# Patient Record
Sex: MALE | Race: WHITE | Employment: FULL TIME | URBAN - METROPOLITAN AREA
[De-identification: names, ages, dates, MRNs, and addresses within clinical notes are randomized per-mention and may not be internally consistent; named-entity substitution may affect disease eponyms.]

---

## 2020-10-15 ENCOUNTER — APPOINTMENT (OUTPATIENT)
Dept: GENERAL RADIOLOGY | Age: 45
DRG: 629 | End: 2020-10-15

## 2020-10-15 ENCOUNTER — HOSPITAL ENCOUNTER (INPATIENT)
Age: 45
LOS: 7 days | Discharge: HOME HEALTH | DRG: 629 | End: 2020-10-22
Attending: EMERGENCY MEDICINE | Admitting: INTERNAL MEDICINE

## 2020-10-15 PROBLEM — L08.9 DIABETIC FOOT INFECTION (HCC): Status: ACTIVE | Noted: 2020-10-15

## 2020-10-15 PROBLEM — E08.40 DIABETIC NEUROPATHY ASSOCIATED WITH DIABETES MELLITUS DUE TO UNDERLYING CONDITION (HCC): Status: ACTIVE | Noted: 2020-10-15

## 2020-10-15 PROBLEM — E11.610 CHARCOT FOOT DUE TO DIABETES MELLITUS (HCC): Status: ACTIVE | Noted: 2020-10-15

## 2020-10-15 PROBLEM — I10 ESSENTIAL HYPERTENSION: Status: ACTIVE | Noted: 2020-10-15

## 2020-10-15 PROBLEM — E11.628 DIABETIC FOOT INFECTION (HCC): Status: ACTIVE | Noted: 2020-10-15

## 2020-10-15 LAB
ALBUMIN SERPL-MCNC: 3.5 G/DL (ref 3.5–5.2)
ALP BLD-CCNC: 105 U/L (ref 40–129)
ALT SERPL-CCNC: 21 U/L (ref 0–40)
ANION GAP SERPL CALCULATED.3IONS-SCNC: 13 MMOL/L (ref 7–16)
AST SERPL-CCNC: 25 U/L (ref 0–39)
BASOPHILS ABSOLUTE: 0.07 E9/L (ref 0–0.2)
BASOPHILS RELATIVE PERCENT: 0.5 % (ref 0–2)
BILIRUB SERPL-MCNC: 0.9 MG/DL (ref 0–1.2)
BUN BLDV-MCNC: 10 MG/DL (ref 6–20)
CALCIUM SERPL-MCNC: 9.3 MG/DL (ref 8.6–10.2)
CHLORIDE BLD-SCNC: 101 MMOL/L (ref 98–107)
CO2: 24 MMOL/L (ref 22–29)
CREAT SERPL-MCNC: 1.1 MG/DL (ref 0.7–1.2)
EOSINOPHILS ABSOLUTE: 0.15 E9/L (ref 0.05–0.5)
EOSINOPHILS RELATIVE PERCENT: 1 % (ref 0–6)
GFR AFRICAN AMERICAN: >60
GFR NON-AFRICAN AMERICAN: >60 ML/MIN/1.73
GLUCOSE BLD-MCNC: 116 MG/DL (ref 74–99)
HCT VFR BLD CALC: 39.7 % (ref 37–54)
HEMOGLOBIN: 12 G/DL (ref 12.5–16.5)
IMMATURE GRANULOCYTES #: 0.28 E9/L
IMMATURE GRANULOCYTES %: 1.9 % (ref 0–5)
LACTIC ACID, SEPSIS: 2.8 MMOL/L (ref 0.5–1.9)
LACTIC ACID: 1.1 MMOL/L (ref 0.5–2.2)
LYMPHOCYTES ABSOLUTE: 0.9 E9/L (ref 1.5–4)
LYMPHOCYTES RELATIVE PERCENT: 6 % (ref 20–42)
MCH RBC QN AUTO: 24.9 PG (ref 26–35)
MCHC RBC AUTO-ENTMCNC: 30.2 % (ref 32–34.5)
MCV RBC AUTO: 82.4 FL (ref 80–99.9)
MONOCYTES ABSOLUTE: 0.46 E9/L (ref 0.1–0.95)
MONOCYTES RELATIVE PERCENT: 3.1 % (ref 2–12)
NEUTROPHILS ABSOLUTE: 13.05 E9/L (ref 1.8–7.3)
NEUTROPHILS RELATIVE PERCENT: 87.5 % (ref 43–80)
PDW BLD-RTO: 14 FL (ref 11.5–15)
PLATELET # BLD: 561 E9/L (ref 130–450)
PMV BLD AUTO: 9.4 FL (ref 7–12)
POTASSIUM SERPL-SCNC: 4.2 MMOL/L (ref 3.5–5)
RBC # BLD: 4.82 E12/L (ref 3.8–5.8)
SODIUM BLD-SCNC: 138 MMOL/L (ref 132–146)
TOTAL PROTEIN: 9.3 G/DL (ref 6.4–8.3)
WBC # BLD: 14.9 E9/L (ref 4.5–11.5)

## 2020-10-15 PROCEDURE — 80053 COMPREHEN METABOLIC PANEL: CPT

## 2020-10-15 PROCEDURE — 85025 COMPLETE CBC W/AUTO DIFF WBC: CPT

## 2020-10-15 PROCEDURE — 1200000000 HC SEMI PRIVATE

## 2020-10-15 PROCEDURE — 87040 BLOOD CULTURE FOR BACTERIA: CPT

## 2020-10-15 PROCEDURE — 87075 CULTR BACTERIA EXCEPT BLOOD: CPT

## 2020-10-15 PROCEDURE — 83605 ASSAY OF LACTIC ACID: CPT

## 2020-10-15 PROCEDURE — 99222 1ST HOSP IP/OBS MODERATE 55: CPT | Performed by: INTERNAL MEDICINE

## 2020-10-15 PROCEDURE — 87070 CULTURE OTHR SPECIMN AEROBIC: CPT

## 2020-10-15 PROCEDURE — 99284 EMERGENCY DEPT VISIT MOD MDM: CPT

## 2020-10-15 PROCEDURE — 36415 COLL VENOUS BLD VENIPUNCTURE: CPT

## 2020-10-15 PROCEDURE — 2580000003 HC RX 258: Performed by: NURSE PRACTITIONER

## 2020-10-15 PROCEDURE — 73630 X-RAY EXAM OF FOOT: CPT

## 2020-10-15 RX ORDER — 0.9 % SODIUM CHLORIDE 0.9 %
1000 INTRAVENOUS SOLUTION INTRAVENOUS ONCE
Status: COMPLETED | OUTPATIENT
Start: 2020-10-15 | End: 2020-10-15

## 2020-10-15 RX ADMIN — SODIUM CHLORIDE 1000 ML: 9 INJECTION, SOLUTION INTRAVENOUS at 21:46

## 2020-10-15 SDOH — HEALTH STABILITY: MENTAL HEALTH: HOW OFTEN DO YOU HAVE A DRINK CONTAINING ALCOHOL?: NEVER

## 2020-10-16 ENCOUNTER — ANESTHESIA EVENT (OUTPATIENT)
Dept: OPERATING ROOM | Age: 45
DRG: 629 | End: 2020-10-16

## 2020-10-16 LAB
ALBUMIN SERPL-MCNC: 2.8 G/DL (ref 3.5–5.2)
ALP BLD-CCNC: 92 U/L (ref 40–129)
ALT SERPL-CCNC: 20 U/L (ref 0–40)
ANION GAP SERPL CALCULATED.3IONS-SCNC: 7 MMOL/L (ref 7–16)
ANTISTREPTOLYSIN-O: 1243 IU/ML (ref 0–200)
AST SERPL-CCNC: 29 U/L (ref 0–39)
BASOPHILS ABSOLUTE: 0.08 E9/L (ref 0–0.2)
BASOPHILS RELATIVE PERCENT: 0.6 % (ref 0–2)
BILIRUB SERPL-MCNC: 0.8 MG/DL (ref 0–1.2)
BUN BLDV-MCNC: 11 MG/DL (ref 6–20)
C-REACTIVE PROTEIN: 10.8 MG/DL (ref 0–0.4)
CALCIUM SERPL-MCNC: 8.3 MG/DL (ref 8.6–10.2)
CHLORIDE BLD-SCNC: 104 MMOL/L (ref 98–107)
CO2: 24 MMOL/L (ref 22–29)
CREAT SERPL-MCNC: 1.1 MG/DL (ref 0.7–1.2)
EKG ATRIAL RATE: 81 BPM
EKG P AXIS: -9 DEGREES
EKG P-R INTERVAL: 134 MS
EKG Q-T INTERVAL: 414 MS
EKG QRS DURATION: 96 MS
EKG QTC CALCULATION (BAZETT): 480 MS
EKG R AXIS: 35 DEGREES
EKG T AXIS: 1 DEGREES
EKG VENTRICULAR RATE: 81 BPM
EOSINOPHILS ABSOLUTE: 0.22 E9/L (ref 0.05–0.5)
EOSINOPHILS RELATIVE PERCENT: 1.6 % (ref 0–6)
GFR AFRICAN AMERICAN: >60
GFR NON-AFRICAN AMERICAN: >60 ML/MIN/1.73
GLUCOSE BLD-MCNC: 134 MG/DL (ref 74–99)
HBA1C MFR BLD: 7.3 % (ref 4–5.6)
HCT VFR BLD CALC: 34.8 % (ref 37–54)
HEMOGLOBIN: 10.6 G/DL (ref 12.5–16.5)
IMMATURE GRANULOCYTES #: 0.28 E9/L
IMMATURE GRANULOCYTES %: 2.1 % (ref 0–5)
LYMPHOCYTES ABSOLUTE: 1.75 E9/L (ref 1.5–4)
LYMPHOCYTES RELATIVE PERCENT: 13 % (ref 20–42)
MCH RBC QN AUTO: 24.9 PG (ref 26–35)
MCHC RBC AUTO-ENTMCNC: 30.5 % (ref 32–34.5)
MCV RBC AUTO: 81.7 FL (ref 80–99.9)
METER GLUCOSE: 107 MG/DL (ref 74–99)
METER GLUCOSE: 126 MG/DL (ref 74–99)
METER GLUCOSE: 142 MG/DL (ref 74–99)
METER GLUCOSE: 156 MG/DL (ref 74–99)
METER GLUCOSE: 94 MG/DL (ref 74–99)
MONOCYTES ABSOLUTE: 0.75 E9/L (ref 0.1–0.95)
MONOCYTES RELATIVE PERCENT: 5.6 % (ref 2–12)
NEUTROPHILS ABSOLUTE: 10.33 E9/L (ref 1.8–7.3)
NEUTROPHILS RELATIVE PERCENT: 77.1 % (ref 43–80)
PDW BLD-RTO: 14.1 FL (ref 11.5–15)
PLATELET # BLD: 479 E9/L (ref 130–450)
PMV BLD AUTO: 9.7 FL (ref 7–12)
POTASSIUM SERPL-SCNC: 4.1 MMOL/L (ref 3.5–5)
RBC # BLD: 4.26 E12/L (ref 3.8–5.8)
SEDIMENTATION RATE, ERYTHROCYTE: 78 MM/HR (ref 0–15)
SODIUM BLD-SCNC: 135 MMOL/L (ref 132–146)
TOTAL PROTEIN: 8.1 G/DL (ref 6.4–8.3)
WBC # BLD: 13.4 E9/L (ref 4.5–11.5)

## 2020-10-16 PROCEDURE — 1200000000 HC SEMI PRIVATE

## 2020-10-16 PROCEDURE — 99233 SBSQ HOSP IP/OBS HIGH 50: CPT | Performed by: INTERNAL MEDICINE

## 2020-10-16 PROCEDURE — 85025 COMPLETE CBC W/AUTO DIFF WBC: CPT

## 2020-10-16 PROCEDURE — 2580000003 HC RX 258: Performed by: INTERNAL MEDICINE

## 2020-10-16 PROCEDURE — 6370000000 HC RX 637 (ALT 250 FOR IP): Performed by: INTERNAL MEDICINE

## 2020-10-16 PROCEDURE — 6360000002 HC RX W HCPCS: Performed by: INTERNAL MEDICINE

## 2020-10-16 PROCEDURE — 94660 CPAP INITIATION&MGMT: CPT

## 2020-10-16 PROCEDURE — 36415 COLL VENOUS BLD VENIPUNCTURE: CPT

## 2020-10-16 PROCEDURE — 99254 IP/OBS CNSLTJ NEW/EST MOD 60: CPT | Performed by: SURGERY

## 2020-10-16 PROCEDURE — 82962 GLUCOSE BLOOD TEST: CPT

## 2020-10-16 PROCEDURE — 83036 HEMOGLOBIN GLYCOSYLATED A1C: CPT

## 2020-10-16 PROCEDURE — 2500000003 HC RX 250 WO HCPCS: Performed by: INTERNAL MEDICINE

## 2020-10-16 PROCEDURE — 93005 ELECTROCARDIOGRAM TRACING: CPT | Performed by: INTERNAL MEDICINE

## 2020-10-16 PROCEDURE — 2580000003 HC RX 258

## 2020-10-16 PROCEDURE — 80053 COMPREHEN METABOLIC PANEL: CPT

## 2020-10-16 PROCEDURE — 86060 ANTISTREPTOLYSIN O TITER: CPT

## 2020-10-16 PROCEDURE — 85651 RBC SED RATE NONAUTOMATED: CPT

## 2020-10-16 PROCEDURE — 86140 C-REACTIVE PROTEIN: CPT

## 2020-10-16 RX ORDER — ATORVASTATIN CALCIUM 10 MG/1
5 TABLET, FILM COATED ORAL NIGHTLY
COMMUNITY

## 2020-10-16 RX ORDER — DEXTROSE MONOHYDRATE 50 MG/ML
100 INJECTION, SOLUTION INTRAVENOUS PRN
Status: DISCONTINUED | OUTPATIENT
Start: 2020-10-16 | End: 2020-10-22 | Stop reason: HOSPADM

## 2020-10-16 RX ORDER — SODIUM CHLORIDE 0.9 % (FLUSH) 0.9 %
SYRINGE (ML) INJECTION
Status: COMPLETED
Start: 2020-10-16 | End: 2020-10-16

## 2020-10-16 RX ORDER — LABETALOL HYDROCHLORIDE 5 MG/ML
10 INJECTION, SOLUTION INTRAVENOUS EVERY 4 HOURS PRN
Status: DISCONTINUED | OUTPATIENT
Start: 2020-10-16 | End: 2020-10-22 | Stop reason: HOSPADM

## 2020-10-16 RX ORDER — CLINDAMYCIN PHOSPHATE 600 MG/50ML
600 INJECTION INTRAVENOUS ONCE
Status: COMPLETED | OUTPATIENT
Start: 2020-10-16 | End: 2020-10-16

## 2020-10-16 RX ORDER — SODIUM CHLORIDE 9 MG/ML
25 INJECTION, SOLUTION INTRAVENOUS EVERY 8 HOURS
Status: DISCONTINUED | OUTPATIENT
Start: 2020-10-16 | End: 2020-10-22 | Stop reason: HOSPADM

## 2020-10-16 RX ORDER — DEXTROSE MONOHYDRATE 25 G/50ML
12.5 INJECTION, SOLUTION INTRAVENOUS PRN
Status: DISCONTINUED | OUTPATIENT
Start: 2020-10-16 | End: 2020-10-22 | Stop reason: HOSPADM

## 2020-10-16 RX ORDER — INSULIN GLARGINE 100 [IU]/ML
15 INJECTION, SOLUTION SUBCUTANEOUS NIGHTLY
COMMUNITY

## 2020-10-16 RX ORDER — NICOTINE POLACRILEX 4 MG
15 LOZENGE BUCCAL PRN
Status: DISCONTINUED | OUTPATIENT
Start: 2020-10-16 | End: 2020-10-22 | Stop reason: HOSPADM

## 2020-10-16 RX ADMIN — SODIUM CHLORIDE, PRESERVATIVE FREE 10 ML: 5 INJECTION INTRAVENOUS at 12:24

## 2020-10-16 RX ADMIN — INSULIN LISPRO 2 UNITS: 100 INJECTION, SOLUTION INTRAVENOUS; SUBCUTANEOUS at 16:29

## 2020-10-16 RX ADMIN — SODIUM CHLORIDE 25 ML: 9 INJECTION, SOLUTION INTRAVENOUS at 15:16

## 2020-10-16 RX ADMIN — SODIUM CHLORIDE 650 MG: 9 INJECTION, SOLUTION INTRAVENOUS at 11:40

## 2020-10-16 RX ADMIN — CLINDAMYCIN IN 5 PERCENT DEXTROSE 600 MG: 12 INJECTION, SOLUTION INTRAVENOUS at 01:07

## 2020-10-16 RX ADMIN — PIPERACILLIN SODIUM AND TAZOBACTAM SODIUM 3.38 G: 3; .375 INJECTION, POWDER, LYOPHILIZED, FOR SOLUTION INTRAVENOUS at 12:24

## 2020-10-16 RX ADMIN — INSULIN LISPRO 1 UNITS: 100 INJECTION, SOLUTION INTRAVENOUS; SUBCUTANEOUS at 22:10

## 2020-10-16 RX ADMIN — PIPERACILLIN SODIUM AND TAZOBACTAM SODIUM 3.38 G: 3; .375 INJECTION, POWDER, LYOPHILIZED, FOR SOLUTION INTRAVENOUS at 20:31

## 2020-10-16 ASSESSMENT — PAIN SCALES - GENERAL
PAINLEVEL_OUTOF10: 0

## 2020-10-16 NOTE — PROGRESS NOTES
[facility] Progress Note    Admitting Date and Time: 10/15/2020  7:57 PM  Admit Dx: Diabetic foot infection ([facility] [address] [E11.628, L08.9]  Diabetic foot infection ([facility] [U11.427, L08.9]    Subjective:  Patient is being followed for Diabetic foot infection ([facility] [C54.604, L08.9]  Diabetic foot infection ([facility] [E11.628, L08.9]   Pt feels     ROS: denies fever, chills, cp, sob, n/v, HA unless stated above.       insulin lispro  0-12 Units Subcutaneous TID WC    insulin lispro  0-6 Units Subcutaneous Nightly    [START ON 10/17/2020] influenza virus vaccine  0.5 mL Intramuscular Prior to discharge    [START ON 10/17/2020] enoxaparin  40 mg Subcutaneous Daily     glucose, 15 g, PRN  dextrose, 12.5 g, PRN  glucagon (rDNA), 1 mg, PRN  dextrose, 100 mL/hr, PRN  labetalol, 10 mg, Q4H PRN         Objective:    /74   Pulse 80   Temp 98.5 °F (36.9 °C) (Oral)   Resp 18   Ht 6' 3\" (1.905 m)   Wt (!) 303 lb (137.4 kg)   SpO2 97%   BMI 37.87 kg/m²     General Appearance: alert and oriented to person, place and time and in no acute distress  Skin: warm and dry  Head: normocephalic and atraumatic  Eyes: pupils equal, round, and reactive to light, extraocular eye movements intact, conjunctivae normal  Neck: neck supple and non tender without mass   Pulmonary/Chest: clear to auscultation bilaterally- no wheezes, rales or rhonchi, normal air movement, no respiratory distress  Cardiovascular: normal rate, normal S1 and S2 and no carotid bruits  Abdomen: soft, non-tender, non-distended, normal bowel sounds, no masses or organomegaly  Extremities: no cyanosis, no clubbing and left foot in dressing had open wound  Neurologic: no cranial nerve deficit and speech normal        Recent Labs     10/15/20  2027 10/16/20  0507    135   K 4.2 4.1    104   CO2 24 24   BUN 10 11   CREATININE 1.1 1.1   GLUCOSE 116* 134*   CALCIUM 9.3 8.3*       Recent Labs     10/15/20  2027 10/16/20  0507   WBC 14.9* 13.4* RBC 4.82 4.26   HGB 12.0* 10.6*   HCT 39.7 34.8*   MCV 82.4 81.7   MCH 24.9* 24.9*   MCHC 30.2* 30.5*   RDW 14.0 14.1   * 479*   MPV 9.4 9.7         Assessment:    Principal Problem:    Diabetic foot infection (HCC)  Active Problems:    Charcot foot due to diabetes mellitus ([facility] [address]    Diabetic neuropathy associated with diabetes mellitus due to underlying condition [facility])    Essential hypertension  Resolved Problems:    * No resolved hospital problems. *      Plan:  1. Open wound, patient has areas today, see, will still clindamycin wound cultures were obtained as well as blood cultures, consult, podiatry and vascular surgery. Patient might need amputation versus debridement,   2. Diabetes type 2, patient is currently on Lantus 15 units and metformin, holding oral hypoglycemic agents, place the patient on sliding scale insulin, consider resuming Lantus as the patient is not n.p.o. anymore. 3.  History of hyperlipidemia, continue atorvastatin. 4.  Elevated blood pressure readings, patient has no history of hypertension, presented with a blood pressure of 170/89, trended down slightly to 120/74 this morning, going to call patient multiple times from low-dose ACE inhibitor      NOTE: This report was transcribed using voice recognition software. Every effort was made to ensure accuracy; however, inadvertent computerized transcription errors may be present.   Electronically signed by Ainsley Willis MD on 10/16/2020 at 9:06 AM

## 2020-10-16 NOTE — ANESTHESIA PRE PROCEDURE
Department of Anesthesiology  Preprocedure Note       Name:  Muna Griggs   Age:  54 y.o.  :  1975                                          MRN:  [id number]         Date:  10/16/2020      Surgeon: Traci Russell):  Kwesi Damico DPM    Procedure: Procedure(s):  LEFT FOOT  INCISION AND DRAINAGE, BONE DEBRIDEMENT AND BIOPSY    Medications prior to admission:   Prior to Admission medications    Medication Sig Start Date End Date Taking?  Authorizing Provider   insulin glargine (LANTUS) 100 UNIT/ML injection vial Inject 15 Units into the skin nightly   Yes Historical Provider, MD   atorvastatin (LIPITOR) 10 MG tablet Take 5 mg by mouth daily   Yes Historical Provider, MD   metFORMIN (GLUCOPHAGE) 500 MG tablet Take 500 mg by mouth daily (with breakfast)   Yes Historical Provider, MD       Current medications:    Current Facility-Administered Medications   Medication Dose Route Frequency Provider Last Rate Last Dose    glucose (GLUTOSE) 40 % oral gel 15 g  15 g Oral PRN Josee Holloway MD        dextrose 50 % IV solution  12.5 g Intravenous PRN Josee Holloway MD        glucagon (rDNA) injection 1 mg  1 mg Intramuscular PRN Josee Holloway MD        dextrose 5 % solution  100 mL/hr Intravenous PRN Josee Holloway MD        insulin lispro (HUMALOG) injection vial 0-12 Units  0-12 Units Subcutaneous TID WC Josee Holloway MD        insulin lispro (HUMALOG) injection vial 0-6 Units  0-6 Units Subcutaneous Nightly MD Talita Morris [START ON 10/17/2020] influenza quadrivalent split vaccine (FLUZONE;FLUARIX;FLULAVAL;AFLURIA) injection 0.5 mL  0.5 mL Intramuscular Prior to discharge MD Talita Reed [START ON 10/17/2020] enoxaparin (LOVENOX) injection 40 mg  40 mg Subcutaneous Daily Josee Holloway MD        labetalol (NORMODYNE;TRANDATE) injection 10 mg  10 mg Intravenous Q4H PRN Josee Holloway MD        DAPTOmycin (CUBICIN) 650 mg in sodium chloride 0.9 % 50 mL IVPB  6 mg/kg (Adjusted) Intravenous Q24H Orlena Beth, DO   Stopped at 10/16/20 1210    piperacillin-tazobactam (ZOSYN) 3.375 g in dextrose 5 % 50 mL IVPB extended infusion (mini-bag)  3.375 g Intravenous Q8H Orlena Beth, DO 12.5 mL/hr at 10/16/20 1224 3.375 g at 10/16/20 1224    0.9 % sodium chloride infusion  25 mL Intravenous Q8H Orlena [place], DO           Allergies: Allergies   Allergen Reactions    Vancomycin Other (See Comments)     Red man syndrome       Problem List:    Patient Active Problem List   Diagnosis Code    Diabetic foot infection ([facility] [address] E11.628, L08.9    Charcot foot due to diabetes mellitus ([facility] [address] E11.610    Diabetic neuropathy associated with diabetes mellitus due to underlying condition ([facility] E08.40    Essential hypertension I10       Past Medical History:        Diagnosis Date    Charcot foot due to diabetes mellitus ([facility]     Diabetes mellitus ([facility]     Hypertension        Past Surgical History:        Procedure Laterality Date    ANKLE FUSION  02/2020       Social History:    Social History     Tobacco Use    Smoking status: Never Smoker    Smokeless tobacco: Never Used   Substance Use Topics    Alcohol use: Never     Frequency: Never                                Counseling given: Not Answered      Vital Signs (Current):   Vitals:    10/15/20 2300 10/16/20 0015 10/16/20 0514 10/16/20 0753   BP: (!) 131/56 (!) 143/80  120/74   Pulse: 87 98  80   Resp: 17 18  18   Temp: 36.7 °C (98 °F) 36.9 °C (98.5 °F)  36.9 °C (98.5 °F)   TempSrc: Oral Oral  Oral   SpO2: 98% 98%  97%   Weight:   (!) 303 lb (137.4 kg)    Height:                                                  BP Readings from Last 3 Encounters:   10/16/20 120/74       NPO Status:  verbalized agreement of NPO after midnight. BMI:   Wt Readings from Last 3 Encounters:   10/16/20 (!) 303 lb (137.4 kg)     Body mass index is 37.87 kg/m².     CBC:   Lab Results Component Value Date    WBC 13.4 10/16/2020    RBC 4.26 10/16/2020    HGB 10.6 10/16/2020    HCT 34.8 10/16/2020    MCV 81.7 10/16/2020    RDW 14.1 10/16/2020     10/16/2020       CMP:   Lab Results   Component Value Date     10/16/2020    K 4.1 10/16/2020     10/16/2020    CO2 24 10/16/2020    BUN 11 10/16/2020    CREATININE 1.1 10/16/2020    GFRAA >60 10/16/2020    LABGLOM >60 10/16/2020    GLUCOSE 134 10/16/2020    PROT 8.1 10/16/2020    CALCIUM 8.3 10/16/2020    BILITOT 0.8 10/16/2020    ALKPHOS 92 10/16/2020    AST 29 10/16/2020    ALT 20 10/16/2020       POC Tests: No results for input(s): POCGLU, POCNA, POCK, POCCL, POCBUN, POCHEMO, POCHCT in the last 72 hours.     Coags: No results found for: PROTIME, INR, APTT    HCG (If Applicable): No results found for: PREGTESTUR, PREGSERUM, HCG, HCGQUANT     ABGs: No results found for: PHART, PO2ART, MZX4WAQ, FLC1BVR, BEART, H4JAKJOR     Type & Screen (If Applicable):  No results found for: LABABO, LABRH    Drug/Infectious Status (If Applicable):  No results found for: HIV, HEPCAB    COVID-19 Screening (If Applicable): No results found for: COVID19      Anesthesia Evaluation  Patient summary reviewed and Nursing notes reviewed no history of anesthetic complications:   Airway: Mallampati: II  TM distance: >3 FB   Neck ROM: full  Mouth opening: > = 3 FB Dental: normal exam         Pulmonary: breath sounds clear to auscultation                             Cardiovascular:  Exercise tolerance: poor (<4 METS),   (+) hypertension:, hyperlipidemia      ECG reviewed  Rhythm: regular  Rate: normal           Beta Blocker:  Not on Beta Blocker         Neuro/Psych:   (+) neuromuscular disease (diebetic neuropathy ):,             GI/Hepatic/Renal:   (+) GERD: well controlled,           Endo/Other:    (+) DiabetesType II DM, using insulin, blood dyscrasia: anticoagulation therapy and anemia:., .          Pt had no PAT visit        ROS comment: obese Abdominal: Vascular:   + PVD, aortic or cerebral (peripheral vasc disease), .                            10/16/2020  7:33 AM - [place], Mhy Incoming Ekg Results From [facility]     Component  Value  Ref Range & Units  Status  Collected  Lab    Ventricular Rate  81  BPM  Incomplete  10/16/2020 7:22 AM  [facility]    Atrial Rate  81  BPM  Incomplete  10/16/2020 7:22 AM  [facility]    P-R Interval  134  ms  Incomplete  10/16/2020 7:22 AM  [facility]    QRS Duration  96  ms  Incomplete  10/16/2020 7:22 AM  [facility]    Q-T Interval  414  ms  Incomplete  10/16/2020 7:22 AM  [facility]    QTc Calculation (Bazett)  480  ms  Incomplete  10/16/2020 7:22 AM  [facility]    P Axis  -9  degrees  Incomplete  10/16/2020 7:22 AM  [facility]    R Axis  35  degrees  Incomplete  10/16/2020 7:22 AM  [facility]    T Axis  1  degrees  Incomplete  10/16/2020 7:22 AM  [facility]    Testing Performed By     Lab - Abbreviation  Name  Director  Address  Valid Date Range    360-[facility]  [facility] MUSE  Unknown  Unknown  04/18/16 0721-Present    Narrative & Impression     Normal sinus rhythm  Prolonged QT  Abnormal ECG  No previous ECGs available          Anesthesia Plan      MAC     ASA 3       Induction: intravenous. MIPS: Prophylactic antiemetics administered. Anesthetic plan and risks discussed with patient. Plan discussed with CRNA and attending. Gill Gallegos RN   10/16/2020    DOS STAFF ADDENDUM:    Pt seen and examined, physical exam updated, chart reviewed including anesthesia, drug and allergy history. H&P reviewed. No interval changes to history or physical examination (unless noted above). NPO status confirmed. Anesthetic plan, risks, benefits, alternatives discussed with patient. Patient verbalized an understanding and agrees to proceed.      Madelyn Krabbe, MD  Staff Anesthesiologist  6:23 AM

## 2020-10-16 NOTE — ED PROVIDER NOTES
ED Attending  CC: Amber       Department of Emergency Medicine   ED  Provider Note  Admit Date/RoomTime: 10/15/2020  7:57 PM  ED Room: \[facility]\""/[place] [address]  MRN: [id number]  Chief Complaint:   Wound Check (left foot wound since Saturday, hx DM)       History of Present Illness   Source of history provided by:  patient. History/Exam Limitations: none. Lucia Casanova is a 54 y.o. male who has a past medical history of:   Past Medical History:   Diagnosis Date    Charcot foot due to diabetes mellitus ([place] [address]     Diabetes mellitus ([facility] [address]     Hypertension     presents to the ED by private vehicle for left foot wound, beginning 5 days ago and are gradually worsening since that time. The complaint has been constant, severe in severity. Patient states he is a type II diabetic and is on insulin. He states that he has a podiatrist, endocrinologist, and infectious disease doctor in [place]. He states he has an appointment with all of his specialties on Monday. He states walking on it makes it worse and nothing makes it better. He denies headache, fever, chills, chest pain, shortness of breath, cough, abdominal pain, nausea, vomiting, diarrhea, constipation, dysuria, or trauma. ROS   Pertinent positives and negatives are stated within HPI, all other systems reviewed and are negative. Past Surgical History:   Procedure Laterality Date    ANKLE FUSION  02/2020   Social History:  reports that he has never smoked. He has never used smokeless tobacco. He reports that he does not drink alcohol or use drugs. Family History: family history is not on file.    Allergies: Vancomycin    Physical Exam Section   Oxygen Saturation Interpretation: Normal.   ED Triage Vitals [10/15/20 1753]   BP Temp Temp Source Pulse Resp SpO2 Height Weight   (!) 170/89 97.6 °F (36.4 °C) Temporal 96 18 99 % 6' 3\" (1.905 m) 280 lb (127 kg)       Physical Exam  · Constitutional/General: Alert and oriented x3, well appearing, non toxic.  · HEENT:  NC/NT. PERRLA,  Airway patent. · Neck: Supple, full ROM, non tender to palpation in the midline, no stridor, no crepitus, no meningeal signs  · Respiratory: Lungs clear to auscultation bilaterally, no wheezes, rales, or rhonchi. Not in respiratory distress  · CV:  Regular rate. Regular rhythm. No murmurs, gallops, or rubs. 2+ distal pulses  · Chest: No chest wall tenderness  · GI:  Abdomen Soft, Non tender, Non distended. +BS. No rebound, guarding, or rigidity. No pulsatile masses. · Musculoskeletal: Moves all extremities x 4. Warm and well perfused, no clubbing, cyanosis, or edema. Capillary refill <3 seconds  · Integument: skin warm and dry. No rashes. See image below of left foot wound. Wound culture went down to the bone. · Lymphatic: no lymphadenopathy noted  · Neurologic: GCS 15, no focal deficits, symmetric strength 5/5 in the upper and lower extremities bilaterally  · Psychiatric: Normal Affect            **Informed Consent**    The patient has given verbal consent to have photos taken of left foot and inserted into their ED Provider Note as part of their permanent medical record for purposes of documentation, treatment management and/or medical review. All Images taken on 10/15/20 of patient name: Katheryn Crowder were transmitted and stored on secured [employer] located within [facility] by a registered [employer] Mobile Application Device.          Lab / Imaging Results   (All laboratory and radiology results have been personally reviewed by myself)  Labs:  Results for orders placed or performed during the hospital encounter of 10/15/20   CBC Auto Differential   Result Value Ref Range    WBC 14.9 (H) 4.5 - 11.5 E9/L    RBC 4.82 3.80 - 5.80 E12/L    Hemoglobin 12.0 (L) 12.5 - 16.5 g/dL    Hematocrit 39.7 37.0 - 54.0 %    MCV 82.4 80.0 - 99.9 fL    MCH 24.9 (L) 26.0 - 35.0 pg    MCHC 30.2 (L) 32.0 - 34.5 %    RDW 14.0 11.5 - 15.0 fL    Platelets 585 (H) 955 - 450 E9/L this time until the patient is evaluated by podiatry. Procedures:   none    MDM: Patient presented with diabetic foot wound to the bottom of his left foot. Patient's primary care, podiatry, endocrinology, and infectious disease specialists are all in [place]. Patient was offered transfer, but declined. His diagnostics were reviewed and discussed with him and internal medicine. Patient requires admission for further treatment evaluation. He was stable in the emergency department and appeared otherwise well and nontoxic. Counseling:   I have spoken with the patient and discussed todays results, in addition to providing specific details for the plan of care and counseling regarding the diagnosis and prognosis and are agreeable with the plan. Assessment      1. Diabetic foot infection ([address]      This patient's ED course included: a personal history and physicial examination  This patient has remained hemodynamically stable during their ED course. Plan   Admit to med/surg floor. Patient condition is stable. New Medications     New Prescriptions    No medications on file     Electronically signed by OPAL Ha CNP   DD: 10/15/20  **This report was transcribed using voice recognition software. Every effort was made to ensure accuracy; however, inadvertent computerized transcription errors may be present.   END OF PROVIDER NOTE       OPAL Liz CNP  10/15/20 5906

## 2020-10-16 NOTE — CONSULTS
Podiatry Consult H&P  10/16/2020   Mily Reilly       SUBJECTIVE: This is a 54 y.o. male seen bedside for worsening L foot wound. Patient states the wound appeared Saturday and has been getting progressively worse since then. Patient previously saw a podiatrist in [place] but has recently moved and does not have a provider in this area. Patient states he has had wounds in the past but they have all healed very well without issue. Patient denies any nausea, vomiting, fever or chills. Patient denies any other pedal complaints. Past Medical History:   Diagnosis Date    Charcot foot due to diabetes mellitus ([facility] [address]     Diabetes mellitus ([facility] [facility]     Hypertension         Past Surgical History:   Procedure Laterality Date    ANKLE FUSION  02/2020         History reviewed. No pertinent family history. Social History     Tobacco Use    Smoking status: Never Smoker    Smokeless tobacco: Never Used   Substance Use Topics    Alcohol use: Never     Frequency: Never        Prior to Admission medications    Medication Sig Start Date End Date Taking? Authorizing Provider   insulin glargine (LANTUS) 100 UNIT/ML injection vial Inject 15 Units into the skin nightly   Yes Historical Provider, MD   atorvastatin (LIPITOR) 10 MG tablet Take 5 mg by mouth daily   Yes Historical Provider, MD   metFORMIN (GLUCOPHAGE) 500 MG tablet Take 500 mg by mouth daily (with breakfast)   Yes Historical Provider, MD        Vancomycin         OBJECTIVE:        Vitals:    10/16/20 0753   BP: 120/74   Pulse: 80   Resp: 18   Temp: 98.5 °F (36.9 °C)   SpO2: 97%              EXAM:        Pt is AAOx3    Vascular Exam:  DP and PT pulses are +1 on the right . DP and PT pulses are +1 on the left. CFT is <3 seconds bilateral lower extremity.   Edema is Present L foot/ankle    Neuro Exam:  Light touch sensation is Absent  bilaterally Protective sensation is absent 0/10 using Meeker-Lexx monofilament bilaterally    Dermatologic Exam: L foot wound pictured below. Plantar wound probes down to bone and communicates with small dorsal wound. Wound is malodorous with purulent drainage, periwound erythema and edema. MSK: Muscle strength 5/5 Bilateral  ROM is full without pain or crepitation bilateral.  No Pain on palpation Left foot/feet. Charcot deformity of LLE with rocker bottom foot type.     Current Facility-Administered Medications   Medication Dose Route Frequency Provider Last Rate Last Dose    glucose (GLUTOSE) 40 % oral gel 15 g  15 g Oral PRN Soraya Hitchcock MD        dextrose 50 % IV solution  12.5 g Intravenous PRN Soraya Hitchcock MD        glucagon (rDNA) injection 1 mg  1 mg Intramuscular PRN Soraya Hitchcock MD        dextrose 5 % solution  100 mL/hr Intravenous PRN Soraya Hitchcock MD        insulin lispro (HUMALOG) injection vial 0-12 Units  0-12 Units Subcutaneous TID WC Soraya Hitchcock MD        insulin lispro (HUMALOG) injection vial 0-6 Units  0-6 Units Subcutaneous Nightly MD Jann Hester [START ON 10/17/2020] influenza quadrivalent split vaccine (FLUZONE;FLUARIX;FLULAVAL;AFLURIA) injection 0.5 mL  0.5 mL Intramuscular Prior to discharge MD Jann Serrano [START ON 10/17/2020] enoxaparin (LOVENOX) injection 40 mg  40 mg Subcutaneous Daily Soraya Hitchcock MD        labetalol (NORMODYNE;TRANDATE) injection 10 mg  10 mg Intravenous Q4H PRN Soraya Hitchcock MD            Lab Results   Component Value Date    WBC 13.4 (H) 10/16/2020    HCT 34.8 (L) 10/16/2020    HGB 10.6 (L) 10/16/2020     (H) 10/16/2020     10/16/2020    K 4.1 10/16/2020     10/16/2020    CO2 24 10/16/2020    BUN 11 10/16/2020    CREATININE 1.1 10/16/2020    GLUCOSE 134 (H) 10/16/2020    CRP 10.8 (H) 10/16/2020         Radiographs:    ASSESSMENT:  Principal Problem:  Full thickness would of L foot to the level of bone, infected  Osteomyelitis, L foot  Charcot deformity of LLE  Diabetes with neuropathy PLAN:  -Evaluation and Management  -Charts and labs reviewed  -ID following, appreciate input  -Vascular following, from their standpoint we are clear to proceed with debridement for source control.  -L foot x-rays show osseous erosion and subcutaneous air. Based on clinical presentations we think the subcutaneous air is trapped free as the wound is wide and gaping, not indicative of a gas gangrene requiring emergent surgical intervention.   -That being said, would is still clinically infected requiring surgical intervention. Discussed this with patient and he is agreeable to debridement. All details of procedure were explained including any and all risks and complications.   -Patient scheduled for R foot incision and drainage with bone debridement and biopsy tomorrow 7:30am.  -Plan for possible application of wound vac 1-2 days are debridement. -NPO midnight  -Obtain surgical consent  -Patient discussed with Dr. Rosario Castillo.        Victorina Hampton   10/16/2020   9:31 AM

## 2020-10-16 NOTE — CONSULTS
[address] [place] [address] [facility]    Consultation Note     Admit Date: 10/15/2020  7:57 PM    Reason for Consult:          -- diabetic foot infection +charcot joint          Attending Physician:  Giuilano Saenzr, MD     Chief Complaint: Left diabetic foot infection    HISTORY OF PRESENT ILLNESS:     The patient is a 54 y.o.  male not previous known to the Infectious Diseases service. The patient has been a type II diabetic for approximately 10 years. He has a history of Charcot arthropathy and has had numerous procedures on his right foot. Recently developed an ulceration on his left foot. He had been managed by his podiatrist in [place] and he was using a walking boot until several days ago when he removed the boot and noticed severe maceration of the entire plantar aspect of his foot. Showed his wife and she urged him come to the emergency room. He tried local wound care with equipment that he had at home from previous infections with no improvement. He did notice significant drainage and discomfort of the left foot. In the emergency room wound culture tips pushed through necrotic tissue to bone. He denies any fever chills nausea or vomiting. He has been previously treated with vancomycin for approximately 6 weeks but at the end of therapy developed \"red man\" syndrome. 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 He was previously treated with daptomycin.     Past Medical History:          Diagnosis Date    Charcot foot due to [facility] mellitus ([facility]     Diabetes mellitus ([facility]     Hypertension      Past Surgical History:          Procedure Laterality Date    ANKLE FUSION  02/2020     Current Medications:     Scheduled Meds:   insulin lispro  0-12 Units Subcutaneous TID     insulin lispro  0-6 Units Subcutaneous Nightly    [START ON 10/17/2020] influenza virus vaccine  0.5 mL Intramuscular Prior to discharge    [START ON 10/17/2020] enoxaparin  40 mg Subcutaneous Daily     Continuous Infusions:   dextrose       PRN Meds:glucose, dextrose, glucagon (rDNA), dextrose, labetalol    Allergies:  Vancomycin    Social History:     Social History     Socioeconomic History    Marital status:      Spouse name: None    Number of children: None    Years of education: None    Highest education level: None   Occupational History    None   Social Needs    Financial resource strain: None    Food insecurity     Worry: None     Inability: None    Transportation needs     Medical: None     Non-medical: None   Tobacco Use    Smoking status: Never Smoker    Smokeless tobacco: Never Used   Substance and Sexual Activity    Alcohol use: Never     Frequency: Never    Drug use: Never    Sexual activity: Not Currently   Lifestyle    Physical activity     Days per week: None     Minutes per session: None    Stress: None   Relationships    Social connections     Talks on phone: None     Gets together: None     Attends Gnosticist service: None     Active member of club or organization: None     Attends meetings of clubs or organizations: None     Relationship status: None    Intimate partner violence     Fear of current or ex partner: None     Emotionally abused: None     Physically abused: None     Forced sexual activity: None   Other Topics Concern    None   Social History Narrative    None         Family History:     History reviewed. No pertinent family history. . Otherwise non-pertinent to the chief complaint. REVIEW OF SYSTEMS:    CONSTITUTIONAL:  No chills, fevers or night sweats. No loss of weight. EYES:  No double vision or drainage from eyes, ears or throat. HEENT:  No neck stiffness. No dysphagia. No drainage from eyes, ears or throat  RESPIRATORY:  No cough, productive sputum or hemoptysis. CARDIOVASCULAR:  No chest pain, palpitations, orthopnea or dyspnea on exertion.   GASTROINTESTINAL:  No nausea, vomiting, diarrhea or constipation or hematochezia GENITOURINARY:  No frequency burning dysuria or hematuria. INTEGUMENT/BREAST:  No rash or breast masses. HEMATOLOGIC/LYMPHATIC:  No lymphadenopathy or blood dyscrasics. ALLERGIC/IMMUNOLOGIC:  No anaphylaxis. ENDOCRINE:  No polyuria or polydipsia or temperature intolerance. MUSCULOSKELETAL: Charcot arthropathy  NEUROLOGICAL:  No focal motor sensory deficit. BEHAVIOR/PSYCH:  No psychosis. PHYSICAL EXAM:      Vitals:    /74   Pulse 80   Temp 98.5 °F (36.9 °C) (Oral)   Resp 18   Ht 6' 3\" (1.905 m)   Wt (!) 303 lb (137.4 kg)   SpO2 97%   BMI 37.87 kg/m²   Constitutional: The patient is awake, alert, and oriented. Skin: Warm and dry. No rashes were noted. No jaundice. HEENT: Eyes show round, and reactive pupils. Moist mucous membranes, no ulcerations, no thrush. Neck: Supple to movements. No lymphadenopathy. Chest: No use of accessory muscles to breathe. Symmetrical expansion. Auscultation reveals no wheezing, crackles, or rhonchi. Cardiovascular: Regular rate and rhythm. No murmurs appreciated. Abdomen: Positive bowel sounds to auscultation. Benign to palpation. No masses felt. No hepatosplenomegaly.   Extremities: Dressing left foot clean and dry-see photo  Neurological: No focal neurologic deficits                            CBC+dif:  Recent Labs     10/15/20  2027 10/16/20  0507   WBC 14.9* 13.4*   HGB 12.0* 10.6*   HCT 39.7 34.8*   MCV 82.4 81.7   * 479*   NEUTROABS 13.05* 10.33*     Lab Results   Component Value Date    CRP 10.8 (H) 10/16/2020     No results found for: CRP  Lab Results   Component Value Date    SEDRATE 78 (H) 10/16/2020     Lab Results   Component Value Date    ALT 20 10/16/2020    AST 29 10/16/2020    ALKPHOS 92 10/16/2020    BILITOT 0.8 10/16/2020     Lab Results   Component Value Date     10/16/2020    K 4.1 10/16/2020     10/16/2020    CO2 24 10/16/2020    BUN 11 10/16/2020    CREATININE 1.1 10/16/2020    GFRAA >60 10/16/2020    LABGLOM >60 10/16/2020    GLUCOSE 134 10/16/2020    PROT 8.1 10/16/2020    LABALBU 2.8 10/16/2020    CALCIUM 8.3 10/16/2020    BILITOT 0.8 10/16/2020    ALKPHOS 92 10/16/2020    AST 29 10/16/2020    ALT 20 10/16/2020       No results found for: PROTIME, INR    No results found for: TSH    No results found for: NITRITE, COLORU, PHUR, LABCAST, WBCUA, RBCUA, MUCUS, TRICHOMONAS, YEAST, BACTERIA, CLARITYU, SPECGRAV, LEUKOCYTESUR, UROBILINOGEN, BILIRUBINUR, BLOODU, GLUCOSEU, AMORPHOUS    No results found for: AIZ7QIB, BEART, N7RYJKGL, PHART, THGBART, MBV8NST, PO2ART, NTV8TMQ     Radiology:  XR FOOT LEFT (MIN 3 VIEWS)   Final Result   1. Mid foot plantar surface ulceration with subjacent subcutaneous air and   evidence of osseous erosion concerning for osseous infection. 2.  Osseous fusion of the osseous components of the midfoot. Microbiology:  Pending  No results for input(s): BC in the last 72 hours. No results for input(s): ORG in the last 72 hours. No results for input(s): Alba Jimmy in the last 72 hours. No results for input(s): STREPNEUMAGU in the last 72 hours. No results for input(s): LP1UAG in the last 72 hours. Recent Labs     10/16/20  0507   ASO 1243*     No results for input(s): CULTRESP in the last 72 hours. Problem list:    Principal Problem:    Diabetic foot infection ([facility]  Active Problems:    Charcot foot due to diabetes mellitus ([facility]    Diabetic neuropathy associated with diabetes mellitus due to underlying condition [facility])    Essential hypertension  Resolved Problems:    * No resolved hospital problems. *      Assessment:    · Severe diabetic foot infection left foot with osteomyelitis  · Charcot arthropathy  · Type 2 diabetes mellitus    Plan:      · Zosyn 3.375 g IV every 8 hours  · Daptomycin 6 mg/kg daily  · Check blood and wound cultures  · Baseline ESR, CRP noted. · Monitor labs  · Will follow with you    Thank you for having us see this patient in consultation.  I will be discussing this case with the treating physicians.       Electronically signed by Moses Mitchell DO on 10/16/2020 at 10:32 AM

## 2020-10-16 NOTE — PROGRESS NOTES
Perfect serve sent to Dr. Anjana Pugh regarding consult. Awaiting response.     Electronically signed by Tara Lowe RN on 10/16/2020 at 6:03 AM

## 2020-10-16 NOTE — CONSULTS
Vascular Surgery Consultation Note    Reason for Consult:  Diabetic foot, ? Amputation, rule out necrotizing fasciitis    HPI :    This is a 54 y.o. male who is admitted to the hospital for treatment of left foot wound and pain that he states began Saturday. He states it wasn't getting better so he came to the hospital. He has had multiple prior wounds and surgeries to his foot. States his sugars are usually normal. Admits to neuropathy. He has a boot for left foot but he has not been wearing it. Vascular surgery is consulted for evaluation and treatment of left foot wound. Pt has had pain for the past 6 days. They have 10/10 pain associated with this problem.       ROS : Negative if blank [], Positive if [x]  General Vascular   [] Fevers [] Claudication (Blocks)   [] Chills [] Rest Pain   [] Weight Loss [x] Tissue Loss   [] Chest Pain [] Clotting Disorder    [] SOB at rest [] Leg Swelling   [] SOB with exertion [] DVT/PE      [] Nausea    [] Vomitting [] Stroke/TIA   [] Abdominal Pain [] Focal weakness   [] Melena [] Slurred Speech   [] Hematochezia [] Vision Changes   [] Hematuria    [] Dysuria [] Hx of Central Catheters   [] Wears Glasses/Contacts  [] Dialysis and If so date initiated   [] Blindness     [x] Right Hand Dominant   [] Difficulty swallowing        Past Medical History:   Diagnosis Date    Charcot foot due to diabetes mellitus ([facility]     Diabetes mellitus ([facility]     Hypertension         Past Surgical History:   Procedure Laterality Date    ANKLE FUSION  02/2020     Current Medications:    dextrose        glucose, dextrose, glucagon (rDNA), dextrose, labetalol    insulin lispro  0-12 Units Subcutaneous TID WC    insulin lispro  0-6 Units Subcutaneous Nightly    [START ON 10/17/2020] influenza virus vaccine  0.5 mL Intramuscular Prior to discharge    [START ON 10/17/2020] enoxaparin  40 mg Subcutaneous Daily        Allergies:  Vancomycin    Social History     Socioeconomic History    Marital status:      Spouse name: Not on file    Number of children: Not on file    Years of education: Not on file    Highest education level: Not on file   Occupational History    Not on file   Social Needs    Financial resource strain: Not on file    Food insecurity     Worry: Not on file     Inability: Not on file    Transportation needs     Medical: Not on file     Non-medical: Not on file   Tobacco Use    Smoking status: Never Smoker    Smokeless tobacco: Never Used   Substance and Sexual Activity    Alcohol use: Never     Frequency: Never    Drug use: Never    Sexual activity: Not Currently   Lifestyle    Physical activity     Days per week: Not on file     Minutes per session: Not on file    Stress: Not on file   Relationships    Social connections     Talks on phone: Not on file     Gets together: Not on file     Attends Episcopalian service: Not on file     Active member of club or organization: Not on file     Attends meetings of clubs or organizations: Not on file     Relationship status: Not on file    Intimate partner violence     Fear of current or ex partner: Not on file     Emotionally abused: Not on file     Physically abused: Not on file     Forced sexual activity: Not on file   Other Topics Concern    Not on file   Social History Narrative    Not on file        History reviewed. No pertinent family history.     PHYSICAL EXAM:    BP (!) 143/80   Pulse 98   Temp 98.5 °F (36.9 °C) (Oral)   Resp 18   Ht 6' 3\" (1.905 m)   Wt (!) 303 lb (137.4 kg)   SpO2 98%   BMI 37.87 kg/m²   CONSTITUTIONAL:  awake, alert, cooperative, no apparent distress, and appears stated age  Normal  LUNGS:  no increased work of breathing, good air exchange  CARDIOVASCULAR:  regular rate and rhythm no murmur noted  ABDOMEN:  soft, non-distended, non-tender, Aorta is not palpable  SKIN:  Breakdown of skin left foot with erythema, induration  EXTREMITIES:   R UE Swelling absent Incisions absent       5/5 Strength  L UE Swelling absent Incisions absent       5/5 Strength  R LE Edema present  Incisions present   Varicose veins absent    Wounds absent, normalcaprefill   5/5 Strength, neuropathy is present  L LE Edema present  Incisions absent    Varicose veins absent    Wounds present, normalcaprefill   5/5 Strength, neuropathy is present  R brachial 2 L brachial 2   R radial 1 L radial 1   R femoral 2 L femoral 2   R popliteal -             L popliteal -   R posterior tibial 1 L posterior tibial multiphasic   R dorsalis pedis 1 L dorsalis pedis multiphasic     LABS:    Lab Results   Component Value Date    WBC 13.4 (H) 10/16/2020    HGB 10.6 (L) 10/16/2020    HCT 34.8 (L) 10/16/2020     (H) 10/16/2020    K 4.1 10/16/2020    BUN 11 10/16/2020    CREATININE 1.1 10/16/2020       RADIOLOGY:    Assesment/Plan  Diabetic foot ulcer    - will need debridement vs amputation, podiatry on board   - continue IV abx as per primary  - NPO, IVFs  - Will discuss with Dr. Cami Swenson     This patient was seen and examined. Agree with above. He has no calf pain or tenderness on physical examination. He has a multiphasic DP and PT signal bilaterally. I discussed the options. Podiatry has been consulted. I would recommend aggressive debridement for source control of the infection. We will order ankle-brachial indices. However I do not think these need to be done or completed prior to source control of the infection and podiatry can proceed with aggressive debridement and source control of the left lower extremity infection. I did talk with the patient about ongoing worsening of his foot including a left lower extremity amputation sepsis and/or death he understands and would like to try to do everything to save his lower extremity. He does have a history of bilateral Charcot joints. With a prior ankle fusion of his right lower extremity. Unfortunately this is been going on since Saturday. He was unaware secondary to neuropathy of his lower extremity. We will await to see how he progresses after podiatry evaluation and treatment.     Pertinent radiology and labs were reviewed    Preston Bush MD

## 2020-10-16 NOTE — PROGRESS NOTES
[facility] Quality Flow/Interdisciplinary Rounds Progress Note        Quality Flow Rounds held on October 16, 2020    Disciplines Attending:  Bedside Nurse, ,  and Nursing Unit Leadership    Alondra Van was admitted on 10/15/2020  7:57 PM    Anticipated Discharge Date:  Expected Discharge Date: 10/19/20    Disposition:    Eamon Score:  Eamon Scale Score: 22    Readmission Risk              Risk of Unplanned Readmission:        9           Discussed patient goal for the day, patient clinical progression, and barriers to discharge.   The following Goal(s) of the Day/Commitment(s) have been identified:  Diagnostics - Report Results and Labs - Report Results, New ID consult      Gurmeet Bass  October 16, 2020

## 2020-10-16 NOTE — PROGRESS NOTES
Perfect serve sent to Dr. Prakash Rowe regarding consult. Dr. Prakash Rowe called unit, stated he will send residents to see him this morning.      Electronically signed by Lisbeth Bajwa RN on 10/16/2020 at 6:06 AM

## 2020-10-16 NOTE — CARE COORDINATION
Met with patient about diagnosis and discharge plan of care. Pt lives with spouse in 2nd floor apt. Pt has [employer] and cane at home, no other DME or home care services. Pt has NO PCP or health insurance at this time. Pt has a diabetic infected wound on his left foot. Pt on IV antibiotics and plans are for OR in the am with possible wound vac. Pt WILL need some assistance for home vs possible ZINA. Waiting for HELP to see pt. Tentative referral given to Leila from [facility].  Will need to follow-MJO

## 2020-10-16 NOTE — PROGRESS NOTES
Per Dr. Isma Corbin, called [place] [phone], [place] @ 2042.  advised will have [address] return call. Pt indicated he has extensive wound care history for diabetic foot wound. Per Dr. Isma Corbin, will request for last year. Completed Release of Information form seeking records from 10/01/2019 to 10/15/2020. Jez Guillen called from [facility] @ 2046. Stated Medical Records available until at least 2100. Called # provided: [phone] @ 2047. To fax signed release to: [phone]. Daniel David advised available until 2200. Pt advised, upon request of signature on Release form, that it is not [facility], but [facility] [address] [place] [address] this facility @ 2106    Manager advised to fax completed release to: [phone]. Faxed signed release @ 2125    Received records via fax @ [phone]. Gave to Dr. Isma Corbin.

## 2020-10-16 NOTE — PLAN OF CARE
Problem: Pain:  Goal: Pain level will decrease  Description: Pain level will decrease  10/16/2020 1159 by Bebeto Price RN  Outcome: Met This Shift  10/16/2020 0127 by Brunilda Romano RN  Outcome: Met This Shift  Goal: Control of acute pain  Description: Control of acute pain  10/16/2020 1159 by Bebeto Price RN  Outcome: Met This Shift  10/16/2020 0127 by Brunilda Romano RN  Outcome: Met This Shift  Goal: Control of chronic pain  Description: Control of chronic pain  10/16/2020 1159 by Bebeto Price RN  Outcome: Met This Shift  10/16/2020 0127 by Brunilda Romano RN  Outcome: Met This Shift     Problem: Skin Integrity:  Goal: Will show no infection signs and symptoms  Description: Will show no infection signs and symptoms  10/16/2020 0127 by Brunilda Romano RN  Outcome: Ongoing  Goal: Absence of new skin breakdown  Description: Absence of new skin breakdown  10/16/2020 0127 by Brunilda Romano RN  Outcome: Ongoing

## 2020-10-16 NOTE — H&P
has never used smokeless tobacco.  ETOH:   reports no history of alcohol use. Family History:   History reviewed. No pertinent family history. Or deferred/otherwise considered non contributory to current admission  PHYSICAL EXAM:    VS: BP (!) 170/89   Pulse 96   Temp 97.6 °F (36.4 °C) (Temporal)   Resp 18   Ht 6' 3\" (1.905 m)   Wt 280 lb (127 kg)   SpO2 99%   BMI 35.00 kg/m²     General Appearance:     no acute distress. Psych:  HEENT:    A.O. As per HPI details  NC/AT, PERRL, no pallor no icterus, lips/ext mucous membrane grossly N    Neck:   Supple, trachea midline, no obvious JVD   Resp:     Dec bs, No wheezes, No rhonchi   Chest wall:    No tenderness or deformity   Heart:    Regular rate and rhythm, S1 and S2 normal, no rub or gallop. Abdomen:     Soft, non-tender, bowel sounds active    Inc habitus   Genitalia & Rectal:    Deferred.    Extremities x4:   Extremities normal, atraumatic, no cyanosis, no clubbing   Musculoskeletal:      NO active synovitis or swollen b/l wrists, 2-5 MCPs examined   Skin:   Skin color- ulceratons see pics     Lymph nodes:   Cervical nodes grossly normal   Neurologic:  .Grossly symmetric   LEs with symmetric grossly absent to  light touch sensation                                                                                LABS:  CBC:   Lab Results   Component Value Date    WBC 14.9 10/15/2020    RBC 4.82 10/15/2020    HGB 12.0 10/15/2020    HCT 39.7 10/15/2020     10/15/2020    MCV 82.4 10/15/2020     BMP:    Lab Results   Component Value Date     10/15/2020    K 4.2 10/15/2020     10/15/2020    CO2 24 10/15/2020    BUN 10 10/15/2020    CREATININE 1.1 10/15/2020    GLUCOSE 116 10/15/2020    CALCIUM 9.3 10/15/2020     Hepatic Function Panel:    Lab Results   Component Value Date    ALKPHOS 105 10/15/2020    AST 25 10/15/2020    ALT 21 10/15/2020    PROT 9.3 10/15/2020    LABALBU 3.5 10/15/2020    BILITOT 0.9 10/15/2020     Magnesium:  No results found for: MG    PT/INR:  No results found for: PROTIME, INR  U/A: No results found for: NITRITE, LEUKOCYTESUR, PHUR, WBCUA, RBCUA, BACTERIA, SPECGRAV, BLOODU, GLUCOSEU  ABG:  No results found for: PHART, KBT4LAR, PO2ART, N0JQBAFT, DSK4ARE, BEART  TSH:  No results found for: TSH  Cardiac Enzymes: No results found for: CKTOTAL, CKMB, CKMBINDEX, TROPONINI    Radiology: Xr Foot Left (min 3 Views)    Result Date: 10/15/2020  EXAMINATION: THREE XRAY VIEWS OF THE LEFT FOOT 10/15/2020 9:29 pm COMPARISON: None. HISTORY: ORDERING SYSTEM PROVIDED HISTORY: Plantar diabetic infection TECHNOLOGIST PROVIDED HISTORY: Reason for exam:->Plantar diabetic infection FINDINGS: Soft tissue edema throughout the left foot. Subcutaneous air along the mid plantar surface. There is suggestion of osseous erosion of the midfoot. Near complete osseous fusion of the midfoot bones as well as the anterior talus and calcaneus. Large anterior and posterior calcaneal enthesophytes. Tibiotalar joint degenerative spurring present. 1.  Mid foot plantar surface ulceration with subjacent subcutaneous air and evidence of osseous erosion concerning for osseous infection. 2.  Osseous fusion of the osseous components of the midfoot. EKG: pending     Assessment & Plan   ACTIVE hospital problems being addressed/reassessed for this admission:  Principal Problem:    Diabetic foot infection ([address]  Active Problems:    Charcot foot due to diabetes mellitus ([facility]    Diabetic neuropathy associated with diabetes mellitus due to underlying condition [facility])    Essential hypertension  Resolved Problems:    * No resolved hospital problems.  *    Code status/DVT prophylaxis and PLAN --see orders   Note extensive time spent coordinating care between ER docs, ER and floor nurses, and transitioning care over to day providers  Plan of care/ clinical impressions/communication specifics detailed below:  54 y.o. male    Patient's primary care, podiatry, endocrinology, and infectious disease specialists are all in [place]. Patient was offered transfer, but declined. Post-op Follow up Removal External Fixator Right Lower Extremity / Ostectomy Right Fibula / Arthrodesis Right Ankle and Right Subtalar Joint , sx 2/3/20   recommended he continue with his offloading. We did briefly discuss surgical intervention to the left lower extremity. At this point time he would like to hold off. I educated him on the signs of Charcot arthropathy      L foot chronically wearing walking boot-- per above-- he should be \"offloading\"    Last seen podiartry/ortho - June /July -- moved here June this year and hasn't seen [place] doctors since. He denies trauma, no cuts/ or wounds,  Till ONE week ago-- BS claims \"good recently\"? No fevers, no covid symptoms but worsening ulceration/eschar heel of foot- supposed to be unloading-- but states he's been on his feet a lot recently. Then 5 days ago, insideous onset swelling and then open sores  +probe to bone      Morbid obesity  +BC, uses cpap at home  +neuropathy L >R foot +PAD  Morbid obesity  +BC, uses cpap at home  dm2- on lantus QD-change to SS    Osteomyelitis foot +charcot  Xray foot:  \"Mid foot plantar surface ulceration with subjacent subcutaneous air and   evidence of osseous erosion concerning for osseous infection.    Osseous fusion of the osseous components of the midfoot\"    clinda one dose overnight-vanco allergy noted  Consult ID   Consult podiatry-[facility]/ [facility]-- diabetic foot infection +charcot joint  Vascular consutl -ulceraton to bbonediabetic foot, ?amputation?  rule out necrotizing fascitis rapid spread      lovenox propylaxis  HTN- meds        Kathi Uriarte MD   Night Officer, overnight admitting doctor at [facility] call day time doctor   for questions after 7:30am    Covering for [address] Service  If Qs please call [phone]  Electronically signed by Connor Manzano

## 2020-10-16 NOTE — ED NOTES
Left foot wound measures approx 5 cm x 7 cm x 2.2 cm, red tissue. Serosanguinous/tan, foul smelling drainage. Wound irrigated with NS, covered with ABD and wrapped with mitchell. Pt tolerated well.       Nelly Jain RN  10/15/20 2122

## 2020-10-16 NOTE — HOME CARE
[facility] referral received for homecare for wound vac and possible IV ATB. Demos verified with patient. Reviewed self pay nursing visit of $99.00/visit and IV ATB coverage of   ORDERED THERAPY AT TIME OF QUOTE: DAPTOMCYIN 650MG Q24H + ZOSYN 3.375GM IV Q8H  COVERAGE: SELF PAY (40% SELF PAY DISCOUNT)  TOTAL PT RESPONSIBILITY: $469.32/DAY. He is not able to afford cost. Would like to fill out a financial application. Faxed form to [phone]. Will have have financial dept review once received. Will follow for ID final plans.    Osvaldo Ruvalcaba LPN

## 2020-10-17 ENCOUNTER — ANESTHESIA (OUTPATIENT)
Dept: OPERATING ROOM | Age: 45
DRG: 629 | End: 2020-10-17

## 2020-10-17 VITALS — OXYGEN SATURATION: 100 % | DIASTOLIC BLOOD PRESSURE: 64 MMHG | SYSTOLIC BLOOD PRESSURE: 110 MMHG

## 2020-10-17 LAB
ANION GAP SERPL CALCULATED.3IONS-SCNC: 10 MMOL/L (ref 7–16)
BASOPHILS ABSOLUTE: 0.08 E9/L (ref 0–0.2)
BASOPHILS RELATIVE PERCENT: 1.1 % (ref 0–2)
BUN BLDV-MCNC: 13 MG/DL (ref 6–20)
CALCIUM SERPL-MCNC: 8.7 MG/DL (ref 8.6–10.2)
CHLORIDE BLD-SCNC: 105 MMOL/L (ref 98–107)
CO2: 24 MMOL/L (ref 22–29)
CREAT SERPL-MCNC: 1 MG/DL (ref 0.7–1.2)
EOSINOPHILS ABSOLUTE: 0.19 E9/L (ref 0.05–0.5)
EOSINOPHILS RELATIVE PERCENT: 2.7 % (ref 0–6)
GFR AFRICAN AMERICAN: >60
GFR NON-AFRICAN AMERICAN: >60 ML/MIN/1.73
GLUCOSE BLD-MCNC: 130 MG/DL (ref 74–99)
HCT VFR BLD CALC: 37.6 % (ref 37–54)
HEMOGLOBIN: 11.1 G/DL (ref 12.5–16.5)
IMMATURE GRANULOCYTES #: 0.31 E9/L
IMMATURE GRANULOCYTES %: 4.4 % (ref 0–5)
LYMPHOCYTES ABSOLUTE: 1.31 E9/L (ref 1.5–4)
LYMPHOCYTES RELATIVE PERCENT: 18.5 % (ref 20–42)
MCH RBC QN AUTO: 24.6 PG (ref 26–35)
MCHC RBC AUTO-ENTMCNC: 29.5 % (ref 32–34.5)
MCV RBC AUTO: 83.4 FL (ref 80–99.9)
METER GLUCOSE: 119 MG/DL (ref 74–99)
METER GLUCOSE: 130 MG/DL (ref 74–99)
METER GLUCOSE: 133 MG/DL (ref 74–99)
METER GLUCOSE: 147 MG/DL (ref 74–99)
METER GLUCOSE: 96 MG/DL (ref 74–99)
MONOCYTES ABSOLUTE: 0.47 E9/L (ref 0.1–0.95)
MONOCYTES RELATIVE PERCENT: 6.6 % (ref 2–12)
NEUTROPHILS ABSOLUTE: 4.73 E9/L (ref 1.8–7.3)
NEUTROPHILS RELATIVE PERCENT: 66.7 % (ref 43–80)
PDW BLD-RTO: 14.3 FL (ref 11.5–15)
PLATELET # BLD: 545 E9/L (ref 130–450)
PMV BLD AUTO: 9.3 FL (ref 7–12)
POTASSIUM SERPL-SCNC: 4.6 MMOL/L (ref 3.5–5)
RBC # BLD: 4.51 E12/L (ref 3.8–5.8)
SODIUM BLD-SCNC: 139 MMOL/L (ref 132–146)
WBC # BLD: 7.1 E9/L (ref 4.5–11.5)

## 2020-10-17 PROCEDURE — 36415 COLL VENOUS BLD VENIPUNCTURE: CPT

## 2020-10-17 PROCEDURE — 0QBM0ZZ EXCISION OF LEFT TARSAL, OPEN APPROACH: ICD-10-PCS | Performed by: PODIATRIST

## 2020-10-17 PROCEDURE — 85025 COMPLETE CBC W/AUTO DIFF WBC: CPT

## 2020-10-17 PROCEDURE — 87077 CULTURE AEROBIC IDENTIFY: CPT

## 2020-10-17 PROCEDURE — 2580000003 HC RX 258: Performed by: NURSE ANESTHETIST, CERTIFIED REGISTERED

## 2020-10-17 PROCEDURE — 3700000001 HC ADD 15 MINUTES (ANESTHESIA): Performed by: PODIATRIST

## 2020-10-17 PROCEDURE — 2500000003 HC RX 250 WO HCPCS: Performed by: PODIATRIST

## 2020-10-17 PROCEDURE — 6370000000 HC RX 637 (ALT 250 FOR IP): Performed by: PODIATRIST

## 2020-10-17 PROCEDURE — 94660 CPAP INITIATION&MGMT: CPT

## 2020-10-17 PROCEDURE — 87176 TISSUE HOMOGENIZATION CULTR: CPT

## 2020-10-17 PROCEDURE — 87102 FUNGUS ISOLATION CULTURE: CPT

## 2020-10-17 PROCEDURE — 87015 SPECIMEN INFECT AGNT CONCNTJ: CPT

## 2020-10-17 PROCEDURE — 87070 CULTURE OTHR SPECIMN AEROBIC: CPT

## 2020-10-17 PROCEDURE — 6360000002 HC RX W HCPCS: Performed by: PODIATRIST

## 2020-10-17 PROCEDURE — 3700000000 HC ANESTHESIA ATTENDED CARE: Performed by: PODIATRIST

## 2020-10-17 PROCEDURE — 3600000002 HC SURGERY LEVEL 2 BASE: Performed by: PODIATRIST

## 2020-10-17 PROCEDURE — 88307 TISSUE EXAM BY PATHOLOGIST: CPT

## 2020-10-17 PROCEDURE — 99233 SBSQ HOSP IP/OBS HIGH 50: CPT | Performed by: INTERNAL MEDICINE

## 2020-10-17 PROCEDURE — 1200000000 HC SEMI PRIVATE

## 2020-10-17 PROCEDURE — 7100000011 HC PHASE II RECOVERY - ADDTL 15 MIN: Performed by: PODIATRIST

## 2020-10-17 PROCEDURE — 2580000003 HC RX 258: Performed by: INTERNAL MEDICINE

## 2020-10-17 PROCEDURE — 82962 GLUCOSE BLOOD TEST: CPT

## 2020-10-17 PROCEDURE — 87153 DNA/RNA SEQUENCING: CPT

## 2020-10-17 PROCEDURE — 87186 SC STD MICRODIL/AGAR DIL: CPT

## 2020-10-17 PROCEDURE — 87116 MYCOBACTERIA CULTURE: CPT

## 2020-10-17 PROCEDURE — 2580000003 HC RX 258: Performed by: PODIATRIST

## 2020-10-17 PROCEDURE — 87185 SC STD ENZYME DETCJ PER NZM: CPT

## 2020-10-17 PROCEDURE — 87205 SMEAR GRAM STAIN: CPT

## 2020-10-17 PROCEDURE — 6360000002 HC RX W HCPCS: Performed by: INTERNAL MEDICINE

## 2020-10-17 PROCEDURE — 7100000010 HC PHASE II RECOVERY - FIRST 15 MIN: Performed by: PODIATRIST

## 2020-10-17 PROCEDURE — 2500000003 HC RX 250 WO HCPCS: Performed by: NURSE ANESTHETIST, CERTIFIED REGISTERED

## 2020-10-17 PROCEDURE — 87206 SMEAR FLUORESCENT/ACID STAI: CPT

## 2020-10-17 PROCEDURE — 80048 BASIC METABOLIC PNL TOTAL CA: CPT

## 2020-10-17 PROCEDURE — 3600000012 HC SURGERY LEVEL 2 ADDTL 15MIN: Performed by: PODIATRIST

## 2020-10-17 PROCEDURE — 87075 CULTR BACTERIA EXCEPT BLOOD: CPT

## 2020-10-17 PROCEDURE — 88304 TISSUE EXAM BY PATHOLOGIST: CPT

## 2020-10-17 PROCEDURE — 6360000002 HC RX W HCPCS: Performed by: NURSE ANESTHETIST, CERTIFIED REGISTERED

## 2020-10-17 PROCEDURE — 88311 DECALCIFY TISSUE: CPT

## 2020-10-17 PROCEDURE — 2709999900 HC NON-CHARGEABLE SUPPLY: Performed by: PODIATRIST

## 2020-10-17 RX ORDER — SODIUM CHLORIDE, SODIUM LACTATE, POTASSIUM CHLORIDE, CALCIUM CHLORIDE 600; 310; 30; 20 MG/100ML; MG/100ML; MG/100ML; MG/100ML
INJECTION, SOLUTION INTRAVENOUS CONTINUOUS PRN
Status: DISCONTINUED | OUTPATIENT
Start: 2020-10-17 | End: 2020-10-17 | Stop reason: SDUPTHER

## 2020-10-17 RX ORDER — LIDOCAINE HYDROCHLORIDE 20 MG/ML
INJECTION, SOLUTION EPIDURAL; INFILTRATION; INTRACAUDAL; PERINEURAL PRN
Status: DISCONTINUED | OUTPATIENT
Start: 2020-10-17 | End: 2020-10-17 | Stop reason: SDUPTHER

## 2020-10-17 RX ORDER — PROPOFOL 10 MG/ML
INJECTION, EMULSION INTRAVENOUS PRN
Status: DISCONTINUED | OUTPATIENT
Start: 2020-10-17 | End: 2020-10-17 | Stop reason: SDUPTHER

## 2020-10-17 RX ORDER — HYDROCODONE BITARTRATE AND ACETAMINOPHEN 5; 325 MG/1; MG/1
1 TABLET ORAL PRN
Status: DISCONTINUED | OUTPATIENT
Start: 2020-10-17 | End: 2020-10-17 | Stop reason: HOSPADM

## 2020-10-17 RX ORDER — PROPOFOL 10 MG/ML
INJECTION, EMULSION INTRAVENOUS CONTINUOUS PRN
Status: DISCONTINUED | OUTPATIENT
Start: 2020-10-17 | End: 2020-10-17 | Stop reason: SDUPTHER

## 2020-10-17 RX ORDER — SODIUM CHLORIDE 0.9 % (FLUSH) 0.9 %
10 SYRINGE (ML) INJECTION PRN
Status: DISCONTINUED | OUTPATIENT
Start: 2020-10-17 | End: 2020-10-22 | Stop reason: HOSPADM

## 2020-10-17 RX ORDER — FENTANYL CITRATE 50 UG/ML
INJECTION, SOLUTION INTRAMUSCULAR; INTRAVENOUS PRN
Status: DISCONTINUED | OUTPATIENT
Start: 2020-10-17 | End: 2020-10-17 | Stop reason: SDUPTHER

## 2020-10-17 RX ORDER — SODIUM CHLORIDE 0.9 % (FLUSH) 0.9 %
10 SYRINGE (ML) INJECTION 2 TIMES DAILY
Status: DISCONTINUED | OUTPATIENT
Start: 2020-10-17 | End: 2020-10-22 | Stop reason: HOSPADM

## 2020-10-17 RX ORDER — MIDAZOLAM HYDROCHLORIDE 1 MG/ML
INJECTION INTRAMUSCULAR; INTRAVENOUS PRN
Status: DISCONTINUED | OUTPATIENT
Start: 2020-10-17 | End: 2020-10-17 | Stop reason: SDUPTHER

## 2020-10-17 RX ORDER — BUPIVACAINE HYDROCHLORIDE 5 MG/ML
INJECTION, SOLUTION EPIDURAL; INTRACAUDAL PRN
Status: DISCONTINUED | OUTPATIENT
Start: 2020-10-17 | End: 2020-10-17 | Stop reason: HOSPADM

## 2020-10-17 RX ORDER — HYDROCODONE BITARTRATE AND ACETAMINOPHEN 5; 325 MG/1; MG/1
2 TABLET ORAL PRN
Status: DISCONTINUED | OUTPATIENT
Start: 2020-10-17 | End: 2020-10-17 | Stop reason: HOSPADM

## 2020-10-17 RX ADMIN — PROPOFOL 150 MCG/KG/MIN: 10 INJECTION, EMULSION INTRAVENOUS at 07:40

## 2020-10-17 RX ADMIN — SODIUM CHLORIDE 25 ML: 9 INJECTION, SOLUTION INTRAVENOUS at 00:01

## 2020-10-17 RX ADMIN — SODIUM CHLORIDE 25 ML: 9 INJECTION, SOLUTION INTRAVENOUS at 23:12

## 2020-10-17 RX ADMIN — SODIUM CHLORIDE, PRESERVATIVE FREE 10 ML: 5 INJECTION INTRAVENOUS at 20:31

## 2020-10-17 RX ADMIN — MIDAZOLAM 1 MG: 1 INJECTION INTRAMUSCULAR; INTRAVENOUS at 07:40

## 2020-10-17 RX ADMIN — ENOXAPARIN SODIUM 40 MG: 40 INJECTION SUBCUTANEOUS at 13:30

## 2020-10-17 RX ADMIN — SODIUM CHLORIDE, PRESERVATIVE FREE 10 ML: 5 INJECTION INTRAVENOUS at 13:38

## 2020-10-17 RX ADMIN — SODIUM CHLORIDE 650 MG: 9 INJECTION, SOLUTION INTRAVENOUS at 11:31

## 2020-10-17 RX ADMIN — INSULIN LISPRO 2 UNITS: 100 INJECTION, SOLUTION INTRAVENOUS; SUBCUTANEOUS at 16:18

## 2020-10-17 RX ADMIN — PROPOFOL 50 MG: 10 INJECTION, EMULSION INTRAVENOUS at 07:40

## 2020-10-17 RX ADMIN — PIPERACILLIN SODIUM AND TAZOBACTAM SODIUM 3.38 G: 3; .375 INJECTION, POWDER, LYOPHILIZED, FOR SOLUTION INTRAVENOUS at 20:30

## 2020-10-17 RX ADMIN — FENTANYL CITRATE 50 MCG: 50 INJECTION, SOLUTION INTRAMUSCULAR; INTRAVENOUS at 07:40

## 2020-10-17 RX ADMIN — PIPERACILLIN SODIUM AND TAZOBACTAM SODIUM 3.38 G: 3; .375 INJECTION, POWDER, LYOPHILIZED, FOR SOLUTION INTRAVENOUS at 04:30

## 2020-10-17 RX ADMIN — SODIUM CHLORIDE, POTASSIUM CHLORIDE, SODIUM LACTATE AND CALCIUM CHLORIDE: 600; 310; 30; 20 INJECTION, SOLUTION INTRAVENOUS at 07:19

## 2020-10-17 RX ADMIN — LIDOCAINE HYDROCHLORIDE 100 MG: 20 INJECTION, SOLUTION EPIDURAL; INFILTRATION; INTRACAUDAL; PERINEURAL at 07:40

## 2020-10-17 RX ADMIN — PIPERACILLIN SODIUM AND TAZOBACTAM SODIUM 3.38 G: 3; .375 INJECTION, POWDER, LYOPHILIZED, FOR SOLUTION INTRAVENOUS at 13:30

## 2020-10-17 RX ADMIN — SODIUM CHLORIDE 25 ML: 9 INJECTION, SOLUTION INTRAVENOUS at 16:17

## 2020-10-17 ASSESSMENT — PULMONARY FUNCTION TESTS
PIF_VALUE: 1
PIF_VALUE: 0
PIF_VALUE: 0
PIF_VALUE: 1
PIF_VALUE: 0
PIF_VALUE: 1

## 2020-10-17 ASSESSMENT — PAIN SCALES - GENERAL
PAINLEVEL_OUTOF10: 0

## 2020-10-17 ASSESSMENT — PAIN DESCRIPTION - ORIENTATION
ORIENTATION: LEFT

## 2020-10-17 ASSESSMENT — PAIN DESCRIPTION - LOCATION
LOCATION: FOOT

## 2020-10-17 ASSESSMENT — PAIN DESCRIPTION - PAIN TYPE
TYPE: SURGICAL PAIN
TYPE: SURGICAL PAIN

## 2020-10-17 NOTE — PROGRESS NOTES
Date: 10/16/2020    Time: 9:55 PM    Patient Placed On BIPAP/CPAP/ Non-Invasive Ventilation? No    If no must comment. Facial area red/color change? No           If YES are Blister/Lesion present? No   If yes must notify nursing staff  BIPAP/CPAP skin barrier? No    Skin barrier type:n/a       Comments: Patient refusing hospital CPAP at this time.         Veronica Carpio

## 2020-10-17 NOTE — PROGRESS NOTES
0114 admitted to stage2 pacu left foot dressing dry and intact.    0840 report called to [address] [place] [address]

## 2020-10-17 NOTE — PLAN OF CARE
Problem: Pain:  Goal: Pain level will decrease  Description: Pain level will decrease  Outcome: Met This Shift  Goal: Control of acute pain  Description: Control of acute pain  Outcome: Met This Shift  Goal: Control of chronic pain  Description: Control of chronic pain  Outcome: Met This Shift     Problem: Skin Integrity:  Goal: Absence of new skin breakdown  Description: Absence of new skin breakdown  Outcome: Met This Shift     Problem: Falls - Risk of:  Goal: Will remain free from falls  Description: Will remain free from falls  Outcome: Met This Shift  Goal: Absence of physical injury  Description: Absence of physical injury  Outcome: Met This Shift     Problem: Increased nutrient needs (NI-5.1)  Goal: Food and/or Nutrient Delivery  Description: Individualized approach for food/nutrient provision.   10/17/2020 1114 by Brittany La, MS, RD, LD  Outcome: Met This Shift

## 2020-10-17 NOTE — PROGRESS NOTES
Comprehensive Nutrition Assessment    Type and Reason for Visit:  Initial, Positive Nutrition Screen    Nutrition Recommendations/Plan: Continue current diet, Start Ensure HP BID, Jakob BID    Nutrition Assessment:  Pt is at nutritional risk d/t adm w/ DM foot ulcer/abscess w/ OM. Pt w/ increased nutrient needs for surgical healing s/p L foot I&D w/ bone debridement. Will add ONS and monitor.     Malnutrition Assessment:  Malnutrition Status:  No malnutrition    Context:  Chronic Illness     Findings of the 6 clinical characteristics of malnutrition:  Energy Intake:  No significant decrease in energy intake  Weight Loss:  Unable to assess     Body Fat Loss:  No significant body fat loss     Muscle Mass Loss:  No significant muscle mass loss    Fluid Accumulation:  No significant fluid accumulation     Strength:  Not Performed    Estimated Daily Nutrient Needs:  Energy (kcal):  9994-4489; Weight Used for Energy Requirements:  Current     Protein (g):  160-175; Weight Used for Protein Requirements:  Ideal(1.8-2)        Fluid (ml/day):  ; Weight Used for Fluid Requirements:  Current      Nutrition Related Findings:  pt alert, active BS, soft abd, +2 non-pitting edema, fluids WDL      Wounds:  Diabetic Ulcer, Surgical Wound       Current Nutrition Therapies:    DIET GENERAL; Carb Control: 4 carbs/meal (approximate 1800 kcals/day)    Anthropometric Measures:  · Height: 6' 3\" (190.5 cm)  · Current Body Weight: 303 lb (137.4 kg)   · Usual Body Weight: (UTO)     · Ideal Body Weight: 196 lbs; % Ideal Body Weight 154.6 %   · BMI: 37.9   · BMI Categories: Obese Class 2 (BMI 35.0 -39.9)       Nutrition Diagnosis:   · Increased nutrient needs related to increase demand for energy/nutrients as evidenced by wounds    Nutrition Interventions:   Food and/or Nutrient Delivery:  Continue Current Diet, Start Oral Nutrition Supplement(Jakob BID, Ensure HP BID)  Nutrition Education/Counseling:  Education not indicated Coordination of Nutrition Care:  Continued Inpatient Monitoring    Goals:  Pt to consume >75% meals/ONS       Nutrition Monitoring and Evaluation:   Food/Nutrient Intake Outcomes:  Food and Nutrient Intake, Supplement Intake  Physical Signs/Symptoms Outcomes:  Biochemical Data, Fluid Status or Edema, Nutrition Focused Physical Findings, Skin, Weight     Discharge Planning:    Continue Oral Nutrition Supplement, Continue current diet     Electronically signed by El Michel MS, RD, LD on 10/17/20 at 11:15 AM EDT    Contact: [phone]

## 2020-10-17 NOTE — PROGRESS NOTES
[facility] Progress Note    Admitting Date and Time: 10/15/2020  7:57 PM  Admit Dx: Di[facility] foot [facility] [E11.628, L08.9]  Diabetic foot infection ([facility] [W31.714, L08.9]    Subjective:  Patient is being followed for Diabetic foot infection ([facility] [Q78.376, L08.9]  Diabetic foot infection ([facility] [V31.700, L08.9]   Pt feels fine, s/p I&D, debridement and bone biopsy    ROS: denies fever, chills, cp, sob, n/v, HA unless stated above.       insulin lispro  0-12 Units Subcutaneous TID WC    insulin lispro  0-6 Units Subcutaneous Nightly    influenza virus vaccine  0.5 mL Intramuscular Prior to discharge    enoxaparin  40 mg Subcutaneous Daily    daptomycin (CUBICIN) IVPB  6 mg/kg (Adjusted) Intravenous Q24H    piperacillin-tazobactam  3.375 g Intravenous Q8H     glucose, 15 g, PRN  dextrose, 12.5 g, PRN  glucagon (rDNA), 1 mg, PRN  dextrose, 100 mL/hr, PRN  labetalol, 10 mg, Q4H PRN         Objective:    /62   Pulse 61   Temp 97.5 °F (36.4 °C) (Oral)   Resp 18   Ht 6' 3\" (1.905 m)   Wt (!) 303 lb (137.4 kg)   SpO2 97%   BMI 37.87 kg/m²     General Appearance: alert and oriented to person, place and time and in no acute distress  Skin: warm and dry  Head: normocephalic and atraumatic  Eyes: pupils equal, round, and reactive to light, extraocular eye movements intact, conjunctivae normal  Neck: neck supple and non tender without mass   Pulmonary/Chest: clear to auscultation bilaterally- no wheezes, rales or rhonchi, normal air movement, no respiratory distress  Cardiovascular: normal rate, normal S1 and S2 and no carotid bruits  Abdomen: soft, non-tender, non-distended, normal bowel sounds, no masses or organomegaly  Extremities: no cyanosis, no clubbing and left foot in dressing had open wound  Neurologic: no cranial nerve deficit and speech normal        Recent Labs     10/15/20  2027 10/16/20  0507    135   K 4.2 4.1    104   CO2 24 24   BUN 10 11   CREATININE 1.1 1.1   GLUCOSE 116* 134*   CALCIUM 9.3 8.3*       Recent Labs     10/15/20  2027 10/16/20  0507   WBC 14.9* 13.4*   RBC 4.82 4.26   HGB 12.0* 10.6*   HCT 39.7 34.8*   MCV 82.4 81.7   MCH 24.9* 24.9*   MCHC 30.2* 30.5*   RDW 14.0 14.1   * 479*   MPV 9.4 9.7         Assessment:    Principal Problem:    Diabetic foot infection (HCC)  Active Problems:    Charcot foot due to diabetes mellitus ([facility]    Diabetic neuropathy associated with diabetes mellitus due to underlying condition [facility])    Essential hypertension  Resolved Problems:    * No resolved hospital problems. *      Plan:  1. Open wound, diabetic foot infection of left foot with osteomyelitis, wound cultures were obtained as well as blood cultures, consulted podiatry and vascular surgery. Patient status post debridement with bone biopsy, appreciate ID input, antibiotics were switched to Zosyn and daptomycin  2. Diabetes type 2, patient is currently on Lantus 15 units and metformin, holding oral hypoglycemic agents, place the patient on sliding scale insulin, . 3.  History of hyperlipidemia, continue atorvastatin. 4.  Elevated blood pressure readings, patient has no history of hypertension, presented with a blood pressure of 170/89, trended down, with improved readings, no need to start antihypertensive medication      NOTE: This report was transcribed using voice recognition software. Every effort was made to ensure accuracy; however, inadvertent computerized transcription errors may be present.   Electronically signed by Niecy Albert MD on 10/17/2020 at 9:33 AM

## 2020-10-17 NOTE — OP NOTE
Operative Note      Patient: Jessica Lawton  YOB: 1975  MRN: [id number]    Date of Procedure: 10/17/2020    Pre-Op Diagnosis: Diabetic Left foot diabetic ulcerations, L foot abscess, osteomyelitis    Post-Op Diagnosis: Same       Procedure(s):  LEFT FOOT  INCISION AND DRAINAGE, BONE DEBRIDEMENT AND BIOPSY    Surgeon(s):  Deondre Oliva DPM    Assistant:   Resident: Fuentes Cruz    Anesthesia: Monitor Anesthesia Care    Estimated Blood Loss (mL): Minimal    Complications: None    Specimens:   ID Type Source Tests Collected by Time Destination   1 : left foot tissue Tissue Tissue CULTURE, ANAEROBIC, CULTURE, FUNGUS, GRAM STAIN, CULTURE, SURGICAL, CULTURE WITH SMEAR, ACID FAST BACILLIUS Deondre Oliva, [place] 10/17/2020 0759    2 : left foot wound Tissue Tissue CULTURE, ANAEROBIC, CULTURE, FUNGUS, GRAM STAIN, CULTURE, SURGICAL, CULTURE WITH SMEAR, [facility] 10/17/[facility] 0800    3 : left foot bone culture Bone Bone CULTURE, ANAEROBIC, CULTURE, FUNGUS, GRAM STAIN, CULTURE, SURGICAL, CULTURE WITH SMEAR, ACID FAST MODESTO Oliva, [place] 10/17/2020 0801    A : left foot tissue Tissue Tissue SURGICAL PATHOLOGY QUINTON Fuentes 10/17/2020 0756    B : left foot bone Bone Bone SURGICAL PATHOLOGY Deondre Oliva [place] 10/17/2020 0758        Implants:  * No implants in log *      Drains: * No LDAs found *    Findings: All necrotic tissue was sharply debrided leaving healthy bleeding tissue    Detailed Description of Procedure:   Details and indications of procedure were explained to patient beforehand. Explained possible risks and complications including bleeding, residual/recurrent infection and loss of limb. Patient states he understands and wishes to proceed with surgical intervention. No guarantees or promises were made. Procedure done on cart. Patient brought into OR and put under sedation. No tourniquet was indicated for this case.  L foot prepped and draped in sterile manner. 10cc of 0.5% marcaine plain was injected in a v style block peripherally to the wound. Attention was first drawn to the plantar wound where all necrotic tissue was sharply debrided with rongeurs. Soft tissue samples were sent for culture and pathology. We then directed our attention to the underlying bone which was similarly debrided with rongeurs and bone rasp to remove all necrotic tissue. Bone was sent for culture and biopsy taken from the cuboid. Similar debridement was performed on the dorsal wound. Leaving healthy bleeding tissue. Both wounds were explored to assess for tracking or residual fluid pockets and there were none. Wounds were then irrigated with 3L sterile saline using pulse lavage. L foot was cleansed with peroxide and saline and then dressed with iodoform packing in the wounds, 4x4s, abds, kerlix and ACE. Patient was brought out of sedation and transferred to PACU. Patient stable in PACU.     Electronically signed by Sai Goodman on 10/17/2020 at 8:23 AM

## 2020-10-17 NOTE — PROGRESS NOTES
[address] [place] [address] [facility]  Progress Note  CC: doing ok today- no new issues  Face to face encounter   SUBJECTIVE:  Patient is tolerating medications. No reported adverse drug reactions. ROS: No nausea, vomiting, diarrhea. No fevers. Resting in bed. Feeling ok today . Nurse at bedside. No issues overnight. Medications:  Scheduled Meds:   insulin lispro  0-12 Units Subcutaneous TID WC    insulin lispro  0-6 Units Subcutaneous Nightly    influenza virus vaccine  0.5 mL Intramuscular Prior to discharge    enoxaparin  40 mg Subcutaneous Daily    daptomycin (CUBICIN) IVPB  6 mg/kg (Adjusted) Intravenous Q24H    piperacillin-tazobactam  3.375 g Intravenous Q8H     Continuous Infusions:   dextrose      sodium chloride 25 mL (10/17/20 0001)     PRN Meds:glucose, dextrose, glucagon (rDNA), dextrose, labetalol  OBJECTIVE:  Patient Vitals for the past 24 hrs:   BP Temp Temp src Pulse Resp SpO2 Height   10/17/20 1106 -- -- -- -- -- -- 6' 3\" (1.905 m)   10/17/20 0915 116/62 97.5 °F (36.4 °C) Oral 61 18 97 % --   10/17/20 0847 119/75 -- -- 63 18 98 % --   10/17/20 0835 117/79 -- -- 64 20 98 % --   10/17/20 0824 109/69 96.8 °F (36 °C) Temporal 72 20 99 % --   10/16/20 2015 (!) 105/36 97.9 °F (36.6 °C) Oral 76 16 96 % --     Constitutional: The patient is awake, alert, and oriented. Skin: Warm and dry. No rashes were noted. Head: Eyes show round, and reactive pupils. No jaundice. Mouth: Moist mucous membranes, no ulcerations, no thrush. Neck: Supple to movements. No lymphadenopathy. Chest: No use of accessory muscles to breathe. Symmetrical expansion. Auscultation reveals no wheezing, crackles, or rhonchi. Cardiovascular: S1 and S2 are rhythmic and regular. No murmurs appreciated. Abdomen: Positive bowel sounds to auscultation. large  Extremities: No clubbing, no cyanosis, lymphedema changes to lower extremities  Left foot with dressing in place-   Skin dry and flaking.

## 2020-10-18 LAB
GRAM STAIN ORDERABLE: NORMAL
METER GLUCOSE: 114 MG/DL (ref 74–99)
METER GLUCOSE: 138 MG/DL (ref 74–99)
METER GLUCOSE: 138 MG/DL (ref 74–99)
METER GLUCOSE: 158 MG/DL (ref 74–99)
TOTAL CK: 45 U/L (ref 20–200)
WOUND/ABSCESS: NORMAL

## 2020-10-18 PROCEDURE — 2580000003 HC RX 258: Performed by: PODIATRIST

## 2020-10-18 PROCEDURE — 94660 CPAP INITIATION&MGMT: CPT

## 2020-10-18 PROCEDURE — 82552 ASSAY OF CPK IN BLOOD: CPT

## 2020-10-18 PROCEDURE — 99232 SBSQ HOSP IP/OBS MODERATE 35: CPT | Performed by: INTERNAL MEDICINE

## 2020-10-18 PROCEDURE — 1200000000 HC SEMI PRIVATE

## 2020-10-18 PROCEDURE — 82550 ASSAY OF CK (CPK): CPT

## 2020-10-18 PROCEDURE — 6360000002 HC RX W HCPCS: Performed by: PODIATRIST

## 2020-10-18 PROCEDURE — 36415 COLL VENOUS BLD VENIPUNCTURE: CPT

## 2020-10-18 PROCEDURE — 82962 GLUCOSE BLOOD TEST: CPT

## 2020-10-18 PROCEDURE — 2580000003 HC RX 258: Performed by: INTERNAL MEDICINE

## 2020-10-18 RX ADMIN — PIPERACILLIN SODIUM AND TAZOBACTAM SODIUM 3.38 G: 3; .375 INJECTION, POWDER, LYOPHILIZED, FOR SOLUTION INTRAVENOUS at 04:48

## 2020-10-18 RX ADMIN — SODIUM CHLORIDE, PRESERVATIVE FREE 10 ML: 5 INJECTION INTRAVENOUS at 12:58

## 2020-10-18 RX ADMIN — PIPERACILLIN SODIUM AND TAZOBACTAM SODIUM 3.38 G: 3; .375 INJECTION, POWDER, LYOPHILIZED, FOR SOLUTION INTRAVENOUS at 20:43

## 2020-10-18 RX ADMIN — SODIUM CHLORIDE, PRESERVATIVE FREE 10 ML: 5 INJECTION INTRAVENOUS at 11:26

## 2020-10-18 RX ADMIN — ENOXAPARIN SODIUM 40 MG: 40 INJECTION SUBCUTANEOUS at 08:35

## 2020-10-18 RX ADMIN — SODIUM CHLORIDE 25 ML: 9 INJECTION, SOLUTION INTRAVENOUS at 15:42

## 2020-10-18 RX ADMIN — SODIUM CHLORIDE, PRESERVATIVE FREE 10 ML: 5 INJECTION INTRAVENOUS at 08:35

## 2020-10-18 RX ADMIN — PIPERACILLIN SODIUM AND TAZOBACTAM SODIUM 3.38 G: 3; .375 INJECTION, POWDER, LYOPHILIZED, FOR SOLUTION INTRAVENOUS at 12:58

## 2020-10-18 RX ADMIN — SODIUM CHLORIDE 650 MG: 9 INJECTION, SOLUTION INTRAVENOUS at 11:26

## 2020-10-18 RX ADMIN — SODIUM CHLORIDE 25 ML: 9 INJECTION, SOLUTION INTRAVENOUS at 23:21

## 2020-10-18 RX ADMIN — SODIUM CHLORIDE 25 ML: 9 INJECTION, SOLUTION INTRAVENOUS at 08:36

## 2020-10-18 RX ADMIN — SODIUM CHLORIDE, PRESERVATIVE FREE 10 ML: 5 INJECTION INTRAVENOUS at 20:55

## 2020-10-18 ASSESSMENT — PAIN SCALES - GENERAL
PAINLEVEL_OUTOF10: 0
PAINLEVEL_OUTOF10: 0

## 2020-10-18 NOTE — PROGRESS NOTES
[address] [place] [address] [facility]  Progress Note  CC: diabetic foot infection  Face to face encounter   SUBJECTIVE:  Patient is tolerating medications. No reported adverse drug reactions. ROS: No nausea, vomiting, diarrhea. No fevers. No pain    Medications:  Scheduled Meds:   sodium chloride flush  10 mL Intravenous BID    insulin lispro  0-12 Units Subcutaneous TID WC    insulin lispro  0-6 Units Subcutaneous Nightly    influenza virus vaccine  0.5 mL Intramuscular Prior to discharge    enoxaparin  40 mg Subcutaneous Daily    daptomycin (CUBICIN) IVPB  6 mg/kg (Adjusted) Intravenous Q24H    piperacillin-tazobactam  3.375 g Intravenous Q8H     Continuous Infusions:   dextrose      sodium chloride 25 mL (10/18/20 0836)     PRN Meds:sodium chloride flush, glucose, dextrose, glucagon (rDNA), dextrose, labetalol  OBJECTIVE:  Patient Vitals for the past 24 hrs:   BP Temp Temp src Pulse Resp SpO2   10/18/20 0827 (!) 140/81 98.1 °F (36.7 °C) Oral 75 16 98 %   10/18/20 0040 114/69 97.8 °F (36.6 °C) Oral 88 16 --   10/17/20 2028 123/65 97.9 °F (36.6 °C) Oral 80 18 97 %   10/17/20 1600 122/71 98.1 °F (36.7 °C) Oral 70 16 --     Constitutional: The patient is awake, alert, and oriented. Sitting up in chair with legs elevated  Skin: Warm and dry. No rashes were noted. Head: Eyes show round, and reactive pupils. No jaundice. Mouth: Moist mucous membranes, no ulcerations, no thrush. Neck: Supple to movements. No lymphadenopathy. Chest: No use of accessory muscles to breathe. Symmetrical expansion. Auscultation reveals no wheezing, crackles, or rhonchi. Cardiovascular: S1 and S2 are rhythmic and regular. No murmurs appreciated. Abdomen: Positive bowel sounds to auscultation.  large  Extremities: No clubbing, no cyanosis, lymphedema changes to lower extremities  Left foot with dressing in place-     Laboratory and Tests Review:  Lab Results   Component Value Date    WBC 7.1 10/17/2020    WBC 13. 4 (H) 10/16/2020    WBC 14.9 (H) 10/15/2020    HGB 11.1 (L) 10/17/2020    HCT 37.6 10/17/2020    MCV 83.4 10/17/2020     (H) 10/17/2020     Lab Results   Component Value Date    NEUTROABS 4.73 10/17/2020    NEUTROABS 10.33 (H) 10/16/2020    NEUTROABS 13.05 (H) 10/15/2020     Lab Results   Component Value Date    CRP 10.8 (H) 10/16/2020     Lab Results   Component Value Date    SEDRATE 78 (H) 10/16/2020     Lab Results   Component Value Date    ALT 20 10/16/2020    AST 29 10/16/2020    ALKPHOS 92 10/16/2020    BILITOT 0.8 10/16/2020     Lab Results   Component Value Date     10/17/2020    K 4.6 10/17/2020     10/17/2020    CO2 24 10/17/2020    BUN 13 10/17/2020    CREATININE 1.0 10/17/2020    GFRAA >60 10/17/2020    LABGLOM >60 10/17/2020    GLUCOSE 130 10/17/2020    PROT 8.1 10/16/2020    LABALBU 2.8 10/16/2020    CALCIUM 8.7 10/17/2020    BILITOT 0.8 10/16/2020    ALKPHOS 92 10/16/2020    AST 29 10/16/2020    ALT 20 10/16/2020     Radiology:      Microbiology:   10/15/2020- wound cx- mixed constance/ gram negative rods, anaerobic growth present  10/15/2020- blood cx- no growth to date   Surgical cx- pending     ASSESSMENT:  · Left DFU with OM  · Leukocytosis -resolved  · Charcot   · S/p I&D of left foot- with bone debridment and biopsy/ resection       PLAN:  · Continue daptomycin IV  · Continue zosyn for now   · F/u cultures  · Monitor labs- crp- 10.8    Sed rate- 78  · ASO 1243  Check CK total - never drawn. D/w nursing  · Continue wound care- podiatry following     April Opal Newsome  12:44 PM  10/18/2020     Mixed cultures from wound not yet finalized    Pt seen and examined. I discussed and co-formulated the plan with advanced practice nurse. Labs, cultures, and radiographs reviewed. Face to Face encounter occurred. Changes made as necessary by me.      Darrell Guerra MD  Infectious Disease

## 2020-10-18 NOTE — PROGRESS NOTES
Podiatry Progress Note  10/18/2020   Imelda Hamptno       SUBJECTIVE: This is a 54 y.o. male seen bedside for Diabetic L foot ulcerations, L foot abscess, osteomyelitis: Status post L foot incision and drainage with bone debridement and biopsy. Procedure done 10/17/20 by Dr. Nelson Mclain. No acute events overnight. Patient is resting comfortably in bed with no complaints, pain is well controlled. Denies any constitutional symptoms or other pedal complaints. OBJECTIVE:      Pt is AAOx3    Vitals:    10/18/20 0827   BP: (!) 140/81   Pulse: 75   Resp: 16   Temp: 98.1 °F (36.7 °C)   SpO2: 98%              EXAM:    LLE dressing change, wounds pictured below. No purulence or malodor appreciated at this time. Erythema and edema markedly decreased since initial evaluation.               Current Facility-Administered Medications   Medication Dose Route Frequency Provider Last Rate Last Dose    sodium chloride flush 0.9 % injection 10 mL  10 mL Intravenous BID Michelle Colorado MD   10 mL at 10/18/20 0835    sodium chloride flush 0.9 % injection 10 mL  10 mL Intravenous PRN Michelle Colorado MD        glucose (GLUTOSE) 40 % oral gel 15 g  15 g Oral PRN Meribeth Diesel, DPM        dextrose 50 % IV solution  12.5 g Intravenous PRN Meribeth Diesel, DPM        glucagon (rDNA) injection 1 mg  1 mg Intramuscular PRN Meribeth Diesel, DPM        dextrose 5 % solution  100 mL/hr Intravenous PRN Meribeth Diesel, DPM        insulin lispro (HUMALOG) injection vial 0-12 Units  0-12 Units Subcutaneous TID WC Meribeth Diesel, DPM   2 Units at 10/17/20 1618    insulin lispro (HUMALOG) injection vial 0-6 Units  0-6 Units Subcutaneous Nightly Meribeth Diesel, DPM   1 Units at 10/16/20 2210    influenza quadrivalent split vaccine (FLUZONE;FLUARIX;FLULAVAL;AFLURIA) injection 0.5 mL  0.5 mL Intramuscular Prior to discharge Meribeth Diesel, DPM        enoxaparin (LOVENOX) injection 40 mg  40 mg Subcutaneous Daily Hortensia Lawrence DPM   40 mg at 10/18/20 2831    labetalol (NORMODYNE;TRANDATE) injection 10 mg  10 mg Intravenous Q4H PRN Luann Lim        DAPTOmycin (CUBICIN) 650 mg in sodium chloride 0.9 % 50 mL IVPB  6 mg/kg (Adjusted) Intravenous Q24H Hortensialinda Lawrence DPM   Stopped at 10/17/20 1201    piperacillin-tazobactam (ZOSYN) 3.375 g in dextrose 5 % 50 mL IVPB extended infusion (mini-bag)  3.375 g Intravenous Q8H Hortensialinda Lawrence DPM   Stopped at 10/18/20 0848    0.9 % sodium chloride infusion  25 mL Intravenous Q8H Hortensia Lawrence DPM 12.5 mL/hr at 10/18/20 0836 25 mL at 10/18/20 0836        Lab Results   Component Value Date    WBC 7.1 10/17/2020    HCT 37.6 10/17/2020    HGB 11.1 (L) 10/17/2020     (H) 10/17/2020     10/17/2020    K 4.6 10/17/2020     10/17/2020    CO2 24 10/17/2020    BUN 13 10/17/2020    CREATININE 1.0 10/17/2020    GLUCOSE 130 (H) 10/17/2020    CRP 10.8 (H) 10/16/2020         Radiographs:    ASSESSMENT:  Diabetic L foot ulcerations, L foot abscess, osteomyelitis: Status post L foot incision and drainage with bone debridement and biopsy. Procedure done 10/17/20 by Dr. Verner Schaffer. Pain Left Foot       PLAN:  -Evaluation and Management  -Charts and labs reviewed  -ID following, dapto/zosyn while awaiting cultures and path. -LLE dressing changed, dressed with iodoform packing, abds, kerlix and ACE. Clinically there are no signs of residual infection.  -Orders placed for wound vac to be applied and changed MW. -Patient discussed with Dr. Verner Schaffer.         [facility]   10/18/2020   10:13 AM

## 2020-10-19 LAB
ANAEROBIC CULTURE: ABNORMAL
ANAEROBIC CULTURE: NORMAL
METER GLUCOSE: 113 MG/DL (ref 74–99)
METER GLUCOSE: 130 MG/DL (ref 74–99)
METER GLUCOSE: 139 MG/DL (ref 74–99)
METER GLUCOSE: 176 MG/DL (ref 74–99)
ORGANISM: ABNORMAL

## 2020-10-19 PROCEDURE — 94660 CPAP INITIATION&MGMT: CPT

## 2020-10-19 PROCEDURE — 99233 SBSQ HOSP IP/OBS HIGH 50: CPT | Performed by: INTERNAL MEDICINE

## 2020-10-19 PROCEDURE — 6360000002 HC RX W HCPCS: Performed by: PODIATRIST

## 2020-10-19 PROCEDURE — 82962 GLUCOSE BLOOD TEST: CPT

## 2020-10-19 PROCEDURE — 1200000000 HC SEMI PRIVATE

## 2020-10-19 PROCEDURE — 2580000003 HC RX 258: Performed by: PODIATRIST

## 2020-10-19 PROCEDURE — 6370000000 HC RX 637 (ALT 250 FOR IP): Performed by: PODIATRIST

## 2020-10-19 RX ADMIN — SODIUM CHLORIDE 25 ML: 9 INJECTION, SOLUTION INTRAVENOUS at 10:09

## 2020-10-19 RX ADMIN — PIPERACILLIN SODIUM AND TAZOBACTAM SODIUM 3.38 G: 3; .375 INJECTION, POWDER, LYOPHILIZED, FOR SOLUTION INTRAVENOUS at 15:36

## 2020-10-19 RX ADMIN — SODIUM CHLORIDE 25 ML: 9 INJECTION, SOLUTION INTRAVENOUS at 18:31

## 2020-10-19 RX ADMIN — ENOXAPARIN SODIUM 40 MG: 40 INJECTION SUBCUTANEOUS at 10:09

## 2020-10-19 RX ADMIN — INSULIN LISPRO 1 UNITS: 100 INJECTION, SOLUTION INTRAVENOUS; SUBCUTANEOUS at 22:38

## 2020-10-19 RX ADMIN — PIPERACILLIN SODIUM AND TAZOBACTAM SODIUM 3.38 G: 3; .375 INJECTION, POWDER, LYOPHILIZED, FOR SOLUTION INTRAVENOUS at 04:09

## 2020-10-19 RX ADMIN — PIPERACILLIN SODIUM AND TAZOBACTAM SODIUM 3.38 G: 3; .375 INJECTION, POWDER, LYOPHILIZED, FOR SOLUTION INTRAVENOUS at 23:11

## 2020-10-19 ASSESSMENT — PAIN SCALES - GENERAL
PAINLEVEL_OUTOF10: 0
PAINLEVEL_OUTOF10: 0

## 2020-10-19 NOTE — PROGRESS NOTES
Podiatry Progress Note  10/19/2020   David Kirk       SUBJECTIVE: This is a 54 y.o. male seen bedside for Diabetic L foot ulcerations, L foot abscess, osteomyelitis: Status post L foot incision and drainage with bone debridement and biopsy. Procedure done 10/17/20 by Dr. Keisha Kan. No acute events overnight. Patient is resting comfortably in bed with no complaints, pain is well controlled. Denies any constitutional symptoms or other pedal complaints. OBJECTIVE:      Pt is AAOx3    Vitals:    10/19/20 0800   BP: (!) 140/83   Pulse: 72   Resp: 18   Temp: 97.5 °F (36.4 °C)   SpO2: 97%              EXAM:    LLE dressing change, wounds pictured below. No purulence or malodor appreciated at this time. Erythema and edema markedly decreased since initial evaluation.               Current Facility-Administered Medications   Medication Dose Route Frequency Provider Last Rate Last Dose    sodium chloride flush 0.9 % injection 10 mL  10 mL Intravenous BID Brigitte Kaye MD   10 mL at 10/18/20 2055    sodium chloride flush 0.9 % injection 10 mL  10 mL Intravenous PRN Brigitte Kaye MD   10 mL at 10/18/20 1258    glucose (GLUTOSE) 40 % oral gel 15 g  15 g Oral PRN Ashley Hug, DPM        dextrose 50 % IV solution  12.5 g Intravenous PRN Ashley Hug, DPM        glucagon (rDNA) injection 1 mg  1 mg Intramuscular PRN Ashley Hug, DPM        dextrose 5 % solution  100 mL/hr Intravenous PRN Ashley Hug, DPM        insulin lispro (HUMALOG) injection vial 0-12 Units  0-12 Units Subcutaneous TID WC Ashley Hug, DPM   2 Units at 10/17/20 1618    insulin lispro (HUMALOG) injection vial 0-6 Units  0-6 Units Subcutaneous Nightly Ashley Hug, DPM   1 Units at 10/16/20 2210    influenza quadrivalent split vaccine (FLUZONE;FLUARIX;FLULAVAL;AFLURIA) injection 0.5 mL  0.5 mL Intramuscular Prior to discharge Ashley Hug, DPM        enoxaparin (LOVENOX) injection 40 mg  40 mg Subcutaneous Daily Sheyla Jones, DPM   40 mg at 10/19/20 1009    labetalol (NORMODYNE;TRANDATE) injection 10 mg  10 mg Intravenous Q4H PRN Sheyla Robert, DPM        DAPTOmycin (CUBICIN) 650 mg in sodium chloride 0.9 % 50 mL IVPB  6 mg/kg (Adjusted) Intravenous Q24H Sheyla Jones, DPM   Stopped at 10/18/20 1200    piperacillin-tazobactam (ZOSYN) 3.375 g in dextrose 5 % 50 mL IVPB extended infusion (mini-bag)  3.375 g Intravenous Q8H Sheyla Jones, DPM   Stopped at 10/19/20 1000    0.9 % sodium chloride infusion  25 mL Intravenous Q8H Sheyla Jones, DPM 12.5 mL/hr at 10/19/20 1009 25 mL at 10/19/20 1009        Lab Results   Component Value Date    WBC 7.1 10/17/2020    HCT 37.6 10/17/2020    HGB 11.1 (L) 10/17/2020     (H) 10/17/2020     10/17/2020    K 4.6 10/17/2020     10/17/2020    CO2 24 10/17/2020    BUN 13 10/17/2020    CREATININE 1.0 10/17/2020    GLUCOSE 130 (H) 10/17/2020    CRP 10.8 (H) 10/16/2020         Radiographs:    ASSESSMENT:  Diabetic L foot ulcerations, L foot abscess, osteomyelitis: Status post L foot incision and drainage with bone debridement and biopsy. Procedure done 10/17/20 by Dr. Epi Rhoades. Pain left foot       PLAN:  -Evaluation and Management  -Charts and labs reviewed  -ID following, dapto/zosyn while awaiting cultures and path. -LLE dressing changed, Wound vac applied. Change wound vac MWF, can be done by wound care. -Patient clear for DC from podiatry standpoint.   -Patient discussed with Dr. Epi Rhoades.         Jeanne Oh   10/19/2020   1:11 PM

## 2020-10-19 NOTE — PROGRESS NOTES
[address] [place] [address] [facility]  DONNAIDA  Progress Note      Chief Complaint   Patient presents with    Wound Check     left foot wound since Saturday, hx DM       SUBJECTIVE:      Patient is tolerating medications. No reported adverse drug reactions. No problems overnight. No fever or chills reported        Review of systems:    As stated above in the chief complaint, otherwise negative. Medications:    Scheduled Meds:   sodium chloride flush  10 mL Intravenous BID    insulin lispro  0-12 Units Subcutaneous TID WC    insulin lispro  0-6 Units Subcutaneous Nightly    influenza virus vaccine  0.5 mL Intramuscular Prior to discharge    enoxaparin  40 mg Subcutaneous Daily    daptomycin (CUBICIN) IVPB  6 mg/kg (Adjusted) Intravenous Q24H    piperacillin-tazobactam  3.375 g Intravenous Q8H     Continuous Infusions:   dextrose      sodium chloride 25 mL (10/18/20 2376)     PRN Meds:sodium chloride flush, glucose, dextrose, glucagon (rDNA), dextrose, labetalol  Prior to Admission medications    Medication Sig Start Date End Date Taking? Authorizing Provider   insulin glargine (LANTUS) 100 UNIT/ML injection vial Inject 15 Units into the skin nightly   Yes Historical Provider, MD   atorvastatin (LIPITOR) 10 MG tablet Take 5 mg by mouth daily   Yes Historical Provider, MD   metFORMIN (GLUCOPHAGE) 500 MG tablet Take 500 mg by mouth daily (with breakfast)   Yes Historical Provider, MD       OBJECTIVE:  BP (!) 140/83   Pulse 72   Temp 97.5 °F (36.4 °C) (Oral)   Resp 18   Ht 6' 3\" (1.905 m)   Wt (!) 304 lb 12.8 oz (138.3 kg)   SpO2 97%   BMI 38.10 kg/m²   Temp  Av.1 °F (36.7 °C)  Min: 97.5 °F (36.4 °C)  Max: 98.6 °F (37 °C)  General appearance: Resting in bed in no apparent distress. Skin: Warm and dry. No rashes were noted. HEENT: Round and reactive pupils. Moist mucous membranes. No ulcerations or thrush. Neck: Supple to movements. Chest: No use of accessory muscles to breathe. Symmetrical expansion. No wheezing, crackles or rhonchi. Cardiovascular: Reguar rate and rhythm. No murmurs gallops, or rubs appreciated. Abdomen: Bowel sounds present, nontender, nondistended, no masses or hepatosplenomegaly. Extremities: Left foot dressing clean and dry. Lines: peripheral    I/O last 3 completed shifts: In: 960 [P.O.:960]  Out: 1000 [Urine:1000]      Laboratory and Tests Review:      Lab Results   Component Value Date    WBC 7.1 10/17/2020    WBC 13.4 (H) 10/16/2020    WBC 14.9 (H) 10/15/2020    HGB 11.1 (L) 10/17/2020    HCT 37.6 10/17/2020    MCV 83.4 10/17/2020     (H) 10/17/2020     Lab Results   Component Value Date    NEUTROABS 4.73 10/17/2020    NEUTROABS 10.33 (H) 10/16/2020    NEUTROABS 13.05 (H) 10/15/2020     No results found for: [facility]  Lab Results   Component Value Date    ALT 20 10/16/2020    AST 29 10/16/2020    ALKPHOS 92 10/16/2020    BILITOT 0.8 10/16/2020     Lab Results   Component Value Date     10/17/2020    K 4.6 10/17/2020     10/17/2020    CO2 24 10/17/2020    BUN 13 10/17/2020    CREATININE 1.0 10/17/2020    CREATININE 1.1 10/16/2020    CREATININE 1.1 10/15/2020    GFRAA >60 10/17/2020    LABGLOM >60 10/17/2020    GLUCOSE 130 10/17/2020    PROT 8.1 10/16/2020    LABALBU 2.8 10/16/2020    CALCIUM 8.7 10/17/2020    BILITOT 0.8 10/16/2020    ALKPHOS 92 10/16/2020    AST 29 10/16/2020    ALT 20 10/16/2020     Lab Results   Component Value Date    CRP 10.8 (H) 10/16/2020     Lab Results   Component Value Date    SEDRATE 78 (H) 10/16/2020     CK [[id number]]      Collected: 10/18/20 1340     Updated: 10/18/20 1430     Specimen Source: Blood      Total CK  45  U/L      Radiology:    XR FOOT LEFT (MIN 3 VIEWS)   Final Result   1. Mid foot plantar surface ulceration with subjacent subcutaneous air and   evidence of osseous erosion concerning for osseous infection. 2.  Osseous fusion of the osseous components of the midfoot.              Microbiology: Lab Results   Component Value Date    BC 24 Hours no growth 10/15/2020     Lab Results   Component Value Date    BLOODCULT2 24 Hours no growth 10/15/2020     WOUND/ABSCESS   Date Value Ref Range Status   10/15/2020   Final    Mixed cosntance isolated. Further workup and sensitivity testing  is not routinely indicated and will not be performed. Mixed constance isolated includes:  Alpha hemolytic Strep species  Coagulase negative Staph species  Gram negative rods       No results found for: RESPSMEAR  No results found for: MPNEUMO, CLAMYDCU, LABLEGI, AFBCX, FUNGSM, LABFUNG  No results found for: CULTRESP  No results found for: CXCATHTIP  No results found for: BFCS  Culture Surgical   Date Value Ref Range Status   10/17/2020 Growth not present, incubation continues  Preliminary   10/17/2020 Growth not present, incubation continues  Preliminary   10/17/2020 Growth not present, incubation continues  Preliminary     No results found for: LABURIN  No results found for: [facility]    Problem list:    Principal Problem:    Diabetic foot infection ([facility]  Active Problems:    Charcot foot due to diabetes mellitus ([facility]    Diabetic neuropathy associated with diabetes mellitus due to underlying condition ([facility]    Essential hypertension  Resolved Problems:    * No resolved hospital problems.  *      ASSESSMENT:    · Severe diabetic foot infection left foot with osteomyelitis  · Charcot arthropathy  · Type 2 diabetes mellitus    PLAN:    · Continue Zosyn 3.375 g IV every 8 hours  · Continue daptomycin 6 mg/kg daily  · Surgical cultures mixed constance, discussed with micro  · Await final ID and sensitivity  · Will require 6 weeks of antibiotic therapy  · Will continue to follow with you  · Weekly CPK while on daptomycin      MARY Salas DO    9:22 AM  10/19/2020

## 2020-10-19 NOTE — PATIENT CARE CONFERENCE
P Quality Flow/Interdisciplinary Rounds Progress Note        Quality Flow Rounds held on October 19, 2020    Disciplines Attending:  Bedside Nurse, ,  and Nursing Unit Leadership    Ovi Desai was admitted on 10/15/2020  7:57 PM    Anticipated Discharge Date:  Expected Discharge Date: 10/19/20    Disposition:    Eamon Score:  Eamon Scale Score: 20    Readmission Risk              Risk of Unplanned Readmission:        9           Discussed patient goal for the day, patient clinical progression, and barriers to discharge.   The following Goal(s) of the Day/Commitment(s) have been identified:  Dick Thomson  October 19, 2020

## 2020-10-19 NOTE — PROGRESS NOTES
Wound VAC in place to left foot. Done per podiatry. At 125  Will follow  Plan  Heel boot  Isaias Goldstein.  Maureen Sterling, MESSI, Wound Care

## 2020-10-19 NOTE — CARE COORDINATION
Social Work discharge planning   Pt does not have a PCP. Podiatry following for [facility]hc. Sw called [facility] [phone] scheduled pt for soonest available PCP establishment Dr Davon Mg on Friday 10/23/20 at [facility] added to pt's AVS.   Electronically signed by THERESA Humphries on 10/19/2020 at 1420 [facility] aide form that pt filled out faxed back to [facility] at this time. Also, [facility] financial aide form that pt completed faxed back to Erick Graham with [facility] at this time.   Electronically signed by Tiffany Perez on 10/19/2020 at 1:33 PM

## 2020-10-19 NOTE — PROGRESS NOTES
[facility] Progress Note    Admitting Date and Time: 10/15/2020  7:57 PM  Admit Dx: Diabetic foot infection ([facility] [E11.628, L08.9]  Diabetic foot infection ([facility] [S84.264, L08.9]    Subjective:  Patient is being followed for Diabetic foot infection ([facility] [Z58.800, L08.9]  Diabetic foot infection ([facility] [B94.850, L08.9]   Pt feels fine, s/p I&D, debridement and bone biopsy    ROS: denies fever, chills, cp, sob, n/v, HA unless stated above.       sodium chloride flush  10 mL Intravenous BID    insulin lispro  0-12 Units Subcutaneous TID WC    insulin lispro  0-6 Units Subcutaneous Nightly    influenza virus vaccine  0.5 mL Intramuscular Prior to discharge    enoxaparin  40 mg Subcutaneous Daily    daptomycin (CUBICIN) IVPB  6 mg/kg (Adjusted) Intravenous Q24H    piperacillin-tazobactam  3.375 g Intravenous Q8H     sodium chloride flush, 10 mL, PRN  glucose, 15 g, PRN  dextrose, 12.5 g, PRN  glucagon (rDNA), 1 mg, PRN  dextrose, 100 mL/hr, PRN  labetalol, 10 mg, Q4H PRN         Objective:    BP (!) 140/83   Pulse 72   Temp 97.5 °F (36.4 °C) (Oral)   Resp 18   Ht 6' 3\" (1.905 m)   Wt (!) 304 lb 12.8 oz (138.3 kg)   SpO2 97%   BMI 38.10 kg/m²     General Appearance: alert and oriented to person, place and time and in no acute distress  Skin: warm and dry  Head: normocephalic and atraumatic  Eyes: pupils equal, round, and reactive to light, extraocular eye movements intact, conjunctivae normal  Neck: neck supple and non tender without mass   Pulmonary/Chest: clear to auscultation bilaterally- no wheezes, rales or rhonchi, normal air movement, no respiratory distress  Cardiovascular: normal rate, normal S1 and S2 and no carotid bruits  Abdomen: soft, non-tender, non-distended, normal bowel sounds, no masses or organomegaly  Extremities: no cyanosis, no clubbing and left foot in dressing had open wound  Neurologic: no cranial nerve deficit and speech normal        Recent Labs 10/17/20  1148      K 4.6      CO2 24   BUN 13   CREATININE 1.0   GLUCOSE 130*   CALCIUM 8.7       Recent Labs     10/17/20  1148   WBC 7.1   RBC 4.51   HGB 11.1*   HCT 37.6   MCV 83.4   MCH 24.6*   MCHC 29.5*   RDW 14.3   *   MPV 9.3         Assessment:    Principal Problem:    Diabetic foot infection (HCC)  Active Problems:    Charcot foot due to diabetes mellitus ([facility]    Diabetic neuropathy associated with diabetes mellitus due to underlying condition [facility])    Essential hypertension  Resolved Problems:    * No resolved hospital problems. *      Plan:  1. Open wound, diabetic foot infection of left foot with osteomyelitis, wound cultures were obtained as well as blood cultures, consulted podiatry and vascular surgery. Patient status post debridement with bone biopsy, appreciate ID input, antibiotics were switched to Zosyn and daptomycin, will need a PICC line and 6 weeks of abx, cultures growing mixed constance. Fortunately patient is self-pay and cannot afford wound VAC or IV antibiotics, he is not eligible for assistance, involving  and , will discuss with ID other options  2. Diabetes type 2, patient is currently on Lantus 15 units and metformin, holding oral hypoglycemic agents, place the patient on sliding scale insulin, . 3.  History of hyperlipidemia, continue atorvastatin. 4.  Elevated blood pressure readings, patient has no history of hypertension, presented with a blood pressure of 170/89, trended down, with improved readings, no need to start antihypertensive medication      NOTE: This report was transcribed using voice recognition software. Every effort was made to ensure accuracy; however, inadvertent computerized transcription errors may be present.   Electronically signed by Nova Luna MD on 10/19/2020 at 9:51 AM

## 2020-10-20 LAB
METER GLUCOSE: 122 MG/DL (ref 74–99)
METER GLUCOSE: 136 MG/DL (ref 74–99)
METER GLUCOSE: 137 MG/DL (ref 74–99)
METER GLUCOSE: 157 MG/DL (ref 74–99)

## 2020-10-20 PROCEDURE — 6370000000 HC RX 637 (ALT 250 FOR IP): Performed by: PODIATRIST

## 2020-10-20 PROCEDURE — 1200000000 HC SEMI PRIVATE

## 2020-10-20 PROCEDURE — 6360000002 HC RX W HCPCS: Performed by: PODIATRIST

## 2020-10-20 PROCEDURE — 82962 GLUCOSE BLOOD TEST: CPT

## 2020-10-20 PROCEDURE — 99232 SBSQ HOSP IP/OBS MODERATE 35: CPT | Performed by: INTERNAL MEDICINE

## 2020-10-20 PROCEDURE — 94660 CPAP INITIATION&MGMT: CPT

## 2020-10-20 PROCEDURE — 2580000003 HC RX 258: Performed by: PODIATRIST

## 2020-10-20 PROCEDURE — 2580000003 HC RX 258: Performed by: INTERNAL MEDICINE

## 2020-10-20 RX ORDER — HEPARIN SODIUM (PORCINE) LOCK FLUSH IV SOLN 100 UNIT/ML 100 UNIT/ML
3 SOLUTION INTRAVENOUS PRN
Status: DISCONTINUED | OUTPATIENT
Start: 2020-10-20 | End: 2020-10-22 | Stop reason: HOSPADM

## 2020-10-20 RX ORDER — LIDOCAINE HYDROCHLORIDE 10 MG/ML
5 INJECTION, SOLUTION EPIDURAL; INFILTRATION; INTRACAUDAL; PERINEURAL ONCE
Status: DISCONTINUED | OUTPATIENT
Start: 2020-10-20 | End: 2020-10-22 | Stop reason: HOSPADM

## 2020-10-20 RX ORDER — SODIUM CHLORIDE 0.9 % (FLUSH) 0.9 %
10 SYRINGE (ML) INJECTION PRN
Status: DISCONTINUED | OUTPATIENT
Start: 2020-10-20 | End: 2020-10-22 | Stop reason: HOSPADM

## 2020-10-20 RX ORDER — HEPARIN SODIUM (PORCINE) LOCK FLUSH IV SOLN 100 UNIT/ML 100 UNIT/ML
3 SOLUTION INTRAVENOUS EVERY 12 HOURS SCHEDULED
Status: DISCONTINUED | OUTPATIENT
Start: 2020-10-20 | End: 2020-10-22 | Stop reason: HOSPADM

## 2020-10-20 RX ADMIN — PIPERACILLIN SODIUM AND TAZOBACTAM SODIUM 3.38 G: 3; .375 INJECTION, POWDER, LYOPHILIZED, FOR SOLUTION INTRAVENOUS at 15:56

## 2020-10-20 RX ADMIN — SODIUM CHLORIDE 25 ML: 9 INJECTION, SOLUTION INTRAVENOUS at 03:15

## 2020-10-20 RX ADMIN — ENOXAPARIN SODIUM 40 MG: 40 INJECTION SUBCUTANEOUS at 09:36

## 2020-10-20 RX ADMIN — SODIUM CHLORIDE 25 ML: 9 INJECTION, SOLUTION INTRAVENOUS at 19:56

## 2020-10-20 RX ADMIN — INSULIN LISPRO 2 UNITS: 100 INJECTION, SOLUTION INTRAVENOUS; SUBCUTANEOUS at 15:56

## 2020-10-20 RX ADMIN — SODIUM CHLORIDE 25 ML: 9 INJECTION, SOLUTION INTRAVENOUS at 11:07

## 2020-10-20 RX ADMIN — SODIUM CHLORIDE, PRESERVATIVE FREE 10 ML: 5 INJECTION INTRAVENOUS at 14:03

## 2020-10-20 RX ADMIN — SODIUM CHLORIDE 650 MG: 9 INJECTION, SOLUTION INTRAVENOUS at 14:03

## 2020-10-20 RX ADMIN — PIPERACILLIN SODIUM AND TAZOBACTAM SODIUM 3.38 G: 3; .375 INJECTION, POWDER, LYOPHILIZED, FOR SOLUTION INTRAVENOUS at 06:58

## 2020-10-20 ASSESSMENT — PAIN SCALES - GENERAL
PAINLEVEL_OUTOF10: 0
PAINLEVEL_OUTOF10: 0

## 2020-10-20 NOTE — PROGRESS NOTES
Podiatry Progress Note  10/20/2020   Merrillmp Alken Leschak       SUBJECTIVE: This is a 54 y.o. male seen bedside for Diabetic L foot ulcerations, L foot abscess, osteomyelitis: Status post L foot incision and drainage with bone debridement and biopsy. Procedure done 10/17/20 by Dr. Pauly Meeks. No acute events overnight. Patient is resting comfortably in bed with no complaints, pain is well controlled. Denies any constitutional symptoms or other pedal complaints. OBJECTIVE:      Pt is AAOx3    Vitals:    10/20/20 0800   BP: 123/75   Pulse: 66   Resp: 16   Temp: 97.6 °F (36.4 °C)   SpO2: 96%              EXAM:    LLE dressing clean, dry and intact. Wound vac intact w good seal, minimal drainage to cannister.               Current Facility-Administered Medications   Medication Dose Route Frequency Provider Last Rate Last Dose    lidocaine PF 1 % injection 5 mL  5 mL Intradermal Once Faustina Aundrea, DO        sodium chloride flush 0.9 % injection 10 mL  10 mL Intravenous PRN Faustina Sauk, DO        heparin flush 100 UNIT/ML injection 300 Units  3 mL Intravenous 2 times per day Faustina Aundrea, DO        heparin flush 100 UNIT/ML injection 300 Units  3 mL Intracatheter PRN Faustina Sauk, DO        sodium chloride flush 0.9 % injection 10 mL  10 mL Intravenous BID Emelia Goodell, MD   Stopped at 10/20/20 0936    sodium chloride flush 0.9 % injection 10 mL  10 mL Intravenous PRN Emelia Goodell, MD   10 mL at 10/18/20 1258    glucose (GLUTOSE) 40 % oral gel 15 g  15 g Oral PRN Tito Coelho DPM        dextrose 50 % IV solution  12.5 g Intravenous PRN Tito Coelho DPM        glucagon (rDNA) injection 1 mg  1 mg Intramuscular PRN Tito Coelho DPM        dextrose 5 % solution  100 mL/hr Intravenous PRN Tito Coelho DPM        insulin lispro (HUMALOG) injection vial 0-12 Units  0-12 Units Subcutaneous TID WC Tito Coelho DPM   2 Units at 10/17/20 1618    insulin lispro (HUMALOG) injection vial 0-6 Units  0-6 Units Subcutaneous Nightly Sherrine Revere, DPM   1 Units at 10/19/20 2238    influenza quadrivalent split vaccine (FLUZONE;FLUARIX;FLULAVAL;AFLURIA) injection 0.5 mL  0.5 mL Intramuscular Prior to discharge Sherrine Revere, DPM        enoxaparin (LOVENOX) injection 40 mg  40 mg Subcutaneous Daily Sherrine Revere, DPM   40 mg at 10/20/20 9651    labetalol (NORMODYNE;TRANDATE) injection 10 mg  10 mg Intravenous Q4H PRN Sherrine Revere, DPM        DAPTOmycin (CUBICIN) 650 mg in sodium chloride 0.9 % 50 mL IVPB  6 mg/kg (Adjusted) Intravenous Q24H Sherrine Revere, DPM   Stopped at 10/19/20 1457    piperacillin-tazobactam (ZOSYN) 3.375 g in dextrose 5 % 50 mL IVPB extended infusion (mini-bag)  3.375 g Intravenous Q8H Sherrine Revere, DPM   Stopped at 10/20/20 1107    0.9 % sodium chloride infusion  25 mL Intravenous Q8H Sherrine Revere, DPM 12.5 mL/hr at 10/20/20 1107 25 mL at 10/20/20 1107        Lab Results   Component Value Date    WBC 7.1 10/17/2020    HCT 37.6 10/17/2020    HGB 11.1 (L) 10/17/2020     (H) 10/17/2020     10/17/2020    K 4.6 10/17/2020     10/17/2020    CO2 24 10/17/2020    BUN 13 10/17/2020    CREATININE 1.0 10/17/2020    GLUCOSE 130 (H) 10/17/2020    CRP 10.8 (H) 10/16/2020         Radiographs:    ASSESSMENT:  Diabetic L foot ulcerations, L foot abscess, osteomyelitis: Status post L foot incision and drainage with bone debridement and biopsy. Procedure done 10/17/20 by Dr. Susy Arteaga. Pain left foot       PLAN:  -Evaluation and Management  -Charts and labs reviewed  -ID following, dapto/zosyn while awaiting cultures and path. PICC line and 6 weeks abx.  -Continue wound vac changes MWF, can be done by wound care. -Patient clear for DC from podiatry standpoint.   -Patient discussed with Dr. Susy Arteaga.         [facility]   10/20/2020   11:31 AM

## 2020-10-20 NOTE — CARE COORDINATION
Spoke to Vishal Finch at [facility] regarding wound Vac. Inquired if [place] offers any type of financial assistance for wound vac therapy. Vishal Finch stated that [facility] DOES NOT offer financial assistance, noting that she normally refers patients to [facility] for patients needing assistance. Peter Hooker at [facility] on 10/20:  financial assistance information was received however patient does not qualify for any financial assistance due to his house hold income being too high per month. Balbina Claire stated that a self pay cost of wound vac therapy would be $3400 per month. [employer] would require $1700 up front before they could process the order. Balbina Claire stated that after the first billing cycle, that patient could be started on a payment plan but can not confirm or disclose an amount for monthly payments. Patient was informed of this information and is not agreeable, noting that he can not afford the cost of the wound vac.

## 2020-10-20 NOTE — CARE COORDINATION
Social Work discharge planning   Pt gave Mary Jo wife's pay stubs as requested yesterday. SW faxed them to Leila with [facility]. Sw also gave pt Leila's email address, as pt said that is only way he can give [facility] his pay stubs from last 2 weeks. Pt said he will email this info to [facility] after he is done eating lunch. Electronically signed by Kassi Hernandez on 10/20/2020 at 11:36 AM     Addendum-    Mary Jo spoke to Leila with [employer] to see if they received email from pt with his pay stubs. Leila confirmed they received wife's info for financial aide. PLAN: Home Care is NOT finalized yet for home iv atb.   Electronically signed by Kassi Hernandez on 10/20/2020 at 3:23 PM

## 2020-10-20 NOTE — CARE COORDINATION
Perfect Serve message sent to Dr. Rosario Castillo regarding pt being unable to afford wound vac therapy upon discharge. 1401: Perfect Service response from Dr. Rosario Castillo received: Rafal olsen keep packed and follow up at wound care rodolfo.

## 2020-10-20 NOTE — PATIENT CARE CONFERENCE
P Quality Flow/Interdisciplinary Rounds Progress Note        Quality Flow Rounds held on October 20, 2020    Disciplines Attending:  Bedside Nurse, ,  and Nursing Unit Leadership    Amalia Abbasi was admitted on 10/15/2020  7:57 PM    Anticipated Discharge Date:  Expected Discharge Date: 10/19/20    Disposition:    Eamon Score:  Eamon Scale Score: 20    Readmission Risk              Risk of Unplanned Readmission:        10           Discussed patient goal for the day, patient clinical progression, and barriers to discharge.   The following Goal(s) of the Day/Commitment(s) have been identified:  Dc nate Thomson  October 20, 2020

## 2020-10-20 NOTE — PROGRESS NOTES
[facility] Progress Note    Admitting Date and Time: 10/15/2020  7:57 PM  Admit Dx: Diabetic foot infection ([facility] [E11.628, L08.9]  Diabetic foot infection ([facility] [G25.995, L08.9]    Subjective:  Patient is being followed for Diabetic foot infection ([facility] [F47.844, L08.9]  Diabetic foot infection ([facility] [Y21.060, L08.9]   Pt feels fine, s/p I&D, debridement and bone biopsy    ROS: denies fever, chills, cp, sob, n/v, HA unless stated above.       lidocaine PF  5 mL Intradermal Once    heparin flush  3 mL Intravenous 2 times per day    sodium chloride flush  10 mL Intravenous BID    insulin lispro  0-12 Units Subcutaneous TID WC    insulin lispro  0-6 Units Subcutaneous Nightly    influenza virus vaccine  0.5 mL Intramuscular Prior to discharge    enoxaparin  40 mg Subcutaneous Daily    daptomycin (CUBICIN) IVPB  6 mg/kg (Adjusted) Intravenous Q24H    piperacillin-tazobactam  3.375 g Intravenous Q8H     sodium chloride flush, 10 mL, PRN  heparin flush, 3 mL, PRN  sodium chloride flush, 10 mL, PRN  glucose, 15 g, PRN  dextrose, 12.5 g, PRN  glucagon (rDNA), 1 mg, PRN  dextrose, 100 mL/hr, PRN  labetalol, 10 mg, Q4H PRN         Objective:    /75   Pulse 66   Temp 97.6 °F (36.4 °C) (Oral)   Resp 16   Ht 6' 3\" (1.905 m)   Wt (!) 303 lb (137.4 kg)   SpO2 96%   BMI 37.87 kg/m²     General Appearance: alert and oriented to person, place and time and in no acute distress  Skin: warm and dry  Head: normocephalic and atraumatic  Eyes: pupils equal, round, and reactive to light, extraocular eye movements intact, conjunctivae normal  Neck: neck supple and non tender without mass   Pulmonary/Chest: clear to auscultation bilaterally- no wheezes, rales or rhonchi, normal air movement, no respiratory distress  Cardiovascular: normal rate, normal S1 and S2 and no carotid bruits  Abdomen: soft, non-tender, non-distended, normal bowel sounds, no masses or organomegaly  Extremities: no cyanosis, no

## 2020-10-20 NOTE — PROGRESS NOTES
[address] [place] [address] [facility]  [place]  Progress Note      Chief Complaint   Patient presents with    Wound Check     left foot wound since Saturday, hx DM       SUBJECTIVE:      Patient is tolerating medications. No reported adverse drug reactions. No problems overnight. Review of systems:    As stated above in the chief complaint, otherwise negative. Medications:    Scheduled Meds:   sodium chloride flush  10 mL Intravenous BID    insulin lispro  0-12 Units Subcutaneous TID WC    insulin lispro  0-6 Units Subcutaneous Nightly    influenza virus vaccine  0.5 mL Intramuscular Prior to discharge    enoxaparin  40 mg Subcutaneous Daily    daptomycin (CUBICIN) IVPB  6 mg/kg (Adjusted) Intravenous Q24H    piperacillin-tazobactam  3.375 g Intravenous Q8H     Continuous Infusions:   dextrose      sodium chloride 25 mL (10/20/20 0315)     PRN Meds:sodium chloride flush, glucose, dextrose, glucagon (rDNA), dextrose, labetalol  Prior to Admission medications    Medication Sig Start Date End Date Taking? Authorizing Provider   insulin glargine (LANTUS) 100 UNIT/ML injection vial Inject 15 Units into the skin nightly   Yes Historical Provider, MD   atorvastatin (LIPITOR) 10 MG tablet Take 5 mg by mouth daily   Yes Historical Provider, MD   metFORMIN (GLUCOPHAGE) 500 MG tablet Take 500 mg by mouth daily (with breakfast)   Yes Historical Provider, MD       OBJECTIVE:  /75   Pulse 66   Temp 97.6 °F (36.4 °C) (Oral)   Resp 16   Ht 6' 3\" (1.905 m)   Wt (!) 303 lb (137.4 kg)   SpO2 96%   BMI 37.87 kg/m²   Temp  Av.9 °F (36.6 °C)  Min: 97.6 °F (36.4 °C)  Max: 98.1 °F (36.7 °C)  General appearance: Resting in bed in no apparent distress. Skin: Warm and dry. No rashes were noted. HEENT: Round and reactive pupils. Moist mucous membranes. No ulcerations or thrush. Neck: Supple to movements. Chest: No use of accessory muscles to breathe. Symmetrical expansion.   No wheezing, crackles or rhonchi. Cardiovascular: Reguar rate and rhythm. No murmurs gallops, or rubs appreciated. Abdomen: Bowel sounds present, nontender, nondistended, no masses or hepatosplenomegaly. Extremities: Wound VAC in place left foot, dressing clean dry. Lines: peripheral    I/O last 3 completed shifts: In: 36 [P.O.:720; IV Piggyback:100]  Out: 600 [Urine:600]      Laboratory and Tests Review:      Lab Results   Component Value Date    WBC 7.1 10/17/2020    WBC 13.4 (H) 10/16/2020    WBC 14.9 (H) 10/15/2020    HGB 11.1 (L) 10/17/2020    HCT 37.6 10/17/2020    MCV 83.4 10/17/2020     (H) 10/17/2020     Lab Results   Component Value Date    NEUTROABS 4.73 10/17/2020    NEUTROABS 10.33 (H) 10/16/2020    NEUTROABS 13.05 (H) 10/15/2020     No results found for: [facility]  Lab Results   Component Value Date    ALT 20 10/16/2020    AST 29 10/16/2020    ALKPHOS 92 10/16/2020    BILITOT 0.8 10/16/2020     Lab Results   Component Value Date     10/17/2020    K 4.6 10/17/2020     10/17/2020    CO2 24 10/17/2020    BUN 13 10/17/2020    CREATININE 1.0 10/17/2020    CREATININE 1.1 10/16/2020    CREATININE 1.1 10/15/2020    GFRAA >60 10/17/2020    LABGLOM >60 10/17/2020    GLUCOSE 130 10/17/2020    PROT 8.1 10/16/2020    LABALBU 2.8 10/16/2020    CALCIUM 8.7 10/17/2020    BILITOT 0.8 10/16/2020    ALKPHOS 92 10/16/2020    AST 29 10/16/2020    ALT 20 10/16/2020     Lab Results   Component Value Date    CRP 10.8 (H) 10/16/2020     Lab Results   Component Value Date    SEDRATE 78 (H) 10/16/2020       Radiology:    XR FOOT LEFT (MIN 3 VIEWS)   Final Result   1. Mid foot plantar surface ulceration with subjacent subcutaneous air and   evidence of osseous erosion concerning for osseous infection. 2.  Osseous fusion of the osseous components of the midfoot.              Microbiology:   Lab Results   Component Value Date    BC 24 Hours no growth 10/15/2020    ORG Staphylococcus species 10/17/2020    ORG Gram negative zarina 10/17/2020    ORG Corynebacterium species 10/17/2020     Lab Results   Component Value Date    BLOODCULT2 24 Hours no growth 10/15/2020    ORG Staphylococcus species 10/17/2020    ORG Gram negative zarina 10/17/2020    ORG Corynebacterium species 10/17/2020     WOUND/ABSCESS   Date Value Ref Range Status   10/15/2020   Final    Mixed constance isolated. Further workup and sensitivity testing  is not routinely indicated and will not be performed. Mixed constance isolated includes:  Alpha hemolytic Strep species  Coagulase negative Staph species  Gram negative rods       No results found for: RESPSMEAR  No results found for: MPNEUMO, CLAMYDCU, LABLEGI, AFBCX, FUNGSM, LABFUNG  No results found for: CULTRESP  No results found for: CXCATHTIP  No results found for: BFCS  Culture Surgical   Date Value Ref Range Status   10/17/2020   Preliminary    Growth not present, incubation continues  Growth present, evaluating for:  Mixed Gram positive organisms  Staph species  Corynebacteria     10/17/2020 (A)  Preliminary    Growth not present, incubation continues  Growth present, evaluating for:  Mixed Gram positive organisms  Staph species     10/17/2020   Preliminary    Light growth  Identification and sensitivity to follow     10/17/2020   Preliminary    Moderate growth  Identification and sensitivity to follow     10/17/2020 Light growth  Preliminary     No results found for: LABURIN  No results found for: [facility]    Problem list:    Principal Problem:    Diabetic foot infection ([facility]  Active Problems:    Charcot foot due to diabetes mellitus ([facility]    Diabetic neuropathy associated with diabetes mellitus due to underlying condition ([facility]    Essential hypertension  Resolved Problems:    * No resolved hospital problems.  *      ASSESSMENT:    · Severe diabetic foot infection left foot with osteomyelitis-polymicrobial  · Charcot arthropathy  · Type 2 diabetes mellitus       PLAN:    · Continue Zosyn   · Continue daptomycin · Surgical cultures in process  · Await final ID and sensitivity  · Will require 6 weeks of antibiotic therapy  · Will place PICC line  · Will continue to follow with you  · Weekly CPK while on daptomycin        Informed Consent for PICC:  I explained the risks, benefits, alternative options, and potential complications associated with insertion of Peripherally Inserted Central Catheter (PICC) with the patient prior to the procedure.     Electronically signed by Yaniv Orourke DO on 10/20/2020 at 8:56 AM      Yaniv Jones    8:53 AM  10/20/2020

## 2020-10-21 LAB
% CKMB: NORMAL % (ref 0–4)
ANION GAP SERPL CALCULATED.3IONS-SCNC: 7 MMOL/L (ref 7–16)
BASOPHILS ABSOLUTE: 0.04 E9/L (ref 0–0.2)
BASOPHILS RELATIVE PERCENT: 0.3 % (ref 0–2)
BLOOD CULTURE, ROUTINE: NORMAL
BUN BLDV-MCNC: 23 MG/DL (ref 6–20)
CALCIUM SERPL-MCNC: 9.6 MG/DL (ref 8.6–10.2)
CHLORIDE BLD-SCNC: 101 MMOL/L (ref 98–107)
CK-BB: NORMAL % (ref 0–0)
CK-MACRO TYPE I: NORMAL % (ref 0–0)
CK-MACRO TYPE II: NORMAL % (ref 0–0)
CK-MM: NORMAL % (ref 96–100)
CO2: 27 MMOL/L (ref 22–29)
CREAT SERPL-MCNC: 1.2 MG/DL (ref 0.7–1.2)
CULTURE, BLOOD 2: NORMAL
EOSINOPHILS ABSOLUTE: 0.69 E9/L (ref 0.05–0.5)
EOSINOPHILS RELATIVE PERCENT: 6 % (ref 0–6)
GFR AFRICAN AMERICAN: >60
GFR NON-AFRICAN AMERICAN: >60 ML/MIN/1.73
GLUCOSE BLD-MCNC: 131 MG/DL (ref 74–99)
HCT VFR BLD CALC: 43.9 % (ref 37–54)
HEMOGLOBIN: 12.7 G/DL (ref 12.5–16.5)
IMMATURE GRANULOCYTES #: 0.4 E9/L
IMMATURE GRANULOCYTES %: 3.5 % (ref 0–5)
LYMPHOCYTES ABSOLUTE: 1.8 E9/L (ref 1.5–4)
LYMPHOCYTES RELATIVE PERCENT: 15.7 % (ref 20–42)
MCH RBC QN AUTO: 24.6 PG (ref 26–35)
MCHC RBC AUTO-ENTMCNC: 28.9 % (ref 32–34.5)
MCV RBC AUTO: 85.1 FL (ref 80–99.9)
METER GLUCOSE: 121 MG/DL (ref 74–99)
METER GLUCOSE: 129 MG/DL (ref 74–99)
METER GLUCOSE: 156 MG/DL (ref 74–99)
MONOCYTES ABSOLUTE: 0.58 E9/L (ref 0.1–0.95)
MONOCYTES RELATIVE PERCENT: 5.1 % (ref 2–12)
NEUTROPHILS ABSOLUTE: 7.95 E9/L (ref 1.8–7.3)
NEUTROPHILS RELATIVE PERCENT: 69.4 % (ref 43–80)
PDW BLD-RTO: 14.5 FL (ref 11.5–15)
PLATELET # BLD: 679 E9/L (ref 130–450)
PMV BLD AUTO: 8.7 FL (ref 7–12)
POLYCHROMASIA: ABNORMAL
POTASSIUM SERPL-SCNC: 4.3 MMOL/L (ref 3.5–5)
RBC # BLD: 5.16 E12/L (ref 3.8–5.8)
SODIUM BLD-SCNC: 135 MMOL/L (ref 132–146)
TOTAL CK: 20 U/L (ref 20–200)
WBC # BLD: 11.5 E9/L (ref 4.5–11.5)

## 2020-10-21 PROCEDURE — 82962 GLUCOSE BLOOD TEST: CPT

## 2020-10-21 PROCEDURE — 94660 CPAP INITIATION&MGMT: CPT

## 2020-10-21 PROCEDURE — 85025 COMPLETE CBC W/AUTO DIFF WBC: CPT

## 2020-10-21 PROCEDURE — 6370000000 HC RX 637 (ALT 250 FOR IP): Performed by: PODIATRIST

## 2020-10-21 PROCEDURE — 2580000003 HC RX 258: Performed by: PODIATRIST

## 2020-10-21 PROCEDURE — 80048 BASIC METABOLIC PNL TOTAL CA: CPT

## 2020-10-21 PROCEDURE — 99233 SBSQ HOSP IP/OBS HIGH 50: CPT | Performed by: INTERNAL MEDICINE

## 2020-10-21 PROCEDURE — 6360000002 HC RX W HCPCS: Performed by: PODIATRIST

## 2020-10-21 PROCEDURE — 2580000003 HC RX 258: Performed by: INTERNAL MEDICINE

## 2020-10-21 PROCEDURE — 1200000000 HC SEMI PRIVATE

## 2020-10-21 PROCEDURE — 36415 COLL VENOUS BLD VENIPUNCTURE: CPT

## 2020-10-21 RX ADMIN — PIPERACILLIN SODIUM AND TAZOBACTAM SODIUM 3.38 G: 3; .375 INJECTION, POWDER, LYOPHILIZED, FOR SOLUTION INTRAVENOUS at 16:05

## 2020-10-21 RX ADMIN — SODIUM CHLORIDE 650 MG: 9 INJECTION, SOLUTION INTRAVENOUS at 14:45

## 2020-10-21 RX ADMIN — PIPERACILLIN SODIUM AND TAZOBACTAM SODIUM 3.38 G: 3; .375 INJECTION, POWDER, LYOPHILIZED, FOR SOLUTION INTRAVENOUS at 07:06

## 2020-10-21 RX ADMIN — SODIUM CHLORIDE 25 ML: 9 INJECTION, SOLUTION INTRAVENOUS at 03:59

## 2020-10-21 RX ADMIN — SODIUM CHLORIDE 25 ML: 9 INJECTION, SOLUTION INTRAVENOUS at 11:05

## 2020-10-21 RX ADMIN — ENOXAPARIN SODIUM 40 MG: 40 INJECTION SUBCUTANEOUS at 10:07

## 2020-10-21 RX ADMIN — SODIUM CHLORIDE, PRESERVATIVE FREE 10 ML: 5 INJECTION INTRAVENOUS at 14:45

## 2020-10-21 RX ADMIN — SODIUM CHLORIDE, PRESERVATIVE FREE 10 ML: 5 INJECTION INTRAVENOUS at 16:05

## 2020-10-21 RX ADMIN — SODIUM CHLORIDE, PRESERVATIVE FREE 10 ML: 5 INJECTION INTRAVENOUS at 10:07

## 2020-10-21 RX ADMIN — PIPERACILLIN SODIUM AND TAZOBACTAM SODIUM 3.38 G: 3; .375 INJECTION, POWDER, LYOPHILIZED, FOR SOLUTION INTRAVENOUS at 23:30

## 2020-10-21 RX ADMIN — PIPERACILLIN SODIUM AND TAZOBACTAM SODIUM 3.38 G: 3; .375 INJECTION, POWDER, LYOPHILIZED, FOR SOLUTION INTRAVENOUS at 00:00

## 2020-10-21 RX ADMIN — SODIUM CHLORIDE 25 ML: 9 INJECTION, SOLUTION INTRAVENOUS at 19:30

## 2020-10-21 RX ADMIN — INSULIN LISPRO 2 UNITS: 100 INJECTION, SOLUTION INTRAVENOUS; SUBCUTANEOUS at 16:10

## 2020-10-21 ASSESSMENT — PAIN SCALES - GENERAL
PAINLEVEL_OUTOF10: 0
PAINLEVEL_OUTOF10: 0

## 2020-10-21 NOTE — PROGRESS NOTES
[address] [place] [address] [facility]  [facility]  Progress Note      Chief Complaint   Patient presents with    Wound Check     left foot wound since Saturday, hx DM       SUBJECTIVE:      Patient is tolerating medications. No reported adverse drug reactions. No problems overnight. Review of systems:    As stated above in the chief complaint, otherwise negative. Medications:    Scheduled Meds:   lidocaine PF  5 mL Intradermal Once    heparin flush  3 mL Intravenous 2 times per day    sodium chloride flush  10 mL Intravenous BID    insulin lispro  0-12 Units Subcutaneous TID WC    insulin lispro  0-6 Units Subcutaneous Nightly    influenza virus vaccine  0.5 mL Intramuscular Prior to discharge    enoxaparin  40 mg Subcutaneous Daily    daptomycin (CUBICIN) IVPB  6 mg/kg (Adjusted) Intravenous Q24H    piperacillin-tazobactam  3.375 g Intravenous Q8H     Continuous Infusions:   dextrose      sodium chloride 25 mL (10/21/20 0359)     PRN Meds:sodium chloride flush, heparin flush, sodium chloride flush, glucose, dextrose, glucagon (rDNA), dextrose, labetalol  Prior to Admission medications    Medication Sig Start Date End Date Taking? Authorizing Provider   insulin glargine (LANTUS) 100 UNIT/ML injection vial Inject 15 Units into the skin nightly   Yes Historical Provider, MD   atorvastatin (LIPITOR) 10 MG tablet Take 5 mg by mouth daily   Yes Historical Provider, MD   metFORMIN (GLUCOPHAGE) 500 MG tablet Take 500 mg by mouth daily (with breakfast)   Yes Historical Provider, MD       OBJECTIVE:  BP (!) 143/75   Pulse 65   Temp 97.6 °F (36.4 °C) (Oral)   Resp 16   Ht 6' 3\" (1.905 m)   Wt (!) 302 lb 12.8 oz (137.3 kg)   SpO2 98%   BMI 37.85 kg/m²   Temp  Av.7 °F (36.5 °C)  Min: 97.6 °F (36.4 °C)  Max: 97.7 °F (36.5 °C)  General appearance: Resting in bed in no apparent distress. Skin: Warm and dry. No rashes were noted. HEENT: Round and reactive pupils. Moist mucous membranes.   No ulcerations or thrush. Neck: Supple to movements. Chest: No use of accessory muscles to breathe. Symmetrical expansion. No wheezing, crackles or rhonchi. Cardiovascular: Reguar rate and rhythm. No murmurs gallops, or rubs appreciated. Abdomen: Bowel sounds present, nontender, nondistended, no masses or hepatosplenomegaly. Extremities: Dressing left foot clean and dry, wound VAC in place  Lines: peripheral    I/O last 3 completed shifts: In: 360 [P.O.:360]  Out: 1500 [Urine:1500]      Laboratory and Tests Review:      Lab Results   Component Value Date    WBC 7.1 10/17/2020    WBC 13.4 (H) 10/16/2020    WBC 14.9 (H) 10/15/2020    HGB 11.1 (L) 10/17/2020    HCT 37.6 10/17/2020    MCV 83.4 10/17/2020     (H) 10/17/2020     Lab Results   Component Value Date    NEUTROABS 4.73 10/17/2020    NEUTROABS 10.33 (H) 10/16/2020    NEUTROABS 13.05 (H) 10/15/2020     No results found for: [facility]  Lab Results   Component Value Date    ALT 20 10/16/2020    AST 29 10/16/2020    ALKPHOS 92 10/16/2020    BILITOT 0.8 10/16/2020     Lab Results   Component Value Date     10/17/2020    K 4.6 10/17/2020     10/17/2020    CO2 24 10/17/2020    BUN 13 10/17/2020    CREATININE 1.0 10/17/2020    CREATININE 1.1 10/16/2020    CREATININE 1.1 10/15/2020    GFRAA >60 10/17/2020    LABGLOM >60 10/17/2020    GLUCOSE 130 10/17/2020    PROT 8.1 10/16/2020    LABALBU 2.8 10/16/2020    CALCIUM 8.7 10/17/2020    BILITOT 0.8 10/16/2020    ALKPHOS 92 10/16/2020    AST 29 10/16/2020    ALT 20 10/16/2020     Lab Results   Component Value Date    CRP 10.8 (H) 10/16/2020     Lab Results   Component Value Date    SEDRATE 78 (H) 10/16/2020       Radiology:    XR FOOT LEFT (MIN 3 VIEWS)   Final Result   1. Mid foot plantar surface ulceration with subjacent subcutaneous air and   evidence of osseous erosion concerning for osseous infection. 2.  Osseous fusion of the osseous components of the midfoot.              Microbiology:   Lab Results   Component Value Date    BC 5 Days no growth 10/15/2020    ORG Staphylococcus species 10/17/2020    ORG Gram negative zarina 10/17/2020    ORG Corynebacterium species 10/17/2020    ORG Corynebacterium species 10/17/2020     Lab Results   Component Value Date    BLOODCULT2 5 Days no growth 10/15/2020    ORG Staphylococcus species 10/17/2020    ORG Gram negative zarina 10/17/2020    ORG Corynebacterium species 10/17/2020    ORG Corynebacterium species 10/17/2020     WOUND/ABSCESS   Date Value Ref Range Status   10/15/2020   Final    Mixed constance isolated. Further workup and sensitivity testing  is not routinely indicated and will not be performed. Mixed constance isolated includes:  Alpha hemolytic Strep species  Coagulase negative Staph species  Gram negative rods       No results found for: RESPSMEAR  No results found for: MPNEUMO, CLAMYDCU, LABLEGI, AFBCX, FUNGSM, LABFUNG  No results found for: CULTRESP  No results found for: CXCATHTIP  No results found for: BFCS  Culture Surgical   Date Value Ref Range Status   10/17/2020 (A)  Preliminary    Growth not present, incubation continues  Growth present, evaluating for:  Mixed Gram positive organisms  Staph species  Corynebacteria     10/17/2020   Preliminary    Rare growth  Identification and sensitivity to follow     10/17/2020   Preliminary    Light growth  Identification and sensitivity to follow     10/17/2020 Rare growth  1st morphology    Preliminary   10/17/2020 Rare growth  2nd morphology    Preliminary     No results found for: LABURIN  No results found for: [facility]    Problem list:    Principal Problem:    Diabetic foot infection ([facility]  Active Problems:    Charcot foot due to diabetes mellitus ([facility]    Diabetic neuropathy associated with diabetes mellitus due to underlying condition ([facility]    Essential hypertension  Resolved Problems:    * No resolved hospital problems.  *      ASSESSMENT:    · Severe diabetic foot infection left foot with osteomyelitis-mixed staph species and gram-negative zarina, ID and sensitivity to follow  · Charcot arthropathy  · Type 2 diabetes mellitus       PLAN:    · Continue Zosyn   · Continue daptomycin   · Surgical cultures in process  · Await final ID and sensitivity-discussed with micro  · Will require 6 weeks of antibiotic therapy  · Will need PICC line  · Will continue to follow with you  · Weekly CPK while on daptomycin         Jeffrey Rucker DO, TRAYOI    9:42 AM  10/21/2020 DISPLAY PLAN FREE TEXT

## 2020-10-21 NOTE — PATIENT CARE CONFERENCE
[facility] Quality Flow/Interdisciplinary Rounds Progress Note        Quality Flow Rounds held on October 21, 2020    Disciplines Attending:  Bedside Nurse, ,  and Nursing Unit Leadership    Ej Fleming was admitted on 10/15/2020  7:57 PM    Anticipated Discharge Date:  Expected Discharge Date: 10/19/20    Disposition:    Eamon Score:  Eamon Scale Score: 20    Readmission Risk              Risk of Unplanned Readmission:        9           Discussed patient goal for the day, patient clinical progression, and barriers to discharge.   The following Goal(s) of the Day/Commitment(s) have been identified:  Diagnostics - Report Results      Palmira Forte  October 21, 2020

## 2020-10-21 NOTE — PROGRESS NOTES
[facility] Progress Note    Admitting Date and Time: 10/15/2020  7:57 PM  Admit Dx: Diabetic foot infection ([facility] [E11.628, L08.9]  Diabetic foot infection ([facility] [A24.179, L08.9]    Subjective:  Patient is being followed for Diabetic foot infection ([facility] [P46.366, L08.9]  Diabetic foot infection ([facility] [U20.088, L08.9]   Pt feels fine, s/p I&D, debridement and bone biopsy    ROS: denies fever, chills, cp, sob, n/v, HA unless stated above.       lidocaine PF  5 mL Intradermal Once    heparin flush  3 mL Intravenous 2 times per day    sodium chloride flush  10 mL Intravenous BID    insulin lispro  0-12 Units Subcutaneous TID WC    insulin lispro  0-6 Units Subcutaneous Nightly    influenza virus vaccine  0.5 mL Intramuscular Prior to discharge    enoxaparin  40 mg Subcutaneous Daily    daptomycin (CUBICIN) IVPB  6 mg/kg (Adjusted) Intravenous Q24H    piperacillin-tazobactam  3.375 g Intravenous Q8H     sodium chloride flush, 10 mL, PRN  heparin flush, 3 mL, PRN  sodium chloride flush, 10 mL, PRN  glucose, 15 g, PRN  dextrose, 12.5 g, PRN  glucagon (rDNA), 1 mg, PRN  dextrose, 100 mL/hr, PRN  labetalol, 10 mg, Q4H PRN         Objective:    BP (!) 143/75   Pulse 65   Temp 97.6 °F (36.4 °C) (Oral)   Resp 16   Ht 6' 3\" (1.905 m)   Wt (!) 302 lb 12.8 oz (137.3 kg)   SpO2 98%   BMI 37.85 kg/m²     General Appearance: alert and oriented to person, place and time and in no acute distress  Skin: warm and dry  Head: normocephalic and atraumatic  Eyes: pupils equal, round, and reactive to light, extraocular eye movements intact, conjunctivae normal  Neck: neck supple and non tender without mass   Pulmonary/Chest: clear to auscultation bilaterally- no wheezes, rales or rhonchi, normal air movement, no respiratory distress  Cardiovascular: normal rate, normal S1 and S2 and no carotid bruits  Abdomen: soft, non-tender, non-distended, normal bowel sounds, no masses or organomegaly  Extremities: no cyanosis, no clubbing and left foot in dressing  Neurologic: no cranial nerve deficit and speech normal        No results for input(s): NA, K, CL, CO2, BUN, CREATININE, GLUCOSE, CALCIUM in the last 72 hours. No results for input(s): WBC, RBC, HGB, HCT, MCV, MCH, MCHC, RDW, PLT, MPV in the last 72 hours. Assessment:    Principal Problem:    Diabetic foot infection ([facility]  Active Problems:    Charcot foot due to diabetes mellitus ([facility]    Diabetic neuropathy associated with diabetes mellitus due to underlying condition [facility])    Essential hypertension  Resolved Problems:    * No resolved hospital problems. *      Plan:  1. Open wound, diabetic foot infection of left foot with osteomyelitis, wound cultures were obtained as well as blood cultures, consulted podiatry and vascular surgery. Patient status post debridement with bone biopsy, appreciate ID input, antibiotics were switched to Zosyn and daptomycin, will need a PICC line and 6 weeks of abx, cultures growing GNR and staph. 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 2.  Diabetes type 2, patient is currently on sliding scale insulin, . 3.  History of hyperlipidemia, continue atorvastatin. 4.  Elevated blood pressure readings, patient has no history of hypertension, presented with a blood pressure of 170/89, trended down, with improved readings, no need to start antihypertensive medication      NOTE: This report was transcribed using voice recognition software.  Every effort was made to ensure accuracy; however, inadvertent computerized transcription errors may be present.   Electronically signed by Ary Franklin MD on 10/21/2020 at 9:19 AM

## 2020-10-21 NOTE — PROGRESS NOTES
Dr. Mariola Wade notes reviewed. Will leave VAC in place today  Ladoris Fleet.  Rissa Harris, CNS, Wound Care

## 2020-10-21 NOTE — PROGRESS NOTES
Consulted to place PICC,  patient had refused yesterday and RN states today there are still issues about insurance and payment for meds so he will not agree until that is worked out. Patient not going home today and wants it placed on day of discharge. She will call me if anything changes. 10/21/2020 1:26 PM DEEPIKA SMITH, [facility]-BC

## 2020-10-21 NOTE — PROGRESS NOTES
Podiatry Progress Note  10/21/2020   Juwan Reagan       SUBJECTIVE: This is a 54 y.o. male seen bedside for Diabetic L foot ulcerations, L foot abscess, osteomyelitis: Status post L foot incision and drainage with bone debridement and biopsy. Procedure done 10/17/20 by Dr. Iveth Valentin. No acute events overnight. Patient is resting comfortably in bed with no complaints, pain is well controlled. Denies any constitutional symptoms or other pedal complaints. OBJECTIVE:      Pt is AAOx3    Vitals:    10/21/20 0807   BP: (!) 143/75   Pulse: 65   Resp: 16   Temp: 97.6 °F (36.4 °C)   SpO2: 98%              EXAM:    LLE dressing clean, dry and intact. Wound vac intact w good seal, minimal drainage to cannister.               Current Facility-Administered Medications   Medication Dose Route Frequency Provider Last Rate Last Dose    lidocaine PF 1 % injection 5 mL  5 mL Intradermal Once El Apa, DO        sodium chloride flush 0.9 % injection 10 mL  10 mL Intravenous PRN Ocean Apa, DO   10 mL at 10/20/20 1403    heparin flush 100 UNIT/ML injection 300 Units  3 mL Intravenous 2 times per day Ocean Apa, DO        heparin flush 100 UNIT/ML injection 300 Units  3 mL Intracatheter PRN Ocean Apa, DO        sodium chloride flush 0.9 % injection 10 mL  10 mL Intravenous BID Shelia Springer MD   10 mL at 10/21/20 1007    sodium chloride flush 0.9 % injection 10 mL  10 mL Intravenous PRN Shelia Springer MD   10 mL at 10/18/20 1258    glucose (GLUTOSE) 40 % oral gel 15 g  15 g Oral PRN Adarsh Macleod, DPM        dextrose 50 % IV solution  12.5 g Intravenous PRN Adarsh Macleod, DPM        glucagon (rDNA) injection 1 mg  1 mg Intramuscular PRN Adarsh Macleod, DPM        dextrose 5 % solution  100 mL/hr Intravenous PRN Adarsh Macleod, DPM        insulin lispro (HUMALOG) injection vial 0-12 Units  0-12 Units Subcutaneous TID WC Adarsh Macleod, DPM   2 Units at 10/20/20 2246

## 2020-10-21 NOTE — CARE COORDINATION
Social Work discharge planning   Sw spoke to Mile Fish with [employer]. She advised their financial aide person is reviewing pt and his wife's pay stubs emailed and faxed to Leila with [employer] yesterday. Mile Fish advised Sw to call back later this am to see IF pt qualifies for assist or not for iv atb. SW discussed with Dr Indigo Monroe and CM as well. Electronically signed by Lonnie Shi on 10/21/2020 at 10:05 AM     Addendum-    Per Leila and Jesus Ceballos with [employer], pt will have a 77% coverage for iv atb. Sw advised them that Dr Indigo Monroe said pt will defiantly need Dapto. They are working on pricing for pt. Dr Rigo Tidwell advised he will follow for home care. Pt has PCP establishment appt on Friday 10/23/20 with Dr Meryle Pleasant V (info on AVS).    Electronically signed by Lonnie Shi on 10/21/2020 at 10:55 AM

## 2020-10-22 VITALS
OXYGEN SATURATION: 100 % | DIASTOLIC BLOOD PRESSURE: 80 MMHG | SYSTOLIC BLOOD PRESSURE: 144 MMHG | TEMPERATURE: 97.8 F | RESPIRATION RATE: 18 BRPM | HEART RATE: 61 BPM | WEIGHT: 299 LBS | HEIGHT: 75 IN | BODY MASS INDEX: 37.18 KG/M2

## 2020-10-22 LAB
CULTURE SURGICAL: ABNORMAL
METER GLUCOSE: 116 MG/DL (ref 74–99)
METER GLUCOSE: 118 MG/DL (ref 74–99)
METER GLUCOSE: 165 MG/DL (ref 74–99)
ORGANISM: ABNORMAL

## 2020-10-22 PROCEDURE — G0008 ADMIN INFLUENZA VIRUS VAC: HCPCS | Performed by: PODIATRIST

## 2020-10-22 PROCEDURE — 2580000003 HC RX 258: Performed by: PODIATRIST

## 2020-10-22 PROCEDURE — APPSS45 APP SPLIT SHARED TIME 31-45 MINUTES: Performed by: CLINICAL NURSE SPECIALIST

## 2020-10-22 PROCEDURE — 6360000002 HC RX W HCPCS: Performed by: PODIATRIST

## 2020-10-22 PROCEDURE — 90686 IIV4 VACC NO PRSV 0.5 ML IM: CPT | Performed by: PODIATRIST

## 2020-10-22 PROCEDURE — 6370000000 HC RX 637 (ALT 250 FOR IP): Performed by: PODIATRIST

## 2020-10-22 PROCEDURE — 94660 CPAP INITIATION&MGMT: CPT

## 2020-10-22 PROCEDURE — 82962 GLUCOSE BLOOD TEST: CPT

## 2020-10-22 PROCEDURE — 99239 HOSP IP/OBS DSCHRG MGMT >30: CPT | Performed by: INTERNAL MEDICINE

## 2020-10-22 PROCEDURE — 6370000000 HC RX 637 (ALT 250 FOR IP): Performed by: INTERNAL MEDICINE

## 2020-10-22 RX ORDER — LINEZOLID 600 MG/1
600 TABLET, FILM COATED ORAL EVERY 12 HOURS SCHEDULED
Qty: 84 TABLET | Refills: 0 | Status: SHIPPED | OUTPATIENT
Start: 2020-10-22 | End: 2020-11-10

## 2020-10-22 RX ORDER — LEVOFLOXACIN 750 MG/1
750 TABLET ORAL DAILY
Qty: 42 TABLET | Refills: 0 | Status: SHIPPED | OUTPATIENT
Start: 2020-10-22 | End: 2020-11-10

## 2020-10-22 RX ORDER — LINEZOLID 600 MG/1
600 TABLET, FILM COATED ORAL EVERY 12 HOURS SCHEDULED
Status: DISCONTINUED | OUTPATIENT
Start: 2020-10-22 | End: 2020-10-22 | Stop reason: HOSPADM

## 2020-10-22 RX ADMIN — SODIUM CHLORIDE 25 ML: 9 INJECTION, SOLUTION INTRAVENOUS at 03:45

## 2020-10-22 RX ADMIN — INSULIN LISPRO 2 UNITS: 100 INJECTION, SOLUTION INTRAVENOUS; SUBCUTANEOUS at 11:31

## 2020-10-22 RX ADMIN — LEVOFLOXACIN 750 MG: 500 TABLET, FILM COATED ORAL at 11:32

## 2020-10-22 RX ADMIN — LINEZOLID 600 MG: 600 TABLET, FILM COATED ORAL at 11:32

## 2020-10-22 RX ADMIN — INFLUENZA A VIRUS A/VICTORIA/2454/2019 IVR-207 (H1N1) ANTIGEN (PROPIOLACTONE INACTIVATED), INFLUENZA A VIRUS A/HONG KONG/2671/2019 IVR-208 (H3N2) ANTIGEN (PROPIOLACTONE INACTIVATED), INFLUENZA B VIRUS B/VICTORIA/705/2018 BVR-11 ANTIGEN (PROPIOLACTONE INACTIVATED), INFLUENZA B VIRUS B/PHUKET/3073/2013 BVR-1B ANTIGEN (PROPIOLACTONE INACTIVATED) 0.5 ML: 15; 15; 15; 15 INJECTION, SUSPENSION INTRAMUSCULAR at 17:38

## 2020-10-22 RX ADMIN — PIPERACILLIN SODIUM AND TAZOBACTAM SODIUM 3.38 G: 3; .375 INJECTION, POWDER, LYOPHILIZED, FOR SOLUTION INTRAVENOUS at 07:00

## 2020-10-22 RX ADMIN — ENOXAPARIN SODIUM 40 MG: 40 INJECTION SUBCUTANEOUS at 08:58

## 2020-10-22 RX ADMIN — SODIUM CHLORIDE 25 ML: 9 INJECTION, SOLUTION INTRAVENOUS at 11:24

## 2020-10-22 ASSESSMENT — PAIN SCALES - GENERAL: PAINLEVEL_OUTOF10: 0

## 2020-10-22 NOTE — PATIENT CARE CONFERENCE
[facility] Quality Flow/Interdisciplinary Rounds Progress Note        Quality Flow Rounds held on October 22, 2020    Disciplines Attending:  Bedside Nurse, ,  and Nursing Unit Leadership    Salas Fisher was admitted on 10/15/2020  7:57 PM    Anticipated Discharge Date:  Expected Discharge Date: 10/19/20    Disposition:    Eamon Score:  Eamon Scale Score: 20    Readmission Risk              Risk of Unplanned Readmission:        11           Discussed patient goal for the day, patient clinical progression, and barriers to discharge.   The following Goal(s) of the Day/Commitment(s) have been identified:  Discharge - Obtain Order      Feroz Barahona  October 22, 2020

## 2020-10-22 NOTE — PROGRESS NOTES
°C)  General appearance: Resting in bed in no apparent distress. Skin: Warm and dry. No rashes were noted. HEENT: Round and reactive pupils. Moist mucous membranes. No ulcerations or thrush. Neck: Supple to movements. Chest: No use of accessory muscles to breathe. Symmetrical expansion. No wheezing, crackles or rhonchi. Cardiovascular: Reguar rate and rhythm. No murmurs gallops, or rubs appreciated. Abdomen: Bowel sounds present, nontender, nondistended, no masses or hepatosplenomegaly. Extremities: Wound VAC in place left foot, dressing clean and dry  Lines: peripheral    I/O last 3 completed shifts: In: 480 [P.O.:480]  Out: 1475 [Urine:1475]      Laboratory and Tests Review:      Lab Results   Component Value Date    WBC 11.5 10/21/2020    WBC 7.1 10/17/2020    WBC 13.4 (H) 10/16/2020    HGB 12.7 10/21/2020    HCT 43.9 10/21/2020    MCV 85.1 10/21/2020     (H) 10/21/2020     Lab Results   Component Value Date    NEUTROABS 7.95 (H) 10/21/2020    NEUTROABS 4.73 10/17/2020    NEUTROABS 10.33 (H) 10/16/2020     No results found for: [facility]  Lab Results   Component Value Date    ALT 20 10/16/2020    AST 29 10/16/2020    ALKPHOS 92 10/16/2020    BILITOT 0.8 10/16/2020     Lab Results   Component Value Date     10/21/2020    K 4.3 10/21/2020     10/21/2020    CO2 27 10/21/2020    BUN 23 10/21/2020    CREATININE 1.2 10/21/2020    CREATININE 1.0 10/17/2020    CREATININE 1.1 10/16/2020    GFRAA >60 10/21/2020    LABGLOM >60 10/21/2020    GLUCOSE 131 10/21/2020    PROT 8.1 10/16/2020    LABALBU 2.8 10/16/2020    CALCIUM 9.6 10/21/2020    BILITOT 0.8 10/16/2020    ALKPHOS 92 10/16/2020    AST 29 10/16/2020    ALT 20 10/16/2020     Lab Results   Component Value Date    CRP 10.8 (H) 10/16/2020     Lab Results   Component Value Date    SEDRATE 78 (H) 10/16/2020       Radiology:    XR FOOT LEFT (MIN 3 VIEWS)   Final Result   1.   Mid foot plantar surface ulceration with subjacent subcutaneous air Light growth  Final     No results found for: LABURIN  No results found for: [facility]    Problem list:    Principal Problem:    Diabetic foot infection ([facility]  Active Problems:    Charcot foot due to diabetes mellitus ([facility]    Diabetic neuropathy associated with diabetes mellitus due to underlying condition [facility])    Essential hypertension  Resolved Problems:    * No resolved hospital problems.  *      ASSESSMENT:    · Severe diabetic foot infection left foot with osteomyelitis-mixed staph species and gram-negative zarina, ID and sensitivity to follow-discussed with micro, gnr sent out  · Charcot arthropathy  · Type 2 diabetes mellitus  · Patient states unable to afford intravenous antibiotics    PLAN:    · Discontinue Zosyn  · Discontinue daptomycin   · Levaquin 750 mg daily for 6 weeks  · Linezolid 600 mg twice daily for 6 weeks  · Will need office follow-up in 2 weeks  · Continue local wound care  · Okay to discharge from [facility]    10:08 AM  10/22/2020

## 2020-10-22 NOTE — PROGRESS NOTES
Podiatry Progress Note  10/22/2020   Salvador Carvajal       SUBJECTIVE: This is a 54 y.o. male seen bedside for Diabetic L foot ulcerations, L foot abscess, osteomyelitis: Status post L foot incision and drainage with bone debridement and biopsy. Procedure done 10/17/20 by Dr. Alejandra Cotton. No acute events overnight. Patient is resting comfortably in bed with no complaints, pain is well controlled. Denies any constitutional symptoms or other pedal complaints. OBJECTIVE:      Pt is AAOx3    Vitals:    10/22/20 0830   BP: (!) 144/80   Pulse: 61   Resp: 18   Temp: 97.8 °F (36.6 °C)   SpO2: 100%              EXAM:    LLE dressing clean, dry and intact. Wound vac intact w good seal, minimal drainage to cannister.               Current Facility-Administered Medications   Medication Dose Route Frequency Provider Last Rate Last Dose    linezolid (ZYVOX) tablet 600 mg  600 mg Oral 2 times per day Eric Arce DO        levoFLOXacin (LEVAQUIN) tablet 750 mg  750 mg Oral Daily Eric Arce DO        lidocaine PF 1 % injection 5 mL  5 mL Intradermal Once Eric Arce DO        sodium chloride flush 0.9 % injection 10 mL  10 mL Intravenous PRN Eric Arce DO   10 mL at 10/21/20 1605    heparin flush 100 UNIT/ML injection 300 Units  3 mL Intravenous 2 times per day Eric Arce DO        heparin flush 100 UNIT/ML injection 300 Units  3 mL Intracatheter PRN Eric Arce DO        sodium chloride flush 0.9 % injection 10 mL  10 mL Intravenous BID Maco Saab MD   10 mL at 10/21/20 1007    sodium chloride flush 0.9 % injection 10 mL  10 mL Intravenous PRN Maco Saab MD   10 mL at 10/18/20 1258    glucose (GLUTOSE) 40 % oral gel 15 g  15 g Oral PRN Genetta Agreste, DPM        dextrose 50 % IV solution  12.5 g Intravenous PRN Genetta Agreste, DPM        glucagon (rDNA) injection 1 mg  1 mg Intramuscular PRN Genetta Agreste, DPM        dextrose 5 % solution  100 mL/hr Intravenous 10/22/2020   11:08 AM

## 2020-10-22 NOTE — DISCHARGE SUMMARY
[facility] Physician Discharge Summary       Van Patrick [address], [place]  [address]      Deandra Posada. your appointment is Friday 10/23/20 at [address] must keep this appt to have home care. Arrive 15 minutes early please with photo ID. Marlo Schroeder, Juanito Phillips, [address]  [phone]    Schedule an appointment as soon as possible for a visit  for [facility] per Dr Laila Gomez  [facility] [address]  [phone]          Activity level: As tolerated     Dispo: Home    Condition on discharge: Stable     Patient ID:  Catherine Banuelos  [id number]  54 y.o.  1975    Admit date: 10/15/2020    Discharge date and time:  10/22/2020  12:39 PM    Admission Diagnoses: Principal Problem:    Diabetic foot infection ([facility]  Active Problems:    Charcot foot due to diabetes mellitus ([facility]    Diabetic neuropathy associated with diabetes mellitus due to underlying condition [facility])    Essential hypertension  Resolved Problems:    * No resolved hospital problems. *      Discharge Diagnoses: Principal Problem:    Diabetic foot infection ([facility]  Active Problems:    Charcot foot due to diabetes mellitus ([facility] [address] [facility]    Diabetic neuropathy associated with diabetes mellitus due to underlying condition [facility])    Essential hypertension  Resolved Problems:    * No resolved hospital problems.  *      Consults:  IP CONSULT TO PODIATRY  IP CONSULT TO INFECTIOUS DISEASES  IP CONSULT TO SOCIAL WORK  IP CONSULT TO VASCULAR SURGERY  IP CONSULT TO PODIATRY  IP CONSULT TO IV TEAM  IP CONSULT TO HOME CARE NEEDS    Procedures:   Patient: Catherine Banuelos  YOB: 1975  MRN: [id number]     Date of Procedure: 10/17/2020     Pre-Op Diagnosis: Diabetic Left foot diabetic ulcerations, L foot abscess, osteomyelitis     Post-Op Diagnosis: Same       Procedure(s):  LEFT FOOT  INCISION AND DRAINAGE, BONE DEBRIDEMENT AND BIOPSY       Hospital Course:   Patient Bennie Manzo is a 54 y.o. presented with Diabetic foot infection ([facility] [E11.628, L08.9]  Diabetic foot infection ([facility] [F01.887, L08.9]  Patient with a complicated medical history of hyperlipidemia, diabetes type 2, neuropathy, left foot diabetic foot infection chronic ulcer. Morbid obesity, BC. Admitted 10/15/2020 left foot Charcot deformity and history of multiple right and left foot procedures historically. Mainly [place]. At any rate he was having some worsening wound maceration on the plantar left foot he was using a walking boot concerning of her infection he came into the emergency room. Concern for necrotic bone and tissue he was taken to the OR 10/17/2020 for I&D with bone biopsies cultures coming back polymicrobial at this point infectious disease was following along initially started off on IV antibiotics clindamycin, daptomycin, Zosyn. Wound VAC also applied I guess there is been some cost issues with the patient he was switched over to Zyvox, Levaquin. Plan was for 6 weeks antibiotics follow-up outpatient    Discharge Exam:    General Appearance: alert and oriented to person, place and time and in no acute distress  Skin: warm and dry  Head: normocephalic and atraumatic  Eyes: pupils equal, round, and reactive to light, extraocular eye movements intact, conjunctivae normal  Neck: neck supple and non tender without mass   Pulmonary/Chest: clear to auscultation bilaterally- no wheezes, rales or rhonchi, normal air movement, no respiratory distress  Cardiovascular: normal rate, normal S1 and S2 and no carotid bruits  Abdomen: soft, non-tender, non-distended, normal bowel sounds, no masses or organomegaly  Extremities: no cyanosis, no clubbing and no edema  Neurologic: no cranial nerve deficit and speech normal  -Left foot wrapped    I/O last 3 completed shifts:   In: 480 [P.O.:480]  Out: 6698 [SVML]  I/O this shift: In: 480 [P.O.:480]  Out: 600 [Urine:600]      LABS:  Recent Labs     10/21/20  1034      K 4.3      CO2 27   BUN 23*   CREATININE 1.2   GLUCOSE 131*   CALCIUM 9.6       Recent Labs     10/21/20  1034   WBC 11.5   RBC 5.16   HGB 12.7   HCT 43.9   MCV 85.1   MCH 24.6*   MCHC 28.9*   RDW 14.5   *   MPV 8.7     Culture, Surgical [[id number]]  (Abnormal)   Collected: 10/17/20 0800    Order Status: Completed  Specimen: Tissue  Updated: 10/22/20 0915     Organism  Staphylococcus simulansAbnormal       Culture Surgical  --     Light growth   This isolate is presumed to be resistant based on the   detection of inducible Clindamycin resistance.  Clindamycin   may still be effective in some patients. Organism  Staphylococcus kloosiiAbnormal       Culture Surgical  Light growth     Organism  Gram negative rodAbnormal       Culture Surgical  --     Moderate growth   After multiple attempts on the [employer], the ID and /or   sensitivity was not attainable. The organism was sent out   for further testing. Please refer to MISS1. Organism  Corynebacterium speciesAbnormal       Culture Surgical  Light growth     Organism  Gram negative rodAbnormal       Culture Surgical  --     Light growth   After multiple attempts on the [employer], the ID and /or   sensitivity was not attainable. The organism was sent out   for further testing. Please refer to MISS2. No results for input(s): POCGLU in the last 72 hours. Imaging:  Xr Foot Left (min 3 Views)    Result Date: 10/15/2020  EXAMINATION: THREE XRAY VIEWS OF THE LEFT FOOT 10/15/2020 9:29 pm COMPARISON: None. HISTORY: ORDERING SYSTEM PROVIDED HISTORY: Plantar diabetic infection TECHNOLOGIST PROVIDED HISTORY: Reason for exam:->Plantar diabetic infection FINDINGS: Soft tissue edema throughout the left foot. Subcutaneous air along the mid plantar surface. There is suggestion of osseous erosion of the midfoot.  Near complete osseous fusion of the midfoot bones as well as the anterior talus and calcaneus. Large anterior and posterior calcaneal enthesophytes. Tibiotalar joint degenerative spurring present. 1.  Mid foot plantar surface ulceration with subjacent subcutaneous air and evidence of osseous erosion concerning for osseous infection. 2.  Osseous fusion of the osseous components of the midfoot. Patient Instructions:      Medication List      START taking these medications    levoFLOXacin 750 MG tablet  Commonly known as:  LEVAQUIN  Take 1 tablet by mouth daily     linezolid 600 MG tablet  Commonly known as:  ZYVOX  Take 1 tablet by mouth every 12 hours        CONTINUE taking these medications    atorvastatin 10 MG tablet  Commonly known as:  LIPITOR     insulin glargine 100 UNIT/ML injection vial  Commonly known as:  LANTUS     metFORMIN 500 MG tablet  Commonly known as:  GLUCOPHAGE           Where to Get Your Medications      You can get these medications from any pharmacy    Bring a paper prescription for each of these medications  · levoFLOXacin 750 MG tablet  · linezolid 600 MG tablet       Assessment and plan    1. Left foot diabetic foot infection, osteomyelitis, open wound, wound infection. Status post  Incision and drainage and bone debridement with biopsy 10/17/2020  Note that more than 30 minutes was spent in preparing discharge papers, discussing discharge with patient, medication review, etc.    -Wound culture polymicrobial Staphylococcus species, gram-negative rods, corynebacterium  -ID following IV antibiotics switched over to Zyvox and Levaquin today ? Cost issue  -Wound VAC-questionable cost issue defer back to podiatry  -Patient will be discharged with 6 weeks of antibiotics minimum and follow-up with ID  -Follow with podiatry      2. Diabetes type 2  -Insulin sliding scale  -Continue home metformin, Lantus      3. Hyperlipidemia  -Continue home Lipitor      4.  Elevated blood pressure, hypertension

## 2020-10-22 NOTE — CARE COORDINATION
Discussed potential IV abx options with Dr. Hannah Núñez. Dr. Hannah Núñez to speak to colleagues regarding final abx course to determine final plan. Spoke to [employer] at [facility] for PO abx options. PO Zyvox (600 mg BID for 6 weeks) has a cash galvan of $243.98. PO Levaquin (750 mg QD for 6 weeks) has a cash price of 31.98. Await ID plan. [id number]: Final ID plan noted. Leila at [facility] notified that patient will no longer need IV abx therapy, only wound care. [facility] orders have already been placed for wound care. Met with patient and updated him on abx plan as well as [address] plan. Pt verbalized understanding and in agreement to discharge plan. SW previously made pt an appointment for PCP establishment Dr Miguel Mallory on Friday 10/23/20 at [facility] added to pt's AVS    Charge RN also updated. Anticipate discharge home today.

## 2020-10-27 LAB
Lab: NORMAL
REPORT: NORMAL
THIS TEST SENT TO: NORMAL

## 2020-10-29 NOTE — PROGRESS NOTES
awaiting cultures and path. PICC line and 6 weeks abx.  -Patient unable to cover wound vac as outpatient due to lack of insurance. Keep wound vac intact at this time until just before DC, at which point switch to iodoform packing and DSD. -Patient clear for DC from podiatry standpoint. Likely DC tomorrow.   -Patient discussed with Dr. Mikayla Najera.         Wood Avery   10/29/2020   5:44 PM

## 2020-10-30 ENCOUNTER — TELEPHONE (OUTPATIENT)
Dept: WOUND CARE | Age: 45
End: 2020-10-30

## 2020-10-30 NOTE — TELEPHONE ENCOUNTER
Dr Nelson Mclain asked me to call have patient come in to wound care for post op followup appointment. I left him a message on his voicemail 10/27 & 10/30. He has still not called us back. Dr Nelson Mclain asked me to document that we have reached out to him.

## 2020-11-01 LAB
Lab: NORMAL
REPORT: NORMAL
THIS TEST SENT TO: NORMAL

## 2020-11-09 PROCEDURE — 99285 EMERGENCY DEPT VISIT HI MDM: CPT

## 2020-11-10 ENCOUNTER — APPOINTMENT (OUTPATIENT)
Dept: INTERVENTIONAL RADIOLOGY/VASCULAR | Age: 45
DRG: 854 | End: 2020-11-10

## 2020-11-10 ENCOUNTER — HOSPITAL ENCOUNTER (INPATIENT)
Age: 45
LOS: 9 days | Discharge: NURSING, REHAB OR LONG-TERM CARE | DRG: 854 | End: 2020-11-19
Attending: EMERGENCY MEDICINE | Admitting: INTERNAL MEDICINE
Payer: MEDICAID

## 2020-11-10 ENCOUNTER — ANESTHESIA EVENT (OUTPATIENT)
Dept: OPERATING ROOM | Age: 45
DRG: 854 | End: 2020-11-10

## 2020-11-10 ENCOUNTER — APPOINTMENT (OUTPATIENT)
Dept: GENERAL RADIOLOGY | Age: 45
DRG: 854 | End: 2020-11-10

## 2020-11-10 ENCOUNTER — APPOINTMENT (OUTPATIENT)
Dept: MRI IMAGING | Age: 45
DRG: 854 | End: 2020-11-10

## 2020-11-10 LAB
ALBUMIN SERPL-MCNC: 3.7 G/DL (ref 3.5–5.2)
ALP BLD-CCNC: 91 U/L (ref 40–129)
ALT SERPL-CCNC: <5 U/L (ref 0–40)
ANION GAP SERPL CALCULATED.3IONS-SCNC: 14 MMOL/L (ref 7–16)
AST SERPL-CCNC: 9 U/L (ref 0–39)
BACTERIA: ABNORMAL /HPF
BASOPHILS ABSOLUTE: 0.06 E9/L (ref 0–0.2)
BASOPHILS RELATIVE PERCENT: 0.4 % (ref 0–2)
BILIRUB SERPL-MCNC: 1.3 MG/DL (ref 0–1.2)
BILIRUBIN URINE: ABNORMAL
BLOOD, URINE: ABNORMAL
BUN BLDV-MCNC: 16 MG/DL (ref 6–20)
C-REACTIVE PROTEIN: 28.9 MG/DL (ref 0–0.4)
CALCIUM SERPL-MCNC: 9.3 MG/DL (ref 8.6–10.2)
CHLORIDE BLD-SCNC: 97 MMOL/L (ref 98–107)
CLARITY: ABNORMAL
CO2: 20 MMOL/L (ref 22–29)
COARSE CASTS, UA: ABNORMAL /LPF (ref 0–2)
COLOR: YELLOW
CREAT SERPL-MCNC: 1.3 MG/DL (ref 0.7–1.2)
EKG ATRIAL RATE: 96 BPM
EKG P AXIS: -12 DEGREES
EKG P-R INTERVAL: 122 MS
EKG Q-T INTERVAL: 388 MS
EKG QRS DURATION: 94 MS
EKG QTC CALCULATION (BAZETT): 490 MS
EKG R AXIS: 44 DEGREES
EKG T AXIS: 9 DEGREES
EKG VENTRICULAR RATE: 96 BPM
EOSINOPHILS ABSOLUTE: 0.03 E9/L (ref 0.05–0.5)
EOSINOPHILS RELATIVE PERCENT: 0.2 % (ref 0–6)
EPITHELIAL CELLS, UA: ABNORMAL /HPF
GFR AFRICAN AMERICAN: >60
GFR NON-AFRICAN AMERICAN: 59 ML/MIN/1.73
GLUCOSE BLD-MCNC: 193 MG/DL (ref 74–99)
GLUCOSE URINE: NEGATIVE MG/DL
HCT VFR BLD CALC: 37.6 % (ref 37–54)
HEMOGLOBIN: 12 G/DL (ref 12.5–16.5)
IMMATURE GRANULOCYTES #: 0.16 E9/L
IMMATURE GRANULOCYTES %: 1.1 % (ref 0–5)
KETONES, URINE: ABNORMAL MG/DL
LACTIC ACID, SEPSIS: 1.6 MMOL/L (ref 0.5–1.9)
LEUKOCYTE ESTERASE, URINE: ABNORMAL
LYMPHOCYTES ABSOLUTE: 1.67 E9/L (ref 1.5–4)
LYMPHOCYTES RELATIVE PERCENT: 11.4 % (ref 20–42)
MCH RBC QN AUTO: 25.4 PG (ref 26–35)
MCHC RBC AUTO-ENTMCNC: 31.9 % (ref 32–34.5)
MCV RBC AUTO: 79.7 FL (ref 80–99.9)
METER GLUCOSE: 140 MG/DL (ref 74–99)
METER GLUCOSE: 169 MG/DL (ref 74–99)
METER GLUCOSE: 198 MG/DL (ref 74–99)
MONOCYTES ABSOLUTE: 1.45 E9/L (ref 0.1–0.95)
MONOCYTES RELATIVE PERCENT: 9.9 % (ref 2–12)
NEUTROPHILS ABSOLUTE: 11.29 E9/L (ref 1.8–7.3)
NEUTROPHILS RELATIVE PERCENT: 77 % (ref 43–80)
NITRITE, URINE: NEGATIVE
PDW BLD-RTO: 16 FL (ref 11.5–15)
PH UA: 5.5 (ref 5–9)
PLATELET # BLD: 371 E9/L (ref 130–450)
PMV BLD AUTO: 9.8 FL (ref 7–12)
POTASSIUM SERPL-SCNC: 4 MMOL/L (ref 3.5–5)
PROTEIN UA: >=300 MG/DL
RBC # BLD: 4.72 E12/L (ref 3.8–5.8)
RBC UA: ABNORMAL /HPF (ref 0–2)
SARS-COV-2, NAAT: NOT DETECTED
SEDIMENTATION RATE, ERYTHROCYTE: 79 MM/HR (ref 0–15)
SODIUM BLD-SCNC: 131 MMOL/L (ref 132–146)
SPECIFIC GRAVITY UA: 1.02 (ref 1–1.03)
TOTAL PROTEIN: 9.1 G/DL (ref 6.4–8.3)
UROBILINOGEN, URINE: 0.2 E.U./DL
WBC # BLD: 14.7 E9/L (ref 4.5–11.5)
WBC UA: ABNORMAL /HPF (ref 0–5)

## 2020-11-10 PROCEDURE — 87040 BLOOD CULTURE FOR BACTERIA: CPT

## 2020-11-10 PROCEDURE — 71045 X-RAY EXAM CHEST 1 VIEW: CPT

## 2020-11-10 PROCEDURE — 93005 ELECTROCARDIOGRAM TRACING: CPT | Performed by: EMERGENCY MEDICINE

## 2020-11-10 PROCEDURE — 6370000000 HC RX 637 (ALT 250 FOR IP): Performed by: INTERNAL MEDICINE

## 2020-11-10 PROCEDURE — 2580000003 HC RX 258: Performed by: EMERGENCY MEDICINE

## 2020-11-10 PROCEDURE — 81001 URINALYSIS AUTO W/SCOPE: CPT

## 2020-11-10 PROCEDURE — 85651 RBC SED RATE NONAUTOMATED: CPT

## 2020-11-10 PROCEDURE — 1200000000 HC SEMI PRIVATE

## 2020-11-10 PROCEDURE — 87186 SC STD MICRODIL/AGAR DIL: CPT

## 2020-11-10 PROCEDURE — 93923 UPR/LXTR ART STDY 3+ LVLS: CPT

## 2020-11-10 PROCEDURE — 99222 1ST HOSP IP/OBS MODERATE 55: CPT | Performed by: INTERNAL MEDICINE

## 2020-11-10 PROCEDURE — 73630 X-RAY EXAM OF FOOT: CPT

## 2020-11-10 PROCEDURE — U0002 COVID-19 LAB TEST NON-CDC: HCPCS

## 2020-11-10 PROCEDURE — 80053 COMPREHEN METABOLIC PANEL: CPT

## 2020-11-10 PROCEDURE — 6360000002 HC RX W HCPCS: Performed by: EMERGENCY MEDICINE

## 2020-11-10 PROCEDURE — 73721 MRI JNT OF LWR EXTRE W/O DYE: CPT

## 2020-11-10 PROCEDURE — 83605 ASSAY OF LACTIC ACID: CPT

## 2020-11-10 PROCEDURE — 87150 DNA/RNA AMPLIFIED PROBE: CPT

## 2020-11-10 PROCEDURE — 6370000000 HC RX 637 (ALT 250 FOR IP): Performed by: EMERGENCY MEDICINE

## 2020-11-10 PROCEDURE — 93010 ELECTROCARDIOGRAM REPORT: CPT | Performed by: INTERNAL MEDICINE

## 2020-11-10 PROCEDURE — 87070 CULTURE OTHR SPECIMN AEROBIC: CPT

## 2020-11-10 PROCEDURE — 85025 COMPLETE CBC W/AUTO DIFF WBC: CPT

## 2020-11-10 PROCEDURE — 2580000003 HC RX 258: Performed by: SPECIALIST

## 2020-11-10 PROCEDURE — 99253 IP/OBS CNSLTJ NEW/EST LOW 45: CPT | Performed by: SURGERY

## 2020-11-10 PROCEDURE — 6360000002 HC RX W HCPCS: Performed by: SPECIALIST

## 2020-11-10 PROCEDURE — 82962 GLUCOSE BLOOD TEST: CPT

## 2020-11-10 PROCEDURE — 86140 C-REACTIVE PROTEIN: CPT

## 2020-11-10 PROCEDURE — 36415 COLL VENOUS BLD VENIPUNCTURE: CPT

## 2020-11-10 PROCEDURE — 73610 X-RAY EXAM OF ANKLE: CPT

## 2020-11-10 RX ORDER — INSULIN GLARGINE 100 [IU]/ML
15 INJECTION, SOLUTION SUBCUTANEOUS NIGHTLY
Status: DISCONTINUED | OUTPATIENT
Start: 2020-11-10 | End: 2020-11-12

## 2020-11-10 RX ORDER — SODIUM CHLORIDE 9 MG/ML
INJECTION, SOLUTION INTRAVENOUS EVERY 8 HOURS
Status: DISCONTINUED | OUTPATIENT
Start: 2020-11-10 | End: 2020-11-16 | Stop reason: ALTCHOICE

## 2020-11-10 RX ORDER — ACETAMINOPHEN 500 MG
1000 TABLET ORAL ONCE
Status: COMPLETED | OUTPATIENT
Start: 2020-11-10 | End: 2020-11-10

## 2020-11-10 RX ORDER — ATORVASTATIN CALCIUM 10 MG/1
5 TABLET, FILM COATED ORAL NIGHTLY
Status: DISCONTINUED | OUTPATIENT
Start: 2020-11-10 | End: 2020-11-19 | Stop reason: HOSPADM

## 2020-11-10 RX ORDER — 0.9 % SODIUM CHLORIDE 0.9 %
1000 INTRAVENOUS SOLUTION INTRAVENOUS ONCE
Status: COMPLETED | OUTPATIENT
Start: 2020-11-10 | End: 2020-11-10

## 2020-11-10 RX ORDER — ACETAMINOPHEN 325 MG/1
650 TABLET ORAL EVERY 6 HOURS PRN
Status: DISCONTINUED | OUTPATIENT
Start: 2020-11-10 | End: 2020-11-19 | Stop reason: HOSPADM

## 2020-11-10 RX ADMIN — SODIUM CHLORIDE 1000 ML: 9 INJECTION, SOLUTION INTRAVENOUS at 01:47

## 2020-11-10 RX ADMIN — SODIUM CHLORIDE 500 MG: 9 INJECTION, SOLUTION INTRAVENOUS at 06:37

## 2020-11-10 RX ADMIN — PIPERACILLIN SODIUM AND TAZOBACTAM SODIUM 3.38 G: 3; .375 INJECTION, POWDER, LYOPHILIZED, FOR SOLUTION INTRAVENOUS at 21:36

## 2020-11-10 RX ADMIN — ACETAMINOPHEN 650 MG: 325 TABLET ORAL at 20:21

## 2020-11-10 RX ADMIN — PIPERACILLIN SODIUM AND TAZOBACTAM SODIUM 3.38 G: 3; .375 INJECTION, POWDER, LYOPHILIZED, FOR SOLUTION INTRAVENOUS at 06:00

## 2020-11-10 RX ADMIN — ATORVASTATIN CALCIUM 5 MG: 10 TABLET, FILM COATED ORAL at 20:20

## 2020-11-10 RX ADMIN — INSULIN GLARGINE 15 UNITS: 100 INJECTION, SOLUTION SUBCUTANEOUS at 20:26

## 2020-11-10 RX ADMIN — ACETAMINOPHEN 1000 MG: 500 TABLET ORAL at 01:47

## 2020-11-10 ASSESSMENT — PAIN DESCRIPTION - PROGRESSION: CLINICAL_PROGRESSION: GRADUALLY IMPROVING

## 2020-11-10 ASSESSMENT — PAIN DESCRIPTION - ORIENTATION
ORIENTATION: RIGHT
ORIENTATION: RIGHT

## 2020-11-10 ASSESSMENT — PAIN - FUNCTIONAL ASSESSMENT: PAIN_FUNCTIONAL_ASSESSMENT: PREVENTS OR INTERFERES SOME ACTIVE ACTIVITIES AND ADLS

## 2020-11-10 ASSESSMENT — PAIN SCALES - GENERAL
PAINLEVEL_OUTOF10: 9
PAINLEVEL_OUTOF10: 0
PAINLEVEL_OUTOF10: 7
PAINLEVEL_OUTOF10: 3

## 2020-11-10 ASSESSMENT — PAIN DESCRIPTION - LOCATION
LOCATION: FOOT
LOCATION: ANKLE

## 2020-11-10 ASSESSMENT — PAIN DESCRIPTION - PAIN TYPE
TYPE: ACUTE PAIN
TYPE: ACUTE PAIN

## 2020-11-10 ASSESSMENT — PAIN DESCRIPTION - FREQUENCY: FREQUENCY: CONTINUOUS

## 2020-11-10 ASSESSMENT — PAIN DESCRIPTION - DESCRIPTORS: DESCRIPTORS: ACHING

## 2020-11-10 NOTE — ED NOTES
Report faxed . Lila at ext [phone] confirmed receipt. Patient updated and prepared for transport.      Jayro Manjarrez RN  11/10/20 [id number]

## 2020-11-10 NOTE — PROGRESS NOTES
Database complete. Medications reconciled. Care plans and education initiated. Currently using no assistive devices. Wears CPAP nightly. Podiatrist is Dr. Sanya Hameed per pt.

## 2020-11-10 NOTE — CONSULTS
Vascular : Pt seen,chart,lab and x rays reviewed. Assessment:1.  Diabetic foot ulcer with osteomyelitis of the calcaneum with satisfactory arterial circulation with normal triphasic ankle Doppler tracings    Plan: Discussed the patient regarding the arterial Doppler study, good arterial flow, no need for vascular intervention, recommend local podiatry wound care, debridement etc. along with IV antibiotics, offloading orthotics, better control of diabetes etc.    All his questions were answered    Vascular service will sign of the case and see the patient as needed    Full consult to follow.     Thank you    Kenyon Navarrete

## 2020-11-10 NOTE — CONSULTS
Chief Complaint: Patient seen for the evaluation of vascular status of the legs      HPI: This patient, who has diabetes mellitus, diabetic neuropathy, Charcot's deformity of the feet had recurrent infections of the right foot, somewhat noncomplicated with a history that was noted in the chart, was hospitalized because of recurrent diabetic foot infection and worsening of the wound, in fact has seen by Dr. Keira Smiley, on the 16th October, at that time had a multiphasic Doppler signals, was recommended comes to therapy from a vascular perspective and continue podiatry care and IV antibiotics as appropriate      In the interim, patient has been hospitalized with the worsening of cellulitis, recurrent, diabetic foot infection, podiatry service has seen the patient, and Dr. Kenn Campos will be taking him to surgery tomorrow for debridement and appropriate procedure is necessary based upon the findings intraoperatively and vascular service is consulted    When I came to speak, patient was resting comfortably, does have a bandage over the right leg without any drainage    Patient denies any history of fever or chills      Patient denies any focal lateralizing neurological symptoms like loss of speech, vision or loss of function of extremity    Patient can walk a few blocks, slowly and denies any symptoms of rest pain    Allergies   Allergen Reactions    Vancomycin Other (See Comments)     Red man syndrome    Semaglutide Itching and Rash     Pink itch rash         Current Facility-Administered Medications   Medication Dose Route Frequency Provider Last Rate Last Dose    atorvastatin (LIPITOR) tablet 5 mg  5 mg Oral Nightly Shant Keyes MD        insulin glargine (LANTUS) injection vial 15 Units  15 Units Subcutaneous Nightly Shant Keyes MD        metFORMIN (GLUCOPHAGE) tablet 500 mg  500 mg Oral Daily with breakfast Shant Keyes MD        insulin lispro (HUMALOG) injection vial 0-18 Units  0-18 Units Subcutaneous TID WC Kyra Boyle MD        insulin lispro (HUMALOG) injection vial 0-9 Units  0-9 Units Subcutaneous Nightly Kyra Boyle MD           Past Medical History:   Diagnosis Date    Charcot [facility] mellitus [facility] [address] [facility]     Diabetes mellitus ([facility] [facility]     Hypertension        Past Surgical History:   Procedure Laterality Date    ANKLE FUSION  02/2020    FOOT DEBRIDEMENT Left 10/17/2020    LEFT FOOT  INCISION AND DRAINAGE, BONE DEBRIDEMENT AND BIOPSY performed by Ananya Cuevas DPM at [facility] OR       Family History   Problem Relation Age of Onset    Diabetes Father     Alcohol Abuse Father        Social History     Socioeconomic History    Marital status:      Spouse name: Not on file    Number of children: Not on file    Years of education: Not on file    Highest education level: Not on file   Occupational History    Not on file   Social Needs    Financial resource strain: Not on file    Food insecurity     Worry: Not on file     Inability: Not on file   [employer] needs     Medical: Not on file     Non-medical: Not on file   Tobacco Use    Smoking status: Never Smoker    Smokeless tobacco: Never Used   Substance and Sexual Activity    Alcohol use: Not Currently     Frequency: Never     Comment: 3 times a year maybe.  very rare    Drug use: Never    Sexual activity: Not Currently     Partners: Female   Lifestyle    Physical activity     Days per week: Not on file     Minutes per session: Not on file    Stress: Not on file   Relationships    Social connections     Talks on phone: Not on file     Gets together: Not on file     Attends Jainism service: Not on file     Active member of club or organization: Not on file     Attends meetings of clubs or organizations: Not on file     Relationship status: Not on file    Intimate partner violence     Fear of current or ex partner: Not on file     Emotionally abused: Not on file     Physically abused: Not on file     Forced sexual activity: Not on file   Other Topics Concern    Not on file   Social History Narrative    Not on file       Review of Systems:  Skin:  No abnormal pigmentation or rash. Eyes:  No blurring, diplopia or vision loss. Ears/Nose/Throat:  No hearing loss or vertigo. Respiratory:  No cough, pleuritic chest pain, dyspnea, or wheezing. Cardiovascular: No angina, palpitations . Hypertension, hyperlipidemia    Gastrointestinal:  No nausea or vomiting; no abdominal pain or rectal bleeding. Musculoskeletal:  No arthritis or weakness. Neurologic:  No paralysis, paresis, seizures or headaches. Hematologic/Lymphatic/Immunologic:  No anemia, abnormal bleeding/bruising. Endocrine:  No heat or cold intolerance. No polyphagia, polydipsia or polyuria. Diabetes mellitus diabetic neuropathy      Physical Exam:  BP (!) 150/75   Pulse 98   Temp 99.2 °F (37.3 °C) (Axillary)   Resp 20   Ht 6' 3\" (1.905 m)   Wt 280 lb (127 kg)   SpO2 97%   BMI 35.00 kg/m²   General appearance:  Alert, awake, oriented x 3. No distress. Skin:  Warm and dry. Head:  Normocephalic. No masses, lesions or tenderness. Eyes:  Conjunctivae appear normal; PERRL. Ears:  External ears normal.  Nose/Sinuses:  Septum midline, mucosa normal; no drainage. Oropharynx:  Clear, no exudate noted. Neck:  No jugular venous distention, lymphadenopathy or thyromegaly. No evidence of carotid bruit      Lungs:  Clear to ausculation bilaterally. No rhonchi, crackles, wheezes. Heart:  Regular rate and rhythm. No rub or murmur. .    Abdomen:  Soft, non-tender. No masses, organomegaly. Musculoskeletal: No joint effusions, tenderness swelling or warmth. Neuro: Speech is intact. Moving all extremities. No focal motor or sensory deficits. Extremities:  Both feet are warm to touch.  The color of both feet is normal.    The patient does have a bandage over the left foot and heel, not removed, patient has very long toenails, with some onychomycosis    Patient does have a diabetic Charcot's feet deformity bilaterally with decreased sensation to light touch    I have reviewed all the pictures that were taken last admission this admission that were documented in the media                                Pulses Right  Left    Brachial 3 3    Radial    3=normal   Femoral 2 2  2=diminished   Popliteal    1=barely palpable   Dorsalis pedis 2-3   0=absent   Posterior tibial 2 2  4=aneurysmal           Other pertinent information:1. The past medical records were reviewed. 2.    Lab Results   Component Value Date    WBC 14.7 (H) 11/10/2020    HGB 12.0 (L) 11/10/2020    HCT 37.6 11/10/2020    MCV 79.7 (L) 11/10/2020     11/10/2020      Lab Results   Component Value Date     (L) 11/10/2020    K 4.0 11/10/2020    CL 97 (L) 11/10/2020    CO2 20 (L) 11/10/2020    BUN 16 11/10/2020    CREATININE 1.3 (H) 11/10/2020    GLUCOSE 193 (H) 11/10/2020    CALCIUM 9.3 11/10/2020    PROT 9.1 (H) 11/10/2020    LABALBU 3.7 11/10/2020    BILITOT 1.3 (H) 11/10/2020    ALKPHOS 91 11/10/2020    AST 9 11/10/2020    ALT <5 11/10/2020    LABGLOM 59 11/10/2020    GFRAA >60 11/10/2020     No results found for: APTT   No results found for: INR, PROTIME     3. MRI ANKLE LEFT WO CONTRAST   Final Result   Large area of ulceration with suspected underlying osteomyelitis of anterior   portions of both the talus and calcaneus. Severe destructive changes within the midfoot may be related to chronic   osteomyelitis, neuropathic joint, or a combination. XR ANKLE RIGHT (MIN 3 VIEWS)   Final Result   No acute fractures or osseous destructive process noted. Osseous structures   are in alignment. VL LOWER EXTREMITY ARTERIAL SEGMENTAL PRESSURES W PPG   Final Result   No evidence of peripheral arterial disease. XR CHEST PORTABLE   Final Result   No acute abnormality.          XR FOOT LEFT (MIN 3 VIEWS)   Final Result   Ongoing plantar and lateral midfoot ulcer with underlying acute (presumably   on chronic) osteomyelitis superimposed on neuropathic foot. 4.  The history physical, podiatry consultation notes, along with ID consultation notes were reviewed    5. The lower extremity artery Doppler study was personally reviewed by me, ankle-brachial index falsely elevated due to calcification tibial arteries, however patient had normal triphasic ankle Doppler tracings and good arterial flow to both feet based upon the pulse volume recordings    6. The consultation note of Dr. Candace Valverde was personally reviewed    7. The MRI report noted, evidence suspicious for osteomyelitis of the calcaneum and the talus    Assessment:    1. Diabetic foot infection with osteomyelitis recurrent nature    2.   Stisfactory arterial circulation with triphasic ankle Doppler tracings            Plan:         I had a long detailed discussion the patient, all options, risks benefits and alternatives explained, informed the patient, that I have personally reviewed the lower external artery Doppler study, the patient satisfactory artery circulation, does not need any vascular intervention at the present time    The importance of compliance with medical care, follow-up, better control of his blood glucose levels, hemoglobin A1c, offloading etc. were explained to the patient understands    All his questions were answered    Thank you for letting us participate in the care of your patient    Vascular service will sign of the case and see the patient as needed    Electronically signed by Sue Contreras MD on 11/10/2020 at 6:25 PM

## 2020-11-10 NOTE — ED NOTES
Podiatrist Dr Rachel De La Rosa called, stated he is taking patient off his list at his offie d/t patient not showing for appts. Not returning calls. Stated for us to consult whoever is on call for podiatry.  Relayed information to Luisito as to I do  Not know how to do inpatient consults     Sumi Terry, RN  11/10/20 [address], RN  11/10/20 [id number]

## 2020-11-10 NOTE — H&P
[facility] History and Physical        Chief Complaint:  Dm foot wound  History of Present Illness   The patient is a 54 y.o. male    Prior to this summer--Patient's primary care, podiatry, endocrinology, and infectious disease specialists are all in [place]. Per their notes:  Post-op Follow up Removal External Fixator Right Lower Extremity / Ostectomy Right Fibula / Arthrodesis Right Ankle and Right Subtalar Joint , sx 2/3/20   recommended he continue with his offloading. We did briefly discuss surgical intervention to the left lower extremity. At this point time he would like to hold off. I educated him on the signs of Charcot arthropathy    --seen podiartry/ortho - June /July -- moved here June this year and hasn't seen [place] doctors since.      then he moved to [facility] in last few months  And admitted by our group last month:  [address] on  levaquin/linezolid for 6 weeks--but he went to pharmacy >$100 he couldn't afford- so didn't fill. Last admission:  -Patient unable to cover wound vac as outpatient due to lack of insurance. L foot chronically wearing walking boot-- per above-- he should be \"offloading\"   He denies trauma, no cuts/  +acute on chronic L heel wounds-- ongoing drainage L foot, inc swelling last several days    + fever- in past 24 hours     no covid symptoms but worsening ulceration/eschar heel of foot- supposed to be unloading-- but states he's been on his feet. +probe to bone  +neuropathy +PAD  Morbid obesity  +BC, uses cpap at home     - hx taken from the patient  REVIEW OF SYSTEMS:  no fevers, chills, cp, sob, n/v, ha, vision/hearing changes, wt changes, hot/cold flashes, other open skin lesions, diarrhea, constipation, dysuria/hematuria unless noted in HPI. Complete ROS performed with the patient and is otherwise negative.     Past Medical History:      Diagnosis Date    Charcot [facility] [address]     Diabetes mellitus ([facility] [address] [facility]     Hypertension        Past Surgical History:        Procedure Laterality Date    ANKLE FUSION  02/2020    FOOT DEBRIDEMENT Left 10/17/2020    LEFT FOOT  INCISION AND DRAINAGE, BONE DEBRIDEMENT AND BIOPSY performed by Samir Paul DPM at [place] [id number] Medications:  Prior to Admission medications    Medication Sig Start Date End Date Taking? Authorizing Provider   levoFLOXacin (LEVAQUIN) 750 MG tablet Take 1 tablet by mouth daily 10/22/20 12/3/20  Mickeal Sires, DO   linezolid (ZYVOX) 600 MG tablet Take 1 tablet by mouth every 12 hours 10/22/20 12/3/20  Mickeal Sires, DO   insulin glargine (LANTUS) 100 UNIT/ML injection vial Inject 15 Units into the skin nightly    Historical Provider, MD   atorvastatin (LIPITOR) 10 MG tablet Take 5 mg by mouth daily    Historical Provider, MD   metFORMIN (GLUCOPHAGE) 500 MG tablet Take 500 mg by mouth daily (with breakfast)    Historical Provider, MD       Allergies:  Vancomycin    Social History:   TOBACCO:   reports that he has never smoked. He has never used smokeless tobacco.  ETOH:   reports no history of alcohol use. Family History:   History reviewed. No pertinent family history. Or deferred/otherwise considered non contributory to current admission  PHYSICAL EXAM:    VS: BP (!) 147/84   Pulse 94   Temp 98.9 °F (37.2 °C) (Oral)   Resp 16   Ht 6' 3\" (1.905 m)   Wt 280 lb (127 kg)   SpO2 95%   BMI 35.00 kg/m²     General Appearance:     no acute distress. Psych:  HEENT:    A.O. As per HPI details  NC/AT, PERRL, no pallor no icterus, lips/ext mucous membrane grossly N    Neck:   Supple, trachea midline, no obvious JVD   Resp:     Dec BS bases, No wheezes, No rhonchi   Chest wall:    No tenderness or deformity   Heart:    Regular rate and rhythm, S1 and S2 normal, no rub or gallop. Abdomen:     Soft, non-tender, bowel sounds active    no suspicious obvious masses/organomegaly   Genitalia & Rectal:    Deferred.    Extremities x4:   Extremities normal, atraumatic, no cyanosis, no clubbing   Musculoskeletal:      NO active synovitis or swollen b/l wrists, 2-5 MCPs examined   Skin:  open wound to the base of the left foot with mild purulent drainage and mildsurrounding erythema. Wound is about 3 x 3 cm the base of the left foot. He does have erythema to the distal calf and entire foot area.   No signs of any necrosis or black tissue   Lymph nodes:   Cervical nodes grossly normal   Neurologic:  .Grossly symmetric  strength in UEs and LEs with symmetric grossly idec to light touch sensation     LABS:  CBC:   Lab Results   Component Value Date    WBC 14.7 11/10/2020    RBC 4.72 11/10/2020    HGB 12.0 11/10/2020    HCT 37.6 11/10/2020     11/10/2020    MCV 79.7 11/10/2020     BMP:    Lab Results   Component Value Date     11/10/2020    K 4.0 11/10/2020    CL 97 11/10/2020    CO2 20 11/10/2020    BUN 16 11/10/2020    CREATININE 1.3 11/10/2020    GLUCOSE 193 11/10/2020    CALCIUM 9.3 11/10/2020     Hepatic Function Panel:    Lab Results   Component Value Date    ALKPHOS 91 11/10/2020    AST 9 11/10/2020    ALT <5 11/10/2020    PROT 9.1 11/10/2020    LABALBU 3.7 11/10/2020    BILITOT 1.3 11/10/2020     Magnesium:  No results found for: MG    PT/INR:  No results found for: PROTIME, INR  U/A: No results found for: NITRITE, LEUKOCYTESUR, PHUR, WBCUA, RBCUA, BACTERIA, SPECGRAV, BLOODU, GLUCOSEU  ABG:  No results found for: PHART, OTP8ZXN, PO2ART, Y8HIYPOH, SIN1BBU, BEART  TSH:  No results found for: TSH  Cardiac Enzymes:   Lab Results   Component Value Date    CKTOTAL 45 10/18/2020    CKTOTAL 20 10/18/2020       Radiology: Xr Foot Left (min 3 Views)    Result Date: 11/10/2020  EXAMINATION: THREE XRAY VIEWS OF THE LEFT FOOT 11/10/2020 2:10 am COMPARISON: Left foot radiographs 10/15/2020 HISTORY: ORDERING SYSTEM PROVIDED HISTORY: r/o osteomyelitis TECHNOLOGIST PROVIDED HISTORY: Reason for exam:->r/o osteomyelitis FINDINGS: Ongoing plantar and lateral midfoot ulceration with underlying osseous irregularity and areas of indistinctness. Additional features of neuropathic foot with midfoot collapse and presumed chronic osteomyelitis. Lateral angulation of the 2nd-5th rays with widened 1st-2nd toe interspace is unchanged. Diffuse soft tissue swelling about the foot and ankle. Prominent calcaneal spur and Achilles enthesophytes. Ongoing plantar and lateral midfoot ulcer with underlying acute (presumably on chronic) osteomyelitis superimposed on neuropathic foot. Xr Chest Portable    Result Date: 11/10/2020  EXAMINATION: ONE XRAY VIEW OF THE CHEST 11/10/2020 1:10 am COMPARISON: None. HISTORY: ORDERING SYSTEM PROVIDED HISTORY: chest pain TECHNOLOGIST PROVIDED HISTORY: Reason for exam:->chest pain FINDINGS: Heart size is normal. No focal consolidation in the lungs. No pleural effusion or pneumothorax. No acute abnormality.     ekg:  Normal sinus rhythm   Normal ECG   When compared with ECG of 16-OCT-2020 07:22,   No significant change was found    Assessment & Plan   ACTIVE hospital problems being addressed/reassessed for this admission:  Principal Problem:    Diabetic foot infection ([facility] [address]  Active Problems:    Charcot foot due to diabetes mellitus ([facility] [address]    Diabetic neuropathy associated with diabetes mellitus due to underlying condition [facility])    Essential hypertension  Resolved Problems:    * No resolved hospital problems. *    Code status/DVT prophylaxis and PLAN --see orders   Note extensive time spent coordinating care between ER docs, ER and floor nurses, and transitioning care over to day providers  Plan of care/ clinical impressions/communication specifics detailed below:    Diabetic L foot ulcerations, L foot abscess, osteomyelitis: Status post L foot incision and drainage with bone debridement and biopsy. Procedure done 10/17/20 by Dr. Mikayla Najera. [facility] misc send outs in [employer].  betalactamase + noted.     This isolate is presumed to be resistant based on the  detection of inducible Clindamycin resistance. Clindamycin  may still be effective in some patients. ORG Staphylococcus simulans 10/17/2020      ORG Corynebacterium species 10/17/2020     ORG Staphylococcus cohnii ssp urealyticum 10/17/2020     ORG Gram negative zarina 10/17/2020     ORG Staphylococcus auricularis 10/17/2020     ORG Corynebacterium species 10/17/2020        Piror DCd on  levaquin/linezolid for 6 weeks--but he went to pharmacy >$100 he couldn't afford- so didn't fill. He was discharged a little over 2 weeks ago. He states has not had any follow-up since that time. He states since he does not not have money for follow-up he has not seen wound care or podiatry. He has not filled the prescription for antibiotics. Last admission:  -Patient unable to cover wound vac as outpatient due to lack of insurance. --podiatry last [facility] - attempted to get patient covered for wound vac as outpatient, as it is a very important tool in healing his wound. + fever- in past 24 hours    He does report he had a little bit of nasal congestion and cough and body aches the last couple of days. No productivity to the cough. covid symptoms but covid test negative    Inc WBC,  Likely sepsis 2 to worsening ulceration/eschar heel of foot- supposed to be unloading-- but states he's been on his feet.  -- also didn't take abx  Osteomyelitis   +probe to bone +infection noted    +neuropathy +PAD  Morbid obesity  +BC, uses cpap at home  dm2- continue insulin, metformin etc.    Nichelle Cuello MD   Night Officer, overnight admitting doctor at [facility] call day time doctor   for questions after 7:30am    Covering for [address] [employer]  If Qs please call [phone]  Electronically signed by Michael Duffy MD on 11/10/2020 at 5:35 AM

## 2020-11-10 NOTE — ED PROVIDER NOTES
HPI:  11/10/20,   Time: 1:26 AM LISBET Castillo is a 54 y.o. male presenting to the ED for fever, beginning 1 day ago. The complaint has been persistent, mild in severity, and worsened by nothing. Patient presents emergency department 3 weeks ago with concerns for diabetic osteomyelitis of the left foot. He was admitted to the hospital started on IV antibiotics was taken to the OR for I&D and debridement podiatry. He was initially on IV antibiotics and the plan was to discharge him on that, but due to insurance issues and cost concerns, he was taken off IV antibiotics and sent home with a prescription for 6 weeks of Levaquin and linezolid. He was discharged a little over 2 weeks ago. He states has not had any follow-up since that time. He states since he does not not have money for follow-up he has not seen wound care or podiatry. He has not filled the prescription for antibiotics. He does report he had a little bit of nasal congestion and cough and body aches the last couple of days. No productivity to the cough. No urinary complaints. No nausea no vomiting no abdominal pain no diarrhea. No headache or rashes no known sick contacts. He developed a fever today so came in back to the emergency department. Patient has been wearing his walking boot to left lower extremity since he was discharged from the hospital and his wife has been changing his dressings at home. Review of Systems:   Pertinent positives and negatives are stated within HPI, all other systems reviewed and are negative.          --------------------------------------------- PAST HISTORY ---------------------------------------------  Past Medical History:  has a past medical history of Charcot foot due to diabetes mellitus ([facility] [address] Diabetes mellitus ([address] and Hypertension. Past Surgical History:  has a past surgical history that includes Ankle Fusion (02/2020) and Foot Debridement (Left, 10/17/2020).     Social History: reports that he has never smoked. He has never used smokeless tobacco. He reports that he does not drink alcohol or use drugs. Family History: family history is not on file. The patients home medications have been reviewed. Allergies: Vancomycin        ---------------------------------------------------PHYSICAL EXAM--------------------------------------    Constitutional/General: Alert and oriented x3, well appearing, non toxic in NAD; morbidly obese  Head: Normocephalic and atraumatic  Eyes: PERRL, EOMI, conjunctive normal, sclera non icteric  Mouth: Oropharynx clear, handling secretions, no trismus, no asymmetry of the posterior oropharynx or uvular edema  Neck: Supple, full ROM, non tender to palpation in the midline, no stridor, no crepitus, no meningeal signs  Respiratory: Lungs clear to auscultation bilaterally, no wheezes, rales, or rhonchi. Not in respiratory distress  Cardiovascular:  Regular rate. Regular rhythm. No murmurs, gallops, or rubs. 2+ distal pulses  Chest: No chest wall tenderness  GI:  Abdomen Soft, Non tender, Non distended. +BS. No organomegaly, no palpable masses,  No rebound, guarding, or rigidity. Musculoskeletal: Moves all extremities x 4. Warm and well perfused, no clubbing or cyanosis. 1+ pitting edema to bilateral lower extremities. . Capillary refill <3 seconds; patient has chronic peripheral vascular skin changes to bilateral lower extremities. He has an open wound to the base of the left foot with mild purulent drainage and mildsurrounding erythema. Wound is about 3 x 3 cm the base of the left foot. He does have erythema to the distal calf and entire foot area. No signs of any necrosis or black tissue. 1+ dorsalis pedis pulses to bilateral lower extremities. Integument: skin warm and dry. No rashes.    Lymphatic: no lymphadenopathy noted  Neurologic: GCS 15, no focal deficits, symmetric strength 5/5 in the upper and lower extremities bilaterally  Psychiatric: Normal Affect    -------------------------------------------------- RESULTS -------------------------------------------------  I have personally reviewed all laboratory and imaging results for this patient. Results are listed below.      LABS:  Results for orders placed or performed during the hospital encounter of 11/10/20   Comprehensive Metabolic Panel   Result Value Ref Range    Sodium 131 (L) 132 - 146 mmol/L    Potassium 4.0 3.5 - 5.0 mmol/L    Chloride 97 (L) 98 - 107 mmol/L    CO2 20 (L) 22 - 29 mmol/L    Anion Gap 14 7 - 16 mmol/L    Glucose 193 (H) 74 - 99 mg/dL    BUN 16 6 - 20 mg/dL    CREATININE 1.3 (H) 0.7 - 1.2 mg/dL    GFR Non-African American 59 >=60 mL/min/1.73    GFR African American >60     Calcium 9.3 8.6 - 10.2 mg/dL    Total Protein 9.1 (H) 6.4 - 8.3 g/dL    Alb 3.7 3.5 - 5.2 g/dL    Total Bilirubin 1.3 (H) 0.0 - 1.2 mg/dL    Alkaline Phosphatase 91 40 - 129 U/L    ALT <5 0 - 40 U/L    AST 9 0 - 39 U/L   CBC Auto Differential   Result Value Ref Range    WBC 14.7 (H) 4.5 - 11.5 E9/L    RBC 4.72 3.80 - 5.80 E12/L    Hemoglobin 12.0 (L) 12.5 - 16.5 g/dL    Hematocrit 37.6 37.0 - 54.0 %    MCV 79.7 (L) 80.0 - 99.9 fL    MCH 25.4 (L) 26.0 - 35.0 pg    MCHC 31.9 (L) 32.0 - 34.5 %    RDW 16.0 (H) 11.5 - 15.0 fL    Platelets 361 663 - 650 E9/L    MPV 9.8 7.0 - 12.0 fL    Neutrophils % 77.0 43.0 - 80.0 %    Immature Granulocytes % 1.1 0.0 - 5.0 %    Lymphocytes % 11.4 (L) 20.0 - 42.0 %    Monocytes % 9.9 2.0 - 12.0 %    Eosinophils % 0.2 0.0 - 6.0 %    Basophils % 0.4 0.0 - 2.0 %    Neutrophils Absolute 11.29 (H) 1.80 - 7.30 E9/L    Immature Granulocytes # 0.16 E9/L    Lymphocytes Absolute 1.67 1.50 - 4.00 E9/L    Monocytes Absolute 1.45 (H) 0.10 - 0.95 E9/L    Eosinophils Absolute 0.03 (L) 0.05 - 0.50 E9/L    Basophils Absolute 0.06 0.00 - 0.20 E9/L   Urinalysis   Result Value Ref Range    Color, UA Yellow Straw/Yellow    Clarity, UA SL CLOUDY Clear    Glucose, Ur Negative Negative mg/dL    Bilirubin Urine SMALL (A) Negative    Ketones, Urine TRACE (A) Negative mg/dL    Specific Gravity, UA 1.025 1.005 - 1.030    Blood, Urine LARGE (A) Negative    pH, UA 5.5 5.0 - 9.0    Protein, UA >=300 (A) Negative mg/dL    Urobilinogen, Urine 0.2 <2.0 E.U./dL    Nitrite, Urine Negative Negative    Leukocyte Esterase, Urine TRACE (A) Negative   Lactate, Sepsis   Result Value Ref Range    Lactic Acid, Sepsis 1.6 0.5 - 1.9 mmol/L   COVID-19   Result Value Ref Range    SARS-CoV-2, NAAT Not Detected Not Detected   EKG 12 Lead   Result Value Ref Range    Ventricular Rate 96 BPM    Atrial Rate 96 BPM    P-R Interval 122 ms    QRS Duration 94 ms    Q-T Interval 388 ms    QTc Calculation (Bazett) 490 ms    P Axis -12 degrees    R Axis 44 degrees    T Axis 9 degrees       RADIOLOGY:  Interpreted by Radiologist.  XR CHEST PORTABLE   Final Result   No acute abnormality. XR FOOT LEFT (MIN 3 VIEWS)   Final Result   Ongoing plantar and lateral midfoot ulcer with underlying acute (presumably   on chronic) osteomyelitis superimposed on neuropathic foot. EKG:  This EKG is signed and interpreted by the EP. Time: 1:35  Rate: 96  Rhythm: Sinus  Interpretation: no acute changes; no st changes  Comparison: stable as compared to patient's most recent EKG      ------------------------- NURSING NOTES AND VITALS REVIEWED ---------------------------   The nursing notes within the ED encounter and vital signs as below have been reviewed by myself. BP (!) 158/86   Pulse 78   Temp 98.9 °F (37.2 °C) (Oral)   Resp 16   Ht 6' 3\" (1.905 m)   Wt 280 lb (127 kg)   SpO2 97%   BMI 35.00 kg/m²   Oxygen Saturation Interpretation: Normal    The patients available past medical records and past encounters were reviewed.         ------------------------------ ED COURSE/MEDICAL DECISION MAKING----------------------  Medications   piperacillin-tazobactam (ZOSYN) 3.375 g in dextrose 5 % 50 mL IVPB (mini-bag) (has no administration in time range)   DAPTOmycin (CUBICIN) 500 mg in sodium chloride 0.9 % 50 mL IVPB (has no administration in time range)   atorvastatin (LIPITOR) tablet 5 mg (has no administration in time range)   insulin glargine (LANTUS) injection vial 15 Units (has no administration in time range)   metFORMIN (GLUCOPHAGE) tablet 500 mg (has no administration in time range)   0.9 % sodium chloride bolus (0 mLs Intravenous Stopped 11/10/20 8637)   acetaminophen (TYLENOL) tablet 1,000 mg (1,000 mg Oral Given 11/10/20 0787)         ED COURSE:       Medical Decision Making:   Patient is a 79-year-old gentleman was discharged in the hospital little over 2 weeks ago with plans for 6 weeks of outpatient antibiotics. He has was treated with I&D and IV antibiotics for left foot osteomyelitis in his recent statement stay in the hospital.  He is insulin-dependent diabetic. He developed fever today. He has not been on antibiotics and has not had any outpatient follow-up regarding this foot he continues to have drainage from the foot. He had some mild upper respiratory symptoms. Patient will have Covid testing pneumonia lab work blood cultures wound culture and x-ray of the foot as well as chest x-ray. Differential diagnosis is sepsis, chronic osteomyelitis, pneumonia, Covid, UTI. This patient has remained hemodynamically stable during their ED course. She had an EKG here that showed normal sinus rhythm at 96 bpm no signs of any ST changes. Blood cultures were sent IV fluids was given. He had CBC with a white count elevated at 14.7 hemoglobin 12. Chemistry showed slight elevated glucose at 193 no signs of DKA anion gap was normal at 14 CO2 quite slightly decreased at 28. Creatinine mildly elevated at 1.3. Urinalysis negative for acute infection lactic acid normal at 1.6 Covid test was negative chest x-ray showed no abnormality no focal infiltrate.   X-ray of the left foot did show ongoing midfoot ulcer with underlying acute or chronic osteomyelitis superimposed on a neuropathic foot. Wound cultures will be sent. Given that the patient has been off of antibiotics and has this continued wound infection with osteomyelitis and new fever. I will restart the patient on IV antibiotics he was on Zosyn and daptomycin before discharge for plan for oral antibiotics. So, I will restart those antibiotics. I spoke to Dr. Ishmael Smith who admit the patient to their service to the hospitalist service and consult podiatry in house. ID can be consulted in house, as well. Re-Evaluations:             Re-evaluation. Patients symptoms are improving    Re-examination  11/10/20   1:26 AM EST          Vital Signs:   Vitals:    11/10/20 0256 11/10/20 0314 11/10/20 0421 11/10/20 0539   BP: (!) 158/80 (!) 140/78 (!) 147/84 (!) 158/86   Pulse: 98 87 94 78   Resp: 16 16 16 16   Temp:   98.9 °F (37.2 °C)    TempSrc:   Oral    SpO2: 97% 97% 95% 97%   Weight:       Height:             Consultations:          I spoke to Dr. Ishmael Smith from the hospitalist service admit the patient to their service telemetry bed they will consult podiatry in house. Counseling: The emergency provider has spoken with the patient and discussed todays results, in addition to providing specific details for the plan of care and counseling regarding the diagnosis and prognosis. Questions are answered at this time and they are agreeable with the plan.       --------------------------------- IMPRESSION AND DISPOSITION ---------------------------------    IMPRESSION  1. Other osteomyelitis of left foot ([address]        DISPOSITION  Disposition: Admit to telemetry  Patient condition is fair    NOTE: This report was transcribed using voice recognition software.  Every effort was made to ensure accuracy; however, inadvertent computerized transcription errors may be present        Ludivina Ring MD  11/10/20 8531

## 2020-11-10 NOTE — CONSULTS
[address] [place] [address] [facility]  JOHNNA    Consultation Note     Admit Date: 11/10/2020 12:53 AM    Reason for Consult:   diabetic foot infection     Attending Physician:  Giuliano Bolanos MD     Chief Complaint: fever     HISTORY OF PRESENT ILLNESS:     The patient is a 54 y.o.  male known to the Infectious Diseases service. The patient is a longstanding diabetic. He was admitted here 10/15/2020 with a left diabetic foot infection. He has a history of Charcot arthropathy and has had numerous procedures on his right foot but recently developed ulceration on his left foot. He had been managed by a podiatrist in [place] and was in a walking boot but developed severe maceration of the entire plantar aspect of his foot. Recently moved to this area from [place] and subsequent presented to the emergency room because of concern about recurrent infection. Drainage and discomfort from the foot was noted. Wound culture tips probed to bone on admission. He denied any fever chills nausea or vomiting. He had been previously treated with vancomycin for 6 weeks but at the end of therapy developed \"red man\" syndrome and vancomycin was discontinued and he was treated with daptomycin. 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 He was initially placed on Zosyn and daptomycin. He was taken to surgery on 10/17 and underwent left foot incision and drainage bone debridement and biopsy. Cultures grew mixed staph species and gram-negative rods. Patient eventually switched over to oral Levaquin and linezolid with plans for 6 weeks of therapy. Patient had agreed to take the prescription medication as recommended but after discharge he elected not to get the prescriptions filled due to cost concerns. Patient was to follow-up with infectious disease in 2 weeks and was also instructed to follow-up with podiatry however he did not follow-up as recommended.   He apparently has been wearing a walking boot on left lower extremity and his wife is been handling dressing changes at home. He developed some cough and nasal congestion as well as body aches 2 days ago and suddenly developed a fever prompting his visit to the emergency room. In the emergency room he was noted to have an open wound at the base of his left foot with mild purulent drainage and surrounding erythema. X-rays of the left foot were obtained were consistent with osteomyelitis. Cultures were obtained and the patient was started on Zosyn and daptomycin. Podiatry has been consulted. Past Medical History:          Diagnosis Date    Charcot [facility]     Diabetes mellitus ([facility]     Hypertension      Past Surgical History:          Procedure Laterality Date    ANKLE FUSION  02/2020    FOOT DEBRIDEMENT Left 10/17/2020    LEFT FOOT  INCISION AND DRAINAGE, BONE DEBRIDEMENT AND BIOPSY performed by Ramila Rowland DPM at [facility] OR     Current Medications:     Scheduled Meds:   atorvastatin  5 mg Oral Nightly    insulin glargine  15 Units Subcutaneous Nightly    metFORMIN  500 mg Oral Daily with breakfast    insulin lispro  0-18 Units Subcutaneous TID WC    insulin lispro  0-9 Units Subcutaneous Nightly     Continuous Infusions:  PRN Meds:    Allergies:  Vancomycin and Semaglutide    Social History:     Social History     Socioeconomic History    Marital status:      Spouse name: None    Number of children: None    Years of education: None    Highest education level: None   Occupational History    None   Social Needs    Financial resource strain: None    Food insecurity     Worry: None     Inability: None    Transportation needs     Medical: None     Non-medical: None   Tobacco Use    Smoking status: Never Smoker    Smokeless tobacco: Never Used   Substance and Sexual Activity    Alcohol use: Not Currently     Frequency: Never     Comment: 3 times a year maybe.  very rare    Drug use: Never    Sexual activity: Not Currently     Partners: Female   Lifestyle    Physical activity     Days per week: None     Minutes per session: None    Stress: None   Relationships    Social connections     Talks on phone: None     Gets together: None     Attends Cheondoism service: None     Active member of club or organization: None     Attends meetings of clubs or organizations: None     Relationship status: None    Intimate partner violence     Fear of current or ex partner: None     Emotionally abused: None     Physically abused: None     Forced sexual activity: None   Other Topics Concern    None   Social History Narrative    None         Family History:         Problem Relation Age of Onset    Diabetes Father     Alcohol Abuse Father    . Otherwise non-pertinent to the chief complaint. REVIEW OF SYSTEMS:    CONSTITUTIONAL: Low-grade fever, cough, no anorexia or weight loss, no chills. EYES:  No double vision or drainage from eyes, ears or throat. HEENT:  No neck stiffness. No dysphagia. No drainage from eyes, ears or throat  RESPIRATORY: Nonproductive cough, no dyspnea. CARDIOVASCULAR:  No chest pain, palpitations, orthopnea or dyspnea on exertion. GASTROINTESTINAL:  No nausea, vomiting, diarrhea or constipation or hematochezia   GENITOURINARY:  No frequency burning dysuria or hematuria. INTEGUMENT/BREAST:  No rash or breast masses. HEMATOLOGIC/LYMPHATIC:  No lymphadenopathy or blood dyscrasics. ALLERGIC/IMMUNOLOGIC:  No anaphylaxis. ENDOCRINE:  No polyuria or polydipsia or temperature intolerance. MUSCULOSKELETAL: Charcot arthropathy with drainage left foot  NEUROLOGICAL:  No focal motor sensory deficit. BEHAVIOR/PSYCH:  No psychosis. PHYSICAL EXAM:      Vitals:    /73   Pulse 98   Temp 97.6 °F (36.4 °C)   Resp 16   Ht 6' 3\" (1.905 m)   Wt 280 lb (127 kg)   SpO2 96%   BMI 35.00 kg/m²   Constitutional: The patient is awake, alert, and oriented.    Skin: Warm and dry. No rashes were noted. No jaundice. HEENT: Eyes show round, and reactive pupils. Moist mucous membranes, no ulcerations, no thrush. Neck: Supple to movements. No lymphadenopathy. Chest: No use of accessory muscles to breathe. Symmetrical expansion. Auscultation reveals no wheezing, crackles, or rhonchi. Cardiovascular: Regular rate and rhythm. No murmurs appreciated. Abdomen: Positive bowel sounds to auscultation. Benign to palpation. No masses felt. No hepatosplenomegaly.   Extremities: Open ulcer left foot-see photo  Neurological: No focal neurologic deficits                CBC+dif:  Recent Labs     11/10/20  0128   WBC 14.7*   HGB 12.0*   HCT 37.6   MCV 79.7*      NEUTROABS 11.29*     Lab Results   Component Value Date    CRP 10.8 (H) 10/16/2020     No results found for: CRP  Lab Results   Component Value Date    SEDRATE 78 (H) 10/16/2020     Lab Results   Component Value Date    ALT <5 11/10/2020    AST 9 11/10/2020    ALKPHOS 91 11/10/2020    BILITOT 1.3 (H) 11/10/2020     Lab Results   Component Value Date     11/10/2020    K 4.0 11/10/2020    CL 97 11/10/2020    CO2 20 11/10/2020    BUN 16 11/10/2020    CREATININE 1.3 11/10/2020    GFRAA >60 11/10/2020    LABGLOM 59 11/10/2020    GLUCOSE 193 11/10/2020    PROT 9.1 11/10/2020    LABALBU 3.7 11/10/2020    CALCIUM 9.3 11/10/2020    BILITOT 1.3 11/10/2020    ALKPHOS 91 11/10/2020    AST 9 11/10/2020    ALT <5 11/10/2020       No results found for: PROTIME, INR    No results found for: TSH    Lab Results   Component Value Date    COLORU Yellow 11/10/2020    PHUR 5.5 11/10/2020    WBCUA 5-10 11/10/2020    RBCUA 10-20 11/10/2020    BACTERIA FEW 11/10/2020    CLARITYU SL CLOUDY 11/10/2020    SPECGRAV 1.025 11/10/2020    LEUKOCYTESUR TRACE 11/10/2020    UROBILINOGEN 0.2 11/10/2020    BILIRUBINUR SMALL 11/10/2020    BLOODU LARGE 11/10/2020    GLUCOSEU Negative 11/10/2020       No results found for: DEY7QPY, BEART, R8UTCMNP, PHART, THGBART, Jose Miguel Roque [place]  Radiology:  XR ANKLE RIGHT (MIN 3 VIEWS)   Final Result   No acute fractures or osseous destructive process noted. Osseous structures   are in alignment. VL LOWER EXTREMITY ARTERIAL SEGMENTAL PRESSURES W PPG   Final Result   No evidence of peripheral arterial disease. XR CHEST PORTABLE   Final Result   No acute abnormality. XR FOOT LEFT (MIN 3 VIEWS)   Final Result   Ongoing plantar and lateral midfoot ulcer with underlying acute (presumably   on chronic) osteomyelitis superimposed on neuropathic foot. MRI ANKLE LEFT WO CONTRAST    (Results Pending)       Microbiology:    Staphylococcus simulans    Culture Surgical  10/17/2020  7:59 AM   - [facility]    Light growth   This isolate is presumed to be resistant based on the   detection of inducible Clindamycin resistance.  Clindamycin   may still be effective in some patients. Organism Abnormal    10/17/2020  7:59 AM  [facility]    Staphylococcus lugdunensis    Culture Surgical  10/17/2020  7:59 AM  [facility] [place] [facility] [facility]    Light growth    Organism Abnormal    10/17/2020  7:59 AM   - [facility]    Gram negative zarina    Culture Surgical  10/17/2020  7:59 AM  [facility]    Light growth   After multiple attempts on the Vitek, the ID and /or   sensitivity was not attainable. The organism was sent out   for further testing. Please refer to MISS1.     Organism Abnormal    10/17/2020  7:59 AM  [facility]    Corynebacterium species    Culture Surgical  10/17/2020  7:59 AM  [facility]    Light growth    Organism Abnormal    10/17/2020  7:59 AM  [facility]    Streptococcus viridans group    Culture Surgical  10/17/2020  7:59 AM  [facility]    Light growth    Organism Abnormal    10/17/2020  7:59 AM  [facility]    Gram negative zarina    Culture Surgical  10/17/2020  7:59 AM   [facility]    Light growth   After multiple attempts on the Vitek, the ID and /or   sensitivity was not attainable. The organism was sent out   for further testing. Please refer to MISS2. Testing Performed By     Lab - Abbreviation  Name  Director  Address  Valid Date Range    53-UX - [address] [facility]  [address] [facility]  Tara Staples MD  [address] [place] [address]. [address] [place] [address]  12/03/19 1502-Present    Narrative   Performed by: [address] [place] [address] Lab   Source: TISSU       Site: L FOOT              Susceptibility     Staphylococcus simulans (1)     Antibiotic  Interpretation  MAMIE  Status     clindamycin  Resistant  <=^0.12  mcg/mL      doxycycline  Sensitive  <=^0.5  mcg/mL      erythromycin  Resistant  ^1  mcg/mL      gentamicin  Sensitive  <=^0.5  mcg/mL      oxacillin  Sensitive  <=^0.25  mcg/mL      trimethoprim-sulfamethoxazole  Sensitive  <=^10  mcg/mL      vancomycin  Sensitive  <=^0.5  mcg/mL      Staphylococcus lugdunensis (2)     Antibiotic  Interpretation  MAMIE  Status     clindamycin  Sensitive  <=^0.12  mcg/mL      doxycycline  Sensitive  <=^0.5  mcg/mL      erythromycin  Sensitive  <=^0.25  mcg/mL      gentamicin  Sensitive  <=^0.5  mcg/mL      oxacillin  Sensitive  ^2  mcg/mL      trimethoprim-sulfamethoxazole  Sensitive  <=^10  mcg/mL      vancomycin  Sensitive  <=^0.5  mcg/mL      Streptococcus viridans group (6)     Antibiotic  Interpretation  MAMIE  Status     ampicillin  Sensitive  <=^0.25  mcg/mL      benzylpenicillin  Intermediate  ^0. 12  mcg/mL      cefotaxime  Resistant  ^2  mcg/mL      cefTRIAXone  Intermediate  ^2  mcg/mL      levofloxacin  Intermediate  ^4  mcg/mL      linezolid  Sensitive  <=^2  mcg/mL      vancomycin  Sensitive  <=^0.12  mcg/mL            Problem list:    Principal Problem:    Diabetic foot infection ([address]  Active Problems:    Charcot foot due to diabetes mellitus ([facility]    Diabetic neuropathy associated with diabetes mellitus due to underlying condition [facility])    Essential hypertension  Resolved Problems:    * No resolved hospital problems. *      Assessment:    · Recurrent left diabetic foot infection with osteomyelitis  · Medical noncompliance  · Type 2 diabetes mellitus  · Charcot arthropathy  · Essential hypertension    Plan:      · Continue Zosyn and daptomycin  · Check repeat wound cultures  · Check blood cultures  · Baseline ESR, CRP  · Monitor labs  · Will follow with you    Thank you for having us see this patient in consultation. I will be discussing this case with the treating physicians.       Electronically signed by Mandy Salinas DO on 11/10/2020 at 2:48 PM

## 2020-11-10 NOTE — CARE COORDINATION
Social Work / Discharge Planning : SW noted no insurance for patient. SW spoke to LOVE from [employer] and discussed case. Love was able to contact patient spouse and after discussion, she will apply for medicaid on patient behalf and patient IS HCAP eligible through [address] [place] [address] he can get his scripts filled here for no more than total cost 15.00 . Per ER note, patient was not able to afford his scripts from recent  discharge therefore did NOT fill them. SW will follow up with patient  and assist with needs at discharge. No PCP on file. Await [facility] plan. Infusion Center if IV needed at discharge maybe only option. SW to follow.  Electronically signed by CLIVE Hastings on 11/10/20 at 1:32 PM EST

## 2020-11-11 ENCOUNTER — ANESTHESIA (OUTPATIENT)
Dept: OPERATING ROOM | Age: 45
DRG: 854 | End: 2020-11-11

## 2020-11-11 VITALS — OXYGEN SATURATION: 94 % | SYSTOLIC BLOOD PRESSURE: 104 MMHG | DIASTOLIC BLOOD PRESSURE: 60 MMHG

## 2020-11-11 PROBLEM — E11.69 DIABETIC FOOT ULCER WITH OSTEOMYELITIS (HCC): Status: ACTIVE | Noted: 2020-11-11

## 2020-11-11 PROBLEM — E11.621 DIABETIC FOOT ULCER WITH OSTEOMYELITIS (HCC): Status: ACTIVE | Noted: 2020-11-11

## 2020-11-11 PROBLEM — L97.509 DIABETIC FOOT ULCER WITH OSTEOMYELITIS (HCC): Status: ACTIVE | Noted: 2020-11-11

## 2020-11-11 PROBLEM — M86.9 DIABETIC FOOT ULCER WITH OSTEOMYELITIS (HCC): Status: ACTIVE | Noted: 2020-11-11

## 2020-11-11 LAB
ACINETOBACTER BAUMANNII BY PCR: NOT DETECTED
ANION GAP SERPL CALCULATED.3IONS-SCNC: 12 MMOL/L (ref 7–16)
BASOPHILS ABSOLUTE: 0.03 E9/L (ref 0–0.2)
BASOPHILS RELATIVE PERCENT: 0.2 % (ref 0–2)
BOTTLE TYPE: ABNORMAL
BUN BLDV-MCNC: 15 MG/DL (ref 6–20)
CALCIUM SERPL-MCNC: 8.8 MG/DL (ref 8.6–10.2)
CANDIDA ALBICANS BY PCR: NOT DETECTED
CANDIDA GLABRATA BY PCR: NOT DETECTED
CANDIDA KRUSEI BY PCR: NOT DETECTED
CANDIDA PARAPSILOSIS BY PCR: NOT DETECTED
CANDIDA TROPICALIS BY PCR: NOT DETECTED
CHLORIDE BLD-SCNC: 102 MMOL/L (ref 98–107)
CO2: 21 MMOL/L (ref 22–29)
CREAT SERPL-MCNC: 1 MG/DL (ref 0.7–1.2)
ENTEROBACTER CLOACAE COMPLEX BY PCR: NOT DETECTED
ENTEROBACTERALES BY PCR: NOT DETECTED
ENTEROCOCCUS BY PCR: NOT DETECTED
EOSINOPHILS ABSOLUTE: 0.06 E9/L (ref 0.05–0.5)
EOSINOPHILS RELATIVE PERCENT: 0.5 % (ref 0–6)
ESCHERICHIA COLI BY PCR: NOT DETECTED
GFR AFRICAN AMERICAN: >60
GFR NON-AFRICAN AMERICAN: >60 ML/MIN/1.73
GLUCOSE BLD-MCNC: 253 MG/DL (ref 74–99)
HAEMOPHILUS INFLUENZAE BY PCR: NOT DETECTED
HCT VFR BLD CALC: 39.3 % (ref 37–54)
HEMOGLOBIN: 12.1 G/DL (ref 12.5–16.5)
IMMATURE GRANULOCYTES #: 0.14 E9/L
IMMATURE GRANULOCYTES %: 1.1 % (ref 0–5)
KLEBSIELLA OXYTOCA BY PCR: NOT DETECTED
KLEBSIELLA PNEUMONIAE GROUP BY PCR: NOT DETECTED
LISTERIA MONOCYTOGENES BY PCR: NOT DETECTED
LYMPHOCYTES ABSOLUTE: 0.51 E9/L (ref 1.5–4)
LYMPHOCYTES RELATIVE PERCENT: 3.8 % (ref 20–42)
MCH RBC QN AUTO: 24.9 PG (ref 26–35)
MCHC RBC AUTO-ENTMCNC: 30.8 % (ref 32–34.5)
MCV RBC AUTO: 80.9 FL (ref 80–99.9)
METER GLUCOSE: 196 MG/DL (ref 74–99)
METER GLUCOSE: 202 MG/DL (ref 74–99)
METER GLUCOSE: 206 MG/DL (ref 74–99)
METER GLUCOSE: 265 MG/DL (ref 74–99)
METER GLUCOSE: 311 MG/DL (ref 74–99)
METHICILLIN RESISTANCE MECA/C  BY PCR: NOT DETECTED
MONOCYTES ABSOLUTE: 0.24 E9/L (ref 0.1–0.95)
MONOCYTES RELATIVE PERCENT: 1.8 % (ref 2–12)
NEISSERIA MENINGITIDIS BY PCR: NOT DETECTED
NEUTROPHILS ABSOLUTE: 12.35 E9/L (ref 1.8–7.3)
NEUTROPHILS RELATIVE PERCENT: 92.6 % (ref 43–80)
ORDER NUMBER: ABNORMAL
PDW BLD-RTO: 16.4 FL (ref 11.5–15)
PLATELET # BLD: 360 E9/L (ref 130–450)
PMV BLD AUTO: 10.1 FL (ref 7–12)
POTASSIUM SERPL-SCNC: 4.2 MMOL/L (ref 3.5–5)
PROTEUS BY PCR: NOT DETECTED
PSEUDOMONAS AERUGINOSA BY PCR: NOT DETECTED
RBC # BLD: 4.86 E12/L (ref 3.8–5.8)
SERRATIA MARCESCENS BY PCR: NOT DETECTED
SODIUM BLD-SCNC: 135 MMOL/L (ref 132–146)
SOURCE OF BLOOD CULTURE: ABNORMAL
STAPHYLOCOCCUS AUREUS BY PCR: DETECTED
STAPHYLOCOCCUS SPECIES BY PCR: DETECTED
STREPTOCOCCUS AGALACTIAE BY PCR: NOT DETECTED
STREPTOCOCCUS PNEUMONIAE BY PCR: NOT DETECTED
STREPTOCOCCUS PYOGENES  BY PCR: NOT DETECTED
STREPTOCOCCUS SPECIES BY PCR: NOT DETECTED
TOTAL CK: 164 U/L (ref 20–200)
WBC # BLD: 13.3 E9/L (ref 4.5–11.5)

## 2020-11-11 PROCEDURE — 87070 CULTURE OTHR SPECIMN AEROBIC: CPT

## 2020-11-11 PROCEDURE — 88307 TISSUE EXAM BY PATHOLOGIST: CPT

## 2020-11-11 PROCEDURE — 3700000001 HC ADD 15 MINUTES (ANESTHESIA): Performed by: PODIATRIST

## 2020-11-11 PROCEDURE — 2580000003 HC RX 258: Performed by: PODIATRIST

## 2020-11-11 PROCEDURE — 2500000003 HC RX 250 WO HCPCS: Performed by: ANESTHESIOLOGY

## 2020-11-11 PROCEDURE — 85025 COMPLETE CBC W/AUTO DIFF WBC: CPT

## 2020-11-11 PROCEDURE — 6360000002 HC RX W HCPCS: Performed by: SPECIALIST

## 2020-11-11 PROCEDURE — 2709999900 HC NON-CHARGEABLE SUPPLY: Performed by: PODIATRIST

## 2020-11-11 PROCEDURE — 3600000012 HC SURGERY LEVEL 2 ADDTL 15MIN: Performed by: PODIATRIST

## 2020-11-11 PROCEDURE — 87186 SC STD MICRODIL/AGAR DIL: CPT

## 2020-11-11 PROCEDURE — 87205 SMEAR GRAM STAIN: CPT

## 2020-11-11 PROCEDURE — 6360000002 HC RX W HCPCS: Performed by: NURSE ANESTHETIST, CERTIFIED REGISTERED

## 2020-11-11 PROCEDURE — 1200000000 HC SEMI PRIVATE

## 2020-11-11 PROCEDURE — 6360000002 HC RX W HCPCS: Performed by: PODIATRIST

## 2020-11-11 PROCEDURE — 87116 MYCOBACTERIA CULTURE: CPT

## 2020-11-11 PROCEDURE — 87077 CULTURE AEROBIC IDENTIFY: CPT

## 2020-11-11 PROCEDURE — 87015 SPECIMEN INFECT AGNT CONCNTJ: CPT

## 2020-11-11 PROCEDURE — 36415 COLL VENOUS BLD VENIPUNCTURE: CPT

## 2020-11-11 PROCEDURE — 2580000003 HC RX 258: Performed by: NURSE ANESTHETIST, CERTIFIED REGISTERED

## 2020-11-11 PROCEDURE — 7100000011 HC PHASE II RECOVERY - ADDTL 15 MIN: Performed by: PODIATRIST

## 2020-11-11 PROCEDURE — 0QBM0ZZ EXCISION OF LEFT TARSAL, OPEN APPROACH: ICD-10-PCS | Performed by: PODIATRIST

## 2020-11-11 PROCEDURE — 82550 ASSAY OF CK (CPK): CPT

## 2020-11-11 PROCEDURE — 6370000000 HC RX 637 (ALT 250 FOR IP): Performed by: PODIATRIST

## 2020-11-11 PROCEDURE — 80048 BASIC METABOLIC PNL TOTAL CA: CPT

## 2020-11-11 PROCEDURE — 2580000003 HC RX 258: Performed by: SPECIALIST

## 2020-11-11 PROCEDURE — 2580000003 HC RX 258: Performed by: ANESTHESIOLOGY

## 2020-11-11 PROCEDURE — 99233 SBSQ HOSP IP/OBS HIGH 50: CPT | Performed by: INTERNAL MEDICINE

## 2020-11-11 PROCEDURE — 88311 DECALCIFY TISSUE: CPT

## 2020-11-11 PROCEDURE — 87102 FUNGUS ISOLATION CULTURE: CPT

## 2020-11-11 PROCEDURE — 3600000002 HC SURGERY LEVEL 2 BASE: Performed by: PODIATRIST

## 2020-11-11 PROCEDURE — 7100000010 HC PHASE II RECOVERY - FIRST 15 MIN: Performed by: PODIATRIST

## 2020-11-11 PROCEDURE — 82962 GLUCOSE BLOOD TEST: CPT

## 2020-11-11 PROCEDURE — 87206 SMEAR FLUORESCENT/ACID STAI: CPT

## 2020-11-11 PROCEDURE — 3700000000 HC ANESTHESIA ATTENDED CARE: Performed by: PODIATRIST

## 2020-11-11 PROCEDURE — 87075 CULTR BACTERIA EXCEPT BLOOD: CPT

## 2020-11-11 RX ORDER — PROMETHAZINE HYDROCHLORIDE 25 MG/ML
6.25 INJECTION, SOLUTION INTRAMUSCULAR; INTRAVENOUS PRN
Status: DISCONTINUED | OUTPATIENT
Start: 2020-11-11 | End: 2020-11-11 | Stop reason: HOSPADM

## 2020-11-11 RX ORDER — SODIUM CHLORIDE 9 MG/ML
INJECTION, SOLUTION INTRAVENOUS CONTINUOUS PRN
Status: DISCONTINUED | OUTPATIENT
Start: 2020-11-11 | End: 2020-11-11 | Stop reason: SDUPTHER

## 2020-11-11 RX ORDER — OXYCODONE HYDROCHLORIDE AND ACETAMINOPHEN 5; 325 MG/1; MG/1
1 TABLET ORAL PRN
Status: DISCONTINUED | OUTPATIENT
Start: 2020-11-11 | End: 2020-11-11 | Stop reason: HOSPADM

## 2020-11-11 RX ORDER — DEXAMETHASONE SODIUM PHOSPHATE 4 MG/ML
INJECTION, SOLUTION INTRA-ARTICULAR; INTRALESIONAL; INTRAMUSCULAR; INTRAVENOUS; SOFT TISSUE PRN
Status: DISCONTINUED | OUTPATIENT
Start: 2020-11-11 | End: 2020-11-11 | Stop reason: SDUPTHER

## 2020-11-11 RX ORDER — DIPHENHYDRAMINE HYDROCHLORIDE 50 MG/ML
12.5 INJECTION INTRAMUSCULAR; INTRAVENOUS
Status: DISCONTINUED | OUTPATIENT
Start: 2020-11-11 | End: 2020-11-11 | Stop reason: HOSPADM

## 2020-11-11 RX ORDER — MEPERIDINE HYDROCHLORIDE 25 MG/ML
12.5 INJECTION INTRAMUSCULAR; INTRAVENOUS; SUBCUTANEOUS EVERY 10 MIN PRN
Status: DISCONTINUED | OUTPATIENT
Start: 2020-11-11 | End: 2020-11-11 | Stop reason: HOSPADM

## 2020-11-11 RX ORDER — ONDANSETRON 2 MG/ML
INJECTION INTRAMUSCULAR; INTRAVENOUS PRN
Status: DISCONTINUED | OUTPATIENT
Start: 2020-11-11 | End: 2020-11-11 | Stop reason: SDUPTHER

## 2020-11-11 RX ORDER — FENTANYL CITRATE 50 UG/ML
25 INJECTION, SOLUTION INTRAMUSCULAR; INTRAVENOUS EVERY 5 MIN PRN
Status: DISCONTINUED | OUTPATIENT
Start: 2020-11-11 | End: 2020-11-11 | Stop reason: HOSPADM

## 2020-11-11 RX ORDER — MIDAZOLAM HYDROCHLORIDE 1 MG/ML
INJECTION INTRAMUSCULAR; INTRAVENOUS PRN
Status: DISCONTINUED | OUTPATIENT
Start: 2020-11-11 | End: 2020-11-11 | Stop reason: SDUPTHER

## 2020-11-11 RX ORDER — FENTANYL CITRATE 50 UG/ML
INJECTION, SOLUTION INTRAMUSCULAR; INTRAVENOUS PRN
Status: DISCONTINUED | OUTPATIENT
Start: 2020-11-11 | End: 2020-11-11 | Stop reason: SDUPTHER

## 2020-11-11 RX ADMIN — MIDAZOLAM 1 MG: 1 INJECTION INTRAMUSCULAR; INTRAVENOUS at 07:14

## 2020-11-11 RX ADMIN — PIPERACILLIN SODIUM AND TAZOBACTAM SODIUM 3.38 G: 3; .375 INJECTION, POWDER, LYOPHILIZED, FOR SOLUTION INTRAVENOUS at 21:32

## 2020-11-11 RX ADMIN — DEXAMETHASONE SODIUM PHOSPHATE 8 MG: 4 INJECTION, SOLUTION INTRAMUSCULAR; INTRAVENOUS at 07:28

## 2020-11-11 RX ADMIN — INSULIN LISPRO 3 UNITS: 100 INJECTION, SOLUTION INTRAVENOUS; SUBCUTANEOUS at 12:11

## 2020-11-11 RX ADMIN — ONDANSETRON 4 MG: 2 INJECTION INTRAMUSCULAR; INTRAVENOUS at 07:28

## 2020-11-11 RX ADMIN — MIDAZOLAM 1 MG: 1 INJECTION INTRAMUSCULAR; INTRAVENOUS at 07:19

## 2020-11-11 RX ADMIN — SODIUM CHLORIDE 600 MG: 9 INJECTION, SOLUTION INTRAVENOUS at 05:40

## 2020-11-11 RX ADMIN — FENTANYL CITRATE 50 MCG: 50 INJECTION, SOLUTION INTRAMUSCULAR; INTRAVENOUS at 07:26

## 2020-11-11 RX ADMIN — PIPERACILLIN SODIUM AND TAZOBACTAM SODIUM 3.38 G: 3; .375 INJECTION, POWDER, LYOPHILIZED, FOR SOLUTION INTRAVENOUS at 05:55

## 2020-11-11 RX ADMIN — PIPERACILLIN SODIUM AND TAZOBACTAM SODIUM 3.38 G: 3; .375 INJECTION, POWDER, LYOPHILIZED, FOR SOLUTION INTRAVENOUS at 13:53

## 2020-11-11 RX ADMIN — INSULIN GLARGINE 15 UNITS: 100 INJECTION, SOLUTION SUBCUTANEOUS at 21:31

## 2020-11-11 RX ADMIN — INSULIN LISPRO 9 UNITS: 100 INJECTION, SOLUTION INTRAVENOUS; SUBCUTANEOUS at 16:23

## 2020-11-11 RX ADMIN — SODIUM CHLORIDE: 9 INJECTION, SOLUTION INTRAVENOUS at 06:37

## 2020-11-11 RX ADMIN — ATORVASTATIN CALCIUM 5 MG: 10 TABLET, FILM COATED ORAL at 22:23

## 2020-11-11 RX ADMIN — DEXMEDETOMIDINE HYDROCHLORIDE 1 MCG/KG/HR: 100 INJECTION, SOLUTION INTRAVENOUS at 07:26

## 2020-11-11 RX ADMIN — INSULIN LISPRO 6 UNITS: 100 INJECTION, SOLUTION INTRAVENOUS; SUBCUTANEOUS at 21:32

## 2020-11-11 ASSESSMENT — PAIN SCALES - GENERAL
PAINLEVEL_OUTOF10: 0

## 2020-11-11 ASSESSMENT — PAIN - FUNCTIONAL ASSESSMENT: PAIN_FUNCTIONAL_ASSESSMENT: 0-10

## 2020-11-11 NOTE — CARE COORDINATION
Met with patient in room in collaboration with . Patient desires a return to home on discharge. Explained necessity to complete financial aide applications for both [facility] and [facility] (wound vac provider). He is agreeable and will complete the copies provided to him and left at beside. Notified Dr. Seema Chaney via office message to sign wound vac forms in lite chart. CLIVE Spencer, has initiated referral to [facility]. All forms will need faxed to appropriate destinations on completion. Will follow along with  and assist with discharge planning as necessary. Jodie Medeiros.  Ozzy, MSN, RN  [facility] Case Management  [phone]

## 2020-11-11 NOTE — PROGRESS NOTES
Call placed to ID for abx treatment and elevated temp. Dr. Dasilva Ards on call. Electronically signed by Nevin Beltran RN on 11/10/2020 at 8:20 PM      Call returned from Dr. Dasilva Ards, see new orders.   Electronically signed by Nevin Beltran RN on 11/10/2020 at 8:43 PM

## 2020-11-11 NOTE — ANESTHESIA PRE PROCEDURE
Department of Anesthesiology  Preprocedure Note       Name:  Jessica Lawton   Age:  54 y.o.  :  1975                                          MRN:  [id number]         Date:  11/10/2020      Surgeon: Flor Oliva):  Yaya Vargas DPM    Procedure: Procedure(s):  LEFT FOOT INCISION AND DRAINAGE APPLICATION OF WOUND VAC POSSIBLE [employer] GRAFT    Medications prior to admission:   Prior to Admission medications    Medication Sig Start Date End Date Taking? Authorizing Provider   insulin glargine (LANTUS) 100 UNIT/ML injection vial Inject 15 Units into the skin nightly   Yes Historical Provider, MD   atorvastatin (LIPITOR) 10 MG tablet Take 5 mg by mouth nightly    Yes Historical Provider, MD   metFORMIN (GLUCOPHAGE) 500 MG tablet Take 500 mg by mouth daily (with breakfast)   Yes Historical Provider, MD       Current medications:    Current Facility-Administered Medications   Medication Dose Route Frequency Provider Last Rate Last Dose    atorvastatin (LIPITOR) tablet 5 mg  5 mg Oral Nightly Marcus Remy MD        insulin glargine (LANTUS) injection vial 15 Units  15 Units Subcutaneous Nightly Marcus Remy MD        metFORMIN (GLUCOPHAGE) tablet 500 mg  500 mg Oral Daily with breakfast Marcus Remy MD        insulin lispro (HUMALOG) injection vial 0-18 Units  0-18 Units Subcutaneous TID WC Marcus Remy MD        insulin lispro (HUMALOG) injection vial 0-9 Units  0-9 Units Subcutaneous Nightly Marcus Remy MD           Allergies:     Allergies   Allergen Reactions    Vancomycin Other (See Comments)     Red man syndrome    Semaglutide Itching and Rash     Pink itch rash         Problem List:    Patient Active Problem List   Diagnosis Code    [facility] E11.628, L08.9    Charcot foot due to diabetes mellitus ([facility] E11.610    Diabetic neuropathy associated with diabetes mellitus due to underlying condition ([facility] E08.40    Essential hypertension I10       Past Medical History: Diagnosis Date    Charcot foot due to diabetes mellitus ([facility]     Diabetes mellitus ([facility]     Hypertension        Past Surgical History:        Procedure Laterality Date    ANKLE FUSION  02/2020    FOOT DEBRIDEMENT Left 10/17/2020    LEFT FOOT  INCISION AND DRAINAGE, BONE DEBRIDEMENT AND BIOPSY performed by Sarbjit Crouch DPM at [facility] OR       Social History:    Social History     Tobacco Use    Smoking status: Never Smoker    Smokeless tobacco: Never Used   Substance Use Topics    Alcohol use: Not Currently     Frequency: Never     Comment: 3 times a year maybe. very rare                                Counseling given: Not Answered      Vital Signs (Current):   Vitals:    11/10/20 0920 11/10/20 1127 11/10/20 1335 11/10/20 1549   BP: (!) 129/55 (!) 130/54 128/73 (!) 150/75   Pulse: 102 95 98 98   Resp: 16 16 16 20   Temp:   97.6 °F (36.4 °C) 99.2 °F (37.3 °C)   TempSrc:    Axillary   SpO2: 100% 98% 96% 97%   Weight:       Height:                                                  BP Readings from Last 3 Encounters:   11/10/20 (!) 150/75   10/22/20 (!) 144/80   10/17/20 110/64       NPO Status:                                                                                 BMI:   Wt Readings from Last 3 Encounters:   11/09/20 280 lb (127 kg)   10/22/20 299 lb (135.6 kg)     Body mass index is 35 kg/m².     CBC:   Lab Results   Component Value Date    WBC 14.7 11/10/2020    RBC 4.72 11/10/2020    HGB 12.0 11/10/2020    HCT 37.6 11/10/2020    MCV 79.7 11/10/2020    RDW 16.0 11/10/2020     11/10/2020       CMP:   Lab Results   Component Value Date     11/10/2020    K 4.0 11/10/2020    CL 97 11/10/2020    CO2 20 11/10/2020    BUN 16 11/10/2020    CREATININE 1.3 11/10/2020    GFRAA >60 11/10/2020    LABGLOM 59 11/10/2020    GLUCOSE 193 11/10/2020    PROT 9.1 11/10/2020    CALCIUM 9.3 11/10/2020    BILITOT 1.3 11/10/2020    ALKPHOS 91 11/10/2020    AST 9 11/10/2020    ALT <5 11/10/2020 Anesthesia Plan      MAC     ASA 3       Induction: intravenous. MIPS: Prophylactic antiemetics administered. Anesthetic plan and risks discussed with patient. Plan discussed with CRNA.                   Andres Munson DO   11/10/2020

## 2020-11-11 NOTE — PROGRESS NOTES
[address] [place] [address] [facility]  Progress Note      Chief Complaint   Patient presents with    Fever     102.6 at home    Cough    Wound Check     open wound left foot       SUBJECTIVE:      Patient resting quietly present    Tolerated surgery well, no complications      Review of systems:    As stated above in the chief complaint, otherwise negative. Medications:    Scheduled Meds:   atorvastatin  5 mg Oral Nightly    insulin glargine  15 Units Subcutaneous Nightly    metFORMIN  500 mg Oral Daily with breakfast    insulin lispro  0-18 Units Subcutaneous TID WC    insulin lispro  0-9 Units Subcutaneous Nightly    piperacillin-tazobactam  3.375 g Intravenous Q8H    daptomycin (CUBICIN) IVPB  600 mg Intravenous Q24H     Continuous Infusions:   dexmedetomidine (PRECEDEX) IV infusion Stopped (20 8840)    sodium chloride Stopped (20 0664)     PRN Meds:acetaminophen  Prior to Admission medications    Medication Sig Start Date End Date Taking? Authorizing Provider   insulin glargine (LANTUS) 100 UNIT/ML injection vial Inject 15 Units into the skin nightly   Yes Historical Provider, MD   atorvastatin (LIPITOR) 10 MG tablet Take 5 mg by mouth nightly    Yes Historical Provider, MD   metFORMIN (GLUCOPHAGE) 500 MG tablet Take 500 mg by mouth daily (with breakfast)   Yes Historical Provider, MD       OBJECTIVE:  /79   Pulse 74   Temp 96 °F (35.6 °C) (Axillary)   Resp 18   Ht 6' 3\" (1.905 m)   Wt 280 lb (127 kg)   SpO2 94%   BMI 35.00 kg/m²   Temp  Av.2 °F (36.8 °C)  Min: 96 °F (35.6 °C)  Max: 102.9 °F (39.4 °C)  General appearance: Resting in bed in no apparent distress. Skin: Warm and dry. No rashes were noted. HEENT: Round and reactive pupils. Moist mucous membranes. No ulcerations or thrush. Neck: Supple to movements. Chest: No use of accessory muscles to breathe. Symmetrical expansion. No wheezing, crackles or rhonchi.   Cardiovascular: Reguar rate and rhythm. No murmurs gallops, or rubs appreciated. Abdomen: Bowel sounds present, nontender, nondistended, no masses or hepatosplenomegaly. Extremities: Dressing left foot with wound VAC in place. Lines: peripheral    No intake/output data recorded. Laboratory and Tests Review:      Lab Results   Component Value Date    WBC 14.7 (H) 11/10/2020    WBC 11.5 10/21/2020    WBC 7.1 10/17/2020    HGB 12.0 (L) 11/10/2020    HCT 37.6 11/10/2020    MCV 79.7 (L) 11/10/2020     11/10/2020     Lab Results   Component Value Date    NEUTROABS 11.29 (H) 11/10/2020    NEUTROABS 7.95 (H) 10/21/2020    NEUTROABS 4.73 10/17/2020     No results found for: [facility]  Lab Results   Component Value Date    ALT <5 11/10/2020    AST 9 11/10/2020    ALKPHOS 91 11/10/2020    BILITOT 1.3 (H) 11/10/2020     Lab Results   Component Value Date     11/10/2020    K 4.0 11/10/2020    CL 97 11/10/2020    CO2 20 11/10/2020    BUN 16 11/10/2020    CREATININE 1.3 11/10/2020    CREATININE 1.2 10/21/2020    CREATININE 1.0 10/17/2020    GFRAA >60 11/10/2020    LABGLOM 59 11/10/2020    GLUCOSE 193 11/10/2020    PROT 9.1 11/10/2020    LABALBU 3.7 11/10/2020    CALCIUM 9.3 11/10/2020    BILITOT 1.3 11/10/2020    ALKPHOS 91 11/10/2020    AST 9 11/10/2020    ALT <5 11/10/2020     Lab Results   Component Value Date    CRP 28.9 (H) 11/10/2020    CRP 10.8 (H) 10/16/2020     Lab Results   Component Value Date    SEDRATE 79 (H) 11/10/2020    SEDRATE 78 (H) 10/16/2020       Radiology:    MRI ANKLE LEFT WO CONTRAST   Final Result   Large area of ulceration with suspected underlying osteomyelitis of anterior   portions of both the talus and calcaneus. Severe destructive changes within the midfoot may be related to chronic   osteomyelitis, neuropathic joint, or a combination. XR ANKLE RIGHT (MIN 3 VIEWS)   Final Result   No acute fractures or osseous destructive process noted. Osseous structures   are in alignment.          VL LOWER after 1 week/s of incubation. No growth after 2 week/s of incubation. AFBCX  10/17/2020 0759     No growth after 1 week/s of incubation. No growth after 2 week/s of incubation. FUNGSM No Fungal elements seen 10/17/2020 0801    LABFUNG  10/17/2020 0801     No growth after 1 week/s of incubation. No growth after 2 week/s of incubation. LABFUNG  10/17/2020 0759     No growth after 1 week/s of incubation. No growth after 2 week/s of incubation. No results found for: CULTRESP  No results found for: CXCATHTIP  No results found for: BFCS  Culture Surgical   Date Value Ref Range Status   10/17/2020 Rare growth  1st morphology    Final   10/17/2020 Rare growth  2nd morphology    Final   10/17/2020   Final    Rare growth  This isolate is presumed to be resistant based on the  detection of inducible Clindamycin resistance. Clindamycin  may still be effective in some patients. 10/17/2020 Rare growth  Final   10/17/2020   Final    Light growth  After multiple attempts on the [employer], the ID and /or  sensitivity was not attainable. The organism was sent out  for further testing. Please refer to MISS1.     10/17/2020 Light growth  Final     No results found for: LABURIN  No results found for: [facility]    Problem list:    Principal Problem:    Diabetic foot infection ([facility] [address] [address]  Active Problems:    Charcot foot due to diabetes mellitus ([facility] [address]    Diabetic neuropathy associated with diabetes mellitus due to underlying condition [facility])    Essential hypertension  Resolved Problems:    * No resolved hospital problems.  *      ASSESSMENT:       · Recurrent left diabetic foot infection with osteomyelitis  · Staph aureus bacteremia, high-grade  · Medical noncompliance  · Type 2 diabetes mellitus  · Charcot arthropathy  · Essential hypertension       PLAN:       · Continue daptomycin  · Continue Zosyn  · Check repeat wound cultures  · Await final blood cultures  · Baseline ESR, CRP noted  · Monitor labs  · Will follow with you         Katie Owens    11:46 AM  11/11/2020

## 2020-11-11 NOTE — OP NOTE
[address] [address]                                OPERATIVE REPORT    PATIENT NAME: Emmett Patel                  :        1975  MED REC NO:   [id number]                            ROOM:       0533  ACCOUNT NO:   [de-identified]                           ADMIT DATE: 11/10/2020  PROVIDER:     Rashida Vaca DPM    DATE OF PROCEDURE:  2020    ASSISTANT:  Kaylah Pisano, PGY-2.    PREOPERATIVE DIAGNOSES:  1. Cellulitis with abscess, left foot. 2.  Acute osteomyelitis, left foot. 3.  Ulceration, left foot, with necrosis of muscle. 4.  Insulin-dependent diabetes mellitus with neuropathy. 5.  Joint sublexation. POSTOPERATIVE DIAGNOSES:  1. Cellulitis with abscess, left foot. 2.  Acute osteomyelitis, left foot. 3.  Ulceration, left foot, with necrosis of muscle. 4.  Insulin-dependent diabetes mellitus with neuropathy. 5.  Joint sublexation. PROCEDURE PERFORMED:  1. Left calcaneus bone incision and drainage. 2.  Excisional wound debridement to and through level of the muscle. Total measurement was 7.2 x 3.0 x 2 cm in dimension. 3.  Application of wound VAC negative pressure therapy. ANESTHESIA:  Monitored anesthesia care. ESTIMATED BLOOD LOSS:  Minimal.    HEMOSTASIS:  On the field. COMPLICATIONS:  None. SPECIMEN OBTAINED:  Left calcaneus bone and left foot abscess. INTRAOPERATIVE FINDINGS:  Necrosis of soft tissues and bones. INDICATION:  This is a 79-year-old male who presented through the  emergency department with diabetic left foot infection. The patient  apparently has had previous intervention within the past month by  another provider, but has had issues with followup postoperatively, thus  presented again for a new recurrence of infection. The patient had  preoperative MRI confirming areas of large ulcerations and suspected  osteomyelitis in the talus and calcaneus.   Did  the patient on  the nature of the problem, proposed course of procedure, potential  benefits, risks, complications, and convalescence in detail. All  questions have been answered to his apparent satisfaction. No  guarantees given as to the outcome of procedure. DESCRIPTION OF PROCEDURE:  Under mild sedation, the patient was brought  to the operating room and placed on the operating table in supine  position. The left lower extremity was scrubbed, prepped, and draped in  usual sterile fashion. At this time, using a #10 blade, performed a  sharp excisional debridement of the wound to and through the level of  the muscle. All necrosed tissues were _____. Next, using rongeur, I  was able to incise through the cortex of the inferior calcaneus exposing  the seropurulent drainage, which was cultured. Bone was also sent out  for pathological and microbiologic examination. Post debridement of the  bone, I then irrigated the areas with pulse lavage system. Two liters  of normal sterile saline was used. Post irrigation, I then applied the  wound VAC negative pressure therapy set at 125 mmHg, continuous. Postoperative bandage was then applied. The patient tolerated the  procedure and anesthesia well in apparent satisfactory condition with  vital signs stable and vascular status intact to the left lower  extremity. The patient was transferred to PACU for further monitoring  prior to readmission to the nursing floor. It is my opinion that the  patient would likely need further debridement going forward. Did  explain to him likelihood of needing a more proximal amputation such as  _____ amputation of the ankle or even possibly below the knee  amputation. We will continue to monitor this patient closely while he  is in house. In the meantime, continue intravenous antibiotics per  Infectious Disease's recommendations.   Also follow the patient's  noninvasive arterial studies, which were performed yesterday.         Cassie Mcdermott DPM    D: 11/11/2020 7:49:20       T: 11/11/2020 11:09:40     [email or web address]  Job#: [id number]     Doc#: [id number]    CC:

## 2020-11-11 NOTE — CARE COORDINATION
Attempted to meet with patient bedside, however, still somnolent post operatively, unable to arouse for conversation. Call placed to wife via phone, left VM with request for return call. Patient is noted to have wound vac applied today intra-operatively. He is also on two IV antibiotics, one of which is q 8 hrs d/t dx of positive blood cultures and acute osteomyelitis. [facility] has screened patient and has documented they will be submitting Medicaid application. Patient also to be eligible for assist with meds at discharge.  has reached out to two local snf's ([facility] and [facility] who will review for potential admission with a pending Medicaid status if patient agreeable to SNF stay post discharge. Spoke with [facility] at [facility] - wound vac provider. An indigent program does exist for wound vac therapy at home, however, a formal application will need completed to assess if patient qualifies. Will complete if indeed plan is home. Await return call from wife. Will follow along with  and assist with discharge planning as necessary. Jazmin Haynes.  Ozzy, MSN, RN  [facility] Case Management  [phone]

## 2020-11-12 LAB
ANION GAP SERPL CALCULATED.3IONS-SCNC: 12 MMOL/L (ref 7–16)
BASOPHILS ABSOLUTE: 0.02 E9/L (ref 0–0.2)
BASOPHILS RELATIVE PERCENT: 0.1 % (ref 0–2)
BUN BLDV-MCNC: 20 MG/DL (ref 6–20)
CALCIUM SERPL-MCNC: 9 MG/DL (ref 8.6–10.2)
CHLORIDE BLD-SCNC: 101 MMOL/L (ref 98–107)
CO2: 22 MMOL/L (ref 22–29)
CREAT SERPL-MCNC: 1 MG/DL (ref 0.7–1.2)
EOSINOPHILS ABSOLUTE: 0 E9/L (ref 0.05–0.5)
EOSINOPHILS RELATIVE PERCENT: 0 % (ref 0–6)
GFR AFRICAN AMERICAN: >60
GFR NON-AFRICAN AMERICAN: >60 ML/MIN/1.73
GLUCOSE BLD-MCNC: 229 MG/DL (ref 74–99)
GRAM STAIN ORDERABLE: NORMAL
GRAM STAIN ORDERABLE: NORMAL
HCT VFR BLD CALC: 37.7 % (ref 37–54)
HEMOGLOBIN: 11.7 G/DL (ref 12.5–16.5)
IMMATURE GRANULOCYTES #: 0.15 E9/L
IMMATURE GRANULOCYTES %: 0.9 % (ref 0–5)
LYMPHOCYTES ABSOLUTE: 0.88 E9/L (ref 1.5–4)
LYMPHOCYTES RELATIVE PERCENT: 5.6 % (ref 20–42)
MCH RBC QN AUTO: 24.9 PG (ref 26–35)
MCHC RBC AUTO-ENTMCNC: 31 % (ref 32–34.5)
MCV RBC AUTO: 80.4 FL (ref 80–99.9)
METER GLUCOSE: 200 MG/DL (ref 74–99)
METER GLUCOSE: 208 MG/DL (ref 74–99)
METER GLUCOSE: 234 MG/DL (ref 74–99)
METER GLUCOSE: 235 MG/DL (ref 74–99)
METER GLUCOSE: 252 MG/DL (ref 74–99)
METER GLUCOSE: 299 MG/DL (ref 74–99)
MONOCYTES ABSOLUTE: 0.51 E9/L (ref 0.1–0.95)
MONOCYTES RELATIVE PERCENT: 3.2 % (ref 2–12)
NEUTROPHILS ABSOLUTE: 14.23 E9/L (ref 1.8–7.3)
NEUTROPHILS RELATIVE PERCENT: 90.2 % (ref 43–80)
ORGANISM: ABNORMAL
PDW BLD-RTO: 16.4 FL (ref 11.5–15)
PLATELET # BLD: 473 E9/L (ref 130–450)
PMV BLD AUTO: 9.8 FL (ref 7–12)
POTASSIUM SERPL-SCNC: 3.9 MMOL/L (ref 3.5–5)
RBC # BLD: 4.69 E12/L (ref 3.8–5.8)
SODIUM BLD-SCNC: 135 MMOL/L (ref 132–146)
WBC # BLD: 15.8 E9/L (ref 4.5–11.5)
WOUND/ABSCESS: ABNORMAL
WOUND/ABSCESS: ABNORMAL

## 2020-11-12 PROCEDURE — 1200000000 HC SEMI PRIVATE

## 2020-11-12 PROCEDURE — 36415 COLL VENOUS BLD VENIPUNCTURE: CPT

## 2020-11-12 PROCEDURE — 6370000000 HC RX 637 (ALT 250 FOR IP): Performed by: INTERNAL MEDICINE

## 2020-11-12 PROCEDURE — 6360000002 HC RX W HCPCS: Performed by: PODIATRIST

## 2020-11-12 PROCEDURE — 6370000000 HC RX 637 (ALT 250 FOR IP): Performed by: PODIATRIST

## 2020-11-12 PROCEDURE — 99233 SBSQ HOSP IP/OBS HIGH 50: CPT | Performed by: INTERNAL MEDICINE

## 2020-11-12 PROCEDURE — 2580000003 HC RX 258

## 2020-11-12 PROCEDURE — 2580000003 HC RX 258: Performed by: PODIATRIST

## 2020-11-12 PROCEDURE — 82962 GLUCOSE BLOOD TEST: CPT

## 2020-11-12 PROCEDURE — 99254 IP/OBS CNSLTJ NEW/EST MOD 60: CPT | Performed by: INTERNAL MEDICINE

## 2020-11-12 PROCEDURE — 85025 COMPLETE CBC W/AUTO DIFF WBC: CPT

## 2020-11-12 PROCEDURE — 80048 BASIC METABOLIC PNL TOTAL CA: CPT

## 2020-11-12 RX ORDER — DEXTROSE MONOHYDRATE 50 MG/ML
100 INJECTION, SOLUTION INTRAVENOUS PRN
Status: DISCONTINUED | OUTPATIENT
Start: 2020-11-12 | End: 2020-11-19 | Stop reason: HOSPADM

## 2020-11-12 RX ORDER — SODIUM CHLORIDE 0.9 % (FLUSH) 0.9 %
SYRINGE (ML) INJECTION
Status: COMPLETED
Start: 2020-11-12 | End: 2020-11-12

## 2020-11-12 RX ORDER — INSULIN GLARGINE 100 [IU]/ML
20 INJECTION, SOLUTION SUBCUTANEOUS NIGHTLY
Status: DISCONTINUED | OUTPATIENT
Start: 2020-11-12 | End: 2020-11-12

## 2020-11-12 RX ORDER — DEXTROSE MONOHYDRATE 25 G/50ML
12.5 INJECTION, SOLUTION INTRAVENOUS PRN
Status: DISCONTINUED | OUTPATIENT
Start: 2020-11-12 | End: 2020-11-19 | Stop reason: HOSPADM

## 2020-11-12 RX ORDER — NICOTINE POLACRILEX 4 MG
15 LOZENGE BUCCAL PRN
Status: DISCONTINUED | OUTPATIENT
Start: 2020-11-12 | End: 2020-11-19 | Stop reason: HOSPADM

## 2020-11-12 RX ORDER — INSULIN GLARGINE 100 [IU]/ML
24 INJECTION, SOLUTION SUBCUTANEOUS NIGHTLY
Status: DISCONTINUED | OUTPATIENT
Start: 2020-11-12 | End: 2020-11-14

## 2020-11-12 RX ORDER — SODIUM CHLORIDE 0.9 % (FLUSH) 0.9 %
10 SYRINGE (ML) INJECTION 2 TIMES DAILY
Status: DISCONTINUED | OUTPATIENT
Start: 2020-11-12 | End: 2020-11-19 | Stop reason: HOSPADM

## 2020-11-12 RX ADMIN — SODIUM CHLORIDE, PRESERVATIVE FREE 10 ML: 5 INJECTION INTRAVENOUS at 21:38

## 2020-11-12 RX ADMIN — SODIUM CHLORIDE: 9 INJECTION, SOLUTION INTRAVENOUS at 02:11

## 2020-11-12 RX ADMIN — INSULIN GLARGINE 24 UNITS: 100 INJECTION, SOLUTION SUBCUTANEOUS at 21:33

## 2020-11-12 RX ADMIN — INSULIN LISPRO 6 UNITS: 100 INJECTION, SOLUTION INTRAVENOUS; SUBCUTANEOUS at 08:26

## 2020-11-12 RX ADMIN — INSULIN LISPRO 5 UNITS: 100 INJECTION, SOLUTION INTRAVENOUS; SUBCUTANEOUS at 02:10

## 2020-11-12 RX ADMIN — PIPERACILLIN SODIUM AND TAZOBACTAM SODIUM 3.38 G: 3; .375 INJECTION, POWDER, LYOPHILIZED, FOR SOLUTION INTRAVENOUS at 14:13

## 2020-11-12 RX ADMIN — INSULIN LISPRO 6 UNITS: 100 INJECTION, SOLUTION INTRAVENOUS; SUBCUTANEOUS at 11:57

## 2020-11-12 RX ADMIN — SODIUM CHLORIDE 600 MG: 9 INJECTION, SOLUTION INTRAVENOUS at 05:04

## 2020-11-12 RX ADMIN — PIPERACILLIN SODIUM AND TAZOBACTAM SODIUM 3.38 G: 3; .375 INJECTION, POWDER, LYOPHILIZED, FOR SOLUTION INTRAVENOUS at 06:09

## 2020-11-12 RX ADMIN — INSULIN LISPRO 3 UNITS: 100 INJECTION, SOLUTION INTRAVENOUS; SUBCUTANEOUS at 21:32

## 2020-11-12 RX ADMIN — PIPERACILLIN SODIUM AND TAZOBACTAM SODIUM 3.38 G: 3; .375 INJECTION, POWDER, LYOPHILIZED, FOR SOLUTION INTRAVENOUS at 21:31

## 2020-11-12 RX ADMIN — ATORVASTATIN CALCIUM 5 MG: 10 TABLET, FILM COATED ORAL at 21:31

## 2020-11-12 RX ADMIN — INSULIN LISPRO 3 UNITS: 100 INJECTION, SOLUTION INTRAVENOUS; SUBCUTANEOUS at 16:42

## 2020-11-12 RX ADMIN — SODIUM CHLORIDE: 9 INJECTION, SOLUTION INTRAVENOUS at 11:08

## 2020-11-12 RX ADMIN — METFORMIN HYDROCHLORIDE 500 MG: 500 TABLET ORAL at 08:25

## 2020-11-12 RX ADMIN — INSULIN LISPRO 5 UNITS: 100 INJECTION, SOLUTION INTRAVENOUS; SUBCUTANEOUS at 16:44

## 2020-11-12 ASSESSMENT — PAIN SCALES - GENERAL
PAINLEVEL_OUTOF10: 0

## 2020-11-12 NOTE — CARE COORDINATION
Social Work / Discharge Planning : SW followed up with patient in regards to discharge plans. Patient strongly leaning for foot amputation. He feels he can heal quicker and get back to actually living his life with his family. Patient stated he has been dealing with this issue for two years and it has worn on him not only physically but mentally. His father was a child diabetic and also had multiple amputations. Patient stated he is familiar with what is involved due to being HIS main caregiver. Patient recently moved his family to [place] from [place] due to new job. Unfortunately, patient was employed for three days orientation and one day employment when he had to be hospitalized. Patient is agreeable to SNF at discharge. HOWEVER: Acute rehab discussed and he was VERY receptive IF option. SW called referral to [facility] and pending amputation , therapy input and availability, would see if option. Patient, at end of SW lengthy visit, stated he wants to proceed with foot amputation. AWait patient final decision with podiatrist. Support provided. SW to follow.  Electronically signed by CLIVE Hurst on 11/12/20 at 3:04 PM EST

## 2020-11-12 NOTE — PROGRESS NOTES
[facility] Progress Note    Admitting Date and Time: 11/10/2020 12:53 AM  Admit Dx: Acute osteomyelitis of left foot ([address] [M86.172]  Acute osteomyelitis of left foot ([address] [M86.172]    Subjective:  Patient is being followed for Acute osteomyelitis of left foot ([address] [M86.172]  Acute osteomyelitis of left foot ([address] [M86.172]   Pt feels ok s/p I&D by podiatry 11/11/2020      ROS: denies fever, chills, cp, sob, n/v, HA unless stated above.       atorvastatin  5 mg Oral Nightly    insulin glargine  15 Units Subcutaneous Nightly    metFORMIN  500 mg Oral Daily with breakfast    insulin lispro  0-18 Units Subcutaneous TID WC    insulin lispro  0-9 Units Subcutaneous Nightly    piperacillin-tazobactam  3.375 g Intravenous Q8H    daptomycin (CUBICIN) IVPB  600 mg Intravenous Q24H     acetaminophen, 650 mg, Q6H PRN         Objective:    /76   Pulse 73   Temp 97.4 °F (36.3 °C) (Oral)   Resp 18   Ht 6' 3\" (1.905 m)   Wt 280 lb (127 kg)   SpO2 94%   BMI 35.00 kg/m²     General Appearance: alert and oriented to person, place and time and in no acute distress  Skin: warm and dry  Head: normocephalic and atraumatic  Eyes: pupils equal, round, and reactive to light, extraocular eye movements intact, conjunctivae normal  Neck: neck supple and non tender without mass   Pulmonary/Chest: clear to auscultation bilaterally- no wheezes, rales or rhonchi, normal air movement, no respiratory distress  Cardiovascular: normal rate, normal S1 and S2 and no carotid bruits  Abdomen: soft, non-tender, non-distended, normal bowel sounds, no masses or organomegaly  Extremities: no cyanosis, no clubbing and left foot with dressing and wound vac  Neurologic: no cranial nerve deficit and speech normal        Recent Labs     11/10/20  0128 11/11/20  1228   * 135   K 4.0 4.2   CL 97* 102   CO2 20* 21*   BUN 16 15   CREATININE 1.3* 1.0   GLUCOSE 193* 253*   CALCIUM 9.3 8.8       Recent Labs

## 2020-11-12 NOTE — CARE COORDINATION
Wound vac form faxed to office of Dr. Lonnie Quispe at [phone] after calling and establishing he is in office today. Will await return fax and then send all documents to [facility]. Patient has completed wound vac financial aid application. It will be included in fax to [facility]. Patient is still in process of completing financial aid application for [facility]. Will fax to agency once patient does complete. Spoke with ID and PCP, final atb regimen still undetermined but will include IV anti-infective. Back up dc plan remains snf  ([facility] or [place]) as medicaid pending patient. Patient strongly desires return to home if able. Will follow along with  and assist with discharge planning as necessary. Lakeisha Ott.  Ozzy, MSN, RN  [facility] Case Management  [phone]

## 2020-11-12 NOTE — CONSULTS
ENDOCRINOLOGY INITIAL CONSULTATION NOTE      Date of admission: 11/10/2020  Date of service: 11/12/2020  Admitting physician: Xavier Gupta MD   Primary Care Physician: No primary care provider on file. Consultant physician: Ernestina Fernandez MD     Reason for the consultation:  DM type 2, infected diabetic foot     History of Present Illness: The history is provided by the patient. Accuracy of the patient data is excellent    Amy Mata is a very pleasant 54 y.o. old male with PMH listed below admitted to [address] [place] [address] on 11/10/2020 LT diabetic foot infection with osteomyelitis, endocrine team was consulted for diabetes management. Pt underwent incision and drainage with bone debridement and biopsy, application of wound vac on 11/11/2020   Currently on Zosyn and Daptomycin     Prior to admission  The patient was diagnosed with type 2  DM at the age many years ago. Prior to admission patient was on Lantus 15 units daily at night, Metformin 500 mg BID. Patient has had no hypoglycemic episodes. Patient has been eating consistent carbohydrate meals, self-blood glucose monitoring has been at goal prior to admission. Pt's diabetes complicaed with neuropathy and diabetic foot. Has been up to date with yearly diabetic eye exam and denied any h/o retinopathy   Lab Results   Component Value Date    LABA1C 7.3 10/16/2020       After admission   While hospitalized, anti-diabetic regiment includes Lantus 15 units daily, ss. Point of care glucose monitoring was reviewed and has been above goal. Appetite is good.      Inpatient diet:   Carb Restricted diet     Point of care glucose monitoring (Independently reviewed)   Recent Labs     11/11/20  1127 11/11/20  1622 11/11/20  2131 11/11/20  2354 11/12/20  0208 11/12/20  0613 11/12/20  1125 11/12/20  1603   GLUMET 196* 265* 311* 299* 252* 235* 208* 200*       Past medical history:   Past Medical History:   Diagnosis Date    Charcot foot due to diabetes mellitus ([address]     Diabetes mellitus [facility]     Diabetic foot ulcer with osteomyelitis ([facility] 11/11/2020    Hypertension      Past surgical history:  Past Surgical History:   Procedure Laterality Date    ANKLE FUSION  02/2020    FOOT DEBRIDEMENT Left 10/17/2020    LEFT FOOT  INCISION AND DRAINAGE, BONE DEBRIDEMENT AND BIOPSY performed by Wood Avery DPM at [address] [place] [address] Left 11/11/2020    LEFT FOOT INCISION AND DRAINAGE APPLICATION OF WOUND VAC POSSIBLE INTEGRA GRAFT performed by Yaya Castrejon DPM at [facility] OR       Social history:   Tobacco:   reports that he has never smoked. He has never used smokeless tobacco.  Alcohol:   reports previous alcohol use. Drugs:   reports no history of drug use. Family history:    Family History   Problem Relation Age of Onset    Diabetes Father     Alcohol Abuse Father        Allergy and drug reactions: Allergies   Allergen Reactions    Vancomycin Other (See Comments)     Red man syndrome    Semaglutide Itching and Rash     Pink itch rash         Scheduled Meds:   insulin glargine  20 Units Subcutaneous Nightly    insulin lispro  5 Units Subcutaneous TID WC    atorvastatin  5 mg Oral Nightly    metFORMIN  500 mg Oral Daily with breakfast    insulin lispro  0-18 Units Subcutaneous TID WC    insulin lispro  0-9 Units Subcutaneous Nightly    piperacillin-tazobactam  3.375 g Intravenous Q8H    daptomycin (CUBICIN) IVPB  600 mg Intravenous Q24H     PRN Meds:   glucose, 15 g, PRN  dextrose, 12.5 g, PRN  glucagon (rDNA), 1 mg, PRN  dextrose, 100 mL/hr, PRN  acetaminophen, 650 mg, Q6H PRN      Continuous Infusions:   dextrose      dexmedetomidine (PRECEDEX) IV infusion Stopped (11/11/20 5297)    sodium chloride Stopped (11/12/20 2223)       Review of Systems  All systems reviewed.  All negative except for symptoms mentioned in HPI     OBJECTIVE    /72   Pulse 76   Temp 97.5 °F (36.4 °C) (Oral)   Resp 17   Ht 6' 3\" (1.905 m)   Wt 280 lb (127 kg)   SpO2 98% BMI 35.00 kg/m²     Intake/Output Summary (Last 24 hours) at 11/12/2020 1828  Last data filed at 11/12/2020 1531  Gross per 24 hour   Intake 360 ml   Output 2500 ml   Net -2140 ml       Physical examination:  General: awake alert, oriented x3, no abnormal position or movements. HEENT: normocephalic non-traumatic, no exophthalmos   Neck: supple, no LN enlargement, no thyromegaly, no thyroid tenderness, no JVD. Pulm: Clear equal air entry no added sounds, no wheezing or rhonchi     CVS: S1 + S2, no murmur, no heave  Abd: soft lax, no tenderness, no organomegaly, audible bowel sounds. Dressing on Lt foot   Skin: warm, no lesions, no rash.     Feet: diabetic Charcot's feet deformity bilaterally with decreased sensation to light touch  Neuro: CN intact, muscle power normal  Psych: normal mood, and affect    Review of Laboratory Data:  I personally reviewed the following labs:   Recent Labs     11/10/20  0128 11/11/20  1228 11/12/20  1125   WBC 14.7* 13.3* 15.8*   RBC 4.72 4.86 4.69   HGB 12.0* 12.1* 11.7*   HCT 37.6 39.3 37.7   MCV 79.7* 80.9 80.4   MCH 25.4* 24.9* 24.9*   MCHC 31.9* 30.8* 31.0*   RDW 16.0* 16.4* 16.4*    360 473*   MPV 9.8 10.1 9.8     Recent Labs     11/10/20  0128 11/11/20  1228 11/12/20  1125   * 135 135   K 4.0 4.2 3.9   CL 97* 102 101   CO2 20* 21* 22   BUN 16 15 20   CREATININE 1.3* 1.0 1.0   GLUCOSE 193* 253* 229*   CALCIUM 9.3 8.8 9.0   PROT 9.1*  --   --    LABALBU 3.7  --   --    BILITOT 1.3*  --   --    ALKPHOS 91  --   --    AST 9  --   --    ALT <5  --   --      No results found for: BHYDRXBUT  Lab Results   Component Value Date    LABA1C 7.3 10/16/2020     No results found for: TSH, T4FREE, Z1STYEY, FT3, K9GEQRK, TSI, TPOABS, THGAB  Lab Results   Component Value Date    LABA1C 7.3 10/16/2020    GLUCOSE 229 11/12/2020     No results found for: TRIG, HDL, LDLCALC, CHOL    Blood culture   Lab Results   Component Value Date    [facility]  11/10/2020     Gram stain performed from blood culture bottle media  Gram positive cocci in clusters      BC Sensitivity to follow 11/10/2020       Radiology:  MRI ANKLE LEFT WO CONTRAST   Final Result   Large area of ulceration with suspected underlying osteomyelitis of anterior   portions of both the talus and calcaneus. Severe destructive changes within the midfoot may be related to chronic   osteomyelitis, neuropathic joint, or a combination. XR ANKLE RIGHT (MIN 3 VIEWS)   Final Result   No acute fractures or osseous destructive process noted. Osseous structures   are in alignment. VL LOWER EXTREMITY ARTERIAL SEGMENTAL PRESSURES W PPG   Final Result   No evidence of peripheral arterial disease. XR CHEST PORTABLE   Final Result   No acute abnormality. XR FOOT LEFT (MIN 3 VIEWS)   Final Result   Ongoing plantar and lateral midfoot ulcer with underlying acute (presumably   on chronic) osteomyelitis superimposed on neuropathic foot. Medical Records/Labs/Images review:   I personally reviewed and summarized previous records   All labs and imaging were reviewed independently     [facility], a 54 y.o.-old male seen today for inpatient diabetes management     Diabetes Mellitus type 2    · Complicated with neuropathy, Charcot's feet deformity bilaterally and admitted with Lt diabetic foot infection   · For now, will change diabetes regimen to:  · Lantus 24 units daily at night  · Humalog 5 units with meals   · High dose sliding scale   · Continue glucose check with meals and at bedtime   · Will titrate insulin dose based on the blood glucose trend & insulin requirement  · Pt interested in following with us few weeks after discharge.  Endocrine follow up visit Monday 12/7 at 9:00AM     Recurrent Lt diabetic foot infection  · osteomyelitis-mixed gram-positive gram-negative organisms and Staph aureus bacteremia  · On Vanco and Daptomycin  · Managed by primary, Podiatry and ID team     The above issues were reviewed with the patient who understood and agreed with the plan. Thank you for allowing us to participate in the care of this patient. Please do not hesitate to contact us with any additional questions. Haile High MD  Endocrinologist, [facility] - [facility] Diabetes Care and Endocrinology   [address] [facility] [address]   Phone: [phone]  Fax: [phone]  --------------------------------------------  An electronic signature was used to authenticate this note.  Davion Alvarez MD on 11/12/2020 at 6:28 PM

## 2020-11-12 NOTE — PROGRESS NOTES
Called Dr. Jalen Veliz regarding increase in blood glucose, order to recheck glucose at 0200, and give coverage. Will continue to monitor.

## 2020-11-12 NOTE — PROGRESS NOTES
[address] [place] [address] [facility]  Progress Note      Chief Complaint   Patient presents with    Fever     102.6 at home    Cough    Wound Check     open wound left foot       SUBJECTIVE:      Patient is tolerating medications. No reported adverse drug reactions. No problems overnight. Expected postoperative leg discomfort. Review of systems:    As stated above in the chief complaint, otherwise negative. Medications:    Scheduled Meds:   insulin glargine  20 Units Subcutaneous Nightly    atorvastatin  5 mg Oral Nightly    metFORMIN  500 mg Oral Daily with breakfast    insulin lispro  0-18 Units Subcutaneous TID WC    insulin lispro  0-9 Units Subcutaneous Nightly    piperacillin-tazobactam  3.375 g Intravenous Q8H    daptomycin (CUBICIN) IVPB  600 mg Intravenous Q24H     Continuous Infusions:   dextrose      dexmedetomidine (PRECEDEX) IV infusion Stopped (20 0743)    sodium chloride 12.5 mL/hr at 20 1108     PRN Meds:glucose, dextrose, glucagon (rDNA), dextrose, acetaminophen  Prior to Admission medications    Medication Sig Start Date End Date Taking? Authorizing Provider   insulin glargine (LANTUS) 100 UNIT/ML injection vial Inject 15 Units into the skin nightly   Yes Historical Provider, MD   atorvastatin (LIPITOR) 10 MG tablet Take 5 mg by mouth nightly    Yes Historical Provider, MD   metFORMIN (GLUCOPHAGE) 500 MG tablet Take 500 mg by mouth daily (with breakfast)   Yes Historical Provider, MD       OBJECTIVE:  /76   Pulse 73   Temp 97.4 °F (36.3 °C) (Oral)   Resp 18   Ht 6' 3\" (1.905 m)   Wt 280 lb (127 kg)   SpO2 94%   BMI 35.00 kg/m²   Temp  Av.6 °F (36.4 °C)  Min: 97.4 °F (36.3 °C)  Max: 97.9 °F (36.6 °C)  General appearance: Resting in bed in no apparent distress. Skin: Warm and dry. No rashes were noted. HEENT: Round and reactive pupils. Moist mucous membranes. No ulcerations or thrush. Neck: Supple to movements.    Chest: No use of accessory muscles to breathe. Symmetrical expansion. No wheezing, crackles or rhonchi. Cardiovascular: Reguar rate and rhythm. No murmurs gallops, or rubs appreciated. Abdomen: Bowel sounds present, nontender, nondistended, no masses or hepatosplenomegaly. Extremities: Dressing left leg clean and dry. Lines: peripheral    I/O last 3 completed shifts: In: 510 [P.O.:360; I.V.:150]  Out: 2130 [Urine:2100; Blood:30]      Laboratory and Tests Review:      Lab Results   Component Value Date    WBC 13.3 (H) 11/11/2020    WBC 14.7 (H) 11/10/2020    WBC 11.5 10/21/2020    HGB 12.1 (L) 11/11/2020    HCT 39.3 11/11/2020    MCV 80.9 11/11/2020     11/11/2020     Lab Results   Component Value Date    NEUTROABS 12.35 (H) 11/11/2020    NEUTROABS 11.29 (H) 11/10/2020    NEUTROABS 7.95 (H) 10/21/2020     No results found for: [facility]  Lab Results   Component Value Date    ALT <5 11/10/2020    AST 9 11/10/2020    ALKPHOS 91 11/10/2020    BILITOT 1.3 (H) 11/10/2020     Lab Results   Component Value Date     11/11/2020    K 4.2 11/11/2020     11/11/2020    CO2 21 11/11/2020    BUN 15 11/11/2020    CREATININE 1.0 11/11/2020    CREATININE 1.3 11/10/2020    CREATININE 1.2 10/21/2020    GFRAA >60 11/11/2020    LABGLOM >60 11/11/2020    GLUCOSE 253 11/11/2020    PROT 9.1 11/10/2020    LABALBU 3.7 11/10/2020    CALCIUM 8.8 11/11/2020    BILITOT 1.3 11/10/2020    ALKPHOS 91 11/10/2020    AST 9 11/10/2020    ALT <5 11/10/2020     Lab Results   Component Value Date    CRP 28.9 (H) 11/10/2020    CRP 10.8 (H) 10/16/2020     Lab Results   Component Value Date    SEDRATE 79 (H) 11/10/2020    SEDRATE 78 (H) 10/16/2020       Radiology:    MRI ANKLE LEFT WO CONTRAST   Final Result   Large area of ulceration with suspected underlying osteomyelitis of anterior   portions of both the talus and calcaneus.       Severe destructive changes within the midfoot may be related to chronic   osteomyelitis, neuropathic joint, or a Component Value Date/Time    AFBCX  10/17/2020 0801     No growth after 1 week/s of incubation. No growth after 2 week/s of incubation. AFBCX  10/17/2020 0759     No growth after 1 week/s of incubation. No growth after 2 week/s of incubation. FUNGSM No Fungal elements seen 10/17/2020 0801    LABFUNG  10/17/2020 0801     No growth after 1 week/s of incubation. No growth after 2 week/s of incubation. LABFUNG  10/17/2020 0759     No growth after 1 week/s of incubation. No growth after 2 week/s of incubation. No results found for: CULTRESP  No results found for: CXCATHTIP  No results found for: BFCS  Culture Surgical   Date Value Ref Range Status   11/11/2020   Preliminary    Rare growth  Identification and sensitivity to follow     11/11/2020 Light growth  Sensitivity to follow    Preliminary   11/11/2020 Moderate growth  Sensitivity to follow    Preliminary       Problem list:    Principal Problem:    Diabetic foot infection ([address]  Active Problems:    Charcot foot due to diabetes mellitus ([address]    Diabetic neuropathy associated with diabetes mellitus due to underlying condition ([address]    Essential hypertension    Diabetic foot ulcer with osteomyelitis ([address]  Resolved Problems:    * No resolved hospital problems.  *      ASSESSMENT:    · Recurrent left diabetic foot infection with osteomyelitis-mixed gram-positive gram-negative organisms  · Staph aureus bacteremia likely due to osteomyelitis  · Type 2 diabetes mellitus, uncontrolled  · Charcot arthropathy  · Essential hypertension    PLAN:    · Continue daptomycin  · Continue Zosyn  · Await final wound cultures for sensitivities  · Await final blood culture results with sensitivities  · Will attempt to consolidate antibiotics for discharge once sensitivities available  · Will need PICC line  · Will need 6 weeks of IV antibiotic therapy on discharge  · Monitor labs  · Discussed with attending  · Will follow with you      Jennifer Lincoln DO,

## 2020-11-12 NOTE — PROGRESS NOTES
Foot and Ankle Surgery  Progress Note    History:  Patient seen bedside for L diabetic foot infection with osteomyelitis. Status post incision and drainage with bone debridement and biopsy, application of wound vac. Procedure done 11/11/2 by Dr. Martha Spivey. No acute events overnight. Patient denies any N/V/D/F/C/SOB/CP    No other complaints at this time. SOB:no  Fever: no  Chills: no  Chest Pain: no  Bowel Movement:  no  Urination: yes - this AM  Calf Pain: no    Objective    Past Medical History:   Diagnosis Date    [facility]     Diabetes mellitus ([facility]     Diabetic foot ulcer with osteomyelitis ([facility] 11/11/2020    Hypertension        Past Surgical History:   Procedure Laterality Date    ANKLE FUSION  02/2020    FOOT DEBRIDEMENT Left 10/17/2020    LEFT FOOT  INCISION AND DRAINAGE, BONE DEBRIDEMENT AND BIOPSY [facility]2020    LEFT FOOT INCISION AND DRAINAGE APPLICATION OF WOUND VAC POSSIBLE INTEGRA GRAFT performed by Clarita Corey DPM at [facility] OR       Family History   Problem Relation Age of Onset    Diabetes Father     Alcohol Abuse Father        Allergies   Allergen Reactions    Vancomycin Other (See Comments)     Red man syndrome    Semaglutide Itching and Rash     Pink itch rash           I reviewed the patient's past medical and surgical history, Medications and Allergies. Physical Exam:  Vitals:    11/11/20 1600 11/11/20 2115 11/12/20 0030 11/12/20 0815   BP:  123/79 118/80 129/76   Pulse: 78 81 66 73   Resp:  18 18 18   Temp:  97.6 °F (36.4 °C) 97.9 °F (36.6 °C) 97.4 °F (36.3 °C)   TempSrc:  Oral Oral Oral   SpO2: 98% 97% 96% 94%   Weight:       Height:           CONSTITUTIONAL:  awake, alert, cooperative, no apparent distress, and appears stated age    LLE dressing clean, dry and intact. Wound vac intact with good seal, minimal drainage o cannister. No calf pain B/L.   No pain on after DC. Patient initially wanted to go home after DC, but states it may be more appropriate to go to SNF if we proceed with amputation  -Patient discussed with Dr. Job Haile.       Job Haile DPM  Fellowship-Trained Foot and Ankle Surgeon  [phone]    Electronically signed by Noelle Lacy on 11/12/20 at 11:28 AM EST

## 2020-11-13 ENCOUNTER — ANESTHESIA EVENT (OUTPATIENT)
Dept: OPERATING ROOM | Age: 45
DRG: 854 | End: 2020-11-13

## 2020-11-13 LAB
ANAEROBIC CULTURE: NORMAL
ANAEROBIC CULTURE: NORMAL
ANION GAP SERPL CALCULATED.3IONS-SCNC: 12 MMOL/L (ref 7–16)
BASOPHILS ABSOLUTE: 0.02 E9/L (ref 0–0.2)
BASOPHILS RELATIVE PERCENT: 0.1 % (ref 0–2)
BLOOD CULTURE, ROUTINE: ABNORMAL
BUN BLDV-MCNC: 22 MG/DL (ref 6–20)
CALCIUM SERPL-MCNC: 8.8 MG/DL (ref 8.6–10.2)
CHLORIDE BLD-SCNC: 101 MMOL/L (ref 98–107)
CO2: 24 MMOL/L (ref 22–29)
CREAT SERPL-MCNC: 1.1 MG/DL (ref 0.7–1.2)
CULTURE SURGICAL: ABNORMAL
CULTURE, BLOOD 2: ABNORMAL
EOSINOPHILS ABSOLUTE: 0.06 E9/L (ref 0.05–0.5)
EOSINOPHILS RELATIVE PERCENT: 0.4 % (ref 0–6)
GFR AFRICAN AMERICAN: >60
GFR NON-AFRICAN AMERICAN: >60 ML/MIN/1.73
GLUCOSE BLD-MCNC: 119 MG/DL (ref 74–99)
HCT VFR BLD CALC: 40.4 % (ref 37–54)
HEMOGLOBIN: 12.1 G/DL (ref 12.5–16.5)
IMMATURE GRANULOCYTES #: 0.24 E9/L
IMMATURE GRANULOCYTES %: 1.5 % (ref 0–5)
INR BLD: 1.1
LYMPHOCYTES ABSOLUTE: 2.04 E9/L (ref 1.5–4)
LYMPHOCYTES RELATIVE PERCENT: 12.8 % (ref 20–42)
MCH RBC QN AUTO: 24.6 PG (ref 26–35)
MCHC RBC AUTO-ENTMCNC: 30 % (ref 32–34.5)
MCV RBC AUTO: 82.3 FL (ref 80–99.9)
METER GLUCOSE: 105 MG/DL (ref 74–99)
METER GLUCOSE: 119 MG/DL (ref 74–99)
METER GLUCOSE: 154 MG/DL (ref 74–99)
METER GLUCOSE: 164 MG/DL (ref 74–99)
MONOCYTES ABSOLUTE: 0.71 E9/L (ref 0.1–0.95)
MONOCYTES RELATIVE PERCENT: 4.4 % (ref 2–12)
NEUTROPHILS ABSOLUTE: 12.93 E9/L (ref 1.8–7.3)
NEUTROPHILS RELATIVE PERCENT: 80.8 % (ref 43–80)
ORGANISM: ABNORMAL
PDW BLD-RTO: 16.9 FL (ref 11.5–15)
PLATELET # BLD: 534 E9/L (ref 130–450)
PMV BLD AUTO: 11 FL (ref 7–12)
POTASSIUM SERPL-SCNC: 3.8 MMOL/L (ref 3.5–5)
PROTHROMBIN TIME: 13.4 SEC (ref 9.3–12.4)
RBC # BLD: 4.91 E12/L (ref 3.8–5.8)
SODIUM BLD-SCNC: 137 MMOL/L (ref 132–146)
WBC # BLD: 16 E9/L (ref 4.5–11.5)

## 2020-11-13 PROCEDURE — 6370000000 HC RX 637 (ALT 250 FOR IP): Performed by: INTERNAL MEDICINE

## 2020-11-13 PROCEDURE — 36415 COLL VENOUS BLD VENIPUNCTURE: CPT

## 2020-11-13 PROCEDURE — 99232 SBSQ HOSP IP/OBS MODERATE 35: CPT | Performed by: INTERNAL MEDICINE

## 2020-11-13 PROCEDURE — 2580000003 HC RX 258: Performed by: PODIATRIST

## 2020-11-13 PROCEDURE — 99233 SBSQ HOSP IP/OBS HIGH 50: CPT | Performed by: INTERNAL MEDICINE

## 2020-11-13 PROCEDURE — 6370000000 HC RX 637 (ALT 250 FOR IP): Performed by: PODIATRIST

## 2020-11-13 PROCEDURE — 85610 PROTHROMBIN TIME: CPT

## 2020-11-13 PROCEDURE — 1200000000 HC SEMI PRIVATE

## 2020-11-13 PROCEDURE — 2580000003 HC RX 258: Performed by: INTERNAL MEDICINE

## 2020-11-13 PROCEDURE — 82962 GLUCOSE BLOOD TEST: CPT

## 2020-11-13 PROCEDURE — 6360000002 HC RX W HCPCS: Performed by: PODIATRIST

## 2020-11-13 PROCEDURE — 80048 BASIC METABOLIC PNL TOTAL CA: CPT

## 2020-11-13 PROCEDURE — 85025 COMPLETE CBC W/AUTO DIFF WBC: CPT

## 2020-11-13 RX ADMIN — INSULIN LISPRO 3 UNITS: 100 INJECTION, SOLUTION INTRAVENOUS; SUBCUTANEOUS at 08:12

## 2020-11-13 RX ADMIN — INSULIN LISPRO 5 UNITS: 100 INJECTION, SOLUTION INTRAVENOUS; SUBCUTANEOUS at 08:13

## 2020-11-13 RX ADMIN — PIPERACILLIN SODIUM AND TAZOBACTAM SODIUM 3.38 G: 3; .375 INJECTION, POWDER, LYOPHILIZED, FOR SOLUTION INTRAVENOUS at 14:47

## 2020-11-13 RX ADMIN — METFORMIN HYDROCHLORIDE 500 MG: 500 TABLET ORAL at 08:12

## 2020-11-13 RX ADMIN — SODIUM CHLORIDE: 9 INJECTION, SOLUTION INTRAVENOUS at 01:34

## 2020-11-13 RX ADMIN — SODIUM CHLORIDE 600 MG: 9 INJECTION, SOLUTION INTRAVENOUS at 05:49

## 2020-11-13 RX ADMIN — INSULIN GLARGINE 24 UNITS: 100 INJECTION, SOLUTION SUBCUTANEOUS at 20:37

## 2020-11-13 RX ADMIN — INSULIN LISPRO 5 UNITS: 100 INJECTION, SOLUTION INTRAVENOUS; SUBCUTANEOUS at 11:32

## 2020-11-13 RX ADMIN — PIPERACILLIN SODIUM AND TAZOBACTAM SODIUM 3.38 G: 3; .375 INJECTION, POWDER, LYOPHILIZED, FOR SOLUTION INTRAVENOUS at 06:23

## 2020-11-13 RX ADMIN — ATORVASTATIN CALCIUM 5 MG: 10 TABLET, FILM COATED ORAL at 20:35

## 2020-11-13 RX ADMIN — SODIUM CHLORIDE: 9 INJECTION, SOLUTION INTRAVENOUS at 18:30

## 2020-11-13 RX ADMIN — SODIUM CHLORIDE, PRESERVATIVE FREE 10 ML: 5 INJECTION INTRAVENOUS at 20:36

## 2020-11-13 ASSESSMENT — PAIN SCALES - GENERAL
PAINLEVEL_OUTOF10: 0
PAINLEVEL_OUTOF10: 0

## 2020-11-13 NOTE — PROGRESS NOTES
ENDOCRINOLOGY INITIAL CONSULTATION NOTE      Date of admission: 11/10/2020  Date of service: 11/13/2020  Admitting physician: Ary Franklin MD   Primary Care Physician: No primary care provider on file. Consultant physician: Thanh Nixon MD     Reason for the consultation:  DM type 2, infected diabetic foot     History of Present Illness: The history is provided by the patient. Accuracy of the patient data is excellent    Ej Fleming is a very pleasant 54 y.o. old male with PMH listed below admitted to [address] [place] [address] on 11/10/2020 LT diabetic foot infection with osteomyelitis, endocrine team was consulted for diabetes management. Pt underwent incision and drainage with bone debridement and biopsy, application of wound vac on 11/11/2020     Subjective   Seen this morning, no acute issues overnight, BG improving     Inpatient diet:   Carb Restricted diet     Point of care glucose monitoring (Independently reviewed)   Recent Labs     11/11/20  2354 11/12/20  0208 11/12/20  0613 11/12/20  1125 11/12/20  1603 11/12/20  1918 11/13/20  0628 11/13/20  1132   GLUMET 299* 252* 235* 208* 200* 234* 154* 119*     Scheduled Meds:   insulin lispro  5 Units Subcutaneous TID WC    insulin glargine  24 Units Subcutaneous Nightly    sodium chloride flush  10 mL Intravenous BID    atorvastatin  5 mg Oral Nightly    metFORMIN  500 mg Oral Daily with breakfast    insulin lispro  0-18 Units Subcutaneous TID WC    insulin lispro  0-9 Units Subcutaneous Nightly    piperacillin-tazobactam  3.375 g Intravenous Q8H    daptomycin (CUBICIN) IVPB  600 mg Intravenous Q24H     PRN Meds:   glucose, 15 g, PRN  dextrose, 12.5 g, PRN  glucagon (rDNA), 1 mg, PRN  dextrose, 100 mL/hr, PRN  acetaminophen, 650 mg, Q6H PRN      Continuous Infusions:   dextrose      dexmedetomidine (PRECEDEX) IV infusion Stopped (11/11/20 8514)    sodium chloride Stopped (11/13/20 8523)       Review of Systems  All systems reviewed.  All negative except for symptoms mentioned in HPI     OBJECTIVE    /76   Pulse 66   Temp 97.9 °F (36.6 °C) (Oral)   Resp 16   Ht 6' 3\" (1.905 m)   Wt 280 lb (127 kg)   SpO2 98%   BMI 35.00 kg/m²     Intake/Output Summary (Last 24 hours) at 11/13/2020 1220  Last data filed at 11/13/2020 0516  Gross per 24 hour   Intake 190 ml   Output 1675 ml   Net -1485 ml       Physical examination:  General: awake alert, oriented x3, no abnormal position or movements. HEENT: normocephalic non-traumatic, no exophthalmos   Neck: supple, no LN enlargement, no thyromegaly, no thyroid tenderness, no JVD. Pulm: Clear equal air entry no added sounds, no wheezing or rhonchi     CVS: S1 + S2, no murmur, no heave  Abd: soft lax, no tenderness, no organomegaly, audible bowel sounds. Dressing on Lt foot   Skin: warm, no lesions, no rash. Feet: diabetic Charcot's feet deformity bilaterally with decreased sensation to light touch  Neuro: CN intact, muscle power normal  Psych: normal mood, and affect    Review of Laboratory Data:  I personally reviewed the following labs:   Recent Labs     11/11/20  1228 11/12/20  1125   WBC 13.3* 15.8*   RBC 4.86 4.69   HGB 12.1* 11.7*   HCT 39.3 37.7   MCV 80.9 80.4   MCH 24.9* 24.9*   MCHC 30.8* 31.0*   RDW 16.4* 16.4*    473*   MPV 10.1 9.8     Recent Labs     11/11/20  1228 11/12/20  1125    135   K 4.2 3.9    101   CO2 21* 22   BUN 15 20   CREATININE 1.0 1.0   GLUCOSE 253* 229*   CALCIUM 8.8 9.0     No results found for: BHYDRXBUT  Lab Results   Component Value Date    LABA1C 7.3 10/16/2020     No results found for: TSH, T4FREE, R6MLQQM, FT3, N2IFQEK, TSI, TPOABS, THGAB  Lab Results   Component Value Date    LABA1C 7.3 10/16/2020    GLUCOSE 229 11/12/2020     No results found for: TRIG, HDL, LDLCALC, CHOL    Blood culture   Lab Results   Component Value Date    [facility]  11/10/2020     This isolate is presumed to be resistant based on the  detection of inducible Clindamycin resistance. Clindamycin  may still be effective in some patients. BC 5 Days no growth 10/15/2020       Radiology:  MRI ANKLE LEFT WO CONTRAST   Final Result   Large area of ulceration with suspected underlying osteomyelitis of anterior   portions of both the talus and calcaneus. Severe destructive changes within the midfoot may be related to chronic   osteomyelitis, neuropathic joint, or a combination. XR ANKLE RIGHT (MIN 3 VIEWS)   Final Result   No acute fractures or osseous destructive process noted. Osseous structures   are in alignment. VL LOWER EXTREMITY ARTERIAL SEGMENTAL PRESSURES W PPG   Final Result   No evidence of peripheral arterial disease. XR CHEST PORTABLE   Final Result   No acute abnormality. XR FOOT LEFT (MIN 3 VIEWS)   Final Result   Ongoing plantar and lateral midfoot ulcer with underlying acute (presumably   on chronic) osteomyelitis superimposed on neuropathic foot. Medical Records/Labs/Images review:   I personally reviewed and summarized previous records   All labs and imaging were reviewed independently     [facility], a 54 y.o.-old male seen today for inpatient diabetes management     Diabetes Mellitus type 2    · Complicated with neuropathy, Charcot's feet deformity bilaterally and admitted with Lt diabetic foot infection   · Continue current DM regimen   · Lantus 24 units daily at night  · Humalog 5 units with meals   · High dose sliding scale   · Continue glucose check with meals and at bedtime   · Will titrate insulin dose based on the blood glucose trend & insulin requirement  · Pt interested in following with us few weeks after discharge.  Endocrine follow up visit Monday 12/7 at 9:00AM     Recurrent Lt diabetic foot infection  · osteomyelitis-mixed gram-positive gram-negative organisms and Staph aureus bacteremia  · On Vanco and Daptomycin   · Managed by primary, Podiatry and ID team     The above issues were reviewed with the patient who understood and agreed with the plan. Thank you for allowing us to participate in the care of this patient. Please do not hesitate to contact us with any additional questions. Pedro Pablo Marvin MD  Endocrinologist, [place] [address] [facility]   [address] [place] [id number]   Phone: [phone]  Fax: [phone]  --------------------------------------------  An electronic signature was used to authenticate this note.  Keaton Rodriguez MD on 11/13/2020 at 12:20 PM

## 2020-11-13 NOTE — PROGRESS NOTES
[facility] Progress Note    Admitting Date and Time: 11/10/2020 12:53 AM  Admit Dx: Acute osteomyelitis of left foot ([address] [M86.172]  Acute osteomyelitis of left foot ([address] [M86.172]    Subjective:  Patient is being followed for Acute osteomyelitis of left foot ([address] [M86.172]  Acute osteomyelitis of left foot ([address] [M86.172]   Pt feels ok s/p I&D by podiatry 11/11/2020, plans for below ankle amputation tomorrow      ROS: denies fever, chills, cp, sob, n/v, HA unless stated above.       insulin lispro  5 Units Subcutaneous TID WC    insulin glargine  24 Units Subcutaneous Nightly    sodium chloride flush  10 mL Intravenous BID    atorvastatin  5 mg Oral Nightly    metFORMIN  500 mg Oral Daily with breakfast    insulin lispro  0-18 Units Subcutaneous TID WC    insulin lispro  0-9 Units Subcutaneous Nightly    piperacillin-tazobactam  3.375 g Intravenous Q8H    daptomycin (CUBICIN) IVPB  600 mg Intravenous Q24H     glucose, 15 g, PRN  dextrose, 12.5 g, PRN  glucagon (rDNA), 1 mg, PRN  dextrose, 100 mL/hr, PRN  acetaminophen, 650 mg, Q6H PRN         Objective:    /76   Pulse 66   Temp 97.9 °F (36.6 °C) (Oral)   Resp 16   Ht 6' 3\" (1.905 m)   Wt 280 lb (127 kg)   SpO2 98%   BMI 35.00 kg/m²     General Appearance: alert and oriented to person, place and time and in no acute distress  Skin: warm and dry  Head: normocephalic and atraumatic  Eyes: pupils equal, round, and reactive to light, extraocular eye movements intact, conjunctivae normal  Neck: neck supple and non tender without mass   Pulmonary/Chest: clear to auscultation bilaterally- no wheezes, rales or rhonchi, normal air movement, no respiratory distress  Cardiovascular: normal rate, normal S1 and S2 and no carotid bruits  Abdomen: soft, non-tender, non-distended, normal bowel sounds, no masses or organomegaly  Extremities: no cyanosis, no clubbing and left foot with dressing and wound vac  Neurologic: no cranial nerve deficit and speech normal        Recent Labs     11/11/20  1228 11/12/20  1125    135   K 4.2 3.9    101   CO2 21* 22   BUN 15 20   CREATININE 1.0 1.0   GLUCOSE 253* 229*   CALCIUM 8.8 9.0       Recent Labs     11/11/20  1228 11/12/20  1125   WBC 13.3* 15.8*   RBC 4.86 4.69   HGB 12.1* 11.7*   HCT 39.3 37.7   MCV 80.9 80.4   MCH 24.9* 24.9*   MCHC 30.8* 31.0*   RDW 16.4* 16.4*    473*   MPV 10.1 9.8         Assessment:    Principal Problem:    Diabetic foot infection ([facility]  Active Problems:    Charcot foot due to diabetes mellitus ([facility])    Diabetic neuropathy associated with diabetes mellitus due to underlying condition ([facility]    Essential hypertension    [facility]el[facility]  Resolved Problems:    * No resolved hospital problems. *      Plan:  1. Diabetic infected foot ulcer, failed to fill abx script from previous admission due to cost, unable to get wound vac due to cost as well. He was treated with zosyn and dapto and discahrged on levaquin and linezoid. Appreciate ID and podiatry input, s/p I&D 11/11/2020, wound vac in place, wound cultures are growing pseudo and staff, MRI showed osteo, plans for amputation of the ankle  2.  Diabetes type 2, patient is currently on sliding scale insulin,  And lantus, increase the lantus to 24 units, started on 5 units of lispro with meals  3.  History of hyperlipidemia, continue atorvastatin. 4.  Elevated blood pressure readings, patient has no history of hypertension, monitor for now, readings improved  5. ARF, cr at 1.3, ? Prerenal, resolved, down to 1  6. Sepsis, leukocytosis, tachycardia and fever, due to infected  diabetic foot      NOTE: This report was transcribed using voice recognition software. Every effort was made to ensure accuracy; however, inadvertent computerized transcription errors may be present.   Electronically signed by Elonda Severin, MD on 11/13/2020 at 9:35 AM

## 2020-11-13 NOTE — PROGRESS NOTES
Foot and Ankle Surgery note  Subjective:  Patient seen. NAD. No overnight events. POD 2 s/p I&D and bone debridement. SOB:no  Fever: no  Chills: no  Chest Pain: no  Bowel Movement:  no  Urination: no  Calf Pain: no    Focused Lower Extremity Physical Exam:  Vitals:    11/13/20 0715   BP: 132/76   Pulse: 66   Resp: 16   Temp: 97.9 °F (36.6 °C)   SpO2: 98%      NV unchanged  Wound VAC in place. No issue with leak or compromise to dressing. No proximal streaking, erythema, lymphedema or lymphangitis  No pain on compression to posterior calf or anterior thigh. Laboratory:    CBC with Differential:    Lab Results   Component Value Date    WBC 15.8 11/12/2020    RBC 4.69 11/12/2020    HGB 11.7 11/12/2020    HCT 37.7 11/12/2020     11/12/2020    MCV 80.4 11/12/2020    MCH 24.9 11/12/2020    MCHC 31.0 11/12/2020    RDW 16.4 11/12/2020    LYMPHOPCT 5.6 11/12/2020    MONOPCT 3.2 11/12/2020    BASOPCT 0.1 11/12/2020    MONOSABS 0.51 11/12/2020    LYMPHSABS 0.88 11/12/2020    EOSABS 0.00 11/12/2020    BASOSABS 0.02 11/12/2020        Assessment:  1) Left foot open wound - POD 2 sp debridement  2) Left foot acute OM  3) IDDM with neuropathy  4) Joint subluxation and deformity  5) Septic arthritis  6) Leukocytosis      Plan:  Lengthy discussion with patient in detail regarding management and care. I offered him 3 options - 1) Continued local wound care, IV abx, and possible HBOT knowing that there is extensive bone infection and soft tissue defect with least probability of healing; 2) Symes amputation of the ankle; and 3) Below the knee amputation. After discussion with patient and his wife, he elects for option 2) Symes amputation at the ankle due to persistent recurrence of infection. We agreed to proceed forward with that. I also recommend SNF vs LTAC for postoperative maangement.     NPO after midnight  OR tomorrow    Claude Clifton DPM  Fellowship-Trained Foot and Ankle Surgeon  [phone]        Electronically signed by Suzan Ayon DPM on 11/13/2020 at 8:04 AM

## 2020-11-13 NOTE — PROGRESS NOTES
Comprehensive Nutrition Assessment    Type and Reason for Visit:  Initial, Positive Nutrition Screen    Nutrition Recommendations/Plan: Continue current diet, Start Ensure HP BID, Jakob BID    Nutrition Assessment:  Pt is at nutritional risk d/t admit w/ DM foot ulcer w/ OM, s/p I&D L calcaneaus bone w/ wound vac placement. Noted pending OR for planned symes amputation of ankle. Will add ONS to aid in healing and monitor.     Malnutrition Assessment:  Malnutrition Status:  No malnutrition    Context:  Chronic Illness     Findings of the 6 clinical characteristics of malnutrition:  Energy Intake:  No significant decrease in energy intake  Weight Loss:  No significant weight loss     Body Fat Loss:  No significant body fat loss     Muscle Mass Loss:  No significant muscle mass loss    Fluid Accumulation:  No significant fluid accumulation     Strength:  Not Performed    Estimated Daily Nutrient Needs:  Energy (kcal):  0404-1796; Weight Used for Energy Requirements:  Current     Protein (g):  130-160; Weight Used for Protein Requirements:  Ideal(1.5-1.8)        Fluid (ml/day):  ; Method Used for Fluid Requirements:  1 ml/kcal      Nutrition Related Findings:  A&Ox4, active BS, soft abd, +1 pitting edema, -I/Os      Wounds:  Multiple, Open Wounds, Diabetic Ulcer, Surgical Incision, Wound Vac       Current Nutrition Therapies:    DIET CARB CONTROL; Carb Control: 4 carbs/meal (approximate 1800 kcals/day)  Diet NPO, After Midnight Exceptions are: Ice Chips, Sips with Meds    Anthropometric Measures:  · Height: 6' 3\" (190.5 cm)  · Current Body Weight: 307 lb (139.3 kg)   · Usual Body Weight: 299 lb (135.6 kg)(10/2020 bed scale, per EMR)     · Ideal Body Weight: 196 lbs; % Ideal Body Weight 156.6 %   · BMI: 38.4  · BMI Categories: Obese Class 2 (BMI 35.0 -39.9)       Nutrition Diagnosis:   · Increased nutrient needs related to increase demand for energy/nutrients as evidenced by wounds    Nutrition Interventions:   Food and/or Nutrient Delivery:  Continue Current Diet, Start Oral Nutrition Supplement  Nutrition Education/Counseling:  No recommendation at this time   Coordination of Nutrition Care:  Continue to monitor while inpatient    Goals:  Pt to consume >75% meals/ONS       Nutrition Monitoring and Evaluation:    Food/Nutrient Intake Outcomes:  Food and Nutrient Intake, Supplement Intake  Physical Signs/Symptoms Outcomes:  Biochemical Data, GI Status, Fluid Status or Edema, Nutrition Focused Physical Findings, Skin, Weight     Discharge Planning:     Too soon to determine     Electronically signed by Jarvis Jhaveri MS, RD, LD on 11/13/20 at 11:33 AM EST    Contact: [phone]

## 2020-11-13 NOTE — PROGRESS NOTES
[address] [place] [address] [facility]  DONNA[place]  Progress Note      Chief Complaint   Patient presents with    Fever     102.6 at home    Cough    Wound Check     open wound left foot       SUBJECTIVE:      Patient is tolerating medications. No reported adverse drug reactions. No problems overnight. Long discussion with podiatry and has decided to proceed with Symes amputation of distal left forefoot tomorrow      Review of systems:    As stated above in the chief complaint, otherwise negative. Medications:    Scheduled Meds:   insulin lispro  5 Units Subcutaneous TID WC    insulin glargine  24 Units Subcutaneous Nightly    sodium chloride flush  10 mL Intravenous BID    atorvastatin  5 mg Oral Nightly    metFORMIN  500 mg Oral Daily with breakfast    insulin lispro  0-18 Units Subcutaneous TID WC    insulin lispro  0-9 Units Subcutaneous Nightly    piperacillin-tazobactam  3.375 g Intravenous Q8H    daptomycin (CUBICIN) IVPB  600 mg Intravenous Q24H     Continuous Infusions:   dextrose      dexmedetomidine (PRECEDEX) IV infusion Stopped (20 2070)    sodium chloride Stopped (20 7555)     PRN Meds:glucose, dextrose, glucagon (rDNA), dextrose, acetaminophen  Prior to Admission medications    Medication Sig Start Date End Date Taking? Authorizing Provider   insulin glargine (LANTUS) 100 UNIT/ML injection vial Inject 15 Units into the skin nightly   Yes Historical Provider, MD   atorvastatin (LIPITOR) 10 MG tablet Take 5 mg by mouth nightly    Yes Historical Provider, MD   metFORMIN (GLUCOPHAGE) 500 MG tablet Take 500 mg by mouth daily (with breakfast)   Yes Historical Provider, MD       OBJECTIVE:  /76   Pulse 66   Temp 97.9 °F (36.6 °C) (Oral)   Resp 16   Ht 6' 3\" (1.905 m)   Wt 280 lb (127 kg)   SpO2 98%   BMI 35.00 kg/m²   Temp  Av.9 °F (36.6 °C)  Min: 97.5 °F (36.4 °C)  Max: 98.2 °F (36.8 °C)  General appearance: Resting in bed in no apparent distress.   Skin: Warm and dry. No rashes were noted. HEENT: Round and reactive pupils. Moist mucous membranes. No ulcerations or thrush. Neck: Supple to movements. Chest: No use of accessory muscles to breathe. Symmetrical expansion. No wheezing, crackles or rhonchi. Cardiovascular: Reguar rate and rhythm. No murmurs gallops, or rubs appreciated. Abdomen: Bowel sounds present, nontender, nondistended, no masses or hepatosplenomegaly. Extremities: Bilateral short carotid nephropathy; dressing and wound VAC left foot clean and dry. Lines: peripheral    I/O last 3 completed shifts:   In: 370 [P.O.:360; I.V.:10]  Out: 2775 [Urine:2775]      Laboratory and Tests Review:      Lab Results   Component Value Date    WBC 15.8 (H) 11/12/2020    WBC 13.3 (H) 11/11/2020    WBC 14.7 (H) 11/10/2020    HGB 11.7 (L) 11/12/2020    HCT 37.7 11/12/2020    MCV 80.4 11/12/2020     (H) 11/12/2020     Lab Results   Component Value Date    NEUTROABS 14.23 (H) 11/12/2020    NEUTROABS 12.35 (H) 11/11/2020    NEUTROABS 11.29 (H) 11/10/2020     No results found for: [facility]  Lab Results   Component Value Date    ALT <5 11/10/2020    AST 9 11/10/2020    ALKPHOS 91 11/10/2020    BILITOT 1.3 (H) 11/10/2020     Lab Results   Component Value Date     11/12/2020    K 3.9 11/12/2020     11/12/2020    CO2 22 11/12/2020    BUN 20 11/12/2020    CREATININE 1.0 11/12/2020    CREATININE 1.0 11/11/2020    CREATININE 1.3 11/10/2020    GFRAA >60 11/12/2020    LABGLOM >60 11/12/2020    GLUCOSE 229 11/12/2020    PROT 9.1 11/10/2020    LABALBU 3.7 11/10/2020    CALCIUM 9.0 11/12/2020    BILITOT 1.3 11/10/2020    ALKPHOS 91 11/10/2020    AST 9 11/10/2020    ALT <5 11/10/2020     Lab Results   Component Value Date    CRP 28.9 (H) 11/10/2020    CRP 10.8 (H) 10/16/2020     Lab Results   Component Value Date    SEDRATE 79 (H) 11/10/2020    SEDRATE 78 (H) 10/16/2020       Radiology:    MRI ANKLE LEFT WO CONTRAST   Final Result   Large area of ulceration with suspected underlying osteomyelitis of anterior   portions of both the talus and calcaneus. Severe destructive changes within the midfoot may be related to chronic   osteomyelitis, neuropathic joint, or a combination. XR ANKLE RIGHT (MIN 3 VIEWS)   Final Result   No acute fractures or osseous destructive process noted. Osseous structures   are in alignment. VL LOWER EXTREMITY ARTERIAL SEGMENTAL PRESSURES W PPG   Final Result   No evidence of peripheral arterial disease. XR CHEST PORTABLE   Final Result   No acute abnormality. XR FOOT LEFT (MIN 3 VIEWS)   Final Result   Ongoing plantar and lateral midfoot ulcer with underlying acute (presumably   on chronic) osteomyelitis superimposed on neuropathic foot. Microbiology:   Lab Results   Component Value Date    [facility]  11/10/2020     This isolate is presumed to be resistant based on the  detection of inducible Clindamycin resistance. Clindamycin  may still be effective in some patients. BC 5 Days no growth 10/15/2020    ORG Gram negative zarina 11/11/2020    ORG Staphylococcus aureus 11/11/2020    ORG Staphylococcus aureus 11/11/2020    ORG Pseudomonas aeruginosa 11/11/2020     Lab Results   Component Value Date    BLOODCULT2  11/10/2020     Refer to previous culture for susceptibility results  (CXBL RAC collected 11-10-20 at 0128)      BLOODCULT2 5 Days no growth 10/15/2020    ORG Gram negative zarina 11/11/2020    ORG Staphylococcus aureus 11/11/2020    ORG Staphylococcus aureus 11/11/2020    ORG Pseudomonas aeruginosa 11/11/2020     WOUND/ABSCESS   Date Value Ref Range Status   11/10/2020 (A)  Final    Mixed constance also isolated, including:  Mixed Gram negative rods  Including Oxidase positive Gram negative rods     11/10/2020   Final    Heavy growth  This isolate is presumed to be resistant based on the  detection of inducible Clindamycin resistance. Clindamycin  may still be effective in some patients. 10/15/2020   Final    Mixed constance isolated. Further workup and sensitivity testing  is not routinely indicated and will not be performed. Mixed constance isolated includes:  Alpha hemolytic Strep species  Coagulase negative Staph species  Gram negative rods       No results found for: RESPSMEAR      Component Value Date/Time    AFBCX  10/17/2020 0801     No growth after 1 week/s of incubation. No growth after 2 week/s of incubation. AFBCX  10/17/2020 0759     No growth after 1 week/s of incubation. No growth after 2 week/s of incubation. FUNGSM No Fungal elements seen 10/17/2020 0801    LABFUNG  10/17/2020 0801     No growth after 1 week/s of incubation. No growth after 2 week/s of incubation. LABFUNG  10/17/2020 0759     No growth after 1 week/s of incubation. No growth after 2 week/s of incubation. No results found for: CULTRESP  No results found for: CXCATHTIP  No results found for: BFCS  Culture Surgical   Date Value Ref Range Status   11/11/2020   Preliminary    Rare growth  Identification and sensitivity to follow     11/11/2020 Light growth  Sensitivity to follow    Preliminary   11/11/2020   Final    Moderate growth  This isolate is presumed to be resistant based on the  detection of inducible Clindamycin resistance. Clindamycin  may still be effective in some patients. 11/11/2020   Final    One colony  Refer to previous culture for susceptibility results  See CXSUR Left Foot collected 11/11/2020 @07:45       No results found for: [facility]  No results found for: [facility]    Problem list:    Principal Problem:    Diabetic foot infection ([facility] [address]  Active Problems:    Charcot foot due to diabetes mellitus ([address]    Diabetic neuropathy associated with diabetes mellitus due to underlying condition ([address]    Essential hypertension    Diabetic foot ulcer with osteomyelitis ([address]  Resolved Problems:    * No resolved hospital problems.  *      ASSESSMENT:       · Recurrent left diabetic foot infection with osteomyelitis-Pseudomonas and MSSA, final sensitivities pending   · Staph aureus bacteremia likely due to osteomyelitis, sensitivities pending  · Type 2 diabetes mellitus, uncontrolled  · Charcot arthropathy  · Essential hypertension    PLAN:    · Continue daptomycin  · Continue Zosyn  · Await final wound cultures for sensitivities  · Await final blood culture results with sensitivities  · Will attempt to consolidate antibiotics for discharge once sensitivities available  · Plan for forefoot amputation tomorrow  · Monitor labs  · Will follow with you      Hira Garcia    12:04 PM  11/13/2020

## 2020-11-13 NOTE — CARE COORDINATION
Chart reviewed- Pt to be NPO after MN for OR tomorrow: Left foot symes amputation. Await post op plan of care.

## 2020-11-13 NOTE — PROGRESS NOTES
Consult received and chart reviewed. Dr Bashir August to assess appropriateness for ARU after decision is made for amputation. Would need post op therapy evaluations to determine appropriateness. Will follow.

## 2020-11-13 NOTE — ANESTHESIA PRE PROCEDURE
Department of Anesthesiology  Preprocedure Note       Name:  Vane Calzada   Age:  54 y.o.  :  1975                                          MRN:  [id number]         Date:  2020      Surgeon: Melissa Daley):  Yaya Heredia DPM    Procedure: Procedure(s):  LEFT FOOT SYMES AMPUTATION    Medications prior to admission:   Prior to Admission medications    Medication Sig Start Date End Date Taking?  Authorizing Provider   insulin glargine (LANTUS) 100 UNIT/ML injection vial Inject 15 Units into the skin nightly   Yes Historical Provider, MD   atorvastatin (LIPITOR) 10 MG tablet Take 5 mg by mouth nightly    Yes Historical Provider, MD   metFORMIN (GLUCOPHAGE) 500 MG tablet Take 500 mg by mouth daily (with breakfast)   Yes Historical Provider, MD       Current medications:    Current Facility-Administered Medications   Medication Dose Route Frequency Provider Last Rate Last Dose    insulin lispro (HUMALOG) injection vial 3 Units  3 Units Subcutaneous TID  Tommy Strauss MD        insulin lispro (HUMALOG) injection vial 0-6 Units  0-6 Units Subcutaneous TID  Tommy Strauss MD        insulin lispro (HUMALOG) injection vial 0-3 Units  0-3 Units Subcutaneous Nightly Tommy Strauss MD        insulin glargine (LANTUS) injection vial 18 Units  18 Units Subcutaneous Nightly Tommy Strauss MD        morphine (PF) injection 2 mg  2 mg Intravenous Q5 Min PRN Kendall Liu MD        HYDROmorphone (DILAUDID) injection 0.5 mg  0.5 mg Intravenous Q5 Min PRN Kendall Liu MD        ondansetron [facility]) injection 4 mg  4 mg Intravenous Once PRN Kendall Liu MD        [facility]) injection 6.25 mg  6.25 mg Intramuscular Once PRN Kendall Liu MD        meperidine (DEMEROL) injection 12.5 mg  12.5 mg Intravenous Q5 Min PRN Kendall Liu MD        glucose (GLUTOSE) 40 % oral gel 15 g  15 g Oral PRN Harmony Solorzano MD        dextrose 50 % IV solution  12.5 g Intravenous PRN Harmony Solorzano MD Procedure Laterality Date    ANKLE FUSION  02/2020    FOOT DEBRIDEMENT Left 10/17/2020    LEFT FOOT  INCISION AND DRAINAGE, BONE DEBRIDEMENT AND BIOPSY performed by Marzena Cordova DPM at [facility] Left 11/11/2020    LEFT FOOT INCISION AND DRAINAGE APPLICATION OF WOUND VAC POSSIBLE INTEGRA GRAFT performed by Yaya Heredia DPM at [facility] OR       Social History:    Social History     Tobacco Use    Smoking status: Never Smoker    Smokeless tobacco: Never Used   Substance Use Topics    Alcohol use: Not Currently     Frequency: Never     Comment: 3 times a year maybe. very rare                                Counseling given: Not Answered      Vital Signs (Current):   Vitals:    11/13/20 0715 11/13/20 1115 11/13/20 2030 11/14/20 0730   BP: 132/76  (!) 142/78 128/79   Pulse: 66  71 68   Resp: 16  18 15   Temp: 97.9 °F (36.6 °C)  97.8 °F (36.6 °C) 97.6 °F (36.4 °C)   TempSrc: Oral  Oral Oral   SpO2: 98%  99%    Weight:       Height:  6' 3\" (1.905 m)                                                BP Readings from Last 3 Encounters:   11/14/20 128/79   11/11/20 104/60   10/22/20 (!) 144/80       NPO Status: Pt instructed to be NPO after midnight. BMI:   Wt Readings from Last 3 Encounters:   11/11/20 280 lb (127 kg)   10/22/20 299 lb (135.6 kg)     Body mass index is 35 kg/m².     CBC:   Lab Results   Component Value Date    WBC 16.0 11/13/2020    RBC 4.91 11/13/2020    HGB 12.1 11/13/2020    HCT 40.4 11/13/2020    MCV 82.3 11/13/2020    RDW 16.9 11/13/2020     11/13/2020       CMP:   Lab Results   Component Value Date     11/13/2020    K 3.8 11/13/2020     11/13/2020    CO2 24 11/13/2020    BUN 22 11/13/2020    CREATININE 1.1 11/13/2020    GFRAA >60 11/13/2020    LABGLOM >60 11/13/2020    GLUCOSE 119 11/13/2020    PROT 9.1 11/10/2020    CALCIUM 8.8 11/13/2020    BILITOT 1.3 11/10/2020    ALKPHOS 91 11/10/2020    AST 9 11/10/2020    ALT <5 11/10/2020       POC Tests: No results for input(s): POCGLU, POCNA, POCK, POCCL, POCBUN, POCHEMO, POCHCT in the last 72 hours. Coags:   Lab Results   Component Value Date    PROTIME 13.4 11/13/2020    INR 1.1 11/13/2020       HCG (If Applicable): No results found for: PREGTESTUR, PREGSERUM, HCG, HCGQUANT     ABGs: No results found for: PHART, PO2ART, NOF9SCU, YJM8HVP, BEART, U8JZMSUB     Type & Screen (If Applicable):  No results found for: LABABO, LABRH    Drug/Infectious Status (If Applicable):  No results found for: HIV, HEPCAB    COVID-19 Screening (If Applicable):   Lab Results   Component Value Date    COVID19 Not Detected 11/10/2020     EKG 11/10/20  Normal sinus rhythm  Normal ECG  When compared with ECG of 16-OCT-2020 07:22,  No significant change was found  Confirmed by Jenn Fraser ([id number]) on 11/10/2020 5:51:21 AM    Chest Xray 11/10/20     FINDINGS:    Heart size is normal. No focal consolidation in the lungs. No pleural    effusion or pneumothorax. MRI Left Ankle 11/10/20  FINDINGS:    Severe midfoot degenerative changes with collapse.         No discrete fracture.         Large area of soft tissue ulceration underlying the midfoot extending to the    underlying bone.  Marrow replacement along the anterior portions of both the    talus and calcaneus are most likely on the basis of osteomyelitis given the    soft tissue changes.  Severe fragmentation and marrow replacement within the    midfoot may be related to osteomyelitis and/or a neuropathic joint.         Diffuse soft tissue swelling and subcutaneous edema.  Ankle joint effusion. Increased fluid within the tendon sheaths of the medial flexor tendons.             Anesthesia Evaluation  Patient summary reviewed and Nursing notes reviewed  Airway: Mallampati: I  TM distance: >3 FB   Neck ROM: full  Mouth opening: > = 3 FB Dental:          Pulmonary: breath sounds clear to auscultation      (-) not a current smoker                           Cardiovascular:  Exercise tolerance: good (>4 METS),   (+) hypertension (Previously reported in medical chart but denies):, hyperlipidemia (Pt on Lipitor but states cholesterol is normal)      ECG reviewed  Rhythm: regular  Rate: normal           Beta Blocker:  Not on Beta Blocker         Neuro/Psych:   (+) neuromuscular disease (Charcot foot due to DM):,             GI/Hepatic/Renal:        (-) no morbid obesity       Endo/Other:    (+) DiabetesType II DM, using insulin, . ROS comment: Diabetic foot ulcer with osteomyelitis  Abdominal:   (+) obese,         Vascular:   + PVD, aortic or cerebral, . Anesthesia Plan      general     ASA 3       Induction: intravenous. Anesthetic plan and risks discussed with patient. Use of blood products discussed with patient whom consented to blood products. Plan discussed with CRNA and attending. Suzi Johnson RN   11/13/2020    DOS STAFF ADDENDUM:    Pt seen and examined, chart reviewed (including anesthesia, drug and allergy history). Anesthetic plan, risks, benefits, alternatives, and personnel involved discussed with patient. Patient verbalized an understanding and agrees to proceed. Plan discussed with care team members and agreed upon.     Lilian Morton MD  Staff Anesthesiologist  11:17 AM

## 2020-11-14 ENCOUNTER — ANESTHESIA (OUTPATIENT)
Dept: OPERATING ROOM | Age: 45
DRG: 854 | End: 2020-11-14

## 2020-11-14 VITALS — DIASTOLIC BLOOD PRESSURE: 69 MMHG | SYSTOLIC BLOOD PRESSURE: 128 MMHG | TEMPERATURE: 97.7 F | OXYGEN SATURATION: 99 %

## 2020-11-14 LAB
METER GLUCOSE: 141 MG/DL (ref 74–99)
METER GLUCOSE: 182 MG/DL (ref 74–99)
METER GLUCOSE: 412 MG/DL (ref 74–99)
METER GLUCOSE: 76 MG/DL (ref 74–99)
METER GLUCOSE: 93 MG/DL (ref 74–99)

## 2020-11-14 PROCEDURE — 3700000000 HC ANESTHESIA ATTENDED CARE: Performed by: PODIATRIST

## 2020-11-14 PROCEDURE — 2709999900 HC NON-CHARGEABLE SUPPLY: Performed by: PODIATRIST

## 2020-11-14 PROCEDURE — 2580000003 HC RX 258: Performed by: PODIATRIST

## 2020-11-14 PROCEDURE — 3600000002 HC SURGERY LEVEL 2 BASE: Performed by: PODIATRIST

## 2020-11-14 PROCEDURE — 82962 GLUCOSE BLOOD TEST: CPT

## 2020-11-14 PROCEDURE — 99232 SBSQ HOSP IP/OBS MODERATE 35: CPT | Performed by: INTERNAL MEDICINE

## 2020-11-14 PROCEDURE — 7100000000 HC PACU RECOVERY - FIRST 15 MIN: Performed by: PODIATRIST

## 2020-11-14 PROCEDURE — 1200000000 HC SEMI PRIVATE

## 2020-11-14 PROCEDURE — 6360000002 HC RX W HCPCS: Performed by: ANESTHESIOLOGY

## 2020-11-14 PROCEDURE — 6360000002 HC RX W HCPCS: Performed by: NURSE ANESTHETIST, CERTIFIED REGISTERED

## 2020-11-14 PROCEDURE — 3600000012 HC SURGERY LEVEL 2 ADDTL 15MIN: Performed by: PODIATRIST

## 2020-11-14 PROCEDURE — 6370000000 HC RX 637 (ALT 250 FOR IP): Performed by: PODIATRIST

## 2020-11-14 PROCEDURE — 0QBK0ZZ EXCISION OF LEFT FIBULA, OPEN APPROACH: ICD-10-PCS | Performed by: PODIATRIST

## 2020-11-14 PROCEDURE — 6360000002 HC RX W HCPCS: Performed by: PODIATRIST

## 2020-11-14 PROCEDURE — 7100000001 HC PACU RECOVERY - ADDTL 15 MIN: Performed by: PODIATRIST

## 2020-11-14 PROCEDURE — 0QBH0ZZ EXCISION OF LEFT TIBIA, OPEN APPROACH: ICD-10-PCS | Performed by: PODIATRIST

## 2020-11-14 PROCEDURE — 3700000001 HC ADD 15 MINUTES (ANESTHESIA): Performed by: PODIATRIST

## 2020-11-14 PROCEDURE — 88307 TISSUE EXAM BY PATHOLOGIST: CPT

## 2020-11-14 PROCEDURE — 0Y6N0Z0 DETACHMENT AT LEFT FOOT, COMPLETE, OPEN APPROACH: ICD-10-PCS | Performed by: PODIATRIST

## 2020-11-14 PROCEDURE — 2500000003 HC RX 250 WO HCPCS: Performed by: NURSE ANESTHETIST, CERTIFIED REGISTERED

## 2020-11-14 PROCEDURE — 2580000003 HC RX 258: Performed by: NURSE ANESTHETIST, CERTIFIED REGISTERED

## 2020-11-14 RX ORDER — PROMETHAZINE HYDROCHLORIDE 25 MG/ML
6.25 INJECTION, SOLUTION INTRAMUSCULAR; INTRAVENOUS
Status: DISCONTINUED | OUTPATIENT
Start: 2020-11-14 | End: 2020-11-14 | Stop reason: HOSPADM

## 2020-11-14 RX ORDER — KETOROLAC TROMETHAMINE 30 MG/ML
30 INJECTION, SOLUTION INTRAMUSCULAR; INTRAVENOUS ONCE
Status: COMPLETED | OUTPATIENT
Start: 2020-11-14 | End: 2020-11-14

## 2020-11-14 RX ORDER — FENTANYL CITRATE 50 UG/ML
INJECTION, SOLUTION INTRAMUSCULAR; INTRAVENOUS PRN
Status: DISCONTINUED | OUTPATIENT
Start: 2020-11-14 | End: 2020-11-14 | Stop reason: SDUPTHER

## 2020-11-14 RX ORDER — MIDAZOLAM HYDROCHLORIDE 1 MG/ML
INJECTION INTRAMUSCULAR; INTRAVENOUS PRN
Status: DISCONTINUED | OUTPATIENT
Start: 2020-11-14 | End: 2020-11-14 | Stop reason: SDUPTHER

## 2020-11-14 RX ORDER — MORPHINE SULFATE 2 MG/ML
2 INJECTION, SOLUTION INTRAMUSCULAR; INTRAVENOUS EVERY 5 MIN PRN
Status: DISCONTINUED | OUTPATIENT
Start: 2020-11-14 | End: 2020-11-14 | Stop reason: HOSPADM

## 2020-11-14 RX ORDER — ONDANSETRON 2 MG/ML
INJECTION INTRAMUSCULAR; INTRAVENOUS PRN
Status: DISCONTINUED | OUTPATIENT
Start: 2020-11-14 | End: 2020-11-14 | Stop reason: SDUPTHER

## 2020-11-14 RX ORDER — ONDANSETRON 2 MG/ML
4 INJECTION INTRAMUSCULAR; INTRAVENOUS
Status: DISCONTINUED | OUTPATIENT
Start: 2020-11-14 | End: 2020-11-14 | Stop reason: HOSPADM

## 2020-11-14 RX ORDER — ROCURONIUM BROMIDE 10 MG/ML
INJECTION, SOLUTION INTRAVENOUS PRN
Status: DISCONTINUED | OUTPATIENT
Start: 2020-11-14 | End: 2020-11-14 | Stop reason: SDUPTHER

## 2020-11-14 RX ORDER — LIDOCAINE HYDROCHLORIDE 20 MG/ML
INJECTION, SOLUTION EPIDURAL; INFILTRATION; INTRACAUDAL; PERINEURAL PRN
Status: DISCONTINUED | OUTPATIENT
Start: 2020-11-14 | End: 2020-11-14 | Stop reason: SDUPTHER

## 2020-11-14 RX ORDER — MEPERIDINE HYDROCHLORIDE 25 MG/ML
12.5 INJECTION INTRAMUSCULAR; INTRAVENOUS; SUBCUTANEOUS EVERY 5 MIN PRN
Status: DISCONTINUED | OUTPATIENT
Start: 2020-11-14 | End: 2020-11-14 | Stop reason: HOSPADM

## 2020-11-14 RX ORDER — INSULIN GLARGINE 100 [IU]/ML
18 INJECTION, SOLUTION SUBCUTANEOUS NIGHTLY
Status: DISCONTINUED | OUTPATIENT
Start: 2020-11-14 | End: 2020-11-17

## 2020-11-14 RX ORDER — DEXAMETHASONE SODIUM PHOSPHATE 4 MG/ML
INJECTION, SOLUTION INTRA-ARTICULAR; INTRALESIONAL; INTRAMUSCULAR; INTRAVENOUS; SOFT TISSUE PRN
Status: DISCONTINUED | OUTPATIENT
Start: 2020-11-14 | End: 2020-11-14 | Stop reason: SDUPTHER

## 2020-11-14 RX ORDER — PROPOFOL 10 MG/ML
INJECTION, EMULSION INTRAVENOUS PRN
Status: DISCONTINUED | OUTPATIENT
Start: 2020-11-14 | End: 2020-11-14 | Stop reason: SDUPTHER

## 2020-11-14 RX ORDER — SODIUM CHLORIDE, SODIUM LACTATE, POTASSIUM CHLORIDE, CALCIUM CHLORIDE 600; 310; 30; 20 MG/100ML; MG/100ML; MG/100ML; MG/100ML
INJECTION, SOLUTION INTRAVENOUS CONTINUOUS PRN
Status: DISCONTINUED | OUTPATIENT
Start: 2020-11-14 | End: 2020-11-14 | Stop reason: SDUPTHER

## 2020-11-14 RX ADMIN — MIDAZOLAM 2 MG: 1 INJECTION INTRAMUSCULAR; INTRAVENOUS at 11:29

## 2020-11-14 RX ADMIN — PIPERACILLIN SODIUM AND TAZOBACTAM SODIUM 3.38 G: 3; .375 INJECTION, POWDER, LYOPHILIZED, FOR SOLUTION INTRAVENOUS at 11:57

## 2020-11-14 RX ADMIN — ATORVASTATIN CALCIUM 5 MG: 10 TABLET, FILM COATED ORAL at 22:17

## 2020-11-14 RX ADMIN — HYDROMORPHONE HYDROCHLORIDE 0.5 MG: 1 INJECTION, SOLUTION INTRAMUSCULAR; INTRAVENOUS; SUBCUTANEOUS at 13:46

## 2020-11-14 RX ADMIN — PIPERACILLIN SODIUM AND TAZOBACTAM SODIUM 3.38 G: 3; .375 INJECTION, POWDER, LYOPHILIZED, FOR SOLUTION INTRAVENOUS at 22:17

## 2020-11-14 RX ADMIN — SODIUM CHLORIDE, POTASSIUM CHLORIDE, SODIUM LACTATE AND CALCIUM CHLORIDE: 600; 310; 30; 20 INJECTION, SOLUTION INTRAVENOUS at 11:05

## 2020-11-14 RX ADMIN — INSULIN GLARGINE 18 UNITS: 100 INJECTION, SOLUTION SUBCUTANEOUS at 22:17

## 2020-11-14 RX ADMIN — SODIUM CHLORIDE 600 MG: 9 INJECTION, SOLUTION INTRAVENOUS at 11:42

## 2020-11-14 RX ADMIN — HYDROMORPHONE HYDROCHLORIDE 0.5 MG: 1 INJECTION, SOLUTION INTRAMUSCULAR; INTRAVENOUS; SUBCUTANEOUS at 14:00

## 2020-11-14 RX ADMIN — ONDANSETRON 4 MG: 2 INJECTION INTRAMUSCULAR; INTRAVENOUS at 11:46

## 2020-11-14 RX ADMIN — FENTANYL CITRATE 100 MCG: 50 INJECTION, SOLUTION INTRAMUSCULAR; INTRAVENOUS at 11:34

## 2020-11-14 RX ADMIN — ROCURONIUM BROMIDE 40 MG: 10 INJECTION, SOLUTION INTRAVENOUS at 11:34

## 2020-11-14 RX ADMIN — SODIUM CHLORIDE, PRESERVATIVE FREE 10 ML: 5 INJECTION INTRAVENOUS at 22:23

## 2020-11-14 RX ADMIN — DEXAMETHASONE SODIUM PHOSPHATE 8 MG: 4 INJECTION, SOLUTION INTRAMUSCULAR; INTRAVENOUS at 11:46

## 2020-11-14 RX ADMIN — INSULIN LISPRO 3 UNITS: 100 INJECTION, SOLUTION INTRAVENOUS; SUBCUTANEOUS at 22:17

## 2020-11-14 RX ADMIN — PROPOFOL 150 MG: 10 INJECTION, EMULSION INTRAVENOUS at 11:34

## 2020-11-14 RX ADMIN — INSULIN LISPRO 1 UNITS: 100 INJECTION, SOLUTION INTRAVENOUS; SUBCUTANEOUS at 16:17

## 2020-11-14 RX ADMIN — LIDOCAINE HYDROCHLORIDE 50 MG: 20 INJECTION, SOLUTION EPIDURAL; INFILTRATION; INTRACAUDAL; PERINEURAL at 11:34

## 2020-11-14 RX ADMIN — KETOROLAC TROMETHAMINE 30 MG: 30 INJECTION, SOLUTION INTRAMUSCULAR; INTRAVENOUS at 13:40

## 2020-11-14 RX ADMIN — FENTANYL CITRATE 100 MCG: 50 INJECTION, SOLUTION INTRAMUSCULAR; INTRAVENOUS at 12:02

## 2020-11-14 RX ADMIN — HYDROMORPHONE HYDROCHLORIDE 0.5 MG: 1 INJECTION, SOLUTION INTRAMUSCULAR; INTRAVENOUS; SUBCUTANEOUS at 13:51

## 2020-11-14 ASSESSMENT — PAIN SCALES - GENERAL
PAINLEVEL_OUTOF10: 7
PAINLEVEL_OUTOF10: 8
PAINLEVEL_OUTOF10: 10
PAINLEVEL_OUTOF10: 8
PAINLEVEL_OUTOF10: 2
PAINLEVEL_OUTOF10: 2
PAINLEVEL_OUTOF10: 0
PAINLEVEL_OUTOF10: 10
PAINLEVEL_OUTOF10: 9
PAINLEVEL_OUTOF10: 2
PAINLEVEL_OUTOF10: 8
PAINLEVEL_OUTOF10: 0

## 2020-11-14 ASSESSMENT — PULMONARY FUNCTION TESTS
PIF_VALUE: 30
PIF_VALUE: 17
PIF_VALUE: 29
PIF_VALUE: 11
PIF_VALUE: 10
PIF_VALUE: 15
PIF_VALUE: 0
PIF_VALUE: 11
PIF_VALUE: 15
PIF_VALUE: 14
PIF_VALUE: 11
PIF_VALUE: 31
PIF_VALUE: 2
PIF_VALUE: 10
PIF_VALUE: 15
PIF_VALUE: 2
PIF_VALUE: 11
PIF_VALUE: 11
PIF_VALUE: 15
PIF_VALUE: 15
PIF_VALUE: 11
PIF_VALUE: 15
PIF_VALUE: 1
PIF_VALUE: 3
PIF_VALUE: 2
PIF_VALUE: 11
PIF_VALUE: 17
PIF_VALUE: 10
PIF_VALUE: 15
PIF_VALUE: 11
PIF_VALUE: 15
PIF_VALUE: 15
PIF_VALUE: 11
PIF_VALUE: 15
PIF_VALUE: 11
PIF_VALUE: 10
PIF_VALUE: 16
PIF_VALUE: 11
PIF_VALUE: 10
PIF_VALUE: 15
PIF_VALUE: 15
PIF_VALUE: 11
PIF_VALUE: 2
PIF_VALUE: 11
PIF_VALUE: 15
PIF_VALUE: 10
PIF_VALUE: 15
PIF_VALUE: 0
PIF_VALUE: 15
PIF_VALUE: 3
PIF_VALUE: 0
PIF_VALUE: 15
PIF_VALUE: 6
PIF_VALUE: 11
PIF_VALUE: 11
PIF_VALUE: 15
PIF_VALUE: 11
PIF_VALUE: 10
PIF_VALUE: 11
PIF_VALUE: 11
PIF_VALUE: 10
PIF_VALUE: 30
PIF_VALUE: 11
PIF_VALUE: 10
PIF_VALUE: 1
PIF_VALUE: 15
PIF_VALUE: 11
PIF_VALUE: 11
PIF_VALUE: 14
PIF_VALUE: 11
PIF_VALUE: 3
PIF_VALUE: 11
PIF_VALUE: 15
PIF_VALUE: 17
PIF_VALUE: 15
PIF_VALUE: 11
PIF_VALUE: 1
PIF_VALUE: 11
PIF_VALUE: 15
PIF_VALUE: 4
PIF_VALUE: 11
PIF_VALUE: 11
PIF_VALUE: 15
PIF_VALUE: 11
PIF_VALUE: 11
PIF_VALUE: 15
PIF_VALUE: 0
PIF_VALUE: 11
PIF_VALUE: 15
PIF_VALUE: 14
PIF_VALUE: 11
PIF_VALUE: 16

## 2020-11-14 ASSESSMENT — PAIN - FUNCTIONAL ASSESSMENT: PAIN_FUNCTIONAL_ASSESSMENT: 0-10

## 2020-11-14 ASSESSMENT — LIFESTYLE VARIABLES: SMOKING_STATUS: 0

## 2020-11-14 NOTE — PROGRESS NOTES
Bed position to [facility] flat. Feeling better. Dr Reyna Rodriguez remains at bedside.

## 2020-11-14 NOTE — PROGRESS NOTES
ENDOCRINOLOGY INITIAL CONSULTATION NOTE      Date of admission: 11/10/2020  Date of service: 11/14/2020  Admitting physician: Lanre Encarnacion MD   Primary Care Physician: No primary care provider on file. Consultant physician: Gwyn Martell MD     Reason for the consultation:  DM type 2, infected diabetic foot     History of Present Illness: The history is provided by the patient. Accuracy of the patient data is excellent    Ovi Desai is a very pleasant 54 y.o. old male with PMH listed below admitted to [address] [place] [address] on 11/10/2020 LT diabetic foot infection with osteomyelitis, endocrine team was consulted for diabetes management.   Pt underwent incision and drainage with bone debridement and biopsy, application of wound vac on 11/11/2020     Subjective   Seen this morning, no acute issues overnight, BG was 93 this AM. Going for surgery today     Inpatient diet:   Carb Restricted diet     Point of care glucose monitoring (Independently reviewed)   Recent Labs     11/12/20  1603 11/12/20  1918 11/13/20  0628 11/13/20  1132 11/13/20  1605 11/13/20  2034 11/14/20  0624 11/14/20  1101   GLUMET 200* 234* 154* 119* 105* 164* 93 76     Scheduled Meds:   insulin lispro  3 Units Subcutaneous TID WC    insulin lispro  0-6 Units Subcutaneous TID WC    insulin lispro  0-3 Units Subcutaneous Nightly    insulin glargine  18 Units Subcutaneous Nightly    sodium chloride flush  10 mL Intravenous BID    atorvastatin  5 mg Oral Nightly    metFORMIN  500 mg Oral Daily with breakfast    piperacillin-tazobactam  3.375 g Intravenous Q8H    daptomycin (CUBICIN) IVPB  600 mg Intravenous Q24H     PRN Meds:   morphine, 2 mg, Q5 Min PRN  HYDROmorphone, 0.5 mg, Q5 Min PRN  ondansetron, 4 mg, Once PRN  promethazine, 6.25 mg, Once PRN  meperidine, 12.5 mg, Q5 Min PRN  glucose, 15 g, PRN  dextrose, 12.5 g, PRN  glucagon (rDNA), 1 mg, PRN  dextrose, 100 mL/hr, PRN  acetaminophen, 650 mg, Q6H PRN      Continuous Infusions:   dextrose      dexmedetomidine (PRECEDEX) IV infusion Stopped (11/11/20 3243)    sodium chloride Stopped (11/13/20 2034)       Review of Systems  All systems reviewed. All negative except for symptoms mentioned in HPI     OBJECTIVE    BP (!) 158/89   Pulse 73   Temp 97.6 °F (36.4 °C) (Temporal)   Resp 18   Ht 6' 3\" (1.905 m)   Wt 280 lb (127 kg)   SpO2 99%   BMI 35.00 kg/m²     Intake/Output Summary (Last 24 hours) at 11/14/2020 1202  Last data filed at 11/14/2020 1142  Gross per 24 hour   Intake 600 ml   Output 650 ml   Net -50 ml       Physical examination:  General: awake alert, oriented x3, no abnormal position or movements. HEENT: normocephalic non-traumatic, no exophthalmos   Neck: supple, no LN enlargement, no thyromegaly, no thyroid tenderness, no JVD. Pulm: Clear equal air entry no added sounds, no wheezing or rhonchi     CVS: S1 + S2, no murmur, no heave  Abd: soft lax, no tenderness, no organomegaly, audible bowel sounds. Dressing on Lt foot   Skin: warm, no lesions, no rash.     Feet: diabetic Charcot's feet deformity bilaterally with decreased sensation to light touch  Neuro: CN intact, muscle power normal  Psych: normal mood, and affect    Review of Laboratory Data:  I personally reviewed the following labs:   Recent Labs     11/11/20  1228 11/12/20  1125 11/13/20  1225   WBC 13.3* 15.8* 16.0*   RBC 4.86 4.69 4.91   HGB 12.1* 11.7* 12.1*   HCT 39.3 37.7 40.4   MCV 80.9 80.4 82.3   MCH 24.9* 24.9* 24.6*   MCHC 30.8* 31.0* 30.0*   RDW 16.4* 16.4* 16.9*    473* 534*   MPV 10.1 9.8 11.0     Recent Labs     11/11/20  1228 11/12/20  1125 11/13/20  1225    135 137   K 4.2 3.9 3.8    101 101   CO2 21* 22 24   BUN 15 20 22*   CREATININE 1.0 1.0 1.1   GLUCOSE 253* 229* 119*   CALCIUM 8.8 9.0 8.8     No results found for: BHYDRXBUT  Lab Results   Component Value Date    LABA1C 7.3 10/16/2020     No results found for: TSH, T4FREE, X4QWVDJ, FT3, P8IMNCB, TSI, TPOABS, THGAB  Lab Results   Component Value Date    LABA1C 7.3 10/16/2020    GLUCOSE 119 11/13/2020     No results found for: TRIG, HDL, LDLCALC, CHOL    Blood culture   Lab Results   Component Value Date    [facility]  11/10/2020     This isolate is presumed to be resistant based on the  detection of inducible Clindamycin resistance. Clindamycin  may still be effective in some patients. BC 5 Days no growth 10/15/2020       Radiology:  MRI ANKLE LEFT WO CONTRAST   Final Result   Large area of ulceration with suspected underlying osteomyelitis of anterior   portions of both the talus and calcaneus. Severe destructive changes within the midfoot may be related to chronic   osteomyelitis, neuropathic joint, or a combination. XR ANKLE RIGHT (MIN 3 VIEWS)   Final Result   No acute fractures or osseous destructive process noted. Osseous structures   are in alignment. VL LOWER EXTREMITY ARTERIAL SEGMENTAL PRESSURES W PPG   Final Result   No evidence of peripheral arterial disease. XR CHEST PORTABLE   Final Result   No acute abnormality. XR FOOT LEFT (MIN 3 VIEWS)   Final Result   Ongoing plantar and lateral midfoot ulcer with underlying acute (presumably   on chronic) osteomyelitis superimposed on neuropathic foot.              Medical Records/Labs/Images review:   I personally reviewed and summarized previous records   All labs and imaging were reviewed independently     [facility], a 54 y.o.-old male seen today for inpatient diabetes management     Diabetes Mellitus type 2    · Complicated with neuropathy, Charcot's feet deformity bilaterally and admitted with Lt diabetic foot infection   · Continue current DM regimen   · Lantus 18 units daily at night  · Hold preprandial insulin coverage while NPO    · Change sliding scale to Low dose    · Continue glucose check with meals and at bedtime   · Will titrate insulin dose based on the blood glucose trend & insulin requirement  · Pt interested in following with us few weeks after discharge. Endocrine follow up visit Monday 12/7 at 9:00AM     Recurrent Lt diabetic foot infection  · osteomyelitis-mixed gram-positive gram-negative organisms and Staph aureus bacteremia  · On Zosyn and Daptomycin   · Managed by primary, Podiatry and ID team   · Going for surgery today     The above issues were reviewed with the patient who understood and agreed with the plan. Thank you for allowing us to participate in the care of this patient. Please do not hesitate to contact us with any additional questions. Thanh Nixon MD  Endocrinologist, [facility]   [address] [place] [address]   Phone: [phone]  Fax: [phone]  --------------------------------------------  An electronic signature was used to authenticate this note.  Keira Cortez MD on 11/14/2020 at 12:02 PM

## 2020-11-14 NOTE — PROGRESS NOTES
[facility] Progress Note    Admitting Date and Time: 11/10/2020 12:53 AM  Admit Dx: Acute osteomyelitis of left foot ([address] [M86.172]  Acute osteomyelitis of left foot ([address] [M86.172]    Subjective:  Patient is being followed for Acute osteomyelitis of left foot ([address] [M86.172]  Acute osteomyelitis of left foot ([address] [M86.172]   Pt feels ok s/p I&D by podiatry 11/11/2020, plans for ankle amputation today      ROS: denies fever, chills, cp, sob, n/v, HA unless stated above.       insulin lispro  3 Units Subcutaneous TID WC    insulin lispro  0-6 Units Subcutaneous TID WC    insulin lispro  0-3 Units Subcutaneous Nightly    insulin glargine  18 Units Subcutaneous Nightly    sodium chloride flush  10 mL Intravenous BID    atorvastatin  5 mg Oral Nightly    metFORMIN  500 mg Oral Daily with breakfast    piperacillin-tazobactam  3.375 g Intravenous Q8H    daptomycin (CUBICIN) IVPB  600 mg Intravenous Q24H     glucose, 15 g, PRN  dextrose, 12.5 g, PRN  glucagon (rDNA), 1 mg, PRN  dextrose, 100 mL/hr, PRN  acetaminophen, 650 mg, Q6H PRN         Objective:    /79   Pulse 68   Temp 97.6 °F (36.4 °C) (Oral)   Resp 15   Ht 6' 3\" (1.905 m)   Wt 280 lb (127 kg)   SpO2 99%   BMI 35.00 kg/m²     General Appearance: alert and oriented to person, place and time and in no acute distress  Skin: warm and dry  Head: normocephalic and atraumatic  Eyes: pupils equal, round, and reactive to light, extraocular eye movements intact, conjunctivae normal  Neck: neck supple and non tender without mass   Pulmonary/Chest: clear to auscultation bilaterally- no wheezes, rales or rhonchi, normal air movement, no respiratory distress  Cardiovascular: normal rate, normal S1 and S2 and no carotid bruits  Abdomen: soft, non-tender, non-distended, normal bowel sounds, no masses or organomegaly  Extremities: no cyanosis, no clubbing and left foot with dressing and wound vac  Neurologic: no cranial nerve deficit and speech normal        Recent Labs     11/11/20  1228 11/12/20  1125 11/13/20  1225    135 137   K 4.2 3.9 3.8    101 101   CO2 21* 22 24   BUN 15 20 22*   CREATININE 1.0 1.0 1.1   GLUCOSE 253* 229* 119*   CALCIUM 8.8 9.0 8.8       Recent Labs     11/11/20  1228 11/12/20  1125 11/13/20  1225   WBC 13.3* 15.8* 16.0*   RBC 4.86 4.69 4.91   HGB 12.1* 11.7* 12.1*   HCT 39.3 37.7 40.4   MCV 80.9 80.4 82.3   MCH 24.9* 24.9* 24.6*   MCHC 30.8* 31.0* 30.0*   RDW 16.4* 16.4* 16.9*    473* 534*   MPV 10.1 9.8 11.0         Assessment:    Principal Problem:    Diabetic foot infection ([facility]  Active Problems:    Charcot foot due to diabetes mellitus (HCC)    Diabetic neuropathy associated with diabetes mellitus due to underlying condition ([facility]    Essential hypertension    Diabetic foot ulcer with osteomyelitis ([facility]  Resolved Problems:    * No resolved hospital problems. *      Plan:  1. Diabetic infected foot ulcer, failed to fill abx script from previous admission due to cost, unable to get wound vac due to cost as well. He was treated with zosyn and dapto and discahrged on levaquin and linezoid. Appreciate ID and podiatry input, s/p I&D 11/11/2020, wound vac in place, wound cultures are growing pseudo and staff, MRI showed osteo, plans for amputation of the ankle  2.  Diabetes type 2, patient is currently on sliding scale insulin,  And lantus, increase the lantus to 24 units, started on 5 units of lispro with meals  3.  History of hyperlipidemia, continue atorvastatin. 4.  Elevated blood pressure readings, patient has no history of hypertension, monitor for now, readings improved  5. ARF, cr at 1.3, ? Prerenal, resolved, down to 1  6. Sepsis, leukocytosis, tachycardia and fever, due to infected  diabetic foot      NOTE: This report was transcribed using voice recognition software.  Every effort was made to ensure accuracy; however, inadvertent computerized transcription errors may be present.   Electronically signed by Fouzia Randall MD on 11/14/2020 at 8:33 AM

## 2020-11-14 NOTE — ANESTHESIA POSTPROCEDURE EVALUATION
Department of Anesthesiology  Postprocedure Note    Patient: Salvador Carvajal  MRN: [id number]  YOB: 1975  Date of evaluation: 11/14/2020  Time:  2:45 PM     Procedure Summary     Date:  11/14/20 Room / Location:  [facility] 01 / [facility]    Anesthesia Start:  9596 Anesthesia Stop:  1320    Procedure:  LEFT FOOT SYMES AMPUTATION (Left Ankle) Diagnosis:  (-)    Surgeon:  Joseline Blas DPM Responsible Provider:  Lilian Morton MD    Anesthesia Type:  general ASA Status:  3          Anesthesia Type: general    Jesenia Phase I: Jesenia Score: 9    Jesenia Phase II: Jesenia Score: 9    Last vitals: Reviewed and per EMR flowsheets.        Anesthesia Post Evaluation    Patient location during evaluation: PACU  Patient participation: complete - patient participated  Level of consciousness: awake  Airway patency: patent  Nausea & Vomiting: no nausea and no vomiting  Complications: no  Cardiovascular status: hemodynamically stable  Respiratory status: acceptable  Hydration status: euvolemic

## 2020-11-14 NOTE — ANESTHESIA POSTPROCEDURE EVALUATION
Department of Anesthesiology  Postprocedure Note    Patient: Salvador Carvajal  MRN: [id number]  YOB: 1975  Date of evaluation: 11/13/2020  Time:  9:44 PM     Procedure Summary     Date:  11/11/20 Room / Location:  [facility] 03 / [address] [place] [address]    Anesthesia Start:  3311 Anesthesia Stop:  5072    Procedure:  LEFT FOOT INCISION AND DRAINAGE APPLICATION OF WOUND VAC POSSIBLE [employer] GRAFT (Left ) Diagnosis:  (WOUND)    Surgeon:  Joseline Blas DPM Responsible Provider:  Josue Galarza MD    Anesthesia Type:  MAC ASA Status:  3          Anesthesia Type: MAC    Jesenia Phase I: Jesenia Score: 10    Jesenia Phase II: Jesenia Score: 9    Last vitals: Reviewed and per EMR flowsheets.        Anesthesia Post Evaluation    Patient location during evaluation: PACU  Patient participation: complete - patient participated  Level of consciousness: awake and alert  Airway patency: patent  Nausea & Vomiting: no vomiting and no nausea  Complications: no  Cardiovascular status: blood pressure returned to baseline  Respiratory status: acceptable  Hydration status: euvolemic

## 2020-11-14 NOTE — PROGRESS NOTES
States not feeling well. Color pale. BP 43/33. Fluids opened wide. Placed in trendelenburg position. Dr Qasim Johns called.

## 2020-11-15 LAB
HCT VFR BLD CALC: 31.3 % (ref 37–54)
HEMOGLOBIN: 9.4 G/DL (ref 12.5–16.5)
MCH RBC QN AUTO: 24.9 PG (ref 26–35)
MCHC RBC AUTO-ENTMCNC: 30 % (ref 32–34.5)
MCV RBC AUTO: 82.8 FL (ref 80–99.9)
METER GLUCOSE: 158 MG/DL (ref 74–99)
METER GLUCOSE: 159 MG/DL (ref 74–99)
METER GLUCOSE: 211 MG/DL (ref 74–99)
METER GLUCOSE: 245 MG/DL (ref 74–99)
METER GLUCOSE: 295 MG/DL (ref 74–99)
PDW BLD-RTO: 16.5 FL (ref 11.5–15)
PLATELET # BLD: 671 E9/L (ref 130–450)
PMV BLD AUTO: 9.7 FL (ref 7–12)
RBC # BLD: 3.78 E12/L (ref 3.8–5.8)
WBC # BLD: 25.9 E9/L (ref 4.5–11.5)

## 2020-11-15 PROCEDURE — 87040 BLOOD CULTURE FOR BACTERIA: CPT

## 2020-11-15 PROCEDURE — 2580000003 HC RX 258: Performed by: PODIATRIST

## 2020-11-15 PROCEDURE — 85027 COMPLETE CBC AUTOMATED: CPT

## 2020-11-15 PROCEDURE — 6370000000 HC RX 637 (ALT 250 FOR IP): Performed by: PODIATRIST

## 2020-11-15 PROCEDURE — 1200000000 HC SEMI PRIVATE

## 2020-11-15 PROCEDURE — 6370000000 HC RX 637 (ALT 250 FOR IP): Performed by: INTERNAL MEDICINE

## 2020-11-15 PROCEDURE — 82962 GLUCOSE BLOOD TEST: CPT

## 2020-11-15 PROCEDURE — 6360000002 HC RX W HCPCS: Performed by: PODIATRIST

## 2020-11-15 PROCEDURE — 6360000002 HC RX W HCPCS: Performed by: INTERNAL MEDICINE

## 2020-11-15 PROCEDURE — 99233 SBSQ HOSP IP/OBS HIGH 50: CPT | Performed by: INTERNAL MEDICINE

## 2020-11-15 PROCEDURE — 36415 COLL VENOUS BLD VENIPUNCTURE: CPT

## 2020-11-15 RX ADMIN — INSULIN LISPRO 3 UNITS: 100 INJECTION, SOLUTION INTRAVENOUS; SUBCUTANEOUS at 07:49

## 2020-11-15 RX ADMIN — ACETAMINOPHEN 650 MG: 325 TABLET ORAL at 09:40

## 2020-11-15 RX ADMIN — ATORVASTATIN CALCIUM 5 MG: 10 TABLET, FILM COATED ORAL at 21:04

## 2020-11-15 RX ADMIN — INSULIN LISPRO 5 UNITS: 100 INJECTION, SOLUTION INTRAVENOUS; SUBCUTANEOUS at 16:48

## 2020-11-15 RX ADMIN — INSULIN LISPRO 5 UNITS: 100 INJECTION, SOLUTION INTRAVENOUS; SUBCUTANEOUS at 09:35

## 2020-11-15 RX ADMIN — SODIUM CHLORIDE: 9 INJECTION, SOLUTION INTRAVENOUS at 09:36

## 2020-11-15 RX ADMIN — INSULIN LISPRO 1 UNITS: 100 INJECTION, SOLUTION INTRAVENOUS; SUBCUTANEOUS at 16:47

## 2020-11-15 RX ADMIN — SODIUM CHLORIDE, PRESERVATIVE FREE 10 ML: 5 INJECTION INTRAVENOUS at 14:16

## 2020-11-15 RX ADMIN — PIPERACILLIN SODIUM AND TAZOBACTAM SODIUM 3.38 G: 3; .375 INJECTION, POWDER, LYOPHILIZED, FOR SOLUTION INTRAVENOUS at 06:17

## 2020-11-15 RX ADMIN — PIPERACILLIN SODIUM AND TAZOBACTAM SODIUM 3.38 G: 3; .375 INJECTION, POWDER, LYOPHILIZED, FOR SOLUTION INTRAVENOUS at 14:16

## 2020-11-15 RX ADMIN — INSULIN GLARGINE 18 UNITS: 100 INJECTION, SOLUTION SUBCUTANEOUS at 21:04

## 2020-11-15 RX ADMIN — METFORMIN HYDROCHLORIDE 500 MG: 500 TABLET ORAL at 07:49

## 2020-11-15 RX ADMIN — SODIUM CHLORIDE: 9 INJECTION, SOLUTION INTRAVENOUS at 02:20

## 2020-11-15 RX ADMIN — INSULIN LISPRO 2 UNITS: 100 INJECTION, SOLUTION INTRAVENOUS; SUBCUTANEOUS at 11:55

## 2020-11-15 RX ADMIN — ENOXAPARIN SODIUM 40 MG: 40 INJECTION SUBCUTANEOUS at 12:49

## 2020-11-15 RX ADMIN — SODIUM CHLORIDE 600 MG: 9 INJECTION, SOLUTION INTRAVENOUS at 11:56

## 2020-11-15 RX ADMIN — INSULIN LISPRO 1 UNITS: 100 INJECTION, SOLUTION INTRAVENOUS; SUBCUTANEOUS at 21:04

## 2020-11-15 RX ADMIN — PIPERACILLIN SODIUM AND TAZOBACTAM SODIUM 3.38 G: 3; .375 INJECTION, POWDER, LYOPHILIZED, FOR SOLUTION INTRAVENOUS at 22:15

## 2020-11-15 RX ADMIN — SODIUM CHLORIDE, PRESERVATIVE FREE 10 ML: 5 INJECTION INTRAVENOUS at 21:04

## 2020-11-15 ASSESSMENT — PAIN DESCRIPTION - DESCRIPTORS
DESCRIPTORS: ACHING
DESCRIPTORS: ACHING

## 2020-11-15 ASSESSMENT — PAIN - FUNCTIONAL ASSESSMENT
PAIN_FUNCTIONAL_ASSESSMENT: PREVENTS OR INTERFERES SOME ACTIVE ACTIVITIES AND ADLS
PAIN_FUNCTIONAL_ASSESSMENT: PREVENTS OR INTERFERES SOME ACTIVE ACTIVITIES AND ADLS

## 2020-11-15 ASSESSMENT — PAIN DESCRIPTION - FREQUENCY
FREQUENCY: INTERMITTENT
FREQUENCY: INTERMITTENT

## 2020-11-15 ASSESSMENT — PAIN DESCRIPTION - PAIN TYPE
TYPE: ACUTE PAIN
TYPE: ACUTE PAIN

## 2020-11-15 ASSESSMENT — PAIN SCALES - GENERAL
PAINLEVEL_OUTOF10: 3
PAINLEVEL_OUTOF10: 0
PAINLEVEL_OUTOF10: 3

## 2020-11-15 ASSESSMENT — PAIN DESCRIPTION - PROGRESSION
CLINICAL_PROGRESSION: NOT CHANGED
CLINICAL_PROGRESSION: NOT CHANGED

## 2020-11-15 ASSESSMENT — PAIN DESCRIPTION - ORIENTATION
ORIENTATION: LEFT
ORIENTATION: LEFT

## 2020-11-15 ASSESSMENT — PAIN DESCRIPTION - LOCATION
LOCATION: FOOT
LOCATION: FOOT

## 2020-11-15 NOTE — PROGRESS NOTES
cranial nerve deficit and speech normal        Recent Labs     11/12/20  1125 11/13/20  1225    137   K 3.9 3.8    101   CO2 22 24   BUN 20 22*   CREATININE 1.0 1.1   GLUCOSE 229* 119*   CALCIUM 9.0 8.8       Recent Labs     11/12/20  1125 11/13/20  1225   WBC 15.8* 16.0*   RBC 4.69 4.91   HGB 11.7* 12.1*   HCT 37.7 40.4   MCV 80.4 82.3   MCH 24.9* 24.6*   MCHC 31.0* 30.0*   RDW 16.4* 16.9*   * 534*   MPV 9.8 11.0     Procedure:  LEFT FOOT SYMES AMPUTATION Partial excision of distal tibia and fibula bones. Dorsalis pedis adipomyofascial elevation and transfer    Assessment:    Principal Problem:    Diabetic foot infection ([facility]  Active Problems:    Charcot foot due to diabetes mellitus ([facility]    Diabetic neuropathy associated with diabetes mellitus due to underlying condition [facility])    Essential hypertension    Diabetic foot ulcer with osteomyelitis ([facility]  Resolved Problems:    * No resolved hospital problems. *      Plan:  1. Diabetic infected foot ulcer, failed to fill abx script from previous admission due to cost, unable to get wound vac due to cost as well. He was treated with zosyn and dapto and discahrged on levaquin and linezoid. Appreciate ID and podiatry input, s/p I&D 11/11/2020, wound vac in place, wound cultures are growing pseudo and staff, MRI showed osteo, s/p amputation of the ankle  2.  Diabetes type 2, patient is currently on sliding scale insulin,  And lantus, increase the lantus to 24 units, started on 5 units of lispro with meals  3.  History of hyperlipidemia, continue atorvastatin. 4.  Elevated blood pressure readings, patient has no history of hypertension, monitor for now, readings improved  5. ARF, cr at 1.3, ? Prerenal, resolved, down to 1  6. Sepsis, leukocytosis, tachycardia and fever, due to infected  diabetic foot      NOTE: This report was transcribed using voice recognition software.  Every effort was made to ensure accuracy; however, inadvertent computerized transcription errors may be present.   Electronically signed by Maco Saab MD on 11/15/2020 at 8:47 AM

## 2020-11-15 NOTE — PROGRESS NOTES
[address] [place] [address] [facility]  Progress Note    SUBJECTIVE:  Chief Complaint   Patient presents with    Fever     102.6 at home    Cough    Wound Check     open wound left foot     Patient is tolerating medications. No reported adverse drug reactions. No nausea, vomiting, diarrhea. Temp max 99.3 overnight. Pain left foot comes and goes. Review of systems:  As stated above in the chief complaint, otherwise negative. Medications:  Scheduled Meds:   insulin lispro  5 Units Subcutaneous TID WC    enoxaparin  40 mg Subcutaneous Daily    insulin lispro  0-6 Units Subcutaneous TID WC    insulin lispro  0-3 Units Subcutaneous Nightly    insulin glargine  18 Units Subcutaneous Nightly    sodium chloride flush  10 mL Intravenous BID    atorvastatin  5 mg Oral Nightly    metFORMIN  500 mg Oral Daily with breakfast    piperacillin-tazobactam  3.375 g Intravenous Q8H    daptomycin (CUBICIN) IVPB  600 mg Intravenous Q24H     Continuous Infusions:   dextrose      dexmedetomidine (PRECEDEX) IV infusion Stopped (20 4709)    sodium chloride Stopped (11/15/20 8013)     PRN Meds:glucose, dextrose, glucagon (rDNA), dextrose, acetaminophen    OBJECTIVE:  BP (!) 146/79   Pulse 70   Temp 97.5 °F (36.4 °C) (Oral)   Resp 18   Ht 6' 3\" (1.905 m)   Wt 280 lb (127 kg)   SpO2 95%   BMI 35.00 kg/m²   Temp  Av.5 °F (36.4 °C)  Min: 97.3 °F (36.3 °C)  Max: 97.6 °F (36.4 °C)  Constitutional: The patient is awake, alert, and oriented. Lying in bed in NAD  Skin: Warm and dry. No rashes were noted. HEENT: Round and reactive pupils. Moist mucous membranes. No ulcerations or thrush. Neck: Supple to movements. Chest: No use of accessory muscles to breathe. Symmetrical expansion. No wheezing, crackles or rhonchi. Cardiovascular: S1 and S2 are rhythmic and regular. No murmurs appreciated. Abdomen: Positive bowel sounds to auscultation. Benign to palpation.    Extremities: LLE postop dressing intact  Lines: peripheral    Laboratory and Tests Review:  Lab Results   Component Value Date    WBC 25.9 (H) 11/15/2020    WBC 16.0 (H) 11/13/2020    WBC 15.8 (H) 11/12/2020    HGB 9.4 (L) 11/15/2020    HCT 31.3 (L) 11/15/2020    MCV 82.8 11/15/2020     (H) 11/15/2020     Lab Results   Component Value Date    NEUTROABS 12.93 (H) 11/13/2020    NEUTROABS 14.23 (H) 11/12/2020    NEUTROABS 12.35 (H) 11/11/2020     No results found for: CRPHS  Lab Results   Component Value Date    ALT <5 11/10/2020    AST 9 11/10/2020    ALKPHOS 91 11/10/2020    BILITOT 1.3 (H) 11/10/2020     Lab Results   Component Value Date     11/13/2020    K 3.8 11/13/2020     11/13/2020    CO2 24 11/13/2020    BUN 22 11/13/2020    CREATININE 1.1 11/13/2020    CREATININE 1.0 11/12/2020    CREATININE 1.0 11/11/2020    GFRAA >60 11/13/2020    LABGLOM >60 11/13/2020    GLUCOSE 119 11/13/2020    PROT 9.1 11/10/2020    LABALBU 3.7 11/10/2020    CALCIUM 8.8 11/13/2020    BILITOT 1.3 11/10/2020    ALKPHOS 91 11/10/2020    AST 9 11/10/2020    ALT <5 11/10/2020     Lab Results   Component Value Date    CRP 28.9 (H) 11/10/2020    CRP 10.8 (H) 10/16/2020     Lab Results   Component Value Date    SEDRATE 79 (H) 11/10/2020    SEDRATE 78 (H) 10/16/2020     Radiology:      Microbiology:   11/10 Blood cx 2:2 staph aureus  11/10 foot wound cx MSSA, oxidase + GNR  11/11 foot OR cx MSSA, pseudomonas, corynebacterium    ASSESSMENT:  · Recurrent left diabetic foot infection with osteomyelitis-Pseudomonas and MSSA, no s/p Syme's amputation at ankle with partial excision of distal tib/fib  · Staph aureus bacteremia likely due to osteomyelitis, sensitivities pending  · Type 2 diabetes mellitus, uncontrolled  · Charcot arthropathy  · Essential hypertension  · Leukocytosis - up to 25.9 - reactive postop      PLAN:     · Stop daptomycin  · Repeat blood cx   · Continue Zosyn - consider narrowing to nafcillin for bacteremia as coverage for pseudomonas likely no longer needed s/p amputation  · Monitor labs  · Will follow with you    April [address] [facility],[address]  3:02 PM  11/15/2020     Pt seen and examined. I discussed and co-formulated the plan with advanced practice nurse. Labs, cultures, and radiographs reviewed. Face to Face encounter occurred. Changes made as necessary by me.      Darrell Guerra MD  Infectious Disease

## 2020-11-15 NOTE — OP NOTE
[address] [address]                                OPERATIVE REPORT    PATIENT NAME: Fede Nogueira                  :        1975  MED REC NO:   [id number]                            ROOM:       0533  ACCOUNT NO:   [de-identified]                           ADMIT DATE: 11/10/2020  PROVIDER:     Yolanda Mercer DPM    DATE OF PROCEDURE:  2020    SURGEON:  Yaya Sanchez DPM.    PREOPERATIVE DIAGNOSES:  1. Open wound, left foot. 2.  Acute osteomyelitis, left foot and ankle. 3.  Ulceration, left midfoot with necrosis of muscle. 4.  Insulin-dependent diabetes mellitus with neuropathy. POSTOPERATIVE DIAGNOSES:  1. Open wound, left foot. 2.  Acute osteomyelitis, left foot and ankle. 3.  Ulceration, left midfoot with necrosis of muscle. 4.  Insulin-dependent diabetes mellitus with neuropathy. PROCEDURES PERFORMED:  1. Left foot Syme's amputation at the level of the ankle. 2.  Partial excision of distal tibia and fibula bones. 3.  Dorsalis pedis adipo-myofascial tissue and flap elevation and  transfer. ASSISTANT:  Joshua Truong, PGY-2. ANESTHESIA:  LMAC. ESTIMATED BLOOD LOSS:  200 mL. COMPLICATIONS:  None. HEMOSTASIS:  On the field. SPECIMENS:  Specimens also include left distal tibia and fibula  malleoli. INTRAOPERATIVE FINDINGS:  Severe necrosis of soft tissue and bone,  specifically calcaneus and talus. This is also consistent with initial  surgery and MRI. INDICATIONS:  A pleasant 59-year-old male presented today for left foot  Syme's amputation of the ankle. The patient had initial debridement on  2020. Discussed with him and his family members, specifically  with his wife the urgency behind the procedure and the rationale behind  this procedure as well too and that at this point, he would benefit from  an amputation given the severity of the infection.   The patient  understood this very well. He is agreeable and anxious to go forward  with the proposed surgical plan. DESCRIPTION OF PROCEDURE:  Under mild sedation, the patient was brought  to the operating room and placed on the operating table in supine  position. The left lower extremity was scrubbed, prepped, and draped in  the usual sterile fashion. At this time, using a #10 blade, I performed  a linear incision in the medial aspect of the left ankle, specifically  the medial malleolus. Another similar incision was performed also  laterally on the lateral malleolus, extending incisions staring dorsal,  then horizontally across the midfoot area. Next, a full-thickness  incision from the malleoli all the way to and through the plantar skin  just proximal to the large soft tissue defect of the ulceration. The  rationale behind this was to only excise the necrosed tissue and leave  healthy tissue behind. In doing so, the incision was taken all the way  to the level of the bone. Sharp dissection was then performed to  disarticulate the foot, specifically at the tibiotalar joint. I was  able to successfully do so. At this point, I was able to visualize the  talus. Severe necrosis was noted at the talus and the calcaneus as  well. With sharp dissection, I was able to sharply excise and  disarticulate the calcaneus from the soft tissue attachments. Severe  necrosis was again consistently noted at this time and also clear  evidence of Charcot neuroarthropathy in this joints, but also more  importantly severe necrosis of the bone and the soft tissue surrounding  this. I was able to then disarticulate the calcaneus from its soft  tissue attachments, tendinous attachments, specifically Achilles tendon,  and other medial and lateral muscle, tendinous, and ligamentous  attachments.   Upon doing so, I was able to then remove the foot from the  surgical site and disarticulate the foot completely at level of the  ankle, removed from surgical site in toto for pathological examination. At this point, I irrigated the area with pulsed lavage system, 1 liter  of normal sterile saline was used. Post irrigation, then using sharp  dissection, I was able to expose the malleoli, which were then resected  in an oblique fashion. Using a reciprocating saw, distal tibia, medial  malleoli, and distal fibula, lateral malleoli were resected in an  oblique fashion. Bone was excised and removed from the surgical site in  toto for pathological and microbiologic examination. The rationale  behind this was to remove and excise any potential pressure points to  ease the patient into fitting for bracing purposes. At this point,  while my assistant had careful retraction of the dorsal skin, I was able  to separate the dorsalis pedis adipo-myofascial tissue from the  subcutaneous layer and dissect this from inferior to superior, distal to  proximal, allowing also elevating that layer of adipo-myofascial tissue  from the periosteum of the distal tibia and fibula. In doing so, I was  able to successfully elevate the flap and cover the articular surface of  the tibia, secured this using 2-0 Vicryl suture. The rationale behind  this was to provide area of further vasculature to the flap and also to  protect the vital structures, specifically osseous structures from any  potential breakdown. This was secured using 2-0 Vicryl suture. At this  point, I was then able to reapproximate the flap. Prior to doing so,  the medial and lateral tendons were placed on their distal traction and  resected proximally to remove any potential site of necrosis and  infection. Irrigation of the area again with pulsed lavage system,  another 2 liters of normal sterile saline was used. Post irrigation, I  was able to reapproximate the flap, close using 3-0 nylon suture. Excellent closure was noted at this time.   Prior to complete closure of  the flap, I did introduce a RENALDO drain with a 100 mL capability. Drain  was successfully attached and secured using 3-0 nylon suture. Postoperative bandage was then applied with modified bandaging to  accommodate for the flap. Excellent closure was noted. Successful  postoperative bandage was applied. The patient tolerated the procedure and anesthesia well in apparent  satisfactory condition with vital signs stable and vascular status  intact to the left lower extremity. The patient was then transferred to  the PACU for further monitoring prior to readmission to the nursing  floor.         Inelsarmad Pederson DPM    D: 11/14/2020 13:29:32       T: 11/14/2020 15:12:36     [email or web address]  Job#: [id number]     Doc#: [id number]    CC:

## 2020-11-15 NOTE — PLAN OF CARE
Problem: Falls - Risk of:  Goal: Will remain free from falls  Description: Will remain free from falls  Outcome: Met This Shift     Problem: INFECTION  Goal: Absence of urinary tract infection signs and symptoms  Description: Absence of urinary tract infection signs and symptoms  Outcome: Met This Shift     Problem: Pain:  Goal: Pain level will decrease  Description: Pain level will decrease  Outcome: Met This Shift     Problem: Skin Integrity:  Goal: Will show no infection signs and symptoms  Description: Will show no infection signs and symptoms  Outcome: Met This Shift

## 2020-11-15 NOTE — PROGRESS NOTES
Department of Podiatry  Progress Note    SUBJECTIVE:  53 y/o male patient seen bedside for 1 day post-op of L Syme's amputation . No acute events overnight. Patient denies any N/V/D/F/C/SOB/CP. Pt states that he has some mild intermittent 3/10 throbbing pain to the L surgical site, but no pedal complaints otherwise. States overall he feels well for being 1 day post-op of an amputation. OBJECTIVE:    Scheduled Meds:   insulin lispro  5 Units Subcutaneous TID WC    enoxaparin  40 mg Subcutaneous Daily    insulin lispro  0-6 Units Subcutaneous TID WC    insulin lispro  0-3 Units Subcutaneous Nightly    insulin glargine  18 Units Subcutaneous Nightly    sodium chloride flush  10 mL Intravenous BID    atorvastatin  5 mg Oral Nightly    metFORMIN  500 mg Oral Daily with breakfast    piperacillin-tazobactam  3.375 g Intravenous Q8H    daptomycin (CUBICIN) IVPB  600 mg Intravenous Q24H     Continuous Infusions:   dextrose      dexmedetomidine (PRECEDEX) IV infusion Stopped (11/11/20 0130)    sodium chloride Stopped (11/15/20 1130)     PRN Meds:.glucose, dextrose, glucagon (rDNA), dextrose, acetaminophen    Allergies   Allergen Reactions    Vancomycin Other (See Comments)     Red man syndrome    Semaglutide Itching and Rash     Pink itch rash         BP (!) 146/79   Pulse 70   Temp 97.5 °F (36.4 °C) (Oral)   Resp 18   Ht 6' 3\" (1.905 m)   Wt 280 lb (127 kg)   SpO2 95%   BMI 35.00 kg/m²       EXAM:    VASCULAR:  DP and PT pulses are palpable. CFT < 5 seconds B/L. Warm to warm from the tibial tuberosity to the distal aspect of the digits dorsally. Hair growth noted to the distal aspects dorsally. NEUROLOGIC:  Protective sensation is diminished by grossly intact    DERM:  Skin is intact and well hydrated to the bilateral lower extremities. No Edema, No Erythema, No Hyperkeratotic tissue notes. Webspaces 1-4 b/l are C/D/I.     MUSCULOSKELETAL:  5/5 Gross Muscle strength in all 4 quadrants         Scheduled Meds:   insulin lispro  5 Units Subcutaneous TID WC    enoxaparin  40 mg Subcutaneous Daily    insulin lispro  0-6 Units Subcutaneous TID WC    insulin lispro  0-3 Units Subcutaneous Nightly    insulin glargine  18 Units Subcutaneous Nightly    sodium chloride flush  10 mL Intravenous BID    atorvastatin  5 mg Oral Nightly    metFORMIN  500 mg Oral Daily with breakfast    piperacillin-tazobactam  3.375 g Intravenous Q8H    daptomycin (CUBICIN) IVPB  600 mg Intravenous Q24H     Continuous Infusions:   dextrose      dexmedetomidine (PRECEDEX) IV infusion Stopped (11/11/20 2946)    sodium chloride Stopped (11/15/20 2124)     PRN Meds:.glucose, dextrose, glucagon (rDNA), dextrose, acetaminophen    RADIOLOGY:  MRI ANKLE LEFT WO CONTRAST   Final Result   Large area of ulceration with suspected underlying osteomyelitis of anterior   portions of both the talus and calcaneus. Severe destructive changes within the midfoot may be related to chronic   osteomyelitis, neuropathic joint, or a combination. XR ANKLE RIGHT (MIN 3 VIEWS)   Final Result   No acute fractures or osseous destructive process noted. Osseous structures   are in alignment. VL LOWER EXTREMITY ARTERIAL SEGMENTAL PRESSURES W PPG   Final Result   No evidence of peripheral arterial disease. XR CHEST PORTABLE   Final Result   No acute abnormality. XR FOOT LEFT (MIN 3 VIEWS)   Final Result   Ongoing plantar and lateral midfoot ulcer with underlying acute (presumably   on chronic) osteomyelitis superimposed on neuropathic foot.            BP (!) 146/79   Pulse 70   Temp 97.5 °F (36.4 °C) (Oral)   Resp 18   Ht 6' 3\" (1.905 m)   Wt 280 lb (127 kg)   SpO2 95%   BMI 35.00 kg/m²     LABS:    Recent Labs     11/13/20  1225   WBC 16.0*   HGB 12.1*   HCT 40.4   *        Recent Labs     11/13/20  1225      K 3.8      CO2 24   BUN 22*   CREATININE 1.1        Recent Labs 11/13/20  1225   INR 1.1         ASSESSMENT:  Principal Problem:    Diabetic foot infection ([facility]  Active Problems:    Charcot foot due to diabetes mellitus ([facility]    Diabetic neuropathy associated with diabetes mellitus due to underlying condition ([facility]    Essential hypertension    Diabetic foot ulcer with osteomyelitis ([facility]      PLAN:  - Patient was examined and evaluated at bedside. Pt is 1 day post-op of a L Syme's amputation.   - Reviewed patient's recent lab results and pertinent diagnostic imaging. An updated CBC w/o diff was ordered. - Reviewed ancillary service notes. - Pain Control: IV and PO  - ABX per ID- pt on Daptomycin and Zosyn. - Dressing: L wound vac application is clean, dry, and intact. -   - Discharge: TBD  - Discussed patient with Dr. Benji Andrew  - Will continue to follow patient while they are in-house.

## 2020-11-16 LAB
METER GLUCOSE: 105 MG/DL (ref 74–99)
METER GLUCOSE: 113 MG/DL (ref 74–99)
METER GLUCOSE: 149 MG/DL (ref 74–99)
METER GLUCOSE: 162 MG/DL (ref 74–99)
METER GLUCOSE: 88 MG/DL (ref 74–99)

## 2020-11-16 PROCEDURE — 2580000003 HC RX 258: Performed by: PODIATRIST

## 2020-11-16 PROCEDURE — 6370000000 HC RX 637 (ALT 250 FOR IP): Performed by: PODIATRIST

## 2020-11-16 PROCEDURE — 6360000002 HC RX W HCPCS: Performed by: INTERNAL MEDICINE

## 2020-11-16 PROCEDURE — 6360000002 HC RX W HCPCS: Performed by: PODIATRIST

## 2020-11-16 PROCEDURE — 1200000000 HC SEMI PRIVATE

## 2020-11-16 PROCEDURE — 99233 SBSQ HOSP IP/OBS HIGH 50: CPT | Performed by: INTERNAL MEDICINE

## 2020-11-16 PROCEDURE — 82962 GLUCOSE BLOOD TEST: CPT

## 2020-11-16 PROCEDURE — 99232 SBSQ HOSP IP/OBS MODERATE 35: CPT | Performed by: INTERNAL MEDICINE

## 2020-11-16 PROCEDURE — 6370000000 HC RX 637 (ALT 250 FOR IP): Performed by: INTERNAL MEDICINE

## 2020-11-16 RX ADMIN — Medication 2 G: at 20:42

## 2020-11-16 RX ADMIN — INSULIN LISPRO 1 UNITS: 100 INJECTION, SOLUTION INTRAVENOUS; SUBCUTANEOUS at 20:44

## 2020-11-16 RX ADMIN — SODIUM CHLORIDE: 9 INJECTION, SOLUTION INTRAVENOUS at 09:37

## 2020-11-16 RX ADMIN — SODIUM CHLORIDE, PRESERVATIVE FREE 10 ML: 5 INJECTION INTRAVENOUS at 20:42

## 2020-11-16 RX ADMIN — SODIUM CHLORIDE, PRESERVATIVE FREE 10 ML: 5 INJECTION INTRAVENOUS at 08:43

## 2020-11-16 RX ADMIN — PIPERACILLIN SODIUM AND TAZOBACTAM SODIUM 3.38 G: 3; .375 INJECTION, POWDER, LYOPHILIZED, FOR SOLUTION INTRAVENOUS at 14:30

## 2020-11-16 RX ADMIN — METFORMIN HYDROCHLORIDE 500 MG: 500 TABLET ORAL at 08:40

## 2020-11-16 RX ADMIN — INSULIN GLARGINE 18 UNITS: 100 INJECTION, SOLUTION SUBCUTANEOUS at 20:43

## 2020-11-16 RX ADMIN — SODIUM CHLORIDE: 9 INJECTION, SOLUTION INTRAVENOUS at 18:30

## 2020-11-16 RX ADMIN — ENOXAPARIN SODIUM 40 MG: 40 INJECTION SUBCUTANEOUS at 08:41

## 2020-11-16 RX ADMIN — ATORVASTATIN CALCIUM 5 MG: 10 TABLET, FILM COATED ORAL at 20:49

## 2020-11-16 RX ADMIN — INSULIN LISPRO 1 UNITS: 100 INJECTION, SOLUTION INTRAVENOUS; SUBCUTANEOUS at 16:46

## 2020-11-16 RX ADMIN — INSULIN LISPRO 5 UNITS: 100 INJECTION, SOLUTION INTRAVENOUS; SUBCUTANEOUS at 08:40

## 2020-11-16 RX ADMIN — PIPERACILLIN SODIUM AND TAZOBACTAM SODIUM 3.38 G: 3; .375 INJECTION, POWDER, LYOPHILIZED, FOR SOLUTION INTRAVENOUS at 06:15

## 2020-11-16 ASSESSMENT — PAIN SCALES - GENERAL: PAINLEVEL_OUTOF10: 0

## 2020-11-16 NOTE — DISCHARGE INSTR - COC
Continuity of Care Form    Patient Name: Danny Ferguson   :  1975  MRN:  [id number]    Admit date:  11/10/2020  Discharge date:  20      Code Status Order: No Order   Advance Directives:   Advance Care Flowsheet Documentation       Date/Time Healthcare Directive Type of Healthcare Directive Copy in [address] Agent's Name Healthcare Agent's Phone Number    20 1122  No, patient does not have an advance directive for healthcare treatment -- -- -- -- --    20 0437  No, patient does not have an advance directive for healthcare treatment -- -- -- -- --    20 9310  No, patient does not have an advance directive for healthcare treatment -- -- -- -- --    20 0549  No, patient does not have an advance directive for healthcare treatment -- -- -- -- --    11/10/20 1408  No, patient does not have an advance directive for healthcare treatment -- -- -- -- --            Admitting Physician:  Leeann Sawant MD  PCP: No primary care provider on file. Discharging Nurse: [facility] Unit/Room#: 0838/8016-C  Discharging Unit Phone Number: [phone]    Emergency Contact:   Extended Emergency Contact Information  Primary Emergency Contact: Vergil Qualia  [facility] [address] Phone: [phone]  Mobile Phone: [phone]  Relation: Spouse  Preferred language: English   needed?  No    Past Surgical History:  Past Surgical History:   Procedure Laterality Date    ANKLE FUSION  2020    FOOT AMPUTATION Left 2020    LEFT FOOT SYMES AMPUTATION performed by Yue Abbott DPM at [facility] Left 10/17/2020    LEFT FOOT  INCISION AND DRAINAGE, BONE DEBRIDEMENT AND BIOPSY performed by Marzena Cordova DPM at [facility] Left 2020    LEFT FOOT INCISION AND DRAINAGE APPLICATION OF WOUND VAC POSSIBLE INTEGRA GRAFT performed by Suzan Ayon DPM at [facility] OR       Immunization History:   Immunization History   Administered Date(s) Administered    Influenza, Quadv, IM, PF (6 mo and older Fluzone, Flulaval, Fluarix, and 3 yrs and older Afluria) 10/22/2020       Active Problems:  Patient Active Problem List   Diagnosis Code    Diabetic foot infection ([facility] E11.628, L08.9    Charcot foot due to diabetes mellitus ([facility] E11.610    Diabetic neuropathy associated with diabetes mellitus due to underlying condition ([facility] E08.40    Essential hypertension I10    Diabetic foot ulcer with osteomyelitis ([facility] E11.621, E11.69, L97.509, M86.9       Isolation/Infection:   Isolation            No Isolation          Unreconciled External Infections       Infection Onset Last Indicated Last Received Source    No mapped external infections found      . Unmapped Infections (1)        MRSA 12/05/19 12/05/19 10/15/20                   Patient Infection Status       Infection Onset Added Last Indicated Last Indicated By Review Planned Expiration Resolved Resolved By    None active    Resolved    COVID-19 Rule Out 11/10/20 11/10/20 11/10/20 COVID-19 (Ordered)   11/10/20 Rule-Out Test Resulted            Nurse Assessment:  Last Vital Signs: /83   Pulse 75   Temp 97.6 °F (36.4 °C) (Oral)   Resp 18   Ht 6' 3\" (1.905 m)   Wt 280 lb (127 kg)   SpO2 97%   BMI 35.00 kg/m²     Last documented pain score (0-10 scale): Pain Level: 0  Last Weight:   Wt Readings from Last 1 Encounters:   11/14/20 280 lb (127 kg)     Mental Status:  oriented and alert    IV Access:  - PICC - site  L Upper Arm, insertion date: 11/18/20    Nursing Mobility/ADLs:  Walking   Assisted  Transfer  Assisted  Bathing  Assisted  Dressing  Assisted  Toileting  Assisted  Feeding  Assisted  Med Admin  Assisted  Med Delivery   whole    Wound Care Documentation and Therapy:        Elimination:  Continence:   · Bowel:  Yes  · Bladder: Yes  Urinary Catheter: None Colostomy/Ileostomy/Ileal Conduit: No       Date of Last BM: ***    Intake/Output Summary (Last 24 hours) at 11/16/2020 1130  Last data filed at 11/16/2020 1053  Gross per 24 hour   Intake 500 ml   Output 2280 ml   Net -1780 ml     I/O last 3 completed shifts:  In: -   Out: 6557 [Urine:2275; Drains:115]    Safety Concerns: At Risk for Falls    Impairments/Disabilities:      Amputation - L foot     Nutrition Therapy:  Current Nutrition Therapy:   - Oral Diet:  Carb Control 4 carbs/meal (1800kcals/day)    Routes of Feeding: Oral  Liquids: Thin Liquids  Daily Fluid Restriction: no  Last Modified Barium Swallow with Video (Video Swallowing Test): not done    Treatments at the Time of Hospital Discharge:   Respiratory Treatments: ***  Oxygen Therapy:  is not on home oxygen therapy.   Ventilator:    - No ventilator support    Rehab Therapies: Physical Therapy and Occupational Therapy  Weight Bearing Status/Restrictions: Non-weight bearing on left leg  Other Medical Equipment (for information only, NOT a DME order):  walker  Other Treatments: ***    Patient's personal belongings (please select all that are sent with patient):  {[facility] DME Belongings:[id number]:::0}    RN SIGNATURE:  Electronically signed by Michelle Rayo RN on 11/19/20 at 11:23 AM EST    CASE MANAGEMENT/SOCIAL WORK SECTION    Inpatient Status Date: ***    Readmission Risk Assessment Score:  Readmission Risk              Risk of Unplanned Readmission:        13           Discharging to Facility/ Agency   · Name: [facility]  Address:   [place] [address]         Phone: [phone]        / signature: Electronically signed by THERESA Dunlap on 11/19/20 at 10:10 AM EST    PHYSICIAN SECTION    Prognosis: {Prognosis:[id number]:::0}    Condition at Discharge: Stable    Rehab Potential (if transferring to Rehab): {Prognosis:[id number]:::0}    Recommended Labs or Other Treatments After Discharge: [address] [place] [address] [facility]  ([address])  [address] [facility] [address]  [place], [address]  Phone [phone]   Fax [phone]        Silva Kelley. Luma Paulino MD, MD Tana Lovett MD Sallyann Locks, MD Munir P. Adin Net, MD Virlinda Noon, RN, CNP      Fran Morales, VELIA, CNS      MD Rose Srinivasan MD     STANDING ORDERS (ID Protocol)     1. Visiting nurses are to write the Primary Care Physician and their own call back number on all laboratory requisition forms. a. Abnormal lab values are called to the physician by the nurse and NOT by the laboratory. b. Fax all labs to the office in a timely manner, during office hours. All faxes should include nurses name and call back number. 2. Vascular Access Devices or VADs (TLC, PICC, Midline, etc) will be replaced as necessary. a. Draw all blood work from VADs, except for drug levels. b. If unable to access a VAD, insert a peripheral catheter temporarily. Contact the Primary Care Physician or [facility] office for surgical referral.  c. Use tPA (Ruchi Primus) as per agency protocol to restore patency of VAD. d. Remove VAD upon completion of IV antibiotics, unless otherwise specified by the ordering physician.  i. If VAD cannot be removed, schedule appointment at office for removal.  3. Notify ordering physician or office if patient requires admission to the hospital with reason for admission. 4. Discontinue all blood work upon completion of IV antibiotics, unless otherwise specified by ordering physician. 5. Notify ordering physician if the patient does not receive the scheduled antibiotic for 24 hours or more. 6. The Pharmacy and [address] [place] [address] may adjust the timing of the infusion and blood work to accommodate the patients home care conditions.   7. When PICC or VAD is to be removed, documentation of length of inserted PICC. PICC or VAD length is to correlate with inserted length and sent to physician at the time of removal.  8. Give the patient a list of antibiotics being administered with:  a. Drug name  b. Route  c. Frequency  d. Start/Stop date      ROUTINE LABS TO BE DRAWN/ORDERED:  1. Twice weekly (preferably every Monday & Thursday):  a. BUN Creatinine and liver panel  b. Complete Blood Count with differential (CBC with dif)  2. Once weekly:  a. C-Reactive Protein (not high sensitivity CRP)  b. Erythrocyte/Westergren Sedimentation Rate (WSR or ESR)  c. Total CPK for patients on Daptomycin (Cubicin®). Obtain CPK more often if the patient is experiencing muscle weakness or myalgias. 3. Clinical Pharmacist is to adjust Vancomycin and Aminoglycosides unless otherwise indicated. a. Draw Vancomycin trough 30 minutes before the third dose  i. After starting drug, or   ii. After the dosing or interval is changed. 1. If the trough level is between 5 and 20 continue dose as ordered. b. Draw troughs twice weekly thereafter until troughs are stable (i.e. until trough is between 5 and 20 mcg/ml for two consecutive laboratory values). i. Once stable check troughs once weekly or every third dose. c. Please do not call physician unless the trough is < 5 or >20.  i. If the trough is <20 continue dosing as ordered. ii. If the trough is >20 call the office for further orders. Do not hold the dose while waiting for the trough result. 4. Amingoglycosides (e.g. Gentamicin, Tobramycin and Amikacin) peaks and troughs should be drawn twice weekly (preferably on Mondays and Thursdays) or every third dose. a. Aminoglycoside peaks are not to be drawn if patient on Once-Daily Dosing (ODD). b. Call physician or office if the trough is:  i. >1 for gentamicin,   ii. >2 for tobramycin, or   iii. >5 for amikacin  5. When clinically indicated obtain:  a. Urine culture.  If the patient has a fever with purulent drainage from Padilla or suprapubic catheter, or foul smelling urine. Do not irrigate a clogged Padilla catheter. Replace it.  b. Blood cultures and Wound Gram stain with culture & sensitivity. If the patient has a fever or increasing drainage or foul odor from a wound. Notify the treating physician in a timely manner  c. Stool specimen. If diarrhea occurs while on antibiotics, send stools for C. difficile and WBCs. 6. When a drug is discontinued due to a low white blood cell count (WBCs) draw two consecutive CBC with differential and CMP. ALLERGIC OR ADVERSE REACTIONS TO MEDICATIONS  1. Mild reaction: (itching, with or without rash):  a. Administer Benadryl 50mg po x 1, then 25mg po q6h prn.   b. Notify office or physician in a timely matter. 2. Moderate reaction (itching with or without rash and/or wheezing, dyspnea, itchy throat):  a. Administer Benadryl 50 mg IV push x 1.   b. Notify office or physician in a timely manner. 3. Severe reaction i.e. Anaphylaxis (wheezing or stidor, sudden rash, lightheadedness, hypotension):  a. Administer epinephrine subcutaneous 0.3mg (1:1000) x 1 dose. b. May repeat twice every 5 minutes if needed. c. Call EMS and notify office or physician immediately. 4. For all above reactions: administer Solu-Cortef 100mg slow IV push x 1. VASCULAR ACCESS DRESSING CHANGE PROTOCOL  1. Cleanse insertion site with Shur-Clens® or equivalent three times. 2. Secure catheter with Steri-Strips®, Bone®, or equivalent securing device. 3. Apply Opsite® 3000 or equivalent transparent dressing. 4. Change dressing twice weekly, maintaining sterile technique. If there is a BioPatch®, SilverSite® or equivalent, change once weekly only or as needed. FOLLOW-UP VISITS  1. Nursing staff should call the office during business hours to schedule a follow-up appointment once the patient is admitted to the service or facility. Every effort should be made to have patient follow-up within 2 weeks of discharge.   2. Continue IV antibiotic therapy until patient is seen in the office or unless specific stop date is noted on the original order or unless otherwise ordered by physician. 3. Call office to ensure stop date is correct before stopping antibiotics. Vini Corado. MD Marguerite Dugan MD Mozelle Oddi, MD Javan Minerva, MD Munir P. Margurite Malling, MD     Physician Certification: I certify the above information and transfer of Max Joshi  is necessary for the continuing treatment of the diagnosis listed and that he requires Acute Rehab for less 30 days.      Update Admission H&P: {[facility] DME Changes in HandP:[id number]:::0}    PHYSICIAN SIGNATURE:  {Esignature:[id number]:::0}

## 2020-11-16 NOTE — PROGRESS NOTES
Acute Rehab Pre-Admission Screen      Referral date: 11/12/20  Onset/Hospital Admit Date: 11/10/2020 12:53 AM    Current Location: [address] [place] [address]    Name: Meghan Chaudhary  YOB: 1975  Age: 54 y.o. Admitting Diagnosis: left BKA   Address: [address] Phone: [phone] (home)  Social Security #:     Sex: male  Race:   Marital Status:    Ethnic/Cultural/Druze Considerations: Islam    Advanced Directives: [x] Full Code  [] [address] [] Medications only       [] Living Will  [] DPOA      []Organ donor      [] No mechanical breathing or ventilation     [] no tube feeding, nutrition or hydration      [x] Patient does not have advanced directives or living will         COVERAGE INFORMATION   Primary Insurer: pending medicaid    Medicaid #:   Verified coverage: [] Patient  [] Family/caregiver    [x] financial department [] insurance carrier    COVID SCREEN DATE: 11/17/20 Result: negative      MEDICAL UPDATE:  History of present admission: 54year old male with DM to ED with DM foot wound. Osteomyelitis noted in bone. Charcot arthopathy. Couldn't afford outpatient antibiotics for foot wound. 11/11- s/p I&D by podiatry  11/14- symes ankle amputation by podiatry. Will need 6 weeks IV abx. RENALDO drain placed  11/16- cont IV abx. Pending post op therapy evals to determine placement. PICC placement ordered. 11/18- PICC placed. Tolerating therapy    PHYSICIAN / REFERRAL INFORMATION  Referring Physician: Ga Goyal MD  Attending Physician: Ana Luisa Polo*  Primary Care Physician: No primary care provider on file.   Consultants/Opinions (see full consult notes on chart): podiatry- foot wound  gen surg- BKA  ID- osteomyelitis    SOCIAL INFORMATION  Primary  Contact: Megan Leschak  Relationship: wife  Primary Phone: [phone]      Previous Community Services: none  Caregiver available: [x] Yes [] No Hours per day available: tbd  Patient previously employed:  [] Yes [] Part Time [] Full Time [x] No [] Retired  Occupation/Profession: unemployed  Prior living arrangements: [x] Home  [] Assisted living  [] SNF [] Other  Lived with:  [] Alone  [x] Spouse  [] Family  [] Other  Lived with: wife  Contact phone: see above  Home:  2nd floor apartment   2 entry steps  Rails: 1  Steps:  7 to 2nd floor  Rails:  2   Bedroom: [x] 1st floor  [] 2nd floor  Upon entering apartment  Bathroom:  [x] 1st floor  [] 2nd floor    Prior Functional Level: Independent for: all, drove  Assistance for:   Dependent for:   Dominant hand: [x] Right  [] Left    Previous Home Equipment:  [] Cane [] Grab bars [] Orthotic / prosthetic   [] Shower chair [] Tub bench  [] 3-in-1 Commode [] Long handle sponge   [] Oxygen [] Sock aide  [] Wheelchair  [] motorized wc/scooter  [] Wheelchair cushion   [] Crutches [] Long handle shoehorn  [] Reachers [] Toilet seat elevator [] Rollator  [] Walker(wheeled)   [] Walker(standard) [] Mechanical lift    [x] None of the above     Has patient had 2 or more falls in the past year or any fall with injury in the past year? [] yes   [x] no   []unknown    Has patient had major surgery during past 100 days prior to admission? [x] yes   [] no Type/ Date: 11/14/20- left foot amputation.      Surgical History:  Past Surgical History:   Procedure Laterality Date    ANKLE FUSION  02/2020    FOOT AMPUTATION Left 11/14/2020    LEFT FOOT SYMES AMPUTATION performed by Indra Townsend DPM at [place] [address] Left 10/17/2020    LEFT FOOT  INCISION AND DRAINAGE, BONE DEBRIDEMENT AND BIOPSY [facility] Left 11/11/2020    LEFT FOOT INCISION AND DRAINAGE APPLICATION OF WOUND VAC POSSIBLE INTEGRA GRAFT performed by Indra Townsend DPM at [facility] PICC LINE INSERTION NURSE  11/18/2020            Past Medical:  Past Medical History:   Diagnosis Date    Charcot foot due to diabetes mellitus ([facility]  Diabetes mellitus ([facility]     Diabetic foot ulcer with osteomyelitis ([facility] 11/11/2020    Hypertension        Current Co-morbidities:  [] Alzheimer's   [] Dysphasia     [] Parkinsonism  [] Amputation   [] GERD  [] Peripheral artery disease   [] Anemia      [] Encephalopathy  [] Peripheral vascular disease  [] Anxiety   [] Gangrene   [] Pneumonia  [] Aphasia   [] Gout    [] Polyneuropathy  [] Asthma   [] Heart Failure (diastolic) [] Post-polio syndrome  [] Atrial fibrillation  [] Heart Failure (left-sided) [] Pseudomonas enteritis   [] Blind    [] Heart Failure (right-sided) [] Pulmonary embolism  [] Cellulitis     [] Heart Failure (systolic) [] Renal dialysis  [] Clostridium difficile  [] Hemiparesis   [] Renal failure  [] Congestive heart failure [] Hypertension   [] Rheumatoid arthritis  [] COPD   [] Hypotension   [] Seizure disorder   [] Coronary Artery Disease [] Hypothyroidism  [] Septicemia   [] Deaf    [] Hyperlipidemia   [] Sleep apnea  [] Depression   [] Morbid obesity  [] Spinal cord injury  [x] Diabetes   [] MRSA   [] Stroke  [] Diabetic nephropathy  [] Myocardial infarction  [] Tracheostomy  [x] Diabetic neuropathy  [] Osteoarthritis  [] Traumatic brain injury   [] Diabetic retinopathy  [] Osteoporosis   [] Urinary tract infection  [] DVT    [] Pancytopenia  [] Vocal cord paralysis  []  Spinal stenosis   []  kidney disease [] VRE  [x] Post op L foot amputation  [x] osteomyelitis  []        Medical/Functional Conditions requiring inpatient rehabilitation: Requires multidisciplinary treatment including PM&R physician daily care, 24 hour rehabilitation nursing, physical therapy, occupational therapy, rehabilitation psychology, recreation therapy and rehabilitation social work, nutrition services due to new deficits     Risk for Medical/Clinical Complications: Falls, injury, pain, skin breakdown, abnormal vitals, abnormal labs, DVT, PE, pneumonia, decreased mobility, neuro changes     CLINICAL DATA: Height : 6'3     Weight:  280 lbs   BMI: 35.00       Date: 11/16/20 Date: 11/19/20 Date:    temperature 97.6 98.4    pulse 75 80    respirations 18 18    Blood pressure 130/83 123/76    Pulse oximeter 95% room air 98% room air       ALLERGIES: Vancomycin and Semaglutide    DIET : DIET CARB CONTROL; Carb Control: 4 carbs/meal (approximate 1800 kcals/day)    Current Lab and Diagnostic Tests:   Recent Results (from the past 24 hour(s))   POCT Glucose    Collection Time: 11/18/20 11:20 AM   Result Value Ref Range    Meter Glucose 129 (H) 74 - 99 mg/dL   POCT Glucose    Collection Time: 11/18/20  3:52 PM   Result Value Ref Range    Meter Glucose 102 (H) 74 - 99 mg/dL   POCT Glucose    Collection Time: 11/18/20  9:34 PM   Result Value Ref Range    Meter Glucose 126 (H) 74 - 99 mg/dL   POCT Glucose    Collection Time: 11/19/20  5:49 AM   Result Value Ref Range    Meter Glucose 123 (H) 74 - 99 mg/dL     Xr Ankle Right (min 3 Views)  Result Date: 11/10/2020  No acute fractures or osseous destructive process noted. Osseous structures are in alignment. Xr Foot Left (min 3 Views)  Result Date: 11/10/2020  Ongoing plantar and lateral midfoot ulcer with underlying acute (presumably on chronic) osteomyelitis superimposed on neuropathic foot. Xr Chest Portable  Result Date: 11/10/2020  No acute abnormality. Mri Ankle Left Wo Contrast  Result Date: 11/10/2020  Large area of ulceration with suspected underlying osteomyelitis of anterior portions of both the talus and calcaneus. Severe destructive changes within the midfoot may be related to chronic osteomyelitis, neuropathic joint, or a combination. Vl Lower Extremity Arterial Segmental Pressures W Ppg  Result Date: 11/10/2020  No evidence of peripheral arterial disease.        Additional labs or diagnostic studies needed before admission to rehabilitation unit:  none    Medications:   heparin flush  3 mL Intravenous 2 times per day    levoFLOXacin  750 mg Oral Tobacco  [] Alcohol  [] Other     [] Ethnic  [] Cultural  [] Spiritual  [] Language [] Needs  [] Other than English  [] Hearing Impaired  [] Visually Impaired  [] Speaking Impaired  [] Blind  [] Special equipment:  [] Devices/Splints  [] Type   [] Brace   [] Type  [] Bariatric bed  [] Extra wide commode  [] Extra wide wheelchair [] Extra wide walker  [] Mac walker  [] Mac wheelchair  [] Transfer lift    [] Other equipment     FUNCTIONAL STATUS PT / Kanika Julieth / Tamiko Rosas:  Flaco Arroyo / BERNADETTE Denton Initial: 11/17/20 Follow Up: 11/18/20 Current:    Eating OT Independent nt    Grooming OT Set up nt    Bathing OT nt nt    Dressing Upper Extremity OT Stand by Assist nt    Dressing Lower Extremity OT Moderate Assist Moderate Assist    Toileting OT Minimum assistance Moderate Assist    Toilet Transfers OT nt Moderate Assist    Tub/Shower Transfers OT nt     Homemaking OT nt     Bed Mobility PT Stand by Assist Supervision    Bed/Wheelchair Transfers PT Minimum assistance Moderate Assist    Locomotion Walk / Wheelchair  Device:  Distance: PT 20 ft with [address] Moderate Assist 3 ft with [address] Moderate Assist   NWB LLE    Endurance PT fair fair    Expression SP  nt    Social Interaction SP  nt    Problem Solving SP  nt    Memory SP      Comprehension SP  nt    Swallowing SP  nt    Bowel Management NSG  continent    Bladder Management NSG  continent      Comments on Functional Status: Able to tolerate 3 hours of therapy a day. LLE amputation impacting mobility and balance.      [x] Able to participate a minimum of 3 hours per day of therapy intervention    Required treatments/services: [x] Rehabilitation nursing [] Dietitian / nurtition                 [x] Case management  [] Respiratory Therapy      [x] Social work   [] Other     Required Therapy:  Therapy Hours per Day Days per Week Therapeutic Interventions Required   [x] Physical Therapy 1.5 5-7 Gait, transfers, Safety, strength, education, endurance   [x] Occupational Therapy 1.5 5-7 ADLs, IADLs, Safety, strength, education, endurance     [] Speech Pathology      [] Prosthetics / Orthotics       []         Anticipated Discharge Plan:   Anticipated DME Needs:  [x] Home     [] Commode   [] Alone    [] Wheelchair   [] Supervised    [] Walker   [x] Assist    [] Oxygen        [] Hospital Bed  [] Assisted Living    [] Ramp        [x] To Be Determined    Anticipated Home Health Services:  Anticipated Outpatient Services:  [] PT       [] PT  [] OT      [] OT  [] Speech     [] Speech  [] Nursing     [] Dialysis  [] Aide      [x] To Be Determined  [x] To Be Determined    Anticipated support group:  [x] Amputation  [] Multiple Sclerosis  [] Stroke  [] Brain Injury  [] Spinal cord injury  [] Other     Barriers to discharge: Impaired mobility, impaired self care    Discharge Support: [] Patient lives alone and does not have a caregiver available     [x] Patient has a caregiver available     [] Discharge plan has been verified with patient's caregiver      [] Caregiver is in agreement with the discharge plan     Expected functional status for safe discharge: Modified indendent to minimal assist    Patient/support person goals: go home    Expected length of stay: 1-2 weeks    Discussed expected length of stay and agreeable to IRF plan: [x] Yes   [] No    Impairment Group Category: 5.4    Etiological Diagnosis: Symes amputation, osteomyelitis    Primary Rehabilitation Diagnosis: LLE amputation    Electronically signed by Andriy Kumar RN on 11/19/2020 at 9:53 AM    Prescreen completed __________________________________ (signature of prescreener)    Date:   11/19/20 Time:  1000    JUSTIFICATION FOR ADMISSION TO ACUTE REHABILITATION:  Patient has suffered decline in functional abilities for gait, transfers, ADL's and IADL's as well as endurance. Patient has functional deficits requiring intensive therapy across multiple disciplines in order to return home safely.  Patient will need physician oversight for respiratory issues, abnormal vital signs, nutritional and hydration status, safety issues, medications and therapy modalities. PT, OT  will work on deficits as noted in evaluations. Case management and social work will provide services for DME and management of a safe discharge home. RECOMMEND LEVEL OF CARE  Recommend inpatient rehabilitation: [x] Yes   [] No  If no indicate reason:  [] Functional level too high  [] Unmotivated  [] No insurance carrier approval [] Unlikely to return to community  [] No medical necessity  [] Patient or family chose other facility  [] Too medically complex  [] Inadequate discharge plan  [] Rehabilitation bed unavailable [] Functional level too low  [] patient or family refused ARU    If patient not accepted for IRF admission, recommended level of care:  [] [address]  [] [address]  [] [place]   [] Home Care  [] Other      [] LTAC       Physician Assigned:  [] Dr. Marisela Trent         [x] Dr. Aba Ricci              [] Dr. Lamine Mckeon [] Dr. Ricki Magana  [] Dr. Cody Andrews (if not admitted within 48 hours of initial pre-screen)    Medical Update/Changes: Prescreen updated to reflect current data since initiated. Functional Update/Changes: Therapy notes updated on prescreen graph to reflect current status.     Reviewer Signature:_____________________________________    Date:  11/19/20 Time: 1000    PHYSICIAN ADMISSION DETERMINATION AND REVIEW UPDATE:     ____________________________________________________________________  ____________________________________________________________________  ____________________________________________________________________  ____________________________________________________________________  ____________________________________________________________________    Physician Signature:_____________________________________    Print

## 2020-11-16 NOTE — PROGRESS NOTES
[address] [place] [address] [facility]  Progress Note      Chief Complaint   Patient presents with    Fever     102.6 at home    Cough    Wound Check     open wound left foot       SUBJECTIVE:      Patient is tolerating medications. No reported adverse drug reactions. No problems overnight. Tolerated surgery well, no complications. Review of systems:    As stated above in the chief complaint, otherwise negative. Medications:    Scheduled Meds:   insulin lispro  5 Units Subcutaneous TID WC    enoxaparin  40 mg Subcutaneous Daily    insulin lispro  0-6 Units Subcutaneous TID WC    insulin lispro  0-3 Units Subcutaneous Nightly    insulin glargine  18 Units Subcutaneous Nightly    sodium chloride flush  10 mL Intravenous BID    atorvastatin  5 mg Oral Nightly    metFORMIN  500 mg Oral Daily with breakfast    piperacillin-tazobactam  3.375 g Intravenous Q8H     Continuous Infusions:   dextrose      dexmedetomidine (PRECEDEX) IV infusion Stopped (20 0743)    sodium chloride 12.5 mL/hr at 20 0937     PRN Meds:glucose, dextrose, glucagon (rDNA), dextrose, acetaminophen  Prior to Admission medications    Medication Sig Start Date End Date Taking? Authorizing Provider   piperacillin-tazobactam (ZOSYN) infusion Infuse 3.375 g intravenously every 8 hours Compound per protocol.  20 Yes Gonzales Bender,    insulin glargine (LANTUS) 100 UNIT/ML injection vial Inject 15 Units into the skin nightly   Yes Historical Provider, MD   atorvastatin (LIPITOR) 10 MG tablet Take 5 mg by mouth nightly    Yes Historical Provider, MD   metFORMIN (GLUCOPHAGE) 500 MG tablet Take 500 mg by mouth daily (with breakfast)   Yes Historical Provider, MD       OBJECTIVE:  /83   Pulse 75   Temp 97.6 °F (36.4 °C) (Oral)   Resp 18   Ht 6' 3\" (1.905 m)   Wt 280 lb (127 kg)   SpO2 97%   BMI 35.00 kg/m²   Temp  Av.6 °F (36.4 °C)  Min: 97.6 °F (36.4 °C)  Max: 97.6 °F (36.4 °C)  General appearance: Resting in bed in no apparent distress. Skin: Warm and dry. No rashes were noted. HEENT: Round and reactive pupils. Moist mucous membranes. No ulcerations or thrush. Neck: Supple to movements. Chest: No use of accessory muscles to breathe. Symmetrical expansion. No wheezing, crackles or rhonchi. Cardiovascular: Reguar rate and rhythm. No murmurs gallops, or rubs appreciated. Abdomen: Bowel sounds present, nontender, nondistended, no masses or hepatosplenomegaly. Extremities: Dressing and wound VAC left foot clean and dry.   Lines: peripheral    I/O last 3 completed shifts:  In: -   Out: 2390 [Urine:2275; Drains:115]      Laboratory and Tests Review:      Lab Results   Component Value Date    WBC 25.9 (H) 11/15/2020    WBC 16.0 (H) 11/13/2020    WBC 15.8 (H) 11/12/2020    HGB 9.4 (L) 11/15/2020    HCT 31.3 (L) 11/15/2020    MCV 82.8 11/15/2020     (H) 11/15/2020     Lab Results   Component Value Date    NEUTROABS 12.93 (H) 11/13/2020    NEUTROABS 14.23 (H) 11/12/2020    NEUTROABS 12.35 (H) 11/11/2020     No results found for: [facility]  Lab Results   Component Value Date    ALT <5 11/10/2020    AST 9 11/10/2020    ALKPHOS 91 11/10/2020    BILITOT 1.3 (H) 11/10/2020     Lab Results   Component Value Date     11/13/2020    K 3.8 11/13/2020     11/13/2020    CO2 24 11/13/2020    BUN 22 11/13/2020    CREATININE 1.1 11/13/2020    CREATININE 1.0 11/12/2020    CREATININE 1.0 11/11/2020    GFRAA >60 11/13/2020    LABGLOM >60 11/13/2020    GLUCOSE 119 11/13/2020    PROT 9.1 11/10/2020    LABALBU 3.7 11/10/2020    CALCIUM 8.8 11/13/2020    BILITOT 1.3 11/10/2020    ALKPHOS 91 11/10/2020    AST 9 11/10/2020    ALT <5 11/10/2020     Lab Results   Component Value Date    CRP 28.9 (H) 11/10/2020    CRP 10.8 (H) 10/16/2020     Lab Results   Component Value Date    SEDRATE 79 (H) 11/10/2020    SEDRATE 78 (H) 10/16/2020       Radiology:    MRI ANKLE LEFT WO CONTRAST   Final Result Large area of ulceration with suspected underlying osteomyelitis of anterior   portions of both the talus and calcaneus. Severe destructive changes within the midfoot may be related to chronic   osteomyelitis, neuropathic joint, or a combination. XR ANKLE RIGHT (MIN 3 VIEWS)   Final Result   No acute fractures or osseous destructive process noted. Osseous structures   are in alignment. VL LOWER EXTREMITY ARTERIAL SEGMENTAL PRESSURES W PPG   Final Result   No evidence of peripheral arterial disease. XR CHEST PORTABLE   Final Result   No acute abnormality. XR FOOT LEFT (MIN 3 VIEWS)   Final Result   Ongoing plantar and lateral midfoot ulcer with underlying acute (presumably   on chronic) osteomyelitis superimposed on neuropathic foot. Microbiology:   Lab Results   Component Value Date    [facility]  11/10/2020     This isolate is presumed to be resistant based on the  detection of inducible Clindamycin resistance. Clindamycin  may still be effective in some patients. BC 5 Days no growth 10/15/2020    [facility] Pseudomonas aeruginosa 11/11/2020    [facility]G Staphylococcus aureus 11/11/2020    [facility]G Corynebacterium species 11/11/2020     Lab Results   Component Value Date    BLOODCULT2  11/10/2020     Refer to previous culture for susceptibility results  (CXBL RAC collected 11-10-20 at 0128)      BLOODCULT2 5 Days no growth 10/15/2020    ORG Pseudomonas aeruginosa 11/11/2020    ORG Staphylococcus aureus 11/11/2020    ORG Corynebacterium species 11/11/2020     WOUND/ABSCESS   Date Value Ref Range Status   11/10/2020 (A)  Final    Mixed constance also isolated, including:  Mixed Gram negative rods  Including Oxidase positive Gram negative rods     11/10/2020   Final    Heavy growth  This isolate is presumed to be resistant based on the  detection of inducible Clindamycin resistance. Clindamycin  may still be effective in some patients. 10/15/2020   Final    Mixed constance isolated. arthropathy  · Essential hypertension  · Leukocytosis - up to 25.9 - reactive postop     PLAN:      · Stop Zosyn  · Ancef 2 gms IVPB every 8 hours x 6 weeks  · Insert PICC line  · Monitor labs  · Will follow with you    Informed Consent for PICC:  I explained the risks, benefits, alternative options, and potential complications associated with insertion of Peripherally Inserted Central Catheter (PICC) with the patient prior to the procedure.     Electronically signed by Elvis Kirkpatrick DO on 11/16/2020 at 11:29 AM    Elvis Callejas    11:27 AM  11/16/2020

## 2020-11-16 NOTE — PLAN OF CARE
Problem: Falls - Risk of:  Goal: Will remain free from falls  Description: Will remain free from falls  11/16/2020 1055 by Divya Jones RN  Outcome: Met This Shift  11/16/2020 0403 by Ren Velasquez RN  Outcome: Met This Shift     Problem: Pain:  Goal: Pain level will decrease  Description: Pain level will decrease  Outcome: Met This Shift     Problem: Skin Integrity:  Goal: Will show no infection signs and symptoms  Description: Will show no infection signs and symptoms  Outcome: Met This Shift

## 2020-11-16 NOTE — CARE COORDINATION
Pt s/p left foot amp 11/14; await final determination for iv atb's. Will need PICC. Plan is acute rehab VS [facility] ( [facility] or [facility]. await PT/OT evals. Britt Couch. Addendum;both LTAC's; [facility] DO NOT accept pts without  insurance OR pended medicaid. Britt Couch.

## 2020-11-16 NOTE — PROGRESS NOTES
Department of Podiatry  Progress Note    SUBJECTIVE:  Westley Martinez is a 54year old male who was seen at bedside for s/p 2 days for left foot Syme's amputation. Patient denies any severe pain today, only suggesting a throbbing pain to the left anterior ankle, but is surprised at the progress the foot LLE has made over the past 2 days. Patient denies any N/V/D/F/C/SOB/CP    OBJECTIVE:    Scheduled Meds:   insulin lispro  5 Units Subcutaneous TID WC    enoxaparin  40 mg Subcutaneous Daily    insulin lispro  0-6 Units Subcutaneous TID WC    insulin lispro  0-3 Units Subcutaneous Nightly    insulin glargine  18 Units Subcutaneous Nightly    sodium chloride flush  10 mL Intravenous BID    atorvastatin  5 mg Oral Nightly    metFORMIN  500 mg Oral Daily with breakfast    piperacillin-tazobactam  3.375 g Intravenous Q8H     Continuous Infusions:   dextrose      dexmedetomidine (PRECEDEX) IV infusion Stopped (11/11/20 1474)    sodium chloride Stopped (11/16/20 1214)     PRN Meds:.glucose, dextrose, glucagon (rDNA), dextrose, acetaminophen    Allergies   Allergen Reactions    Vancomycin Other (See Comments)     Red man syndrome    Semaglutide Itching and Rash     Pink itch rash         /83   Pulse 75   Temp 97.6 °F (36.4 °C) (Oral)   Resp 18   Ht 6' 3\" (1.905 m)   Wt 280 lb (127 kg)   SpO2 97%   BMI 35.00 kg/m²       EXAM:    VASCULAR:  PT pulses are palpable. CFT < 5 seconds B/L to dorsal ankle. Warm to warm from the tibial tuberosity to the dorsal ankles. No hair growth noted to the distal aspects dorsally. NEUROLOGIC:  Protective sensation is diminished b/l    DERM:  Surgical site is well coapted, sutures are intact. No noted pus, drainage or active bleeding at the time of evaluation. No SOI. Minimal edema noted diffusely throughout the distal LLE. MUSCULOSKELETAL:  5/5 Gross Muscle strength in all 4 quadrants.  Minimal pain on palpation tot he amputation site         Scheduled Meds:   insulin lispro  5 Units Subcutaneous TID     enoxaparin  40 mg Subcutaneous Daily    insulin lispro  0-6 Units Subcutaneous TID     insulin lispro  0-3 Units Subcutaneous Nightly    insulin glargine  18 Units Subcutaneous Nightly    sodium chloride flush  10 mL Intravenous BID    atorvastatin  5 mg Oral Nightly    metFORMIN  500 mg Oral Daily with breakfast    piperacillin-tazobactam  3.375 g Intravenous Q8H     Continuous Infusions:   dextrose      dexmedetomidine (PRECEDEX) IV infusion Stopped (11/11/20 0743)    sodium chloride Stopped (11/16/20 1214)     PRN Meds:.glucose, dextrose, glucagon (rDNA), dextrose, acetaminophen    RADIOLOGY:  MRI ANKLE LEFT WO CONTRAST   Final Result   Large area of ulceration with suspected underlying osteomyelitis of anterior   portions of both the talus and calcaneus. Severe destructive changes within the midfoot may be related to chronic   osteomyelitis, neuropathic joint, or a combination. XR ANKLE RIGHT (MIN 3 VIEWS)   Final Result   No acute fractures or osseous destructive process noted. Osseous structures   are in alignment. VL LOWER EXTREMITY ARTERIAL SEGMENTAL PRESSURES W PPG   Final Result   No evidence of peripheral arterial disease. XR CHEST PORTABLE   Final Result   No acute abnormality. XR FOOT LEFT (MIN 3 VIEWS)   Final Result   Ongoing plantar and lateral midfoot ulcer with underlying acute (presumably   on chronic) osteomyelitis superimposed on neuropathic foot. /83   Pulse 75   Temp 97.6 °F (36.4 °C) (Oral)   Resp 18   Ht 6' 3\" (1.905 m)   Wt 280 lb (127 kg)   SpO2 97%   BMI 35.00 kg/m²     LABS:    Recent Labs     11/15/20  1435   WBC 25.9*   HGB 9.4*   HCT 31.3*   *        No results for input(s): NA, K, CL, CO2, PHOS, BUN, CREATININE in the last 72 hours. Invalid input(s): CA     No results for input(s): PROT, INR, APTT in the last 72 hours.       ASSESSMENT:  Principal Problem:  -Diabetic foot infection   -S/P 2 days left foot Syme's amputation     Active Problems:    Charcot foot due to diabetes mellitus ([facility]    Diabetic neuropathy associated with diabetes mellitus due to underlying condition ([facility]    Essential hypertension    Diabetic foot ulcer with osteomyelitis (HCC)      PLAN:  - Patient's labs, charts and images were reviewed   - Antibiotics as per ID: Zosyn (6 weeks anticipated)  - Surgical pathology is still pending  - No WBC noted today, WBC yesterday was 25.9  - Dressings to LLE were changed today. They include:Betadine, Xeroform, gauze, ABDs, kerlix. - Drain pulled today. - Discussed patient with Dr. Milly Szymanski  - Will continue to follow patient while they are in-house.

## 2020-11-16 NOTE — PROGRESS NOTES
[facility] Progress Note    Admitting Date and Time: 11/10/2020 12:53 AM  Admit Dx: Acute osteomyelitis of left foot ([address] [M86.172]  Acute osteomyelitis of left foot ([address] [M86.172]    Subjective:  Patient is being followed for Acute osteomyelitis of left foot ([address] [M86.172]  Acute osteomyelitis of left foot ([address] [M86.172]   Pt feels ok s/p I&D by podiatry 11/11/2020, s/p ankle amputation 11/14/2020    ROS: denies fever, chills, cp, sob, n/v, HA unless stated above.       insulin lispro  5 Units Subcutaneous TID WC    enoxaparin  40 mg Subcutaneous Daily    insulin lispro  0-6 Units Subcutaneous TID WC    insulin lispro  0-3 Units Subcutaneous Nightly    insulin glargine  18 Units Subcutaneous Nightly    sodium chloride flush  10 mL Intravenous BID    atorvastatin  5 mg Oral Nightly    metFORMIN  500 mg Oral Daily with breakfast    piperacillin-tazobactam  3.375 g Intravenous Q8H     glucose, 15 g, PRN  dextrose, 12.5 g, PRN  glucagon (rDNA), 1 mg, PRN  dextrose, 100 mL/hr, PRN  acetaminophen, 650 mg, Q6H PRN         Objective:    /83   Pulse 75   Temp 97.6 °F (36.4 °C) (Oral)   Resp 18   Ht 6' 3\" (1.905 m)   Wt 280 lb (127 kg)   SpO2 97%   BMI 35.00 kg/m²     General Appearance: alert and oriented to person, place and time and in no acute distress  Skin: warm and dry  Head: normocephalic and atraumatic  Eyes: pupils equal, round, and reactive to light, extraocular eye movements intact, conjunctivae normal  Neck: neck supple and non tender without mass   Pulmonary/Chest: clear to auscultation bilaterally- no wheezes, rales or rhonchi, normal air movement, no respiratory distress  Cardiovascular: normal rate, normal S1 and S2 and no carotid bruits  Abdomen: soft, non-tender, non-distended, normal bowel sounds, no masses or organomegaly  Extremities: no cyanosis, no clubbing and left leg with dressing and wound vac  Neurologic: no cranial nerve deficit and speech normal        Recent Labs     11/13/20  1225      K 3.8      CO2 24   BUN 22*   CREATININE 1.1   GLUCOSE 119*   CALCIUM 8.8       Recent Labs     11/13/20  1225 11/15/20  1435   WBC 16.0* 25.9*   RBC 4.91 3.78*   HGB 12.1* 9.4*   HCT 40.4 31.3*   MCV 82.3 82.8   MCH 24.6* 24.9*   MCHC 30.0* 30.0*   RDW 16.9* 16.5*   * 671*   MPV 11.0 9.7     Procedure:  LEFT FOOT SYMES AMPUTATION Partial excision of distal tibia and fibula bones. Dorsalis pedis adipomyofascial elevation and transfer    Assessment:    Principal Problem:    Diabetic foot infection ([facility]  Active Problems:    Charcot foot due to diabetes mellitus ([facility]    Diabetic neuropathy associated with diabetes mellitus due to underlying condition [facility])    Essential hypertension    Diabetic foot ulcer with osteomyelitis ([facility]  Resolved Problems:    * No resolved hospital problems. *      Plan:  1. Diabetic infected foot ulcer, failed to fill abx script from previous admission due to cost, unable to get wound vac due to cost as well. He was treated with zosyn and dapto and discahrged on levaquin and linezoid. Appreciate ID and podiatry input, s/p I&D 11/11/2020, wound vac in place, wound cultures are growing pseudo and staff, MRI showed osteo, s/p amputation of the ankle  2.  Diabetes type 2, patient is currently on sliding scale insulin,  And lantus, increase the lantus to 24 units, started on 5 units of lispro with meals  3.  History of hyperlipidemia, continue atorvastatin. 4.  Elevated blood pressure readings, patient has no history of hypertension, monitor for now, readings improved  5. ARF, cr at 1.3, ? Prerenal, resolved, down to 1  6. Sepsis, present on admission, leukocytosis, tachycardia and fever, due to infected  diabetic foot  7.   Dispo, patient would benefit from LTAC yet none of the LTAC's in the area with accepted patient with no insurance or pending Medicaid, will attempt with SNF        NOTE: This report was transcribed using voice recognition software. Every effort was made to ensure accuracy; however, inadvertent computerized transcription errors may be present.   Electronically signed by Reno Clark MD on 11/16/2020 at 10:57 AM

## 2020-11-17 LAB
METER GLUCOSE: 109 MG/DL (ref 74–99)
METER GLUCOSE: 116 MG/DL (ref 74–99)
METER GLUCOSE: 136 MG/DL (ref 74–99)
METER GLUCOSE: 92 MG/DL (ref 74–99)
METER GLUCOSE: 97 MG/DL (ref 74–99)

## 2020-11-17 PROCEDURE — 99232 SBSQ HOSP IP/OBS MODERATE 35: CPT | Performed by: INTERNAL MEDICINE

## 2020-11-17 PROCEDURE — 6360000002 HC RX W HCPCS: Performed by: INTERNAL MEDICINE

## 2020-11-17 PROCEDURE — 1200000000 HC SEMI PRIVATE

## 2020-11-17 PROCEDURE — 82962 GLUCOSE BLOOD TEST: CPT

## 2020-11-17 PROCEDURE — 6370000000 HC RX 637 (ALT 250 FOR IP): Performed by: PODIATRIST

## 2020-11-17 PROCEDURE — 6370000000 HC RX 637 (ALT 250 FOR IP): Performed by: INTERNAL MEDICINE

## 2020-11-17 PROCEDURE — 97530 THERAPEUTIC ACTIVITIES: CPT

## 2020-11-17 PROCEDURE — U0003 INFECTIOUS AGENT DETECTION BY NUCLEIC ACID (DNA OR RNA); SEVERE ACUTE RESPIRATORY SYNDROME CORONAVIRUS 2 (SARS-COV-2) (CORONAVIRUS DISEASE [COVID-19]), AMPLIFIED PROBE TECHNIQUE, MAKING USE OF HIGH THROUGHPUT TECHNOLOGIES AS DESCRIBED BY CMS-2020-01-R: HCPCS

## 2020-11-17 PROCEDURE — 97161 PT EVAL LOW COMPLEX 20 MIN: CPT

## 2020-11-17 PROCEDURE — 97165 OT EVAL LOW COMPLEX 30 MIN: CPT

## 2020-11-17 PROCEDURE — 2580000003 HC RX 258: Performed by: PODIATRIST

## 2020-11-17 RX ORDER — LEVOFLOXACIN 750 MG/1
750 TABLET ORAL DAILY
Qty: 14 TABLET | Refills: 0 | Status: ON HOLD | OUTPATIENT
Start: 2020-11-17 | End: 2020-12-08 | Stop reason: HOSPADM

## 2020-11-17 RX ORDER — INSULIN GLARGINE 100 [IU]/ML
15 INJECTION, SOLUTION SUBCUTANEOUS NIGHTLY
Status: DISCONTINUED | OUTPATIENT
Start: 2020-11-17 | End: 2020-11-19 | Stop reason: HOSPADM

## 2020-11-17 RX ADMIN — LEVOFLOXACIN 750 MG: 500 TABLET, FILM COATED ORAL at 10:47

## 2020-11-17 RX ADMIN — ATORVASTATIN CALCIUM 5 MG: 10 TABLET, FILM COATED ORAL at 21:36

## 2020-11-17 RX ADMIN — INSULIN GLARGINE 15 UNITS: 100 INJECTION, SOLUTION SUBCUTANEOUS at 21:46

## 2020-11-17 RX ADMIN — SODIUM CHLORIDE, PRESERVATIVE FREE 10 ML: 5 INJECTION INTRAVENOUS at 12:11

## 2020-11-17 RX ADMIN — ENOXAPARIN SODIUM 40 MG: 40 INJECTION SUBCUTANEOUS at 08:00

## 2020-11-17 RX ADMIN — Medication 2 G: at 04:58

## 2020-11-17 RX ADMIN — SODIUM CHLORIDE, PRESERVATIVE FREE 10 ML: 5 INJECTION INTRAVENOUS at 08:00

## 2020-11-17 RX ADMIN — SODIUM CHLORIDE, PRESERVATIVE FREE 10 ML: 5 INJECTION INTRAVENOUS at 21:36

## 2020-11-17 RX ADMIN — METFORMIN HYDROCHLORIDE 500 MG: 500 TABLET ORAL at 08:00

## 2020-11-17 RX ADMIN — Medication 2 G: at 12:10

## 2020-11-17 RX ADMIN — Medication 2 G: at 21:36

## 2020-11-17 ASSESSMENT — PAIN SCALES - GENERAL
PAINLEVEL_OUTOF10: 0

## 2020-11-17 NOTE — PROGRESS NOTES
ENDOCRINOLOGY INITIAL CONSULTATION NOTE      Date of admission: 11/10/2020  Date of service: 11/16/2020  Admitting physician: Benita Waite MD   Primary Care Physician: No primary care provider on file. Consultant physician: Santana Veliz MD     Reason for the consultation:  DM type 2, infected diabetic foot     History of Present Illness: The history is provided by the patient. Accuracy of the patient data is excellent    Leslie Mckay is a very pleasant 54 y.o. old male with PMH listed below admitted to [facility] on 11/10/2020 LT diabetic foot infection with osteomyelitis, endocrine team was consulted for diabetes management. Pt underwent incision and drainage with bone debridement and biopsy, on 11/11/2020 and Syme's amputation at ankle with partial excision of distal tib/fib on 11/14/2020     Subjective   Seen this morning, no acute issues overnight, appetite good     Inpatient diet:   Carb Restricted diet     Point of care glucose monitoring (Independently reviewed)   Recent Labs     11/15/20  0934 11/15/20  1104 11/15/20  1646 11/15/20  2102 11/16/20  0617 11/16/20  1128 11/16/20  1214 11/16/20  1558   GLUMET 245* 211* 158* 159* 113* 105* 88 162*     Scheduled Meds:   ceFAZolin  2 g Intravenous Q8H    [Held by provider] insulin lispro  5 Units Subcutaneous TID WC    enoxaparin  40 mg Subcutaneous Daily    insulin lispro  0-6 Units Subcutaneous TID WC    insulin lispro  0-3 Units Subcutaneous Nightly    insulin glargine  18 Units Subcutaneous Nightly    sodium chloride flush  10 mL Intravenous BID    atorvastatin  5 mg Oral Nightly    metFORMIN  500 mg Oral Daily with breakfast     PRN Meds:   glucose, 15 g, PRN  dextrose, 12.5 g, PRN  glucagon (rDNA), 1 mg, PRN  dextrose, 100 mL/hr, PRN  acetaminophen, 650 mg, Q6H PRN      Continuous Infusions:   dextrose      dexmedetomidine (PRECEDEX) IV infusion Stopped (11/11/20 0743)       Review of Systems  All systems reviewed.  All negative except for symptoms mentioned in HPI     OBJECTIVE    /83   Pulse 75   Temp 97.6 °F (36.4 °C) (Oral)   Resp 18   Ht 6' 3\" (1.905 m)   Wt 280 lb (127 kg)   SpO2 97%   BMI 35.00 kg/m²     Intake/Output Summary (Last 24 hours) at 11/16/2020 1944  Last data filed at 11/16/2020 1236  Gross per 24 hour   Intake 500 ml   Output 2325 ml   Net -1825 ml       Physical examination:  General: awake alert, oriented x3, no abnormal position or movements. HEENT: normocephalic non-traumatic, no exophthalmos   Neck: supple, no LN enlargement, no thyromegaly, no thyroid tenderness, no JVD. Pulm: Clear equal air entry no added sounds, no wheezing or rhonchi     CVS: S1 + S2, no murmur, no heave  Abd: soft lax, no tenderness, no organomegaly, audible bowel sounds. Dressing on Lt foot   Skin: warm, no lesions, no rash. Feet: diabetic Charcot's feet , Lt foot s/p Syme's amputation at ankle with partial excision of distal tib/fib   Neuro: CN intact, muscle power normal  Psych: normal mood, and affect    Review of Laboratory Data:  I personally reviewed the following labs:   Recent Labs     11/15/20  1435   WBC 25.9*   RBC 3.78*   HGB 9.4*   HCT 31.3*   MCV 82.8   MCH 24.9*   MCHC 30.0*   RDW 16.5*   *   MPV 9.7     No results for input(s): NA, K, CL, CO2, BUN, CREATININE, GLUCOSE, CALCIUM, PROT, LABALBU, BILITOT, ALKPHOS, AST, ALT in the last 72 hours. No results found for: BHYDRXBUT  Lab Results   Component Value Date    LABA1C 7.3 10/16/2020     No results found for: TSH, T4FREE, R5KDVYT, FT3, U1EJZUG, TSI, TPOABS, THGAB  Lab Results   Component Value Date    LABA1C 7.3 10/16/2020    GLUCOSE 119 11/13/2020     No results found for: TRIG, HDL, LDLCALC, CHOL    Blood culture   Lab Results   Component Value Date    [facility]  11/10/2020     This isolate is presumed to be resistant based on the  detection of inducible Clindamycin resistance. Clindamycin  may still be effective in some patients.       BC 5 Days no growth 10/15/2020 Radiology:  MRI ANKLE LEFT WO CONTRAST   Final Result   Large area of ulceration with suspected underlying osteomyelitis of anterior   portions of both the talus and calcaneus. Severe destructive changes within the midfoot may be related to chronic   osteomyelitis, neuropathic joint, or a combination. XR ANKLE RIGHT (MIN 3 VIEWS)   Final Result   No acute fractures or osseous destructive process noted. Osseous structures   are in alignment. VL LOWER EXTREMITY ARTERIAL SEGMENTAL PRESSURES W PPG   Final Result   No evidence of peripheral arterial disease. XR CHEST PORTABLE   Final Result   No acute abnormality. XR FOOT LEFT (MIN 3 VIEWS)   Final Result   Ongoing plantar and lateral midfoot ulcer with underlying acute (presumably   on chronic) osteomyelitis superimposed on neuropathic foot. Medical Records/Labs/Images review:   I personally reviewed and summarized previous records   All labs and imaging were reviewed independently     9 Jennifer Damon, a 54 y.o.-old male seen today for inpatient diabetes management     Diabetes Mellitus type 2    · Complicated with neuropathy, Charcot's feet deformity bilaterally and admitted with Lt diabetic foot infection   · For now we recommend the following regimen   · Continue Lantus 18 units daily at night  · Low dose sliding scale with meals at night   · Metformin 500 mg daily     · Continue glucose check with meals and at bedtime   · Will titrate insulin dose based on the blood glucose trend & insulin requirement  · Pt interested in following with us few weeks after discharge.  Endocrine follow up visit Monday 12/7 at 9:00AM     Recurrent Lt diabetic foot infection  · S/p Syme's amputation at ankle with partial excision of distal tib/fib on 11/14/2020  · On Cefazolin 2g Q8hrs   · Managed by primary, Podiatry and ID team     The above issues were reviewed with the patient who understood and agreed with the plan. Thank you for allowing us to participate in the care of this patient. Please do not hesitate to contact us with any additional questions. Star Hallman MD  Endocrinologist, [facility]   [address] [place] [address]   Phone: [phone]  Fax: [phone]  --------------------------------------------  An electronic signature was used to authenticate this note.  Mazin Yen MD on 11/16/2020 at 7:44 PM

## 2020-11-17 NOTE — CARE COORDINATION
Social work / Discharge Planning : JEWEL noted therapy am/pac scores. JEWEL called Edouard from [address]  rehab and updated her. They will eval. Patient will need six weeks of IV ancef. AWait response. JEWEL to follow. Electronically signed by CLIVE Adler on 11/17/20 at 1:28 PM EST      Addendum : JEWEL received return call from [address] [facility] stated they will only take patient for one to two weeks then he will have to return home. THey do NOT discharge to a SNF after Acute rehab has been completed. [facility] will only accept if patient IV antibiotics are paid for and he can discharge home. JEWEL called Shay at [facility] and current cost is $ 10:00 a day. EJWEL faxed over most recent financial form to see if patient can have additional assistance. Shay will discuss with her financial office and update SW. JEWEL to follow. Electronically signed by CLIVE Adler on 11/17/20 at 1:43 PM EST      Addendum : JEWEL met with patient and discussed plan : Acute rehab up to two weeks then home with four weeks of IV antibiotics with [facility]. Patient in agreement . Shay from [place] [id number] will need documentation from patient employer [employer] stating he is currently not employed. JEWEL called [employer] and spoke to [employer]. She stated she will yesenia provide documentation and fax tomororw am to [facility]. JEWEL did speka to shay at [facility] and updated her in regards to documentation being faxed tomorrow am. AWait documentaion and see if additional asistance finacially can be provided . Edouard from Acute rehab updated that JEWEL is working on discharge plan from Acute rehab and not to give up patient spot. JEWEL to follow.  .Electronically signed by CLIVE Adler on 11/17/20 at 2:33 PM EST

## 2020-11-17 NOTE — PROGRESS NOTES
Physical Therapy    Facility/Department: [address] MED SURG/TELE  Initial Assessment    NAME: Alondra Van  : 1975  MRN: [id number]    Date of Service: 2020    Attending Provider:  Crystal Amaro MD    Evaluating PT:  Meena Burns. Judy Watson, P.T. Room #:  4744/7965-T  Diagnosis:  Acute osteomyelitis of L foot  Pertinent PMHx/PSHx:  Diabetes mellitus with neuropathy  Procedure/Surgery:  Left foot Syme's amputation at the level of the ankle, partial excision of distal tibia and fibula bones, dorsalis pedis adipo-myofascial tissue and flap elevation and  transfer (20)  Precautions:  Falls, bed/chair alarms, NWB L foot    SUBJECTIVE:    Pt lives with wife in a 2nd floor apartment with 2 stairs and 1 rail to enter. There are 7 steps and 2 rails to his 2nd floor apt. Pt ambulated with no AD PTA, but owns ww and has used ADs before due to NWB L foot in the past.    OBJECTIVE:   Initial Evaluation  Date: 20 Treatment Short Term/ Long Term   Goals   Was pt agreeable to Eval/treatment? yes     Does pt have pain? No c/o pain     Bed Mobility  Rolling: supervision  Supine to sit: supervision  Sit to supine: supervision  Scooting: supervision  Independent    Transfers Sit to stand: MIN A  Stand to sit: MIN A  Stand pivot: MIN A/MOD A with ww MIN A/MOD A with knee scooter  Independent    Ambulation   20 feet with ww MIN A/MOD A and 20 feet with knee scooter MIN/MOD A  100+ feet with AAD Independent    Stair negotiation: ascended and descended NA  10 steps with 1 rail Independent   AM-PAC 6 Clicks        BLE ROM is WFL except R ankle did not have DF/PF and pt reports he had R ankle fusion surgery. RLE strength is grossly 4/5, except R ankle NA and LLE knee and hip are grossly 4-/5 to 4/5.    Sensation:  Pt reports numbness to R foot   Edema:  None noted  Balance: sitting is SBA and standing with ww and knee scooter was MIN A  Endurance: fair    Patient education  Pt educated on gait training with ww and knee scooter to maintain NWB LLE    Patient response to education:   Pt verbalized understanding Pt demonstrated skill Pt requires further education in this area   yes yes yes     ASSESSMENT:    Comments:  Pt was found lying supine in bed and was agreeable to PT evaluation. Pt was informed about NWB on his LLE and instructed on how to maintain NWB with ambulation. Pt reports to have been NWB on his LLE before and had used ww and a knee scooter previously. Prior to ambulation, pt's right shoe was donned. Pt stood with assistance and ambulated in the room with ww and hopped on his RLE. Pt reported to have pain in LLE with hopping on the RLE and pt displayed an increase in work of breathing with ambulation. Pt took a seated break and was introduced to knee scooter. Pt stood with assistance to transfer on to the knee scooter and ambulated in the room again. Pt reported using the knee scooter was easier and did not increase pain and he took small steps with RLE using the knee scooter. Pt was able to maintain strict NWB L foot throughout entire PT session. Treatment:  Patient practiced and was instructed in the following treatment:     Bed mobility, transfers, and gait with ww and knee scooter to improve functional strength and endurance. Pt was left lying supine in bed with call light left by patient. Chair/bed alarm: bed alarm re-activated    Pt's/ family goals   1. To get better and go home    Patient and or family understand(s) diagnosis, prognosis, and plan of care. PLAN OF CARE:    Current Treatment Recommendations     [x] Strengthening     [] ROM   [x] Balance Training   [x] Endurance Training   [x] Transfer Training   [x] Gait Training   [x] Stair Training   [] Positioning   [x] Safety and Education Training   [] Patient/Caregiver Education   [] HEP  [] Other     PT care will be provided in accordance with the objectives noted above.  Exercises and functional mobility practice will be used as well as appropriate assistive devices or modalities to obtain goals. Patient and family education will also be administered as needed. Frequency of treatments: 2-5x/week x 1-2 weeks. Time in  10:40  Time out  11:05    Total Treatment Time  8 minutes     Evaluation Time includes thorough review of current medical information, gathering information on past medical history/social history and prior level of function, completion of standardized testing/informal observation of tasks, assessment of data and education on plan of care and goals. CPT codes:  [x] Low Complexity PT evaluation 58994  [] Moderate Complexity PT evaluation 47558  [] High Complexity PT evaluation 04069  [] PT Re-evaluation 99187  [] Gait training 76457 ** minutes  [] Manual therapy 55672 ** minutes  [x] Therapeutic activities 74504 8 minutes  [] Therapeutic exercises 59336 ** minutes  [] Neuromuscular reeducation 86658 ** minutes     Ely Jacome, SPT    Huang Tapia, P.T.   License Number: [id number]

## 2020-11-17 NOTE — PLAN OF CARE
Problem: Falls - Risk of:  Goal: Will remain free from falls  Outcome: Met This Shift  Goal: Absence of physical injury  Outcome: Met This Shift     Problem: Pain:  Description: Pain management should include both nonpharmacologic and pharmacologic interventions. Goal: Pain level will decrease  Outcome: Met This Shift  Goal: Control of acute pain  Outcome: Met This Shift  Goal: Control of chronic pain  Outcome: Met This Shift     Problem: Skin Integrity:  Goal: Will show no infection signs and symptoms  Outcome: Met This Shift  Goal: Absence of new skin breakdown  Outcome: Met This Shift     Problem: Fluid and Electrolyte Imbalance  Goal: Fluid and electrolyte balance are achieved/maintained  Outcome: Met This Shift     Problem: INFECTION  Goal: Absence of urinary tract infection signs and symptoms  Outcome: Met This Shift     Problem: Wound:  Goal: Signs of wound healing will improve  Outcome: Met This Shift     Problem:  Body Temperature - Imbalanced:  Goal: Ability to maintain a body temperature in the normal range will improve to within specified parameters  Outcome: Met This Shift

## 2020-11-17 NOTE — PROGRESS NOTES
°C)  Min: 97.8 °F (36.6 °C)  Max: 98.1 °F (36.7 °C)  General appearance: Resting in bed in no apparent distress. Skin: Warm and dry. No rashes were noted. HEENT: Round and reactive pupils. Moist mucous membranes. No ulcerations or thrush. Neck: Supple to movements. Chest: No use of accessory muscles to breathe. Symmetrical expansion. No wheezing, crackles or rhonchi. Cardiovascular: Reguar rate and rhythm. No murmurs gallops, or rubs appreciated. Abdomen: Bowel sounds present, nontender, nondistended, no masses or hepatosplenomegaly. Extremities: No clubbing, no cyanosis, no edema. Lines: peripheral    I/O last 3 completed shifts:   In: 500 [P.O.:500]  Out: 1910 [Urine:1900; Drains:10]      Laboratory and Tests Review:      Lab Results   Component Value Date    WBC 25.9 (H) 11/15/2020    WBC 16.0 (H) 11/13/2020    WBC 15.8 (H) 11/12/2020    HGB 9.4 (L) 11/15/2020    HCT 31.3 (L) 11/15/2020    MCV 82.8 11/15/2020     (H) 11/15/2020     Lab Results   Component Value Date    NEUTROABS 12.93 (H) 11/13/2020    NEUTROABS 14.23 (H) 11/12/2020    NEUTROABS 12.35 (H) 11/11/2020     No results found for: [facility]  Lab Results   Component Value Date    ALT <5 11/10/2020    AST 9 11/10/2020    ALKPHOS 91 11/10/2020    BILITOT 1.3 (H) 11/10/2020     Lab Results   Component Value Date     11/13/2020    K 3.8 11/13/2020     11/13/2020    CO2 24 11/13/2020    BUN 22 11/13/2020    CREATININE 1.1 11/13/2020    CREATININE 1.0 11/12/2020    CREATININE 1.0 11/11/2020    GFRAA >60 11/13/2020    LABGLOM >60 11/13/2020    GLUCOSE 119 11/13/2020    PROT 9.1 11/10/2020    LABALBU 3.7 11/10/2020    CALCIUM 8.8 11/13/2020    BILITOT 1.3 11/10/2020    ALKPHOS 91 11/10/2020    AST 9 11/10/2020    ALT <5 11/10/2020     Lab Results   Component Value Date    CRP 28.9 (H) 11/10/2020    CRP 10.8 (H) 10/16/2020     Lab Results   Component Value Date    SEDRATE 79 (H) 11/10/2020    SEDRATE 78 (H) 10/16/2020 Radiology:    MRI ANKLE LEFT WO CONTRAST   Final Result   Large area of ulceration with suspected underlying osteomyelitis of anterior   portions of both the talus and calcaneus. Severe destructive changes within the midfoot may be related to chronic   osteomyelitis, neuropathic joint, or a combination. XR ANKLE RIGHT (MIN 3 VIEWS)   Final Result   No acute fractures or osseous destructive process noted. Osseous structures   are in alignment. VL LOWER EXTREMITY ARTERIAL SEGMENTAL PRESSURES W PPG   Final Result   No evidence of peripheral arterial disease. XR CHEST PORTABLE   Final Result   No acute abnormality. XR FOOT LEFT (MIN 3 VIEWS)   Final Result   Ongoing plantar and lateral midfoot ulcer with underlying acute (presumably   on chronic) osteomyelitis superimposed on neuropathic foot. Microbiology:   Lab Results   Component Value Date    BC 24 Hours no growth 11/15/2020    BC  11/10/2020     This isolate is presumed to be resistant based on the  detection of inducible Clindamycin resistance. Clindamycin  may still be effective in some patients. BC 5 Days no growth 10/15/2020    ORG Pseudomonas aeruginosa 11/11/2020    ORG Staphylococcus aureus 11/11/2020    ORG Corynebacterium species 11/11/2020     Lab Results   Component Value Date    BLOODCULT2  11/10/2020     Refer to previous culture for susceptibility results  (CXBL RAC collected 11-10-20 at 0128)      BLOODCULT2 5 Days no growth 10/15/2020    ORG Pseudomonas aeruginosa 11/11/2020    ORG Staphylococcus aureus 11/11/2020    ORG Corynebacterium species 11/11/2020     WOUND/ABSCESS   Date Value Ref Range Status   11/10/2020 (A)  Final    Mixed constance also isolated, including:  Mixed Gram negative rods  Including Oxidase positive Gram negative rods     11/10/2020   Final    Heavy growth  This isolate is presumed to be resistant based on the  detection of inducible Clindamycin resistance. Clindamycin  may still be effective in some patients. 10/15/2020   Final    Mixed constance isolated. Further workup and sensitivity testing  is not routinely indicated and will not be performed. Mixed constance isolated includes:  Alpha hemolytic Strep species  Coagulase negative Staph species  Gram negative rods       No results found for: RESPSMEAR      Component Value Date/Time    AFBCX  10/17/2020 0801     No growth after 1 week/s of incubation. No growth after 2 week/s of incubation. No growth after 3 week/s of incubation. AFBCX  10/17/2020 0759     No growth after 1 week/s of incubation. No growth after 2 week/s of incubation. No growth after 3 week/s of incubation. FUNGSM No Fungal elements seen 11/11/2020 0735    LABFUNG  10/17/2020 0801     No growth after 1 week/s of incubation. No growth after 2 week/s of incubation. No growth after 3 week/s of incubation. LABFUNG  10/17/2020 0759     No growth after 1 week/s of incubation. No growth after 2 week/s of incubation. No growth after 3 week/s of incubation. No results found for: CULTRESP  No results found for: CXCATHTIP  No results found for: BFCS  Culture Surgical   Date Value Ref Range Status   11/11/2020 Rare growth  Final   11/11/2020   Final    Light growth  Refer to previous culture for susceptibility results  See CXSUR Left Foot Bone collected 11/11/2020 @07:45     11/11/2020 Light growth  Final     No results found for: Delta Chris  No results found for: [facility]    Problem list:    Principal Problem:    Diabetic foot infection ([address]  Active Problems:    Charcot foot due to diabetes mellitus ([address]    Diabetic neuropathy associated with diabetes mellitus due to underlying condition ([address]    Essential hypertension    Diabetic foot ulcer with osteomyelitis ([address]  Resolved Problems:    * No resolved hospital problems.  *      ASSESSMENT:       · Recurrent left diabetic foot infection with osteomyelitis-Pseudomonas and MSSA, now s/p

## 2020-11-17 NOTE — PROGRESS NOTES
[facility] Progress Note    Admitting Date and Time: 11/10/2020 12:53 AM  Admit Dx: Acute osteomyelitis of left foot ([address] [M86.172]  Acute osteomyelitis of left foot ([address] [M86.172]    Subjective:  Patient is being followed for Acute osteomyelitis of left foot ([address] [M86.172]  Acute osteomyelitis of left foot ([address] [M86.172]   Pt feels ok s/p I&D by podiatry 11/11/2020, s/p ankle amputation 11/14/2020    ROS: denies fever, chills, cp, sob, n/v, HA unless stated above.       ceFAZolin  2 g Intravenous Q8H    [Held by provider] insulin lispro  5 Units Subcutaneous TID WC    enoxaparin  40 mg Subcutaneous Daily    insulin lispro  0-6 Units Subcutaneous TID WC    insulin lispro  0-3 Units Subcutaneous Nightly    insulin glargine  18 Units Subcutaneous Nightly    sodium chloride flush  10 mL Intravenous BID    atorvastatin  5 mg Oral Nightly    metFORMIN  500 mg Oral Daily with breakfast     glucose, 15 g, PRN  dextrose, 12.5 g, PRN  glucagon (rDNA), 1 mg, PRN  dextrose, 100 mL/hr, PRN  acetaminophen, 650 mg, Q6H PRN         Objective:    /84   Pulse 75   Temp 97.8 °F (36.6 °C) (Oral)   Resp 18   Ht 6' 3\" (1.905 m)   Wt 280 lb (127 kg)   SpO2 99%   BMI 35.00 kg/m²     General Appearance: alert and oriented to person, place and time and in no acute distress  Skin: warm and dry  Head: normocephalic and atraumatic  Eyes: pupils equal, round, and reactive to light, extraocular eye movements intact, conjunctivae normal  Neck: neck supple and non tender without mass   Pulmonary/Chest: clear to auscultation bilaterally- no wheezes, rales or rhonchi, normal air movement, no respiratory distress  Cardiovascular: normal rate, normal S1 and S2 and no carotid bruits  Abdomen: soft, non-tender, non-distended, normal bowel sounds, no masses or organomegaly  Extremities: no cyanosis, no clubbing and left leg with dressing and wound vac  Neurologic: no cranial nerve deficit and speech normal        No results for input(s): NA, K, CL, CO2, BUN, CREATININE, GLUCOSE, CALCIUM in the last 72 hours. Recent Labs     11/15/20  1435   WBC 25.9*   RBC 3.78*   HGB 9.4*   HCT 31.3*   MCV 82.8   MCH 24.9*   MCHC 30.0*   RDW 16.5*   *   MPV 9.7     Procedure:  LEFT FOOT SYMES AMPUTATION Partial excision of distal tibia and fibula bones. Dorsalis pedis adipomyofascial elevation and transfer    Assessment:    Principal Problem:    Diabetic foot infection ([facility]  Active Problems:    Charcot foot due to diabetes mellitus ([facility]    Diabetic neuropathy associated with diabetes mellitus due to underlying condition [facility])    Essential hypertension    Diabetic foot ulcer with osteomyelitis ([facility]  Resolved Problems:    * No resolved hospital problems. *      Plan:  1. Diabetic infected foot ulcer, failed to fill abx script from previous admission due to cost, unable to get wound vac due to cost as well. He was treated with zosyn and dapto and discahrged on levaquin and linezoid. Appreciate ID and podiatry input, s/p I&D 11/11/2020, wound vac in place, wound cultures are growing pseudo and staff, MRI showed osteo, s/p amputation of the ankle anx switched to ancef per ID, place a PICC line abx for 6 weeks  2.  Diabetes type 2, patient is currently on sliding scale insulin,  And lantus, increase the lantus to 24 units, started on 5 units of lispro with meals  3.  History of hyperlipidemia, continue atorvastatin. 4.  Elevated blood pressure readings, patient has no history of hypertension, monitor for now, readings improved  5. ARF, cr at 1.3, ? Prerenal, resolved, down to 1  6. Sepsis, present on admission, leukocytosis, tachycardia and fever, due to infected  diabetic foot  7. Dispo, patient would benefit from LTAC yet none of the LTAC's in the area with accepted patient with no insurance or pending Medicaid, will attempt with SNF        NOTE: This report was transcribed using voice recognition software. Every effort was made to ensure accuracy; however, inadvertent computerized transcription errors may be present.   Electronically signed by Fermin Finney MD on 11/17/2020 at 9:44 AM

## 2020-11-17 NOTE — PROGRESS NOTES
Occupational Therapy  OCCUPATIONAL THERAPY INITIAL EVALUATION      Date:2020  Patient Name: Bennie Manzo  MRN: [id number]  : 1975  Room: [address] [place] [address]    Referring Provider: Ventura Owens DPM     Evaluating OT: Truong Sharma OTR/L [id number]    AM-PAC Daily Activity Raw Score:     Recommended Adaptive Equipment:  TBD     Diagnosis: acute osteomyelitis L foot   Surgery: 2020 s/p  Left foot Syme's amputation at the level of the ankle, partial excision of distal tibia and fibula bones, dorsalis pedis adipo-myofascial tissue and flap elevation and  transfer    Pertinent Medical History: DM with neuropathy, HTB  Precautions:  Falls, NWB L LE, alarm      Home Living: Pt lives with wife, 2nd floor apartment. 2 steps to enter building.   7 steps/rail to apartment    Bathroom setup: tub/shower unit    Equipment owned: cane, getting a walker     Prior Level of Function: Independent  with ADLs , assist as needed  with IADLs; ambulated with no device    Pain Level: no pain this session   Cognition: alert, orientedx3 and conversing    Memory:  Good    Sequencing:  Fair    Problem solving:  Fair    Judgement/safety:  Fair      Functional Assessment:   Initial Eval Status  Date: 20 Treatment Status  Date: STGs = LTGs  Time frame: 5-7 days   Feeding Independent      Grooming Set-up,seated   Mod I    UB Dressing Set-up   Mod I    LB Dressing Mod A   Mod I    Bathing      Toileting Assist with thorough hygiene   Independent   Bed Mobility  Sitting EOB upon arrival     SBA  Sit-supine      Functional Transfers Min A  Sit-stand from bed   Mod I    Functional Mobility Min A,w/walker   Patient having increase pain L surgical area - per patient from the hopping on his R foot - can feel increase L LE   Mod I with good tolerance    Balance Sitting:     Static:  Independent     Dynamic:SBA   Standing: Min A  Independent   with good tolerance    Activity Tolerance Fair with light activity   Good with ADL activity []Visual/Perceptual retraining  to improve body awareness and safety during transfers and ADLs  []Splinting/positioning needs to maintain joint/skin integrity and prevent contractures  [x]Therapeutic activity to improve functional performance during ADLs. [x]Therapeutic exercise to improve tolerance and functional strength for ADLs   [x]Balance retraining/tolerance tasks for facilitation of postural control with dynamic challenges during ADLs . []Neuromuscular re-education: facilitation of righting/equilibrium reactions, midline orientation, scapular stability/mobility, Normalization muscle     tone and facilitation active functional movement/Attention                         []Delirium prevention/treatment    [x]Positioning to improve functional independence  []Other:       Rehab Potential:  Good for established goals     Patient / Family Goal: rehab       Patient and/or family were instructed on functional diagnosis, prognosis/goals and OT plan of care. Demonstrated good  understanding. Eval Complexity: Low      Time In:1049  Time Out: 1105        Min Units   OT Eval Low 97165  x 1   OT Eval Medium 09844      OT Eval High 75229       OT Re-Eval [id number]       Therapeutic Ex 42728       Therapeutic Activities 85852       ADL/Self Care 44036       Orthotic Management 21349       Neuro Re-Ed 60984       Non-Billable Time          Evaluation Time includes thorough review of current medical information, gathering information on past medical history/social history and prior level of function, completion of standardized testing/informal observation of tasks, assessment of data and education on plan of care and goals.             Elizabeth Juárez OTR/L [id number]

## 2020-11-17 NOTE — PROGRESS NOTES
Department of Podiatry  Progress Note    SUBJECTIVE:  Patient seen at bedside for s/p 3 days for left foot Syme's amputation. Patient states his pain is well controlled. He relates physical therapy took a toll on him yesterday. Patient denies any N/V/D/F/C/SOB/CP and has no new pedal complaints. OBJECTIVE:    Scheduled Meds:   levoFLOXacin  750 mg Oral Daily    ceFAZolin  2 g Intravenous Q8H    [Held by provider] insulin lispro  5 Units Subcutaneous TID WC    enoxaparin  40 mg Subcutaneous Daily    insulin lispro  0-6 Units Subcutaneous TID WC    insulin lispro  0-3 Units Subcutaneous Nightly    insulin glargine  18 Units Subcutaneous Nightly    sodium chloride flush  10 mL Intravenous BID    atorvastatin  5 mg Oral Nightly    metFORMIN  500 mg Oral Daily with breakfast     Continuous Infusions:   dextrose      dexmedetomidine (PRECEDEX) IV infusion Stopped (11/11/20 0743)     PRN Meds:.glucose, dextrose, glucagon (rDNA), dextrose, acetaminophen    Allergies   Allergen Reactions    Vancomycin Other (See Comments)     Red man syndrome    Semaglutide Itching and Rash     Pink itch rash         /84   Pulse 75   Temp 97.8 °F (36.6 °C) (Oral)   Resp 18   Ht 6' 3\" (1.905 m)   Wt 280 lb (127 kg)   SpO2 99%   BMI 35.00 kg/m²       EXAM: Dressing CDI with no strike through noted. No pain on palpation of calf. Previous exam (11/16/20) below:     VASCULAR:  PT pulses are palpable. CFT < 5 seconds B/L to dorsal ankle. Warm to warm from the tibial tuberosity to the dorsal ankles. No hair growth noted to the distal aspects dorsally. NEUROLOGIC:  Protective sensation is diminished b/l    DERM:  Surgical site is well coapted, sutures are intact. No noted pus, drainage or active bleeding at the time of evaluation. No SOI. Minimal edema noted diffusely throughout the distal LLE. MUSCULOSKELETAL:  5/5 Gross Muscle strength in all 4 quadrants.  Minimal pain on palpation to the amputation site         Scheduled Meds:   levoFLOXacin  750 mg Oral Daily    ceFAZolin  2 g Intravenous Q8H    [Held by provider] insulin lispro  5 Units Subcutaneous TID     enoxaparin  40 mg Subcutaneous Daily    insulin lispro  0-6 Units Subcutaneous TID WC    insulin lispro  0-3 Units Subcutaneous Nightly    insulin glargine  18 Units Subcutaneous Nightly    sodium chloride flush  10 mL Intravenous BID    atorvastatin  5 mg Oral Nightly    metFORMIN  500 mg Oral Daily with breakfast     Continuous Infusions:   dextrose      dexmedetomidine (PRECEDEX) IV infusion Stopped (11/11/20 0743)     PRN Meds:.glucose, dextrose, glucagon (rDNA), dextrose, acetaminophen    RADIOLOGY:  MRI ANKLE LEFT WO CONTRAST   Final Result   Large area of ulceration with suspected underlying osteomyelitis of anterior   portions of both the talus and calcaneus. Severe destructive changes within the midfoot may be related to chronic   osteomyelitis, neuropathic joint, or a combination. XR ANKLE RIGHT (MIN 3 VIEWS)   Final Result   No acute fractures or osseous destructive process noted. Osseous structures   are in alignment. VL LOWER EXTREMITY ARTERIAL SEGMENTAL PRESSURES W PPG   Final Result   No evidence of peripheral arterial disease. XR CHEST PORTABLE   Final Result   No acute abnormality. XR FOOT LEFT (MIN 3 VIEWS)   Final Result   Ongoing plantar and lateral midfoot ulcer with underlying acute (presumably   on chronic) osteomyelitis superimposed on neuropathic foot. /84   Pulse 75   Temp 97.8 °F (36.6 °C) (Oral)   Resp 18   Ht 6' 3\" (1.905 m)   Wt 280 lb (127 kg)   SpO2 99%   BMI 35.00 kg/m²     LABS:    Recent Labs     11/15/20  1435   WBC 25.9*   HGB 9.4*   HCT 31.3*   *        No results for input(s): NA, K, CL, CO2, PHOS, BUN, CREATININE in the last 72 hours.     Invalid input(s): CA     No results for input(s): PROT, INR, APTT in the last 72 hours.      ASSESSMENT:  Principal Problem:  -Diabetic foot infection   -S/P 3 days left foot Syme's amputation     Active Problems:    Charcot foot due to diabetes mellitus ([facility]    Diabetic neuropathy associated with diabetes mellitus due to underlying condition ([facility]    Essential hypertension    Diabetic foot ulcer with osteomyelitis ([address]      PLAN:  - Patient's labs, charts and images were reviewed   - Antibiotics as per ID: Zosyn (6 weeks anticipated)  - Surgical pathology pending  - Dressings left intact today. Dressings to be changed every 2 days per podiatry. - Patient to be NWB to operative extremity   - No further podiatric surgical intervention indicated. Patient is stable for discharge from podiatric standpoint.   - Recommend discharge to SNF   - Discussed patient with Dr. Arpita Felix  - Will continue to follow patient while they are in-house.

## 2020-11-17 NOTE — CARE COORDINATION
Per ID pt will require  6-8 weeks iv ancef q8 hrs; will need PICC. Await PT/OT evals; audelia drain left foot. Plan is [facility] vs [facility]. NOT an LTAC candidate d/t lack of payor source. will follow. [place].

## 2020-11-18 LAB
METER GLUCOSE: 102 MG/DL (ref 74–99)
METER GLUCOSE: 105 MG/DL (ref 74–99)
METER GLUCOSE: 126 MG/DL (ref 74–99)
METER GLUCOSE: 129 MG/DL (ref 74–99)

## 2020-11-18 PROCEDURE — 99232 SBSQ HOSP IP/OBS MODERATE 35: CPT | Performed by: INTERNAL MEDICINE

## 2020-11-18 PROCEDURE — 2580000003 HC RX 258: Performed by: PODIATRIST

## 2020-11-18 PROCEDURE — 6370000000 HC RX 637 (ALT 250 FOR IP): Performed by: INTERNAL MEDICINE

## 2020-11-18 PROCEDURE — 2580000003 HC RX 258: Performed by: INTERNAL MEDICINE

## 2020-11-18 PROCEDURE — 76937 US GUIDE VASCULAR ACCESS: CPT

## 2020-11-18 PROCEDURE — 97530 THERAPEUTIC ACTIVITIES: CPT

## 2020-11-18 PROCEDURE — 97535 SELF CARE MNGMENT TRAINING: CPT

## 2020-11-18 PROCEDURE — 1200000000 HC SEMI PRIVATE

## 2020-11-18 PROCEDURE — 2500000003 HC RX 250 WO HCPCS: Performed by: INTERNAL MEDICINE

## 2020-11-18 PROCEDURE — 82962 GLUCOSE BLOOD TEST: CPT

## 2020-11-18 PROCEDURE — 6370000000 HC RX 637 (ALT 250 FOR IP): Performed by: PODIATRIST

## 2020-11-18 PROCEDURE — 6360000002 HC RX W HCPCS: Performed by: INTERNAL MEDICINE

## 2020-11-18 PROCEDURE — 36569 INSJ PICC 5 YR+ W/O IMAGING: CPT

## 2020-11-18 PROCEDURE — 02HV33Z INSERTION OF INFUSION DEVICE INTO SUPERIOR VENA CAVA, PERCUTANEOUS APPROACH: ICD-10-PCS | Performed by: INTERNAL MEDICINE

## 2020-11-18 PROCEDURE — C1751 CATH, INF, PER/CENT/MIDLINE: HCPCS

## 2020-11-18 RX ORDER — LIDOCAINE HYDROCHLORIDE 10 MG/ML
5 INJECTION, SOLUTION EPIDURAL; INFILTRATION; INTRACAUDAL; PERINEURAL ONCE
Status: COMPLETED | OUTPATIENT
Start: 2020-11-18 | End: 2020-11-18

## 2020-11-18 RX ORDER — SODIUM CHLORIDE 0.9 % (FLUSH) 0.9 %
10 SYRINGE (ML) INJECTION PRN
Status: DISCONTINUED | OUTPATIENT
Start: 2020-11-18 | End: 2020-11-19 | Stop reason: HOSPADM

## 2020-11-18 RX ORDER — HEPARIN SODIUM (PORCINE) LOCK FLUSH IV SOLN 100 UNIT/ML 100 UNIT/ML
3 SOLUTION INTRAVENOUS EVERY 12 HOURS SCHEDULED
Status: DISCONTINUED | OUTPATIENT
Start: 2020-11-18 | End: 2020-11-19 | Stop reason: HOSPADM

## 2020-11-18 RX ORDER — 0.9 % SODIUM CHLORIDE 0.9 %
500 INTRAVENOUS SOLUTION INTRAVENOUS ONCE
Status: COMPLETED | OUTPATIENT
Start: 2020-11-18 | End: 2020-11-18

## 2020-11-18 RX ORDER — HEPARIN SODIUM (PORCINE) LOCK FLUSH IV SOLN 100 UNIT/ML 100 UNIT/ML
3 SOLUTION INTRAVENOUS PRN
Status: DISCONTINUED | OUTPATIENT
Start: 2020-11-18 | End: 2020-11-19 | Stop reason: HOSPADM

## 2020-11-18 RX ADMIN — LIDOCAINE HYDROCHLORIDE 1 ML: 10 INJECTION, SOLUTION EPIDURAL; INFILTRATION; INTRACAUDAL; PERINEURAL at 16:20

## 2020-11-18 RX ADMIN — Medication 2 G: at 21:31

## 2020-11-18 RX ADMIN — INSULIN GLARGINE 15 UNITS: 100 INJECTION, SOLUTION SUBCUTANEOUS at 21:34

## 2020-11-18 RX ADMIN — ATORVASTATIN CALCIUM 5 MG: 10 TABLET, FILM COATED ORAL at 21:32

## 2020-11-18 RX ADMIN — SODIUM CHLORIDE, PRESERVATIVE FREE 10 ML: 5 INJECTION INTRAVENOUS at 09:01

## 2020-11-18 RX ADMIN — ENOXAPARIN SODIUM 40 MG: 40 INJECTION SUBCUTANEOUS at 09:01

## 2020-11-18 RX ADMIN — METFORMIN HYDROCHLORIDE 500 MG: 500 TABLET ORAL at 09:01

## 2020-11-18 RX ADMIN — SODIUM CHLORIDE, PRESERVATIVE FREE 300 UNITS: 5 INJECTION INTRAVENOUS at 21:32

## 2020-11-18 RX ADMIN — SODIUM CHLORIDE 500 ML: 9 INJECTION, SOLUTION INTRAVENOUS at 11:44

## 2020-11-18 RX ADMIN — LEVOFLOXACIN 750 MG: 500 TABLET, FILM COATED ORAL at 09:01

## 2020-11-18 RX ADMIN — SODIUM CHLORIDE, PRESERVATIVE FREE 10 ML: 5 INJECTION INTRAVENOUS at 21:33

## 2020-11-18 RX ADMIN — Medication 2 G: at 05:29

## 2020-11-18 RX ADMIN — Medication 20 ML: at 16:26

## 2020-11-18 RX ADMIN — Medication 2 G: at 13:48

## 2020-11-18 ASSESSMENT — PAIN SCALES - GENERAL
PAINLEVEL_OUTOF10: 0
PAINLEVEL_OUTOF10: 0

## 2020-11-18 NOTE — PROCEDURES
PICC   Catheter insertion date 11/18/2020     Product Number:  [id number]   Lot No: [id number]   Gauge: 4FR   Lumen: SINGLE   Left Basilic    Vein Diameter : 0.63CM   Upper arm circumference (10CM ABOVE AC): 36CM   Catheter Length : 50CM   Internal Length: 46.5CM   External Catheter Length: 3.5CM   Ultrasound Used:YES  VPS [place] confirms PICC tip is placed in the lower 1/3 of the SVC or at the Cavoatrial junction. Floor nurse notified PICC is okay to use.    : Terri Julio RN

## 2020-11-18 NOTE — PROGRESS NOTES
Department of Podiatry  Progress Note    SUBJECTIVE:  Patient seen at bedside for s/p left foot Syme's amputation performed by  on 11/14/20. Patient states his pain is well controlled and decreasing daily. Patient denies any N/V/D/F/C/SOB/CP     OBJECTIVE:    Scheduled Meds:   levoFLOXacin  750 mg Oral Daily    insulin glargine  15 Units Subcutaneous Nightly    ceFAZolin  2 g Intravenous Q8H    [Held by provider] insulin lispro  5 Units Subcutaneous TID WC    enoxaparin  40 mg Subcutaneous Daily    insulin lispro  0-6 Units Subcutaneous TID WC    insulin lispro  0-3 Units Subcutaneous Nightly    sodium chloride flush  10 mL Intravenous BID    atorvastatin  5 mg Oral Nightly    metFORMIN  500 mg Oral Daily with breakfast     Continuous Infusions:   dextrose      dexmedetomidine (PRECEDEX) IV infusion Stopped (11/11/20 0743)     PRN Meds:.glucose, dextrose, glucagon (rDNA), dextrose, acetaminophen    Allergies   Allergen Reactions    Vancomycin Other (See Comments)     Red man syndrome    Semaglutide Itching and Rash     Pink itch rash         /67   Pulse 72   Temp 97.8 °F (36.6 °C) (Oral)   Resp 18   Ht 6' 3\" (1.905 m)   Wt 280 lb (127 kg)   SpO2 99%   BMI 35.00 kg/m²       EXAM: Dressing clean, dry and intact with no strike through noted. No pain on palpation of calf. Previous exam (11/16/20) below:     VASCULAR:  PT pulses are palpable. CFT < 5 seconds to dorsal ankle b/l. Warm to warm throughout entirety of the LEs    NEUROLOGIC:  Protective sensation is diminished b/l    DERM:  Surgical site is well coapted, sutures are intact. No noted pus, drainage or active bleeding at the time of evaluation. No SOI. Minimal edema noted diffusely throughout the distal LLE. MUSCULOSKELETAL:  5/5 Gross Muscle strength in all 4 quadrants.  Minimal pain on palpation to the amputation site         Scheduled Meds:   levoFLOXacin  750 mg Oral Daily    insulin glargine  15 Units Subcutaneous 11/14/20    Active Problems:    Charcot foot due to diabetes mellitus ([facility]    Diabetic neuropathy associated with diabetes mellitus due to underlying condition ([facility]    Essential hypertension    Diabetic foot ulcer with osteomyelitis ([facility]      PLAN:  - Patient's labs, charts and images were reviewed   - Antibiotics as per ID: Ancef 2 grams every 8 hours for 6 weeks. Zosyn stopped  - Surgical pathology of left foot: Pseudomonas, Staph, Corynebacterium   - Dressings left intact today, they were clean, dry and intact without strikethrough. Dressings to be changed every 2 days per podiatry. - Patient to be NWB to operative extremity   - No further podiatric surgical intervention indicated. Patient is stable for discharge from podiatric standpoint.   - Recommend discharge to SNF   - Discussed patient with Dr. Nomi Purdy  - Will continue to follow patient while they are in-house.

## 2020-11-18 NOTE — PROGRESS NOTES
[address] [place] [address] [facility]  Progress Note      Chief Complaint   Patient presents with    Fever     102.6 at home    Cough    Wound Check     open wound left foot       SUBJECTIVE:      Patient is tolerating medications. No reported adverse drug reactions. No problems overnight. Review of systems:    As stated above in the chief complaint, otherwise negative. Medications:    Scheduled Meds:   lidocaine PF  5 mL Intradermal Once    heparin flush  3 mL Intravenous 2 times per day    levoFLOXacin  750 mg Oral Daily    insulin glargine  15 Units Subcutaneous Nightly    ceFAZolin  2 g Intravenous Q8H    [Held by provider] insulin lispro  5 Units Subcutaneous TID WC    enoxaparin  40 mg Subcutaneous Daily    insulin lispro  0-6 Units Subcutaneous TID WC    insulin lispro  0-3 Units Subcutaneous Nightly    sodium chloride flush  10 mL Intravenous BID    atorvastatin  5 mg Oral Nightly    metFORMIN  500 mg Oral Daily with breakfast     Continuous Infusions:   dextrose      dexmedetomidine (PRECEDEX) IV infusion Stopped (11/11/20 0743)     PRN Meds:sodium chloride flush, heparin flush, glucose, dextrose, glucagon (rDNA), dextrose, acetaminophen  Prior to Admission medications    Medication Sig Start Date End Date Taking?  Authorizing Provider   ceFAZolin (ANCEF) infusion Infuse 2,000 mg intravenously every 8 hours Compound per protocol 11/17/20 12/29/20 Yes Jerre Grade, DO   levoFLOXacin (LEVAQUIN) 750 MG tablet Take 1 tablet by mouth daily for 14 days 11/17/20 12/1/20 Yes Jerre Grade, DO   insulin glargine (LANTUS) 100 UNIT/ML injection vial Inject 15 Units into the skin nightly   Yes Historical Provider, MD   atorvastatin (LIPITOR) 10 MG tablet Take 5 mg by mouth nightly    Yes Historical Provider, MD   metFORMIN (GLUCOPHAGE) 500 MG tablet Take 500 mg by mouth daily (with breakfast)   Yes Historical Provider, MD       OBJECTIVE:  BP (!) 109/59 Comment: in bed, after bolus  Pulse 83   Temp 97.8 °F (36.6 °C) (Oral)   Resp 18   Ht 6' 3\" (1.905 m)   Wt 280 lb (127 kg)   SpO2 99%   BMI 35.00 kg/m²   Temp  Av.8 °F (36.6 °C)  Min: 97.8 °F (36.6 °C)  Max: 97.8 °F (36.6 °C)  General appearance: Resting in bed in no apparent distress. Skin: Warm and dry. No rashes were noted. HEENT: Round and reactive pupils. Moist mucous membranes. No ulcerations or thrush. Neck: Supple to movements. Chest: No use of accessory muscles to breathe. Symmetrical expansion. No wheezing, crackles or rhonchi. Cardiovascular: Reguar rate and rhythm. No murmurs gallops, or rubs appreciated. Abdomen: Bowel sounds present, nontender, nondistended, no masses or hepatosplenomegaly. Extremities: Dressing left foot clean and dry  Lines: peripheral    I/O last 3 completed shifts:   In: 10 [I.V.:10]  Out: 2725 [Urine:2725]      Laboratory and Tests Review:      Lab Results   Component Value Date    WBC 25.9 (H) 11/15/2020    WBC 16.0 (H) 2020    WBC 15.8 (H) 2020    HGB 9.4 (L) 11/15/2020    HCT 31.3 (L) 11/15/2020    MCV 82.8 11/15/2020     (H) 11/15/2020     Lab Results   Component Value Date    NEUTROABS 12.93 (H) 2020    NEUTROABS 14.23 (H) 2020    NEUTROABS 12.35 (H) 2020     No results found for: [facility]  Lab Results   Component Value Date    ALT <5 11/10/2020    AST 9 11/10/2020    ALKPHOS 91 11/10/2020    BILITOT 1.3 (H) 11/10/2020     Lab Results   Component Value Date     2020    K 3.8 2020     2020    CO2 24 2020    BUN 22 2020    CREATININE 1.1 2020    CREATININE 1.0 2020    CREATININE 1.0 2020    GFRAA >60 2020    LABGLOM >60 2020    GLUCOSE 119 2020    PROT 9.1 11/10/2020    LABALBU 3.7 11/10/2020    CALCIUM 8.8 2020    BILITOT 1.3 11/10/2020    ALKPHOS 91 11/10/2020    AST 9 11/10/2020    ALT <5 11/10/2020     Lab Results   Component Value Date    CRP 28.9 (H) 11/10/2020    CRP 10.8 (H) 10/16/2020     Lab Results   Component Value Date    SEDRATE 79 (H) 11/10/2020    SEDRATE 78 (H) 10/16/2020       Radiology:    MRI ANKLE LEFT WO CONTRAST   Final Result   Large area of ulceration with suspected underlying osteomyelitis of anterior   portions of both the talus and calcaneus. Severe destructive changes within the midfoot may be related to chronic   osteomyelitis, neuropathic joint, or a combination. XR ANKLE RIGHT (MIN 3 VIEWS)   Final Result   No acute fractures or osseous destructive process noted. Osseous structures   are in alignment. VL LOWER EXTREMITY ARTERIAL SEGMENTAL PRESSURES W PPG   Final Result   No evidence of peripheral arterial disease. XR CHEST PORTABLE   Final Result   No acute abnormality. XR FOOT LEFT (MIN 3 VIEWS)   Final Result   Ongoing plantar and lateral midfoot ulcer with underlying acute (presumably   on chronic) osteomyelitis superimposed on neuropathic foot. Microbiology:   Lab Results   Component Value Date    BC 24 Hours no growth 11/15/2020    BC  11/10/2020     This isolate is presumed to be resistant based on the  detection of inducible Clindamycin resistance. Clindamycin  may still be effective in some patients.       BC 5 Days no growth 10/15/2020    ORG Pseudomonas aeruginosa 11/11/2020    ORG Staphylococcus aureus 11/11/2020    ORG Corynebacterium species 11/11/2020     Lab Results   Component Value Date    BLOODCULT2  11/10/2020     Refer to previous culture for susceptibility results  (CXBL RAC collected 11-10-20 at 0128)      BLOODCULT2 5 Days no growth 10/15/2020    ORG Pseudomonas aeruginosa 11/11/2020    ORG Staphylococcus aureus 11/11/2020    ORG Corynebacterium species 11/11/2020     WOUND/ABSCESS   Date Value Ref Range Status   11/10/2020 (A)  Final    Mixed constance also isolated, including:  Mixed Gram negative rods  Including Oxidase positive Gram negative rods     11/10/2020 Final    Heavy growth  This isolate is presumed to be resistant based on the  detection of inducible Clindamycin resistance. Clindamycin  may still be effective in some patients. 10/15/2020   Final    Mixed constance isolated. Further workup and sensitivity testing  is not routinely indicated and will not be performed. Mixed constance isolated includes:  Alpha hemolytic Strep species  Coagulase negative Staph species  Gram negative rods       No results found for: RESPSMEAR      Component Value Date/Time    AFBCX  10/17/2020 0801     No growth after 1 week/s of incubation. No growth after 2 week/s of incubation. No growth after 3 week/s of incubation. AFBCX  10/17/2020 0759     No growth after 1 week/s of incubation. No growth after 2 week/s of incubation. No growth after 3 week/s of incubation. FUNGSM No Fungal elements seen 11/11/2020 0735    LABFUNG  10/17/2020 0801     No growth after 1 week/s of incubation. No growth after 2 week/s of incubation. No growth after 3 week/s of incubation. LABFUNG  10/17/2020 0759     No growth after 1 week/s of incubation. No growth after 2 week/s of incubation. No growth after 3 week/s of incubation.        No results found for: CULTRESP  No results found for: CXCATHTIP  No results found for: BFCS  Culture Surgical   Date Value Ref Range Status   11/11/2020 Rare growth  Final   11/11/2020   Final    Light growth  Refer to previous culture for susceptibility results  See CXSUR Left Foot Bone collected 11/11/2020 @07:45     11/11/2020 Light growth  Final     No results found for: Marcelo Murdock  No results found for: [facility]    Problem list:    Principal Problem:    Diabetic foot infection ([address]  Active Problems:    Charcot foot due to diabetes mellitus ([address]    Diabetic neuropathy associated with diabetes mellitus due to underlying condition ([address]    Essential hypertension    Diabetic foot ulcer with osteomyelitis ([address]  Resolved Problems:    * No resolved hospital problems.  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

## 2020-11-18 NOTE — CARE COORDINATION
Social Work / Discharge Planning : JEWEL left VM with [facility] [phone] in regards to if [employer] faxed over requested info. for financial aide. AWait response. JEWEL to follow. Electronically signed by CLIVE Bertrand on 11/18/2020 at 12:53 PM     Addendum :JEWEL spoke to Alayna at [facility] and she verified [place] from [facility]. KOLE.     has not faxed over financial information they need to complete finacial aspect of cost of IV. Alf HOLLOWAY called [employer] and spoke to [place] [phone]. She did NOT fax requested over info. JEWEL asked again if information can be documentation and faxed to [address]. [place] stated she will provide information to Minh Simon . JEWEL to follow.  Electronically signed by CLIVE Bertrand on 11/18/20 at 1:44 PM EST

## 2020-11-18 NOTE — PROGRESS NOTES
[facility] Progress Note    Admitting Date and Time: 11/10/2020 12:53 AM  Admit Dx: Acute osteomyelitis of left foot ([address] [M86.172]  Acute osteomyelitis of left foot ([address] [M86.172]    Subjective:  Patient is being followed for Acute osteomyelitis of left foot ([address] [M86.172]  Acute osteomyelitis of left foot ([address] [M86.172]   Pt feels ok s/p I&D by podiatry 11/11/2020, s/p ankle amputation 11/14/2020    ROS: denies fever, chills, cp, sob, n/v, HA unless stated above.       levoFLOXacin  750 mg Oral Daily    insulin glargine  15 Units Subcutaneous Nightly    ceFAZolin  2 g Intravenous Q8H    [Held by provider] insulin lispro  5 Units Subcutaneous TID WC    enoxaparin  40 mg Subcutaneous Daily    insulin lispro  0-6 Units Subcutaneous TID WC    insulin lispro  0-3 Units Subcutaneous Nightly    sodium chloride flush  10 mL Intravenous BID    atorvastatin  5 mg Oral Nightly    metFORMIN  500 mg Oral Daily with breakfast     glucose, 15 g, PRN  dextrose, 12.5 g, PRN  glucagon (rDNA), 1 mg, PRN  dextrose, 100 mL/hr, PRN  acetaminophen, 650 mg, Q6H PRN         Objective:    /67   Pulse 72   Temp 97.8 °F (36.6 °C) (Oral)   Resp 18   Ht 6' 3\" (1.905 m)   Wt 280 lb (127 kg)   SpO2 99%   BMI 35.00 kg/m²     General Appearance: alert and oriented to person, place and time and in no acute distress  Skin: warm and dry  Head: normocephalic and atraumatic  Eyes: pupils equal, round, and reactive to light, extraocular eye movements intact, conjunctivae normal  Neck: neck supple and non tender without mass   Pulmonary/Chest: clear to auscultation bilaterally- no wheezes, rales or rhonchi, normal air movement, no respiratory distress  Cardiovascular: normal rate, normal S1 and S2 and no carotid bruits  Abdomen: soft, non-tender, non-distended, normal bowel sounds, no masses or organomegaly  Extremities: no cyanosis, no clubbing and left leg with dressing and wound vac  Neurologic: no cranial nerve deficit and speech normal        No results for input(s): NA, K, CL, CO2, BUN, CREATININE, GLUCOSE, CALCIUM in the last 72 hours. Recent Labs     11/15/20  1435   WBC 25.9*   RBC 3.78*   HGB 9.4*   HCT 31.3*   MCV 82.8   MCH 24.9*   MCHC 30.0*   RDW 16.5*   *   MPV 9.7     Procedure:  LEFT FOOT SYMES AMPUTATION Partial excision of distal tibia and fibula bones. Dorsalis pedis adipomyofascial elevation and transfer    Assessment:    Principal Problem:    Diabetic foot infection ([facility]  Active Problems:    Charcot foot due to diabetes mellitus ([facility]    Diabetic neuropathy associated with diabetes mellitus due to underlying condition [facility])    Essential hypertension    Diabetic foot ulcer with osteomyelitis ([facility]  Resolved Problems:    * No resolved hospital problems. *      Plan:  1. Diabetic infected foot ulcer, failed to fill abx script from previous admission due to cost, unable to get wound vac due to cost as well. He was treated with zosyn and dapto and discahrged on levaquin and linezoid. Appreciate ID and podiatry input, s/p I&D 11/11/2020, wound vac in place, wound cultures are growing pseudo and staff, MRI showed osteo, s/p amputation of the ankle anx switched to ancef per ID, place a PICC line abx for 6 weeks  2.  Diabetes type 2, patient is currently on sliding scale insulin,  And lantus, increase the lantus to 24 units, started on 5 units of lispro with meals  3.  History of hyperlipidemia, continue atorvastatin. 4.  Elevated blood pressure readings, patient has no history of hypertension, monitor for now, readings improved  5. ARF, cr at 1.3, ? Prerenal, resolved, down to 1  6. Sepsis, present on admission, leukocytosis, tachycardia and fever, due to infected  diabetic foot  7.   Dispo, patient would benefit from LTAC yet none of the LTAC's in the area with accepted patient with no insurance or pending Medicaid, will attempt with SNF vs Acute rehab        NOTE: This report was transcribed using voice recognition software. Every effort was made to ensure accuracy; however, inadvertent computerized transcription errors may be present.   Electronically signed by Ary Franklin MD on 11/18/2020 at 8:36 AM

## 2020-11-18 NOTE — PLAN OF CARE
Problem: Falls - Risk of:  Goal: Will remain free from falls  Description: Will remain free from falls  11/18/2020 1117 by Jennifer Diop RN  Outcome: Met This Shift  11/18/2020 0239 by João Montaño RN  Outcome: Met This Shift  Goal: Absence of physical injury  Description: Absence of physical injury  11/18/2020 1117 by Jennifer Diop RN  Outcome: Met This Shift  11/18/2020 0239 by João Montaño RN  Outcome: Met This Shift     Problem: Fluid and Electrolyte Imbalance  Goal: Fluid and electrolyte balance are achieved/maintained  11/18/2020 0239 by João Montaño RN  Outcome: Met This Shift     Problem: INFECTION  Goal: Absence of urinary tract infection signs and symptoms  Description: Absence of urinary tract infection signs and symptoms  11/18/2020 0239 by João Montaño RN  Outcome: Met This Shift     Problem: Wound:  Goal: Signs of wound healing will improve  Description: Signs of wound healing will improve  11/18/2020 0239 by João Montaño RN  Outcome: Met This Shift     Problem:  Body Temperature - Imbalanced:  Goal: Ability to maintain a body temperature in the normal range will improve to within specified parameters  Description: Ability to maintain a body temperature in the normal range will improve to within specified parameters  11/18/2020 0239 by João Montaño RN  Outcome: Met This Shift     Problem: Pain:  Goal: Pain level will decrease  Description: Pain level will decrease  11/18/2020 0239 by João Montaño RN  Outcome: Met This Shift  Goal: Control of acute pain  Description: Control of acute pain  11/18/2020 0239 by Jãoo Montaño RN  Outcome: Met This Shift  Goal: Control of chronic pain  Description: Control of chronic pain  11/18/2020 0239 by João Montaño RN  Outcome: Met This Shift     Problem: Skin Integrity:  Goal: Will show no infection signs and symptoms  Description: Will show no infection signs and symptoms  11/18/2020 0239 by João Montaño RN  Outcome: Met This Shift  Goal: Absence of new skin breakdown  Description: Absence of new skin breakdown  11/18/2020 0239 by Gunjan Castro, RN  Outcome: Met This Shift

## 2020-11-18 NOTE — PROGRESS NOTES
Physical Therapy    Facility/Department: [address] MED SURG/TELE  Treatment note    NAME: Bert Aleman  : 1975  MRN: [id number]    Date of Service: 2020    Attending Provider:  Virgilio Stephens MD    Evaluating PT:  Kamryn Chin. Juancarlos ., P.T. Room #:  2221/5421-M  Diagnosis:  Acute osteomyelitis of L foot  Pertinent PMHx/PSHx:  Diabetes mellitus with neuropathy  Procedure/Surgery:  Left foot Syme's amputation at the level of the ankle, partial excision of distal tibia and fibula bones, dorsalis pedis adipo-myofascial tissue and flap elevation and  transfer (20)  Precautions:  Falls, bed/chair alarms, NWB L foot    SUBJECTIVE:    Pt lives with wife in a 2nd floor apartment with 2 stairs and 1 rail to enter. There are 7 steps and 2 rails to his 2nd floor apt. Pt ambulated with no AD PTA, but owns ww and has used ADs before due to NWB L foot in the past.    OBJECTIVE:   Initial Evaluation  Date: 20 Treatment  2020 Short Term/ Long Term   Goals   Was pt agreeable to Eval/treatment? yes yes    Does pt have pain?  No c/o pain No complaints    Bed Mobility  Rolling: supervision  Supine to sit: supervision  Sit to supine: supervision  Scooting: supervision Rolling: Supervision  Supine to sit: Supervision  Scooting: Supervision Independent    Transfers Sit to stand: MIN A  Stand to sit: MIN A  Stand pivot: MIN A/MOD A with ww MIN A/MOD A with knee scooter Sit to stand: Min A  Stand to sit: Min A  Stand Pivot; Min A using [employer] for support Independent    Ambulation   20 feet with ww MIN A/MOD A and 20 feet with knee scooter MIN/MOD A 15 feet  x 1 each using [employer] for support Min A for balance 100+ feet with AAD Independent    Stair negotiation: ascended and descended NA NT 10 steps with 1 rail Independent   AM-PAC 6 Clicks         Pt is alert and able to follow instruction  Balance: poor dynamic using [employer] for support    Patient education  Pt was educated on UE usage to assist with transfers, gait promoting staying within [employer] base of support    Patient response to education:   Pt verbalized understanding Pt demonstrated skill Pt requires further education in this area   yes With instruction yes     ASSESSMENT:   Comments: Nurse fernando lopez Rx. Pt found in bed, agreed to Rx, assisted to the bathroom using scooter and and from the bathroom using [employer] for support, NWB L LE maintained. Pt fatigued after activity. Treatment: Pt practiced and was instructed in the following treatment: use of scooter for long distance mobility. Pt able to transfer chair to scooter with Min A for balance, displayed good safety with use of scooter brakes applied prior to transfers. Scooter distance 5 feet and 70 feet, Pt required 3 brief rest breaks during 70 feet scooter attempt due to fatigue and shortness of breath. Pt displayed locking scooter brakes while resting. Pt on room air all Rx, saturation, saturation 98% after activity. Pt was left in a recliner chair with call light in reach, L LE elevated for comfort and edema management. Time in 1015   Time out 1047   Total Treatment Time 32 minutes   CPT codes:     Therapeutic activities 71706 32 minutes   Therapeutic exercises 77936 0 minutes       Pt is making good progress toward established Physical Therapy goals. Continue with physical therapy current plan of care.     Marcello Goldstein PTA   License Number: [id number]

## 2020-11-18 NOTE — PROGRESS NOTES
Physical Therapy    Facility/Department: [address] MED SURG/TELE  Treatment note    NAME: Nadine Watters  : 1975  MRN: [id number]    Date of Service: 2020    Attending Provider:  Elonda Severin, MD    Evaluating PT:  Alesia Mariscal. Evaristo Faith P.T. Room #:  4858/8217-X  Diagnosis:  Acute osteomyelitis of L foot  Pertinent PMHx/PSHx:  Diabetes mellitus with neuropathy  Procedure/Surgery:  Left foot Syme's amputation at the level of the ankle, partial excision of distal tibia and fibula bones, dorsalis pedis adipo-myofascial tissue and flap elevation and  transfer (20)  Precautions:  Falls, bed/chair alarms, NWB L foot    SUBJECTIVE:    Pt lives with wife in a 2nd floor apartment with 2 stairs and 1 rail to enter. There are 7 steps and 2 rails to his 2nd floor apt. Pt ambulated with no AD PTA, but owns ww and has used ADs before due to NWB L foot in the past.    OBJECTIVE:   Initial Evaluation  Date: 20 Treatment   Short Term/ Long Term   Goals   Was pt agreeable to Eval/treatment? yes yes    Does pt have pain?  No c/o pain No complaints    Bed Mobility  Rolling: supervision  Supine to sit: supervision  Sit to supine: supervision  Scooting: supervision Rolling: supervision  Supine to sit: NA  Sit to supine: supervision  Scooting: supervision Independent    Transfers Sit to stand: MIN A  Stand to sit: MIN A  Stand pivot: MIN A/MOD A with ww MIN A/MOD A with knee scooter Sit to stand: MOD A  Stand to sit: MIN A  Stand Pivot: MIN A/MOD A with ww NWB LLE Independent    Ambulation   20 feet with ww MIN A/MOD A and 20 feet with knee scooter MIN/MOD A 3 feet with ww MIN A/MOD A NWB + feet with AAD Independent    Stair negotiation: ascended and descended NA NT 10 steps with 1 rail Independent   AM-PAC 6 Clicks 98/50 46/01      Balance: MIN A/MOD A with ww    ASSESSMENT:    Comments:  Pt was found in chair and RN reports pt is feeling light headed and his BP is running low and asked for assist to help pt back to bed. Pt required increased assist to stand up from lower chair height and was able to use ww and maintain NWB LLE while getting back to bed. Treatment:  Patient practiced and was instructed in the following treatment:     Bed mobility, transfers, and gait with ww to improve functional strength and endurance. Pt was left supine in bed with call light left by patient and nurses with pt. PLAN:    Pt is making good progress toward established Physical Therapy goals. Continue with physical therapy current plan of care. Time in  11:45  Time out  11:55    Total Treatment Time  10 minutes     Evaluation Time includes thorough review of current medical information, gathering information on past medical history/social history and prior level of function, completion of standardized testing/informal observation of tasks, assessment of data and education on plan of care and goals. CPT codes:  [] Low Complexity PT evaluation 04832  [] Moderate Complexity PT evaluation 47168  [] High Complexity PT evaluation 39090  [] PT Re-evaluation 86497  [] Gait training 05876 ** minutes  [] Manual therapy 65938 ** minutes  [x] Therapeutic activities 32239 10 minutes  [] Therapeutic exercises 54544 ** minutes  [] Neuromuscular reeducation 37283 ** minutes     Huang Flores., P.T.   License Number: [id number]

## 2020-11-18 NOTE — CARE COORDINATION
Pt will require iv atb's x6 weeks post discharge.; [facility] will accept as long as arrangements can be made for payment for iv atb's ($10 a day). Await outcome of [employer] tyra for pt. If can be arranged; can then expedite transfer to [facility]. Needs PICC await PICC order by ID. Will follow. Jaylen Dickerson.

## 2020-11-18 NOTE — PROGRESS NOTES
Occupational Therapy  OT BEDSIDE TREATMENT NOTE      Date:2020  Patient Name: Amalia Abbasi  MRN: [id number]  : 1975  Room: [address] [place] [address]     Referring Provider: Dottie Hernandez DPM      Evaluating OT: Cortney Robertirmer OTR/L [id number]     AM-PAC Daily Activity Raw Score:      Recommended Adaptive Equipment:  TBD      Diagnosis: acute osteomyelitis L foot   Surgery: 2020 s/p  Left foot Syme's amputation at the level of the ankle, partial excision of distal tibia and fibula bones, dorsalis pedis adipo-myofascial tissue and flap elevation and  transfer    Pertinent Medical History: DM with neuropathy, HTB  Precautions:  Falls, NWB L LE, alarm      Home Living: Pt lives with wife, 2nd floor apartment. 2 steps to enter building.   7 steps/rail to apartment    Bathroom setup: tub/shower unit    Equipment owned: cane, getting a walker      Prior Level of Function: Independent  with ADLs , assist as needed  with IADLs; ambulated with no device     Pain Level: L foot- Mild  Cognition: alert and oriented                 Functional Assessment:    Initial Eval Status  Date: 20 Treatment Status  Date:20 STGs = LTGs  Time frame: 5-7 days   Feeding Independent        Grooming Set-up,seated    Mod I    UB Dressing Set-up    Mod I    LB Dressing Mod A  Mod A  Pt able to thread B LE through pants but required assistance to manage clothing over hips.  Mod I    Bathing         Toileting Assist with thorough hygiene  Mod A  Pt performed hygiene seated nut required assistance with clothing management standing.  Independent   Bed Mobility  Sitting EOB upon arrival      SBA  Sit-supine   Supine to sit: SBA     Functional Transfers Min A  Sit-stand from bed  STS:  Mod A  From EOB and commode Mod I    Functional Mobility Min A,w/walker   Patient having increase pain L surgical area - per patient from the hopping on his R foot - can feel increase L LE   CGA using knee scooter from bed to bathroom and Min A using ww from bathroom to chair. Mod I with good tolerance    Balance Sitting:     Static:  Independent     Dynamic:SBA   Standing: Min A Sitting: independent  Standin [address]   with good tolerance    Activity Tolerance Fair with light activity  Fair  Good with ADL activity    Visual/  Perceptual Glasses: reading                           B UE were WFL during tasks. Comments: Upon arrival pt was supine in bed. At end of session pt was transferred to chair with L LE elevated, alarm on, all lines and tubes intact and call light within reach. Treatment: Educated pt on management of knee scooter and ww during toileting tasks. Pt used knee scooter to restroom and transitioned to ww. Due to standing balance deficits pt required assistance to manage pants over hips. Encouraged pt to perform LE dressing at bed level by rolling R to L or seated to weight shifting to don pants. Pt donned R shoe seated at EOB with Min A. Provided pt with elastic shoe laces to decrease difficulty with tying laces. Education: Compensator LE dressing techniques, toilet transfer training, DME and AE    · Pt has made good progress towards set goals.      Plan of Care: 1-3 days/week for 1-2 weeks PRN   [x]? ?ADL retraining/adapted techniques and AE recommendations to increase functional independence within precautions                    [x]? ? Energy conservation techniques to improve tolerance for selfcare routine   [x]? ? Functional transfer/mobility training/DME recommendations for increased independence, safety and fall prevention         [x]? ?Patient/family education to increase safety and functional independence             [x]? ? Environmental modifications for safe mobility and completion of ADLs                             []? ? Cognitive retraining ex's to improve problem solving skills & safe participation in ADLs/IADLs     []? ?Sensory re-education techniques to improve extremity awareness, maintain skin integrity and improve hand function                             []? ? Visual/Perceptual retraining  to improve body awareness and safety during transfers and ADLs  []? ? Splinting/positioning needs to maintain joint/skin integrity and prevent contractures  [x]? ? Therapeutic activity to improve functional performance during ADLs.                                         [x]? ? Therapeutic exercise to improve tolerance and functional strength for ADLs   [x]? ? Balance retraining/tolerance tasks for facilitation of postural control with dynamic challenges during ADLs .  []?? Neuromuscular re-education: facilitation of righting/equilibrium reactions, midline orientation, scapular stability/mobility, Normalization muscle     tone and facilitation active functional movement/Attention                         []? ? Delirium prevention/treatment    [x]? Positioning to improve functional independence  []? ? Other:     Treatment Charges: Mins Units   Ther Ex  [id number]     Manual Therapy Perla Graham [phone]   ADL/Home Mgt [id number] [phone]   Neuro Re-ed [id number]     Group Therapy      Orthotic manage/training  [id number]     Non-Billable Time       Time In: 10:20  Time Out: 10:39  Total Time: 110[address] [facility] PERES/L [id number]

## 2020-11-18 NOTE — PROGRESS NOTES
Patient up in chair, c/o dizziness and lightheadedness. Checked a blood pressure, 88/53 right arm  and 87/55 left arm. Notified Dr Ga Goyal new order received. Patient put back to bed, patient feels better and dizziness has gone away.

## 2020-11-19 ENCOUNTER — HOSPITAL ENCOUNTER (INPATIENT)
Age: 45
LOS: 19 days | Discharge: NURSING, REHAB OR LONG-TERM CARE | DRG: 041 | End: 2020-12-08
Attending: PHYSICAL MEDICINE & REHABILITATION | Admitting: PHYSICAL MEDICINE & REHABILITATION

## 2020-11-19 VITALS
OXYGEN SATURATION: 98 % | TEMPERATURE: 98.4 F | SYSTOLIC BLOOD PRESSURE: 123 MMHG | HEIGHT: 75 IN | RESPIRATION RATE: 18 BRPM | HEART RATE: 80 BPM | BODY MASS INDEX: 34.82 KG/M2 | DIASTOLIC BLOOD PRESSURE: 76 MMHG | WEIGHT: 280 LBS

## 2020-11-19 PROBLEM — Z89.432 PARTIAL NONTRAUMATIC AMPUTATION OF FOOT, LEFT (HCC): Status: ACTIVE | Noted: 2020-11-19

## 2020-11-19 LAB
METER GLUCOSE: 106 MG/DL (ref 74–99)
METER GLUCOSE: 107 MG/DL (ref 74–99)
METER GLUCOSE: 123 MG/DL (ref 74–99)
METER GLUCOSE: 147 MG/DL (ref 74–99)
SARS-COV-2: NOT DETECTED
SOURCE: NORMAL

## 2020-11-19 PROCEDURE — 1280000000 HC REHAB R&B

## 2020-11-19 PROCEDURE — 0HRNXK3 REPLACEMENT OF LEFT FOOT SKIN WITH NONAUTOLOGOUS TISSUE SUBSTITUTE, FULL THICKNESS, EXTERNAL APPROACH: ICD-10-PCS | Performed by: PODIATRIST

## 2020-11-19 PROCEDURE — 82962 GLUCOSE BLOOD TEST: CPT

## 2020-11-19 PROCEDURE — 6360000002 HC RX W HCPCS: Performed by: INTERNAL MEDICINE

## 2020-11-19 PROCEDURE — 6370000000 HC RX 637 (ALT 250 FOR IP): Performed by: INTERNAL MEDICINE

## 2020-11-19 PROCEDURE — 2580000003 HC RX 258: Performed by: INTERNAL MEDICINE

## 2020-11-19 PROCEDURE — 0J9R0ZZ DRAINAGE OF LEFT FOOT SUBCUTANEOUS TISSUE AND FASCIA, OPEN APPROACH: ICD-10-PCS | Performed by: PODIATRIST

## 2020-11-19 PROCEDURE — 0KBW0ZZ EXCISION OF LEFT FOOT MUSCLE, OPEN APPROACH: ICD-10-PCS | Performed by: PODIATRIST

## 2020-11-19 PROCEDURE — 99239 HOSP IP/OBS DSCHRG MGMT >30: CPT | Performed by: INTERNAL MEDICINE

## 2020-11-19 PROCEDURE — 6370000000 HC RX 637 (ALT 250 FOR IP): Performed by: PODIATRIST

## 2020-11-19 PROCEDURE — 2580000003 HC RX 258: Performed by: PODIATRIST

## 2020-11-19 PROCEDURE — 36592 COLLECT BLOOD FROM PICC: CPT

## 2020-11-19 RX ORDER — POLYETHYLENE GLYCOL 3350 17 G/17G
17 POWDER, FOR SOLUTION ORAL DAILY PRN
Status: DISCONTINUED | OUTPATIENT
Start: 2020-11-19 | End: 2020-12-08 | Stop reason: HOSPADM

## 2020-11-19 RX ORDER — HEPARIN SODIUM (PORCINE) LOCK FLUSH IV SOLN 100 UNIT/ML 100 UNIT/ML
3 SOLUTION INTRAVENOUS EVERY 12 HOURS SCHEDULED
Status: DISCONTINUED | OUTPATIENT
Start: 2020-11-19 | End: 2020-12-08 | Stop reason: HOSPADM

## 2020-11-19 RX ORDER — DEXTROSE MONOHYDRATE 50 MG/ML
100 INJECTION, SOLUTION INTRAVENOUS PRN
Status: CANCELLED | OUTPATIENT
Start: 2020-11-19

## 2020-11-19 RX ORDER — NICOTINE POLACRILEX 4 MG
15 LOZENGE BUCCAL PRN
Status: CANCELLED | OUTPATIENT
Start: 2020-11-19

## 2020-11-19 RX ORDER — DEXTROSE MONOHYDRATE 50 MG/ML
100 INJECTION, SOLUTION INTRAVENOUS PRN
Status: DISCONTINUED | OUTPATIENT
Start: 2020-11-19 | End: 2020-12-08 | Stop reason: HOSPADM

## 2020-11-19 RX ORDER — ACETAMINOPHEN 325 MG/1
650 TABLET ORAL EVERY 6 HOURS PRN
Status: DISCONTINUED | OUTPATIENT
Start: 2020-11-19 | End: 2020-12-08 | Stop reason: HOSPADM

## 2020-11-19 RX ORDER — HEPARIN SODIUM (PORCINE) LOCK FLUSH IV SOLN 100 UNIT/ML 100 UNIT/ML
3 SOLUTION INTRAVENOUS PRN
Status: CANCELLED | OUTPATIENT
Start: 2020-11-19

## 2020-11-19 RX ORDER — DEXTROSE MONOHYDRATE 25 G/50ML
12.5 INJECTION, SOLUTION INTRAVENOUS PRN
Status: CANCELLED | OUTPATIENT
Start: 2020-11-19

## 2020-11-19 RX ORDER — INSULIN GLARGINE 100 [IU]/ML
15 INJECTION, SOLUTION SUBCUTANEOUS NIGHTLY
Status: DISCONTINUED | OUTPATIENT
Start: 2020-11-19 | End: 2020-12-08 | Stop reason: HOSPADM

## 2020-11-19 RX ORDER — ATORVASTATIN CALCIUM 10 MG/1
5 TABLET, FILM COATED ORAL NIGHTLY
Status: DISCONTINUED | OUTPATIENT
Start: 2020-11-19 | End: 2020-12-08 | Stop reason: HOSPADM

## 2020-11-19 RX ORDER — HEPARIN SODIUM (PORCINE) LOCK FLUSH IV SOLN 100 UNIT/ML 100 UNIT/ML
3 SOLUTION INTRAVENOUS PRN
Status: DISCONTINUED | OUTPATIENT
Start: 2020-11-19 | End: 2020-12-08 | Stop reason: HOSPADM

## 2020-11-19 RX ORDER — ACETAMINOPHEN 325 MG/1
650 TABLET ORAL EVERY 4 HOURS PRN
Status: DISCONTINUED | OUTPATIENT
Start: 2020-11-19 | End: 2020-12-05

## 2020-11-19 RX ORDER — NICOTINE POLACRILEX 4 MG
15 LOZENGE BUCCAL PRN
Status: DISCONTINUED | OUTPATIENT
Start: 2020-11-19 | End: 2020-12-08 | Stop reason: HOSPADM

## 2020-11-19 RX ORDER — HEPARIN SODIUM (PORCINE) LOCK FLUSH IV SOLN 100 UNIT/ML 100 UNIT/ML
3 SOLUTION INTRAVENOUS EVERY 12 HOURS SCHEDULED
Status: CANCELLED | OUTPATIENT
Start: 2020-11-19

## 2020-11-19 RX ORDER — INSULIN GLARGINE 100 [IU]/ML
15 INJECTION, SOLUTION SUBCUTANEOUS NIGHTLY
Status: CANCELLED | OUTPATIENT
Start: 2020-11-19

## 2020-11-19 RX ORDER — ATORVASTATIN CALCIUM 10 MG/1
5 TABLET, FILM COATED ORAL NIGHTLY
Status: CANCELLED | OUTPATIENT
Start: 2020-11-19

## 2020-11-19 RX ORDER — SODIUM CHLORIDE 0.9 % (FLUSH) 0.9 %
10 SYRINGE (ML) INJECTION 2 TIMES DAILY
Status: CANCELLED | OUTPATIENT
Start: 2020-11-19

## 2020-11-19 RX ORDER — LEVOFLOXACIN 750 MG/1
750 TABLET ORAL DAILY
Status: COMPLETED | OUTPATIENT
Start: 2020-11-20 | End: 2020-11-28

## 2020-11-19 RX ORDER — NICOTINE POLACRILEX 4 MG
15 LOZENGE BUCCAL PRN
Qty: 45 G | Refills: 1 | DISCHARGE
Start: 2020-11-19

## 2020-11-19 RX ORDER — SODIUM CHLORIDE 0.9 % (FLUSH) 0.9 %
10 SYRINGE (ML) INJECTION 2 TIMES DAILY
Status: DISCONTINUED | OUTPATIENT
Start: 2020-11-19 | End: 2020-12-08 | Stop reason: HOSPADM

## 2020-11-19 RX ORDER — DEXTROSE MONOHYDRATE 25 G/50ML
12.5 INJECTION, SOLUTION INTRAVENOUS PRN
Status: DISCONTINUED | OUTPATIENT
Start: 2020-11-19 | End: 2020-12-08 | Stop reason: HOSPADM

## 2020-11-19 RX ORDER — SODIUM CHLORIDE 0.9 % (FLUSH) 0.9 %
10 SYRINGE (ML) INJECTION PRN
Status: DISCONTINUED | OUTPATIENT
Start: 2020-11-19 | End: 2020-12-08 | Stop reason: HOSPADM

## 2020-11-19 RX ORDER — ACETAMINOPHEN 325 MG/1
650 TABLET ORAL EVERY 6 HOURS PRN
Status: CANCELLED | OUTPATIENT
Start: 2020-11-19

## 2020-11-19 RX ORDER — SODIUM CHLORIDE 0.9 % (FLUSH) 0.9 %
10 SYRINGE (ML) INJECTION PRN
Status: CANCELLED | OUTPATIENT
Start: 2020-11-19

## 2020-11-19 RX ADMIN — Medication 10 ML: at 05:49

## 2020-11-19 RX ADMIN — Medication 300 UNITS: at 05:49

## 2020-11-19 RX ADMIN — SODIUM CHLORIDE, PRESERVATIVE FREE 10 ML: 5 INJECTION INTRAVENOUS at 08:47

## 2020-11-19 RX ADMIN — Medication 2 G: at 14:21

## 2020-11-19 RX ADMIN — INSULIN GLARGINE 15 UNITS: 100 INJECTION, SOLUTION SUBCUTANEOUS at 20:51

## 2020-11-19 RX ADMIN — SODIUM CHLORIDE, PRESERVATIVE FREE 300 UNITS: 5 INJECTION INTRAVENOUS at 10:33

## 2020-11-19 RX ADMIN — Medication 2 G: at 05:49

## 2020-11-19 RX ADMIN — ENOXAPARIN SODIUM 40 MG: 40 INJECTION SUBCUTANEOUS at 08:47

## 2020-11-19 RX ADMIN — SODIUM CHLORIDE, PRESERVATIVE FREE 300 UNITS: 5 INJECTION INTRAVENOUS at 21:46

## 2020-11-19 RX ADMIN — METFORMIN HYDROCHLORIDE 500 MG: 500 TABLET ORAL at 08:47

## 2020-11-19 RX ADMIN — Medication 10 ML: at 21:46

## 2020-11-19 RX ADMIN — Medication 2 G: at 21:45

## 2020-11-19 RX ADMIN — ATORVASTATIN CALCIUM 5 MG: 10 TABLET, FILM COATED ORAL at 20:51

## 2020-11-19 RX ADMIN — LEVOFLOXACIN 750 MG: 500 TABLET, FILM COATED ORAL at 08:47

## 2020-11-19 ASSESSMENT — PAIN SCALES - GENERAL
PAINLEVEL_OUTOF10: 0

## 2020-11-19 ASSESSMENT — PAIN SCALES - WONG BAKER: WONGBAKER_NUMERICALRESPONSE: 0

## 2020-11-19 NOTE — HOME CARE
Received fax from [employer] Solor[employer] to complete financial assistance review. Given to finance office. Awaiting reply.   Daina Harrington LPN [facility]

## 2020-11-19 NOTE — PROGRESS NOTES
Occupational Therapy  Patient treatment attempted this AM.  Patient declined all activity due to fatigue despite encouragement from therapist. Will attempt at a later time.                                                  Nando PERES/KOLE [id number]

## 2020-11-19 NOTE — PROGRESS NOTES
Take 37.5 mLs by mouth as needed (Hypoglycemia) 20  Yes Flako Campos MD   ceFAZolin (ANCEF) infusion Infuse 2,000 mg intravenously every 8 hours Compound per protocol 20 Yes Jeffrey Rucker DO   levoFLOXacin (LEVAQUIN) 750 MG tablet Take 1 tablet by mouth daily for 14 days 20 Yes Jeffrey Rucker DO   insulin glargine (LANTUS) 100 UNIT/ML injection vial Inject 15 Units into the skin nightly   Yes Historical Provider, MD   atorvastatin (LIPITOR) 10 MG tablet Take 5 mg by mouth nightly    Yes Historical Provider, MD   metFORMIN (GLUCOPHAGE) 500 MG tablet Take 500 mg by mouth daily (with breakfast)   Yes Historical Provider, MD       OBJECTIVE:  /76   Pulse 80   Temp 98.4 °F (36.9 °C) (Oral)   Resp 18   Ht 6' 3\" (1.905 m)   Wt 280 lb (127 kg)   SpO2 98%   BMI 35.00 kg/m²   Temp  Av °F (36.7 °C)  Min: 97.6 °F (36.4 °C)  Max: 98.4 °F (36.9 °C)  General appearance: Resting in bed in no apparent distress. Skin: Warm and dry. No rashes were noted. HEENT: Round and reactive pupils. Moist mucous membranes. No ulcerations or thrush. Neck: Supple to movements. Chest: No use of accessory muscles to breathe. Symmetrical expansion. No wheezing, crackles or rhonchi. Cardiovascular: Reguar rate and rhythm. No murmurs gallops, or rubs appreciated. Abdomen: Bowel sounds present, nontender, nondistended, no masses or hepatosplenomegaly. Extremities: Dressing left foot clean and dry. Lines: peripheral    I/O last 3 completed shifts:   In: 523 [I.V.:23; IV Piggyback:500]  Out: 2450 [Urine:2450]      Laboratory and Tests Review:      Lab Results   Component Value Date    WBC 25.9 (H) 11/15/2020    WBC 16.0 (H) 2020    WBC 15.8 (H) 2020    HGB 9.4 (L) 11/15/2020    HCT 31.3 (L) 11/15/2020    MCV 82.8 11/15/2020     (H) 11/15/2020     Lab Results   Component Value Date    NEUTROABS 12.93 (H) 2020    NEUTROABS 14.23 (H) 2020 NEUTROABS 12.35 (H) 11/11/2020     No results found for: CRPHS  Lab Results   Component Value Date    ALT <5 11/10/2020    AST 9 11/10/2020    ALKPHOS 91 11/10/2020    BILITOT 1.3 (H) 11/10/2020     Lab Results   Component Value Date     11/13/2020    K 3.8 11/13/2020     11/13/2020    CO2 24 11/13/2020    BUN 22 11/13/2020    CREATININE 1.1 11/13/2020    CREATININE 1.0 11/12/2020    CREATININE 1.0 11/11/2020    GFRAA >60 11/13/2020    LABGLOM >60 11/13/2020    GLUCOSE 119 11/13/2020    PROT 9.1 11/10/2020    LABALBU 3.7 11/10/2020    CALCIUM 8.8 11/13/2020    BILITOT 1.3 11/10/2020    ALKPHOS 91 11/10/2020    AST 9 11/10/2020    ALT <5 11/10/2020     Lab Results   Component Value Date    CRP 28.9 (H) 11/10/2020    CRP 10.8 (H) 10/16/2020     Lab Results   Component Value Date    SEDRATE 79 (H) 11/10/2020    SEDRATE 78 (H) 10/16/2020       Radiology:    MRI ANKLE LEFT WO CONTRAST   Final Result   Large area of ulceration with suspected underlying osteomyelitis of anterior   portions of both the talus and calcaneus. Severe destructive changes within the midfoot may be related to chronic   osteomyelitis, neuropathic joint, or a combination. XR ANKLE RIGHT (MIN 3 VIEWS)   Final Result   No acute fractures or osseous destructive process noted. Osseous structures   are in alignment. VL LOWER EXTREMITY ARTERIAL SEGMENTAL PRESSURES W PPG   Final Result   No evidence of peripheral arterial disease. XR CHEST PORTABLE   Final Result   No acute abnormality. XR FOOT LEFT (MIN 3 VIEWS)   Final Result   Ongoing plantar and lateral midfoot ulcer with underlying acute (presumably   on chronic) osteomyelitis superimposed on neuropathic foot. Microbiology:   Lab Results   Component Value Date    BC 24 Hours no growth 11/15/2020    BC  11/10/2020     This isolate is presumed to be resistant based on the  detection of inducible Clindamycin resistance.   Clindamycin  may still be effective in some patients. BC 5 Days no growth 10/15/2020    ORG Pseudomonas aeruginosa 11/11/2020    ORG Staphylococcus aureus 11/11/2020    ORG Corynebacterium species 11/11/2020     Lab Results   Component Value Date    BLOODCULT2  11/10/2020     Refer to previous culture for susceptibility results  (CXBL RAC collected 11-10-20 at 0128)      BLOODCULT2 5 Days no growth 10/15/2020    ORG Pseudomonas aeruginosa 11/11/2020    ORG Staphylococcus aureus 11/11/2020    ORG Corynebacterium species 11/11/2020     WOUND/ABSCESS   Date Value Ref Range Status   11/10/2020 (A)  Final    Mixed constance also isolated, including:  Mixed Gram negative rods  Including Oxidase positive Gram negative rods     11/10/2020   Final    Heavy growth  This isolate is presumed to be resistant based on the  detection of inducible Clindamycin resistance. Clindamycin  may still be effective in some patients. 10/15/2020   Final    Mixed constance isolated. Further workup and sensitivity testing  is not routinely indicated and will not be performed. Mixed constance isolated includes:  Alpha hemolytic Strep species  Coagulase negative Staph species  Gram negative rods       No results found for: RESPSMEAR      Component Value Date/Time    AFBCX  10/17/2020 0801     No growth after 1 week/s of incubation. No growth after 2 week/s of incubation. No growth after 3 week/s of incubation. AFBCX  10/17/2020 0759     No growth after 1 week/s of incubation. No growth after 2 week/s of incubation. No growth after 3 week/s of incubation. FUNGSM No Fungal elements seen 11/11/2020 0735    LABFUNG  10/17/2020 0801     No growth after 1 week/s of incubation. No growth after 2 week/s of incubation. No growth after 3 week/s of incubation. LABFUNG  10/17/2020 0759     No growth after 1 week/s of incubation. No growth after 2 week/s of incubation. No growth after 3 week/s of incubation.        No results found for: CULTRESP  No results found for: CXCATHTIP  No results found for: BFCS  Culture Surgical   Date Value Ref Range Status   11/11/2020 Rare growth  Final   11/11/2020   Final    Light growth  Refer to previous culture for susceptibility results  See CXSUR Left Foot Bone collected 11/11/2020 @07:45     11/11/2020 Light growth  Final     No results found for: Nura Garcia  No results found for: [facility]    Problem list:    Principal Problem:    Diabetic foot infection ([address]  Active Problems:    Charcot foot due to diabetes mellitus ([address]    Diabetic neuropathy associated with diabetes mellitus due to underlying condition ([address]    Essential hypertension    Diabetic foot ulcer with osteomyelitis ([facility]  Resolved Problems:    * No resolved hospital problems.  *      ASSESSMENT:    · Recurrent left diabetic foot infection with osteomyelitis-Pseudomonas and MSSA, now s/p Syme's amputation at ankle with partial excision of distal tib/fib  · Staph aureus bacteremia due to osteomyelitis, MSSA  · Type 2 diabetes mellitus, uncontrolled  · Charcot arthropathy  · Essential hypertension       PLAN:    · Continue Levaquin 750 mg daily x2 weeks  · Continue Ancef 2 gms IVPB every 8 hours x 6 weeks  · Await transfer to acute rehab  · Monitor labs  · Will follow with you      Jovon Cobb    12:56 PM  11/19/2020

## 2020-11-19 NOTE — PROGRESS NOTES
ENDOCRINOLOGY INITIAL CONSULTATION NOTE      Date of admission: 11/10/2020  Date of service: 11/18/2020  Admitting physician: Virginia Sims MD   Primary Care Physician: No primary care provider on file. Consultant physician: Hayden Perez MD     Reason for the consultation:  DM type 2, infected diabetic foot     History of Present Illness: The history is provided by the patient. Accuracy of the patient data is excellent    Melvin Corona is a very pleasant 54 y.o. old male with PMH listed below admitted to [facility] on 11/10/2020 LT diabetic foot infection with osteomyelitis, endocrine team was consulted for diabetes management.   Pt underwent incision and drainage with bone debridement and biopsy, on 11/11/2020 and Syme's amputation at ankle with partial excision of distal tib/fib on 11/14/2020     Subjective   Seen this morning, no acute issues overnight, appetite good     Inpatient diet:   Carb Restricted diet     Point of care glucose monitoring (Independently reviewed)   Recent Labs     11/17/20  0623 11/17/20  0736 11/17/20  1107 11/17/20  1611 11/17/20  2139 11/18/20  0530 11/18/20  1120 11/18/20  1552   GLUMET 92 97 136* 109* 116* 105* 129* 102*     Scheduled Meds:   heparin flush  3 mL Intravenous 2 times per day    levoFLOXacin  750 mg Oral Daily    insulin glargine  15 Units Subcutaneous Nightly    ceFAZolin  2 g Intravenous Q8H    [Held by provider] insulin lispro  5 Units Subcutaneous TID WC    enoxaparin  40 mg Subcutaneous Daily    insulin lispro  0-6 Units Subcutaneous TID WC    insulin lispro  0-3 Units Subcutaneous Nightly    sodium chloride flush  10 mL Intravenous BID    atorvastatin  5 mg Oral Nightly    metFORMIN  500 mg Oral Daily with breakfast     PRN Meds:   sodium chloride flush, 10 mL, PRN  heparin flush, 3 mL, PRN  glucose, 15 g, PRN  dextrose, 12.5 g, PRN  glucagon (rDNA), 1 mg, PRN  dextrose, 100 mL/hr, PRN  acetaminophen, 650 mg, Q6H PRN      Continuous Infusions:   resistant based on the  detection of inducible Clindamycin resistance. Clindamycin  may still be effective in some patients. Radiology:  MRI ANKLE LEFT WO CONTRAST   Final Result   Large area of ulceration with suspected underlying osteomyelitis of anterior   portions of both the talus and calcaneus. Severe destructive changes within the midfoot may be related to chronic   osteomyelitis, neuropathic joint, or a combination. XR ANKLE RIGHT (MIN 3 VIEWS)   Final Result   No acute fractures or osseous destructive process noted. Osseous structures   are in alignment. VL LOWER EXTREMITY ARTERIAL SEGMENTAL PRESSURES W PPG   Final Result   No evidence of peripheral arterial disease. XR CHEST PORTABLE   Final Result   No acute abnormality. XR FOOT LEFT (MIN 3 VIEWS)   Final Result   Ongoing plantar and lateral midfoot ulcer with underlying acute (presumably   on chronic) osteomyelitis superimposed on neuropathic foot. Medical Records/Labs/Images review:   I personally reviewed and summarized previous records   All labs and imaging were reviewed independently     [facility], a 54 y.o.-old male seen today for inpatient diabetes management     Diabetes Mellitus type 2    · Complicated with neuropathy, Charcot's feet deformity bilaterally and admitted with Lt diabetic foot infection   · BG now at goal  · For now we recommend the following:   · Continue Lantus 15 units daily at night  · Low dose sliding scale with meals at night   · Metformin 500 mg daily     · Continue glucose check with meals and at bedtime   · Will titrate insulin dose based on the blood glucose trend & insulin requirement  · Pt interested in following with us few weeks after discharge.  Endocrine follow up visit Monday 12/7 at 9:00AM     Recurrent Lt diabetic foot infection  · S/p Syme's amputation at ankle with partial excision of distal tib/fib on 11/14/2020  · Antibiotics per ID service   · Managed by primary, Podiatry and ID team     The above issues were reviewed with the patient who understood and agreed with the plan. Thank you for allowing us to participate in the care of this patient. Please do not hesitate to contact us with any additional questions. Wei Sears MD  Endocrinologist, [facility]   [address] [place] [id number]   Phone: [phone]  Fax: [phone]  --------------------------------------------  An electronic signature was used to authenticate this note.  Juanjo Gutierrez MD on 11/18/2020 at 7:41 PM

## 2020-11-19 NOTE — PROGRESS NOTES
Met with patient and he is aware that Medicaid application has been sent and will cover stay retroactively once approved. Copy of disclosure statement and \"Privacy Act [address] Records\" given to patient after he signed it.

## 2020-11-19 NOTE — LETTER
PORTABLE PATIENT PROFILE  Leslie Mckay  5628/1981-A    MEDICAL DIAGNOSIS/CONDITION:   Patient Active Problem List   Diagnosis    Di[facility]    Charcot foot due to diabetes mellitus ([facility]    Diabetic neuropathy associated with diabetes mellitus due to underlying condition ([facility]    Essential hypertension    Diabetic foot ulcer with osteomyelitis ([facility]    Partial nontraumatic amputation of foot, left ([facility]       INSURANCE INFORMATION:  Payor: PENDING MEDICAID /  /  /     ADVANCED DIRECTIVES:   Advance Directives (For Healthcare)  Pre-existing DNR Comfort Care/DNR Arrest/DNI Order: No  Healthcare Directive: No, patient does not have an advance directive for healthcare treatment  Information on Healthcare Directives Requested: No  Patient Requests Assistance: No  Advance Directives: Pt. not interested at this time  [unfilled]     EMERGENCY CONTACT:       RISK FACTORS:   Social History     Tobacco Use    Smoking status: Never Smoker    Smokeless tobacco: Never Used   Substance Use Topics    Alcohol use: Not Currently     Frequency: Never     Comment: 3 times a year maybe. very rare       ALLERGIES:  Allergies   Allergen Reactions    Vancomycin Other (See Comments)     Red man syndrome    Semaglutide Itching and Rash     Pink itch rash         IMMUNIZATIONS:  Immunization History   Administered Date(s) Administered    Influenza, Quadv, IM, PF (6 mo and older Fluzone, Flulaval, Fluarix, and 3 yrs and older Afluria) 10/22/2020       SWALLOWING:   Difficulty Chewing or Swallowing Food: No    VISION AND HEARING:   Sensory Problems  Visual impairment: Glasses  Hearing impairment: None    PHYSICIANS INVOLVED WITH CARE:    Layton Ramírez MD  No ref. provider found  No primary care provider on file.   Layton Ramírez MD

## 2020-11-19 NOTE — CONSULTS
Department of Podiatry  Progress Note    SUBJECTIVE:  Patient seen bedside for s/p Syme's amputation (DOS: 11/14/20, Dr. Jsoesito Doran). Patient is known to service and we have been following at [facility] since surgery. Patient relates he was transferred to acute rehab today. No acute events overnight. Patient states pain in continually improving. Patient denies any N/V/D/F/C/SOB/CP and has no other pedal complaints at this time. OBJECTIVE:    Scheduled Meds:   atorvastatin  5 mg Oral Nightly    ceFAZolin  2 g Intravenous Q8H    [START ON 11/20/2020] enoxaparin  40 mg Subcutaneous Daily    heparin flush  3 mL Intravenous 2 times per day    insulin glargine  15 Units Subcutaneous Nightly    insulin lispro  0-6 Units Subcutaneous TID WC    insulin lispro  0-3 Units Subcutaneous Nightly    insulin lispro  5 Units Subcutaneous TID     [START ON 11/20/2020] levoFLOXacin  750 mg Oral Daily    [START ON 11/20/2020] metFORMIN  500 mg Oral Daily with breakfast    sodium chloride flush  10 mL Intravenous BID     Continuous Infusions:   dextrose       PRN Meds:.dextrose, dextrose, glucagon (rDNA), glucose, acetaminophen, heparin flush, sodium chloride flush    Allergies   Allergen Reactions    Vancomycin Other (See Comments)     Red man syndrome    Semaglutide Itching and Rash     Pink itch rash         /64   Pulse 98   Temp 98.6 °F (37 °C) (Temporal)   Resp 19   Ht 6' 3\" (1.905 m)   Wt 289 lb 11.8 oz (131.4 kg)   BMI 36.22 kg/m²       EXAM:    VASCULAR:  PT pulses are palpable. CFT < 5 seconds B/L to dorsal ankle. Warm to warm from the tibial tuberosity to the dorsal ankles. No hair growth noted to the distal aspects dorsally.     NEUROLOGIC:  Protective sensation is diminished b/l     DERM:  Surgical site is well coapted, sutures are intact. No noted purulence, drainage or active bleeding at the time of evaluation. No SOI.  Minimal edema noted diffusely throughout the distal LLE.      MUSCULOSKELETAL: 5/5 Gross Muscle strength in all 4 quadrants. Minimal pain on palpation to the amputation site           Scheduled Meds:   atorvastatin  5 mg Oral Nightly    ceFAZolin  2 g Intravenous Q8H    [START ON 11/20/2020] enoxaparin  40 mg Subcutaneous Daily    heparin flush  3 mL Intravenous 2 times per day    insulin glargine  15 Units Subcutaneous Nightly    insulin lispro  0-6 Units Subcutaneous TID WC    insulin lispro  0-3 Units Subcutaneous Nightly    insulin lispro  5 Units Subcutaneous TID     [START ON 11/20/2020] levoFLOXacin  750 mg Oral Daily    [START ON 11/20/2020] metFORMIN  500 mg Oral Daily with breakfast    sodium chloride flush  10 mL Intravenous BID     Continuous Infusions:   dextrose       PRN Meds:.dextrose, dextrose, glucagon (rDNA), glucose, acetaminophen, heparin flush, sodium chloride flush    RADIOLOGY:  No orders to display     /64   Pulse 98   Temp 98.6 °F (37 °C) (Temporal)   Resp 19   Ht 6' 3\" (1.905 m)   Wt 289 lb 11.8 oz (131.4 kg)   BMI 36.22 kg/m²     LABS:  No results for input(s): WBC, HGB, HCT, PLT in the last 72 hours. No results for input(s): NA, K, CL, CO2, PHOS, BUN, CREATININE in the last 72 hours. Invalid input(s): CA   No results for input(s): PROT, INR, APTT in the last 72 hours. ASSESSMENT:  Principal Problem:  -Diabetic foot infection   -S/P left foot Syme's amputation (DOS: 11/14/20)     Active Problems:    Charcot foot due to diabetes mellitus ([facility]    Diabetic neuropathy associated with diabetes mellitus due to underlying condition ([facility]    Essential hypertension    Diabetic foot ulcer with osteomyelitis ([facility])        PLAN:  - Patient's labs, charts and images were reviewed   - Antibiotics as per ID: Zosyn (6 weeks anticipated)  - Surgical pathology: chronic osteomyelitis of left foot bone  - Dressings changed today.  Dressing changes per podiatry every 2 days.    - Patient to be NWB to operative extremity   - No further podiatric

## 2020-11-19 NOTE — CARE COORDINATION
received call from Suzy at [facility] ([facility] acute rehab; they can accept pt today; d/w Dr.R. Bethany Montes; N-N [phone]; nurse fe  Aware. To transport via physician's ambulance once d/c order obtained. Pt aware and agreeable. covid negative 11/18. Lisa Cruz.

## 2020-11-19 NOTE — PLAN OF CARE
Problem: Falls - Risk of:  Goal: Will remain free from falls  Description: Will remain free from falls  11/19/2020 1037 by Dori Bal RN  Outcome: Met This Shift  11/19/2020 0108 by Kena Puente RN  Outcome: Met This Shift  Goal: Absence of physical injury  Description: Absence of physical injury  11/19/2020 1037 by Dori Bal RN  Outcome: Met This Shift  11/19/2020 0108 by Kena Puente RN  Outcome: Met This Shift     Problem: Pain:  Goal: Pain level will decrease  Description: Pain level will decrease  11/19/2020 1037 by Dori Bal RN  Outcome: Met This Shift  11/19/2020 0108 by Kena Puente RN  Outcome: Met This Shift  Goal: Control of acute pain  Description: Control of acute pain  11/19/2020 1037 by Dori Bal RN  Outcome: Met This Shift  11/19/2020 0108 by Kena Puente RN  Outcome: Met This Shift  Goal: Control of chronic pain  Description: Control of chronic pain  11/19/2020 1037 by Dori Bal RN  Outcome: Met This Shift  11/19/2020 0108 by Kena Puente RN  Outcome: Met This Shift     Problem: Skin Integrity:  Goal: Will show no infection signs and symptoms  Description: Will show no infection signs and symptoms  11/19/2020 1037 by Dori Bal RN  Outcome: Met This Shift  11/19/2020 0108 by Kena Puente RN  Outcome: Met This Shift  Goal: Absence of new skin breakdown  Description: Absence of new skin breakdown  11/19/2020 1037 by Dori Bal RN  Outcome: Met This Shift  11/19/2020 0108 by Kena Puente RN  Outcome: Met This Shift     Problem: INFECTION  Goal: Absence of urinary tract infection signs and symptoms  Description: Absence of urinary tract infection signs and symptoms  11/19/2020 1037 by Dori Bal RN  Outcome: Met This Shift  11/19/2020 0108 by Kena Puente RN  Outcome: Met This Shift     Problem: Wound:  Goal: Signs of wound healing will improve  Description: Signs of wound healing will improve  11/19/2020 1037 by Dori Bal RN  Outcome: Met This

## 2020-11-19 NOTE — DISCHARGE SUMMARY
[id number] [facility] EMERGENCY SERVICE Physician Discharge Summary       [facility]  [address], [address] [address] [address] [facility] [address] [phone]    On 12/7/2020  Endocrine follow up visit Monday 12/7 at 9:00AM      Activity level: As tolerated and approved by the acute rehab. Diet: DIET CARB CONTROL; Carb Control: 4 carbs/meal (approximate 1800 kcals/day)    Labs:    Dispo: Acute rehab    Condition at discharge: Stable    Continue supplemental oxygen via nasal canula @ 2 LPM round-the-clock. Continue CPAP / BiPAP during sleep as prior to admission. Patient ID:  Alejandra Gibbs  [id number]  54 y.o.  1975    Admit date: 11/10/2020    Discharge date and time:  11/19/2020  9:43 AM    Admission Diagnoses: Principal Problem:    Diabetic foot infection ([address]  Active Problems:    Charcot foot due to diabetes mellitus ([facility]    Diabetic neuropathy associated with diabetes mellitus due to underlying condition [facility])    Essential hypertension    Diabetic foot ulcer with osteomyelitis ([facility]  Resolved Problems:    * No resolved hospital problems. *      Discharge Diagnoses: Principal Problem:    Diabetic foot infection ([address]  Active Problems:    Charcot foot due to diabetes mellitus ([facility]    Diabetic neuropathy associated with diabetes mellitus due to underlying condition [facility])    Essential hypertension    Diabetic foot ulcer with osteomyelitis ([facility]  Resolved Problems:    * No resolved hospital problems.  *      Consults:  IP CONSULT TO INFECTIOUS DISEASES  IP CONSULT TO PODIATRY  IP CONSULT TO SOCIAL WORK  IP CONSULT TO VASCULAR SURGERY  IP CONSULT TO CASE MANAGEMENT  IP CONSULT TO IV TEAM  IP CONSULT TO ENDOCRINOLOGY  IP CONSULT TO PHYSICAL MEDICINE REHAB  IP CONSULT TO IV TEAM  IP CONSULT TO IV TEAM    Procedures: Amputation left foot    Hospital Course: Patient was admitted with Acute osteomyelitis of left foot ([facility] [M86.172]  Acute osteomyelitis of left foot ([facility] [M86.172]. Patient admitted on 10th, patient with diabetic foot wound, infected, failed to fill up the prescription, do have morbid obesity, BC requiring CPAP, per podiatry acute on chronic osteomyelitis of plantar calcaneus. As per vascular medicine patient with satisfactory arterial circulation with normal triphasic ankle Doppler tracings. Vascular medicine signed off. Patient was taken for I&D of left calcaneum, excisional wound debridement through level of muscle, also wound VAC by podiatry. ID has continued daptomycin and Zosyn. Patient in addition to osteomyelitis also with staph aureus bacteremia. Endocrinology involved for the management of diabetes. Patient did require left foot Symes amputation, which is partial excision of distal tibia and fibula bones. Also dorsalis pedis ID pulmonary facial elevation and transfer. Antibiotics changed to Zosyn only after amputation. There is also changed to Ancef 2 g every 8 hours for 6 weeks. As well as Levaquin 750 mg daily for 2 weeks. Patient did have PICC line. Patient okay for DC from podiatry to SNF. Did receive call from nursing that patient does have acute rehab bed available as of today. As of now patient is awake, alert, comfortable, does communicate well, does understand the plans of discharge, will plan to continue discharge process on the current medications to acute rehab.     Discharge Exam:  Vitals:    11/18/20 1130 11/18/20 1218 11/18/20 2100 11/19/20 0845   BP: (!) 88/53 (!) 109/59 118/64 123/76   Pulse: 104 83 75 80   Resp:  18 18 18   Temp:   97.6 °F (36.4 °C) 98.4 °F (36.9 °C)   TempSrc:   Oral Oral   SpO2:   96% 98%   Weight:       Height:           General Appearance: alert and oriented to person, place and time, well-developed and well-nourished, in no acute distress  Skin: warm and dry, no rash or erythema  Head: normocephalic and atraumatic  Eyes: pupils equal, round, foot.             Patient Instructions:      Medication List      START taking these medications    ceFAZolin  infusion  Commonly known as:  ANCEF  Infuse 2,000 mg intravenously every 8 hours Compound per protocol     enoxaparin 40 MG/0.4ML injection  Commonly known as:  LOVENOX  Inject 0.4 mLs into the skin daily  Start taking on:  November 20, 2020     glucagon (rDNA) 1 MG injection  Inject 1 mg into the muscle as needed for Low blood sugar (Blood glucose less than 70 mg/dL and patient NOT ALERT or NPO and does not have IV access.)     glucose 40 % Gel  Commonly known as:  GLUTOSE  Take 37.5 mLs by mouth as needed (Hypoglycemia)     * insulin lispro 100 UNIT/ML injection vial  Commonly known as:  HUMALOG  Inject 0-3 Units into the skin nightly     * insulin lispro 100 UNIT/ML injection vial  Commonly known as:  HUMALOG  Inject 0-6 Units into the skin 3 times daily (with meals)     levoFLOXacin 750 MG tablet  Commonly known as:  LEVAQUIN  Take 1 tablet by mouth daily for 14 days         * This list has 2 medication(s) that are the same as other medications prescribed for you. Read the directions carefully, and ask your doctor or other care provider to review them with you.             CONTINUE taking these medications    atorvastatin 10 MG tablet  Commonly known as:  LIPITOR     insulin glargine 100 UNIT/ML injection vial  Commonly known as:  LANTUS     metFORMIN 500 MG tablet  Commonly known as:  GLUCOPHAGE           Where to Get Your Medications      You can get these medications from any pharmacy    Bring a paper prescription for each of these medications  · ceFAZolin  infusion  · levoFLOXacin 750 MG tablet     Information about where to get these medications is not yet available    Ask your nurse or doctor about these medications  · enoxaparin 40 MG/0.4ML injection  · glucagon (rDNA) 1 MG injection  · glucose 40 % Gel  · insulin lispro 100 UNIT/ML injection vial  · insulin lispro 100 UNIT/ML injection vial Note that more than 30 minutes was spent in preparing discharge papers, discussing discharge with patient, medication review, etc.    Signed:  Electronically signed by Paloma Zamudio MD on 11/19/2020 at 9:43 AM    NOTE: This report was transcribed using voice recognition software. Every effort was made to ensure accuracy; however, inadvertent computerized transcription errors may be present.

## 2020-11-19 NOTE — HOME CARE
Patient will have 100% coverage for his IV ATB for home when needed.    [facility]

## 2020-11-19 NOTE — CARE COORDINATION
Social Work discharge planning   Discussed plan for [address] [facility] transfer today with [address] [de-identified] Ambulance Ming Rukhsana) [phone] set up for noon  to room 5509A at [facility] (1-). Charge VELIA Caballero notified as well.    Electronically signed by Charlie Hannon on 11/19/2020 at 10:12 AM

## 2020-11-19 NOTE — PROGRESS NOTES
[facility] Hospitalist   Progress Note    Admitting Date and Time: 11/10/2020 12:53 AM  Admit Dx: Acute osteomyelitis of left foot ([facility] [M86.172]  Acute osteomyelitis of left foot ([facility] [M86.172]    Subjective: Admitted on 10th, patient with diabetic foot wound, infected, failed to fill up the prescription, do have morbid obesity, BC requiring CPAP, per podiatry acute on chronic osteomyelitis of plantar calcaneus. As per vascular medicine patient with satisfactory arterial circulation with normal triphasic ankle Doppler tracings. Vascular medicine signed off. Patient was taken for I&D of left calcaneum, excisional wound debridement through level of muscle, also wound VAC by podiatry. ID has continued daptomycin and Zosyn. Patient in addition to osteomyelitis also with staph aureus bacteremia. Endocrinology involved for the management of diabetes. Patient did require left foot Symes amputation, which is partial excision of distal tibia and fibula bones. Also dorsalis pedis ID pulmonary facial elevation and transfer. Antibiotics changed to Zosyn only after amputation. There is also changed to Ancef 2 g every 8 hours for 6 weeks. As well as Levaquin 750 mg daily for 2 weeks. Patient did have PICC line. Patient okay for DC from podiatry to SNF. Patient was admitted with Acute osteomyelitis of left foot ([facility] [M86.172]  Acute osteomyelitis of left foot ([facility] [M86.172]. Patient feels ***  Per RN: ***    ROS: denies fever, chills, cp, sob, n/v, HA unless stated above.      heparin flush  3 mL Intravenous 2 times per day    levoFLOXacin  750 mg Oral Daily    insulin glargine  15 Units Subcutaneous Nightly    ceFAZolin  2 g Intravenous Q8H    [Held by provider] insulin lispro  5 Units Subcutaneous TID WC    enoxaparin  40 mg Subcutaneous Daily    insulin lispro  0-6 Units Subcutaneous TID WC    insulin lispro  0-3 Units Subcutaneous Nightly    sodium chloride flush  10 mL Intravenous BID    atorvastatin  5 mg Oral Nightly    metFORMIN  500 mg Oral Daily with breakfast     sodium chloride flush, 10 mL, PRN  heparin flush, 3 mL, PRN  glucose, 15 g, PRN  dextrose, 12.5 g, PRN  glucagon (rDNA), 1 mg, PRN  dextrose, 100 mL/hr, PRN  acetaminophen, 650 mg, Q6H PRN         Objective:    /64   Pulse 75   Temp 97.6 °F (36.4 °C) (Oral)   Resp 18   Ht 6' 3\" (1.905 m)   Wt 280 lb (127 kg)   SpO2 96%   BMI 35.00 kg/m²   {GENERAL PHYSICAL EXAM:19990}      No results for input(s): NA, K, CL, CO2, BUN, CREATININE, GLUCOSE, CALCIUM in the last 72 hours. No results for input(s): WBC, RBC, HGB, HCT, MCV, MCH, MCHC, RDW, PLT, MPV in the last 72 hours. {[facility]:[id number]}     Radiology:   MRI ANKLE LEFT WO CONTRAST   Final Result   Large area of ulceration with suspected underlying osteomyelitis of anterior   portions of both the talus and calcaneus. Severe destructive changes within the midfoot may be related to chronic   osteomyelitis, neuropathic joint, or a combination. XR ANKLE RIGHT (MIN 3 VIEWS)   Final Result   No acute fractures or osseous destructive process noted. Osseous structures   are in alignment. VL LOWER EXTREMITY ARTERIAL SEGMENTAL PRESSURES W PPG   Final Result   No evidence of peripheral arterial disease. XR CHEST PORTABLE   Final Result   No acute abnormality. XR FOOT LEFT (MIN 3 VIEWS)   Final Result   Ongoing plantar and lateral midfoot ulcer with underlying acute (presumably   on chronic) osteomyelitis superimposed on neuropathic foot. Assessment:    Principal Problem:    Diabetic foot infection ([address]  Active Problems:    Charcot foot due to diabetes mellitus ([address]    Diabetic neuropathy associated with diabetes mellitus due to underlying condition [facility])    Essential hypertension    Diabetic foot ulcer with osteomyelitis ([address]  Resolved Problems:    * No resolved hospital problems. *      Plan:  1. ***        Electronically signed by Lavonne Joe MD on 11/19/2020 at 8:42 AM

## 2020-11-19 NOTE — PROGRESS NOTES
Dr. Sylvia Garcia notfied of pts admission from [facility], called for dressing orders

## 2020-11-20 LAB
ANION GAP SERPL CALCULATED.3IONS-SCNC: 13 MMOL/L (ref 7–16)
BASOPHILIC STIPPLING: ABNORMAL
BASOPHILS ABSOLUTE: 0.25 E9/L (ref 0–0.2)
BASOPHILS RELATIVE PERCENT: 1.8 % (ref 0–2)
BLOOD CULTURE, ROUTINE: NORMAL
BUN BLDV-MCNC: 14 MG/DL (ref 6–20)
BURR CELLS: ABNORMAL
CALCIUM SERPL-MCNC: 9.2 MG/DL (ref 8.6–10.2)
CHLORIDE BLD-SCNC: 101 MMOL/L (ref 98–107)
CO2: 21 MMOL/L (ref 22–29)
CREAT SERPL-MCNC: 1 MG/DL (ref 0.7–1.2)
EOSINOPHILS ABSOLUTE: 0.25 E9/L (ref 0.05–0.5)
EOSINOPHILS RELATIVE PERCENT: 1.8 % (ref 0–6)
GFR AFRICAN AMERICAN: >60
GFR NON-AFRICAN AMERICAN: >60 ML/MIN/1.73
GLUCOSE BLD-MCNC: 106 MG/DL (ref 74–99)
HCT VFR BLD CALC: 30.4 % (ref 37–54)
HEMOGLOBIN: 9.4 G/DL (ref 12.5–16.5)
LYMPHOCYTES ABSOLUTE: 2.48 E9/L (ref 1.5–4)
LYMPHOCYTES RELATIVE PERCENT: 18.4 % (ref 20–42)
MCH RBC QN AUTO: 25.5 PG (ref 26–35)
MCHC RBC AUTO-ENTMCNC: 30.9 % (ref 32–34.5)
MCV RBC AUTO: 82.4 FL (ref 80–99.9)
METAMYELOCYTES RELATIVE PERCENT: 4.4 % (ref 0–1)
METER GLUCOSE: 112 MG/DL (ref 74–99)
METER GLUCOSE: 146 MG/DL (ref 74–99)
METER GLUCOSE: 149 MG/DL (ref 74–99)
MONOCYTES ABSOLUTE: 0.55 E9/L (ref 0.1–0.95)
MONOCYTES RELATIVE PERCENT: 3.5 % (ref 2–12)
MYELOCYTE PERCENT: 7.9 % (ref 0–0)
NEUTROPHILS ABSOLUTE: 10.35 E9/L (ref 1.8–7.3)
NEUTROPHILS RELATIVE PERCENT: 62.3 % (ref 43–80)
OVALOCYTES: ABNORMAL
PDW BLD-RTO: 18.1 FL (ref 11.5–15)
PLATELET # BLD: 652 E9/L (ref 130–450)
PMV BLD AUTO: 9 FL (ref 7–12)
POIKILOCYTES: ABNORMAL
POLYCHROMASIA: ABNORMAL
POTASSIUM REFLEX MAGNESIUM: 4.1 MMOL/L (ref 3.5–5)
RBC # BLD: 3.69 E12/L (ref 3.8–5.8)
SODIUM BLD-SCNC: 135 MMOL/L (ref 132–146)
WBC # BLD: 13.8 E9/L (ref 4.5–11.5)

## 2020-11-20 PROCEDURE — 85025 COMPLETE CBC W/AUTO DIFF WBC: CPT

## 2020-11-20 PROCEDURE — 97530 THERAPEUTIC ACTIVITIES: CPT

## 2020-11-20 PROCEDURE — 97162 PT EVAL MOD COMPLEX 30 MIN: CPT

## 2020-11-20 PROCEDURE — 2580000003 HC RX 258: Performed by: INTERNAL MEDICINE

## 2020-11-20 PROCEDURE — 36415 COLL VENOUS BLD VENIPUNCTURE: CPT

## 2020-11-20 PROCEDURE — 36592 COLLECT BLOOD FROM PICC: CPT

## 2020-11-20 PROCEDURE — 82962 GLUCOSE BLOOD TEST: CPT

## 2020-11-20 PROCEDURE — 80048 BASIC METABOLIC PNL TOTAL CA: CPT

## 2020-11-20 PROCEDURE — 6360000002 HC RX W HCPCS: Performed by: INTERNAL MEDICINE

## 2020-11-20 PROCEDURE — 97110 THERAPEUTIC EXERCISES: CPT

## 2020-11-20 PROCEDURE — 97166 OT EVAL MOD COMPLEX 45 MIN: CPT

## 2020-11-20 PROCEDURE — 6370000000 HC RX 637 (ALT 250 FOR IP): Performed by: INTERNAL MEDICINE

## 2020-11-20 PROCEDURE — 1280000000 HC REHAB R&B

## 2020-11-20 PROCEDURE — 97535 SELF CARE MNGMENT TRAINING: CPT

## 2020-11-20 RX ADMIN — Medication 10 ML: at 13:55

## 2020-11-20 RX ADMIN — Medication 10 ML: at 08:53

## 2020-11-20 RX ADMIN — Medication 10 ML: at 20:24

## 2020-11-20 RX ADMIN — INSULIN LISPRO 5 UNITS: 100 INJECTION, SOLUTION INTRAVENOUS; SUBCUTANEOUS at 11:42

## 2020-11-20 RX ADMIN — INSULIN GLARGINE 15 UNITS: 100 INJECTION, SOLUTION SUBCUTANEOUS at 20:24

## 2020-11-20 RX ADMIN — Medication 300 UNITS: at 13:55

## 2020-11-20 RX ADMIN — INSULIN LISPRO 5 UNITS: 100 INJECTION, SOLUTION INTRAVENOUS; SUBCUTANEOUS at 16:46

## 2020-11-20 RX ADMIN — Medication 2 G: at 21:51

## 2020-11-20 RX ADMIN — SODIUM CHLORIDE, PRESERVATIVE FREE 300 UNITS: 5 INJECTION INTRAVENOUS at 21:51

## 2020-11-20 RX ADMIN — METFORMIN HYDROCHLORIDE 500 MG: 500 TABLET ORAL at 08:04

## 2020-11-20 RX ADMIN — SODIUM CHLORIDE, PRESERVATIVE FREE 300 UNITS: 5 INJECTION INTRAVENOUS at 08:53

## 2020-11-20 RX ADMIN — Medication 10 ML: at 05:38

## 2020-11-20 RX ADMIN — Medication 2 G: at 13:55

## 2020-11-20 RX ADMIN — LEVOFLOXACIN 750 MG: 750 TABLET, FILM COATED ORAL at 08:04

## 2020-11-20 RX ADMIN — INSULIN LISPRO 5 UNITS: 100 INJECTION, SOLUTION INTRAVENOUS; SUBCUTANEOUS at 06:47

## 2020-11-20 RX ADMIN — Medication 10 ML: at 21:51

## 2020-11-20 RX ADMIN — ATORVASTATIN CALCIUM 5 MG: 10 TABLET, FILM COATED ORAL at 20:23

## 2020-11-20 RX ADMIN — Medication 2 G: at 05:30

## 2020-11-20 RX ADMIN — ENOXAPARIN SODIUM 40 MG: 40 INJECTION SUBCUTANEOUS at 08:04

## 2020-11-20 ASSESSMENT — PAIN SCALES - GENERAL
PAINLEVEL_OUTOF10: 0

## 2020-11-20 NOTE — PROGRESS NOTES
OCCUPATIONAL THERAPY DAILY NOTE    Date:2020  Patient Name: Melvin Corona  MRN: [id number]  : 1975  Room: [address] [place] [address]-A     Diagnosis: L Syme's amputation- amputation @ ankle, partial excision distal/fibula 2020  Additional Pertinent Hx: Charcot foot, neuropathy, DM, HTN, osteomyilitis  Precautions: Falls, NWB L LE    Functional Assessment:   Date Status AE  Comments   Feeding 20 Setup     Grooming 20 S/Setup           Oral Care 20 S/Setup     Bathing 20 Min A     UB Dressing 20 S/sStup     LB Dressing 20 Min A            Footwear 20 Min A     Toileting 20 Min A     Homemaking  TBA       Functional Transfers / Balance:   Date Status DME  Comments   Sit Balance 20 Supervision     Stand Balance 20 CGA     [] Tub  [] Shower   Transfer  TBA     Commode   Transfer 20 Min A     Functional   Mobility 20 Min A    SBA W/w    W/c      Min cues for technique to maneuver w/c around environmental obstacles. Other:         Functional Exercises / Activity:   -UBE completed 2x5 minutes with focus on increasing B UE strength/endurance and overall endurance for functional activities. Fair tolerance. -1# Mr. Catrachita Yang completed x10 reps with min rest breaks required to complete with focus on increasing B UE strength/endurance for functional activities. Fair- tolerance. Sensory / Neuromuscular Re-Education:      Cognitive Skills:   Status Comments   Problem   Solving good     Memory good     Sequencing fair +    Safety fair +      Visual Perception:    Education:  -Pt educated on role of OT and w/c mobility techniques. [] Family teach completed on:    Pain Level: 3/10 L LE    Additional Notes:       Patient has made good  progress during treatment sessions toward set goals.  Therapy emphasis to obtain goals:   Long term goal 1: Pt demo independent to eat all meals  Long term goal 2: Pt dmeo MOd I grooming seated or standing @ sink level  Long term goal 3: Pt demo SBA bathing @ shower level or sponge bathing both seated & standing  Long term goal 4: Pt demo I UE & Mod I LE dress with AE as needed  Long term goal 5: Pt demo MOd i commode trf wth appropraite DME to ensure pt safety  Long term goals 6: Pt demo Sup tub trf with appropraite DME to ensure pt safety  Long term goal 7: Pt demo Mod I light homemaking task @ wc level  Long term goal 8: Pt demo G endurance for a 30 minute funcitonal activity  Long term goal 9: Pt demo increased BUE strength to 5/5 & improved  strength- On eval R  strength 95# & norm for pt age & gender is 110# & L hand 80# & norm for tp age & gender is 101#  Long term goal 10: Pt demo Mod I wc porpulsion throughout a living environment & around obstacles      [x] Continue with current OT Plan of care. [] Prepare for Discharge     DISCHARGE RECOMMENDATIONS  Recommended DME:    Post Discharge Care:   []Home Independently  []Home with 24hr Care / Supervision []Home with Partial Supervision []Home with Home Health OT []Home with Out Pt OT []Other: ___   Comments:         Time in Time out Tx Time Breakdown  Variance:   First Session  eval+rx  [] Individual Tx-   [] Concurrent Tx -  [] Co-Tx -   [] Group Tx -   [] Time Missed -     Second Session 0830 0915 [x] Individual Tx- 45 Mins   [] Concurrent Tx -  [] Co-Tx -   [] Group Tx -   [] Time Missed -     Third Session    [] Individual Tx-   [] Concurrent Tx -  [] Co-Tx -   [] Group Tx -   [] Time Missed -         Total Tx Time  45 Mins       Emily Galeana PERES/L 11521    I have read & agree with the above status.     Marion Xiong OTR/L [id number]

## 2020-11-20 NOTE — PROGRESS NOTES
Department of Podiatry  Progress Note    SUBJECTIVE:  Patient seen at bedside for s/p left foot Syme's amputation performed on 11/14/20. Patient states his pain is well controlled and decreasing daily. Patient denies any N/V/D/F/C/SOB/CP     OBJECTIVE:    Scheduled Meds:   atorvastatin  5 mg Oral Nightly    ceFAZolin  2 g Intravenous Q8H    enoxaparin  40 mg Subcutaneous Daily    heparin flush  3 mL Intravenous 2 times per day    insulin glargine  15 Units Subcutaneous Nightly    insulin lispro  5 Units Subcutaneous TID WC    levoFLOXacin  750 mg Oral Daily    metFORMIN  500 mg Oral Daily with breakfast    sodium chloride flush  10 mL Intravenous BID     Continuous Infusions:   dextrose       PRN Meds:.dextrose, dextrose, glucagon (rDNA), glucose, acetaminophen, heparin flush, sodium chloride flush, acetaminophen, polyethylene glycol    Allergies   Allergen Reactions    Vancomycin Other (See Comments)     Red man syndrome    Semaglutide Itching and Rash     Pink itch rash         BP (!) 153/90   Pulse 106   Temp 98.2 °F (36.8 °C) (Temporal)   Resp 18   Ht 6' 3\" (1.905 m)   Wt 289 lb 11.8 oz (131.4 kg)   BMI 36.22 kg/m²       EXAM: Dressing clean, dry and intact with no strike through noted. No pain on palpation of calf. Previous exam below:    VASCULAR:  PT pulses are palpable. CFT < 5 seconds to dorsal ankle b/l. Warm to warm throughout entirety of the LEs    NEUROLOGIC:  Protective sensation is diminished b/l    DERM:  Surgical site is well coapted, sutures are intact. No noted pus, drainage or active bleeding at the time of evaluation. No SOI. Minimal edema noted to left lower extremity     MUSCULOSKELETAL:  5/5 Gross Muscle strength in all 4 quadrants.  No pain on palpation of posterior calf b/l       Scheduled Meds:   atorvastatin  5 mg Oral Nightly    ceFAZolin  2 g Intravenous Q8H    enoxaparin  40 mg Subcutaneous Daily    heparin flush  3 mL Intravenous 2 times per day    insulin glargine  15 Units Subcutaneous Nightly    insulin lispro  5 Units Subcutaneous TID     levoFLOXacin  750 mg Oral Daily    metFORMIN  500 mg Oral Daily with breakfast    sodium chloride flush  10 mL Intravenous BID     Continuous Infusions:   dextrose       PRN Meds:.dextrose, dextrose, glucagon (rDNA), glucose, acetaminophen, heparin flush, sodium chloride flush, acetaminophen, polyethylene glycol    RADIOLOGY:  No orders to display     BP (!) 153/90   Pulse 106   Temp 98.2 °F (36.8 °C) (Temporal)   Resp 18   Ht 6' 3\" (1.905 m)   Wt 289 lb 11.8 oz (131.4 kg)   BMI 36.22 kg/m²     LABS:    Recent Labs     11/20/20  0540   WBC 13.8*   HGB 9.4*   HCT 30.4*   *        Recent Labs     11/20/20  0540      K 4.1      CO2 21*   BUN 14   CREATININE 1.0        No results for input(s): PROT, INR, APTT in the last 72 hours. ASSESSMENT:  Principal Problem:  -Diabetic foot infection   -S/P left foot Syme's amputation performed 11/14/20    Active Problems:    Charcot foot due to diabetes mellitus ([facility]    Diabetic neuropathy associated with diabetes mellitus due to underlying condition ([facility]    Essential hypertension    Diabetic foot ulcer with osteomyelitis ([facility]      PLAN:  - Patient's labs, charts and images were reviewed   - Antibiotics as per ID: Levaquin, Ancef  - Surgical culture of left foot: Pseudomonas, Staph, Corynebacterium   - Dressings left intact today, they were clean, dry and intact without strikethrough. Dressings to be changed Saturday. - Patient to be NWB to operative extremity   - No further podiatric surgical intervention indicated. Patient is stable for discharge from podiatric standpoint.   - Recommend discharge to SNF   - Discussed patient with Dr. Mackenzie Wilcox  - Will continue to follow patient while they are in-house.

## 2020-11-20 NOTE — PROGRESS NOTES
Physical Therapy    Facility/Department: [address] REHAB  Initial Assessment    NAME: Gigi Vargas  : 1975  MRN: [id number]    Date of Service: 2020  Evaluating Therapist: Bhargav Salgado P.T.    ROOM: [facility]  DIAGNOSIS: BKA  PRECAUTIONS: Fall risk, NWB L residual limb  PROCEDURE(S):   : I&D with bone debridement/biopsy   : Syme's amputation at ankle with partial excision of distal tib/fib  HPI: The pt was admitted to [facility] on 11/10 with a diabetic foot infection with osteomyelitis. Social:  Pt lives with his wife in a 2nd floor apartment with a curb step+2+1 steps with 1 rail to enter building and 7 steps with 2 rails to the 2nd floor. 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     Initial Evaluation  20          Short Term Goals Long Term Goals    Was pt agreeable to Eval/treatment? Yes Initial Evaluation Initial Evaluation     Does pt have pain? Minimal LLE soreness \"maybe . 5/10)       Bed Mobility  Rolling: Supervision  Supine<>sit: Supervision  Scooting: Supervision    Independent   Transfers Sit to stand: Max A  Stand to sit: Max A  Stand pivot:  Max A [employer]   SBA Modified Independent   Ambulation   15 feet with [employer] with Min A + WC follow   20 feet with AAD with SBA >150 feet with AAD with Modified [place]   Walking 10 feet on uneven surface NT   10 feet with AAD with SBA >10 feet with AAD with Modified [place]   Wheel Chair Mobility 150 feet with BUE with SBA    >200 feet with BUE with Modified [place]   Car Transfers NT   SBA Modified Independent   Stair negotiation: ascended and descended NT, attempted but pt unable to perform safely   4 steps with 2 rails with SBA >8 steps with 1 rail + AAD with Modified [place]   Curb Step:   ascended and descended NT   4 inch step with AAD and Min A 7.5 inch step with AAD and SBA   Picking up object off the floor NT   Will  an object with Min A Will  an object with Modified [place]   BLE ROM R ankle limited d/t past fusion, otherwise WFL       BLE Strength RLE: grossly 4+/5  LLE: grossly 4/5 at the hip and knee       Balance  Seated: Independent  Standing: Min A<>Max A       Date Family Teach Completed No family present at evlauation       Is additional Family Teaching Needed? Y or N Yes       Hindering Progress Limited endurance, medical history, obesity       PT recommended ELOS 3 weeks       Team's Discharge Plan        Therapist at Team Meeting          Pt is alert and Oriented x4  Sensation: Denied any numbness/tingling  Edema: Unremarkable  Endurance: Poor  Skin was inspected: Dressing to L residual limb not removed     ASSESSMENT  Pt displays functional ability as noted in the objective portion of this evaluation. Comments: The pt was initially able to stand well from the [facility] but fatigued quickly and required a chair to be brought to complete a second bout of ambulation of less than 20 feet. The pt also required heavy assistance to stand from the commode and was unable to stand from a low mat table despite Max A from therapist. The pt's limited R ankle mobility limits him from bringing his RLE under his KIT during transfers and this will be a barrier to overcome with adaptive strategies in acute rehab. The pt is expected to improve with daily skilled PT services in the acute rehab setting. Patient education  Pt educated on transfer technique. Patient response to education:   Pt verbalized understanding Pt demonstrated skill Pt requires further education in this area   Yes Yes Reinforce     Rehab potential is Good for reaching above PT goals. Pts/ family goals   1. \"To get out of here before [place]. \"    Patient and or family understand(s) diagnosis, prognosis, and plan of care: Yes    PLAN  PT care will be provided in accordance with the objectives noted above.   Whenever appropriate, clear delegation orders will be provided for nursing staff.  Exercises and functional mobility practice will be used as well as appropriate assistive devices or modalities to obtain goals. Patient and family education will also be administered as needed. Frequency of treatments will be 2x/day M-F and 1x/day Sat or Sun x  21 days. Time in: 1000  Time out: 137 [facility] Ambar Caballero P.T.    License number:  [id number]

## 2020-11-20 NOTE — CONSULTS
[facility] CONSULT NOTE    Reason for Consult: Ongoing antibiotic treatment for MSSA bacteremia  Requested by: Dr. Vandana Cole      Chief complaint: Inpatient rehab    History Obtained From: Patient and EMR     HISTORY Van              The patient is a 54 y.o. male with history of DM, Charcot arthropathy, hypertension, peripheral vascular disease, previously admitted from 11/10-11/18 for Pseudomonas aeruginosa and MSSA left diabetic foot infection with osteomyelitis, s/p Symes amputation at level of ankle and partial excision of tib-fib on 74/64, complicated by MSSA bacteremia for which he was seen by Dr. Michelle Caldwell and discharged to inpatient rehab on 11/19 on 2-week course of levofloxacin and 6-week course of cefazolin. On admission, he was afebrile and hemodynamically stable with leukocytosis of 13,000. Cefazolin was resumed. ID service was subsequently consulted for further recommendations.     Past Medical History  Past Medical History:   Diagnosis Date    Charcot foot due to diabetes mellitus ([facility]     Diabetes mellitus ([facility]     Diabetic foot ulcer with osteomyelitis ([facility] 11/11/2020    Hypertension        Current Facility-Administered Medications   Medication Dose Route Frequency Provider Last Rate Last Dose    dextrose 5 % solution  100 mL/hr Intravenous PRN Huma Mendenhall MD        dextrose 50 % IV solution  12.5 g Intravenous PRN Huma Mendenhall MD        glucagon (rDNA) injection 1 mg  1 mg Intramuscular PRN Huma Mendenhall MD        glucose (GLUTOSE) 40 % oral gel 15 g  15 g Oral PRN Huma Mendenhall MD        acetaminophen (TYLENOL) tablet 650 mg  650 mg Oral Q6H PRN Huma Mendenhall MD        atorvastatin (LIPITOR) tablet 5 mg  5 mg Oral Nightly Huma Mendenhall MD   5 mg at 11/19/20 2051    ceFAZolin (ANCEF) 2 g in sterile water 20 mL IV syringe  2 g Intravenous Q8H Huma Mendenhall MD   2 g at 11/20/20 1355    enoxaparin (LOVENOX) injection 40 mg  40 mg Subcutaneous Daily Kerri Lake MD   40 mg at 11/20/20 0804    heparin flush 100 UNIT/ML injection 300 Units  3 mL Intravenous 2 times per day Kerri Lake MD   300 Units at 11/20/20 0853    heparin flush 100 UNIT/ML injection 300 Units  3 mL Intracatheter PRN Kerri Lake MD   300 Units at 11/20/20 1355    insulin glargine (LANTUS) injection vial 15 Units  15 Units Subcutaneous Nightly Huma Ascencio MD   15 Units at 11/19/20 2051    insulin lispro (HUMALOG) injection vial 5 Units  5 Units Subcutaneous TID WC Huma Ascencio MD   5 Units at 11/20/20 1142    levoFLOXacin (LEVAQUIN) tablet 750 mg  750 mg Oral Daily Huma Ascencio MD   750 mg at 11/20/20 0804    metFORMIN (GLUCOPHAGE) tablet 500 mg  500 mg Oral Daily with breakfast Huma Ascencio MD   500 mg at 11/20/20 0804    sodium chloride flush 0.9 % injection 10 mL  10 mL Intravenous BID Huma Ascencio MD   10 mL at 11/20/20 0853    sodium chloride flush 0.9 % injection 10 mL  10 mL Intravenous PRN Huma Ascencio MD   10 mL at 11/20/20 1355    acetaminophen (TYLENOL) tablet 650 mg  650 mg Oral Q4H PRN Reno Cam MD        polyethylene glycol (GLYCOLAX) packet 17 g  17 g Oral Daily PRN Reno Cam MD           Allergies   Allergen Reactions    Vancomycin Other (See Comments)     Red man syndrome    Semaglutide Itching and Rash     Pink itch rash         Surgical History  Past Surgical History:   Procedure Laterality Date    ANKLE FUSION  02/2020    FOOT AMPUTATION Left 11/14/2020    LEFT FOOT SYMES AMPUTATION performed by Joseline Blas DPM at [facility] Left 10/17/2020    LEFT FOOT  INCISION AND DRAINAGE, BONE DEBRIDEMENT AND BIOPSY performed by Estephanie Masters DPM at [facility] Left 11/11/2020    LEFT FOOT INCISION AND DRAINAGE APPLICATION OF WOUND VAC POSSIBLE INTEGRA GRAFT performed by [place] DPM at [facility] [address] PICC LINE INSERTION NURSE  11/18/2020             Social History  Social History     Socioeconomic History    Marital status:    Tobacco Use    Smoking status: Never Smoker    Smokeless tobacco: Never Used   Substance and Sexual Activity    Alcohol use: Not Currently     Frequency: Never     Comment: 3 times a year maybe. very rare    Drug use: Never       Family Medical History  Family History   Problem Relation Age of Onset    Diabetes Father     Alcohol Abuse Father        Review of Systems:  Constitutional: No fever, no chills  Eyes: No vision changes, no retroorbital pain  ENT: No hearing changes, no ear pain  Respiratory: No cough, no dyspnea  Cardiovascular: No chest pain, no palpitations  Gastrointestinal: No abdominal pain, no diarrhea  Genitourinary: No dysuria, no hematuria  Integumentary: No rash, no itching  Musculoskeletal: No muscle pain, no joint pain  Neurologic: No headache, no numbness in extremities    Physical Examination:  Vitals:    11/19/20 1326 11/19/20 1620 11/20/20 0800   BP: 125/64 125/68 (!) 153/90   Pulse: 98 92 106   Resp: 19 18 18   Temp: 98.6 °F (37 °C) 98.6 °F (37 °C) 98.2 °F (36.8 °C)   TempSrc: Temporal Oral Temporal   Weight: 289 lb 11.8 oz (131.4 kg)     Height: 6' 3\" (1.905 m)       Constitutional: Alert, not in distress  Eyes: Sclerae anicteric, no conjunctival erythema  ENT: No buccal lesion, no pharyngeal exudates  Neck: No nuchal rigidity, no cervical adenopathy  Lungs: Clear breath sounds, no crackles, no wheezes  Heart: Regular rate and rhythm, no murmurs  Abdomen: Bowel sounds present, soft, nontender  Skin: Warm and dry, no active dermatoses  Musculoskeletal: Left foot with dressing and Ace wrap in place. Labs, imaging, and medical records/notes were personally reviewed.     Assessment:  Pseudomonas aeruginosa and MSSA left diabetic foot ulcer with osteomyelitis status post Symes amputation at the level of ankle and partial excision of tib-fib on 11/14, ongoing antibiotic treatment  MSSA bacteremia, ongoing antibiotic treatment    Plan:  Continue levofloxacin for 2 weeks as planned from 11/14-11/28. Continue cefazolin 2 g every 8 hours for 6 weeks as planned from 11/14-12/26. Continue local wound care. Thank you for involving me in the care of Alondra Van. I will continue to follow. Please do not hesitate to call for any questions or concerns.     Electronically signed by Soheila Glez MD on 11/20/2020 at 4:27 PM

## 2020-11-20 NOTE — PROGRESS NOTES
Occupational Therapy   Occupational Therapy Initial Assessment  Date: 2020   Patient Name: Emilie Xiong  MRN: [id number]     : 1975  Room: [facility]    Referring Practitioner: MD Russ  Diagnosis: L Syme's amputation- amputation @ ankle, partial excision distal/fibula 2020  Additional Pertinent Hx: Charcot foot, neuropathy, DM, HTN, osteomyilitis  Restrictions: Fall Risk & NWB LLE    Date of Service: 2020    Discharge Recommendations:  Home with assist PRN     Subjective   Chart Reviewed: Yes  Family / Caregiver Present: No  Subjective: Pt presents supine in bed & was agreeable to OT intervention  Pain Comments: Pt did not c/o pain during OT session    Social/Functional History  Lives With: Spouse  Type of Home: Apartment  Home Layout: One level  Home Access: Stairs to enter with rails  Entrance Stairs - Number of Steps: Pt has 1 curb step then 2 VINCE 1HR & then 1 step into the apartment building.  Pt then has 7 steps BHR to apartment level  Bathroom Shower/Tub: Tub/Shower unit, Curtain  Bathroom Toilet: Standard  Home Equipment: [address] [facility]- Pt stated he occasionally used cane  ADL Assistance: Independent  Homemaking Assistance: Independent  Homemaking Responsibilities: Yes  Meal Prep Responsibility: Secondary  Laundry Responsibility: Secondary  Cleaning Responsibility: Secondary  Bill Paying/Finance Responsibility: Secondary  Shopping Responsibility: Secondary  Ambulation Assistance: Independent  Transfer Assistance: Independent  Occupation: Unemployed  Type of occupation: dietary manager  IADL Comments: PLOF pt independent in [address] [place] [address]  Additional Comments: cat     Objective   Vision: Impaired  Vision Exceptions: Wears glasses at all times  Hearing: Within functional limits      Orientation  Overall Orientation Status: Within Functional Limits     Observation/Palpation  Posture: Fair     Balance  Sitting Balance: Supervision  Standing Balance: Contact guard assistance  Functional Mobility  Functional - Mobility Device: Rolling Walker  Activity: (Pt ambualted short distance 8 feet using a rw CGA-Min A)  Toilet Transfers  Toilet - Technique: Stand pivot  Equipment Used: Standard toilet  Toilet Transfer: Minimal assistance  Toilet Transfers Comments: min vc's for safety     ADL  Feeding: Setup  Grooming: Supervision;Setup(seated)  UE Bathing: Supervision;Setup  LE Bathing: Minimal assistance;Setup; Increased time to complete  UE Dressing: Setup;Supervision  LE Dressing: Setup;Minimal assistance; Increased time to complete;Verbal cueing     Tone RUE  RUE Tone: Normotonic  Tone LUE  LUE Tone: Normotonic  Coordination  Movements Are Fluid And Coordinated: Yes     Bed mobility  Supine to Sit: Stand by assistance  Scooting: Stand by assistance  Transfers  Stand Pivot Transfers: Contact guard assistance  Sit to stand: Contact guard assistance  Stand to sit: Stand by assistance  Transfer Comments: min vc's for safety     Vision - Basic Assessment  Prior Vision: Wears glasses all the time  Visual History: No significant visual history  Patient Visual Report: No visual complaint reported. Cognition  Overall Cognitive Status: WFL     Sensation  Overall Sensation Status: WFL      BUE AROM: BUE AROM in all planes WFL  Right & Left Hand AROM: B hand grasp & release WFL.  Pt able to oppose his thumb to each digit    LUE Strength  L Hand: 4/5  LUE Strength Comment: 4/5 strength in all planes  RUE Strength  R Hand: 4+/5  RUE Strength Comment: 4/5 strength in all planes     Hand Dominance: Right    Left Hand Strength -   Handle Setting 2: 80# & norm for tp age & gender is 101#  Right Hand Strength -   Handle Setting 2: 95# & norm for pt age & gender is 110#    9-Hole peg test-  R Hand- 20.5 seconds & norm for pt age & gender is 18.4 seconds  L Hand- 19.5 seconds & norm for pt age & gender is 19.6 seconds     Plan   Times per week: OT twice a day for 10 days to address above problem areas  Times per day: Twice a day  Current Treatment Recommendations: Strengthening, Gait Training, Patient/Caregiver Education & Training, Home Management Training, Balance Training, Equipment Evaluation, Education, & procurement, Functional Mobility Training, Endurance Training, Wheelchair Mobility Training, Safety Education & Training, Self-Care / ADL    Assessment   Performance deficits / Impairments: Decreased functional mobility ; Decreased endurance;Decreased ADL status; Decreased balance;Decreased strength;Decreased high-level IADLs  Prognosis: Good  Decision Making: Low Complexity  OT Education: OT Role;IADL Safety;Precautions; ADL Adaptive Strategies;Transfer Training;Energy Conservation  Patient Education: Pt educated with regards to OT process. Pt participated in OT goal planning. Pt in agreement with POC established & wants to reach OT goals so that he can return home  REQUIRES OT FOLLOW UP: Yes  Activity Tolerance: Patient Tolerated treatment well  Safety Devices in place: Yes  Type of devices: Call light within reach         Goals  Long term goals  Time Frame for Long term goals : 10 days to address above problem areas  Long term goal 1: Pt demo independent to eat all meals  Long term goal 2: Pt dmeo MOd I grooming seated or standing @ sink level  Long term goal 3: Pt demo SBA bathing @ shower level or sponge bathing both seated & standing  Long term goal 4: Pt demo I UE & Mod I LE dress with AE as needed  Long term goal 5: Pt demo MOd i commode trf wt appropraite DME to ensure pt safety  Long term goals 6: Pt demo Sup tub trf with appropraite DME to ensure pt safety  Long term goal 7: Pt demo Mod I light homemaking task @ wc level  Long term goal 8: Pt demo G endurance for a 30 minute funcitonal activity  Long term goal 9: Pt demo increased BUE strength to 5/5 & improved  strength- On eval R  strength 95# & norm for pt age & gender is 110# & L hand 80# & norm for tp age & gender is 101#  Long term goal 10:  Pt demo Mod I wc porpulsion throughout a living environment & around obstacles  Patient Goals   Patient goals : \"Just live my life. Get back to work eventually. \"     Therapy Time   Individual Concurrent Group Co-treatment   Time In 645-OT eval  655-OT rx         Time Out 655-OT eval  740-OT rx         Minutes 55 Min OT total  10 Min OT eval  45 Min OT rx             Liz Irwin OTR/L [id number]

## 2020-11-20 NOTE — CARE COORDINATION
Social Work Assessment Summary    PCP: Does not have a PCP    Patient Resides: With his wife. They have (0) children. Home Architecture : 2nd story apartment. Main entrance has 2 steps with a rail. 7 steps to the 2nd floor. Bathroom has a tub/shower combo with curtains. Will you return to your own home? Yes        Primary Caregiver: Wife, Annika Anthony (44), works at [email or web address] PTA : Independent in all and drove. 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 Receives SSD No     DME Pta  : [employer] and glucometer    Community Agency Involvement PTA : None    Do they have a medical profile: No    Family Teaching: TBD    Strength: Strong willed    Preference: Get well enough to get out and get back to regular living.       NAME RELATION HOME # WORK # CELL #   Annika Anthony Wife   [phone]                   Height: 6'3  Weight: [address] Intern  Chuyita Elliott  11/20/2020

## 2020-11-20 NOTE — PROGRESS NOTES
verbalized understanding Pt/family demonstrated skill Pt/family requires further education in this area   Yes Yes Reinforce      PM  Time in: 1300  Time out: 1345    Pt is making good progress toward established Physical Therapy goals. Continue with physical therapy current plan of care. Oleg Avery.  Delmi Rodriguez, [facility]  License Number: [id number]

## 2020-11-20 NOTE — H&P
[address]                  [address] [facility]                              HISTORY AND PHYSICAL    PATIENT NAME: Mago Palacios                  :        1975  MED REC NO:   [id number]                            ROOM:       5509  ACCOUNT NO:   [de-identified]                           ADMIT DATE: 2020  PROVIDER:     Cristin Alba MD    CHIEF COMPLAINT:  Left below-knee amputation. HISTORY OF PRESENT ILLNESS:  The patient was admitted to [facility] with an infected left foot. He has underlying diabetes,  neuropathy and Charcot foot deformity. He ended up having a left foot  amputation. He tolerated the procedure well. He still has a wound  there with dressings. He has had a wound VAC until the transfer and we  may have to look at getting that back again. He has got some neuropathy  at the other foot as well as a fusion there. Functionally, he is still  at a moderate assist level with transfers. He is nonweightbearing on  the left. He was only able to hop a couple of feet. He is mod assist  with his ADLs. He is felt to be a good candidate for further rehab. PAST MEDICAL HISTORY:  1. Type 2 diabetes mellitus, insulin-dependent. 2.  Hypertension. 3.  Peripheral vascular disease. 4.  Charcot foot deformity. ALLERGIES:  VANCOMYCIN and SEMAGLUTIDE. CURRENT MEDICATIONS:  Lipitor, Ancef, Lovenox, Lantus with mealtime  insulin, Levaquin and Glucophage. HABITS:  No smoking or alcohol. SOCIAL HISTORY:  He lives with his wife. REVIEW OF SYSTEMS:  HEENT:  Negative for prior HEENT problems. CARDIAC/PULMONARY:  Negative for prior cardiac or pulmonary problems. GI/:  Negative for prior GI or  problems. ORTHOPEDIC:  Positive for right ankle fusion and the Charcot foot  deformity. NEUROLOGIC:  Positive for neuropathy.     PHYSICAL EXAMINATION:  GENERAL:  Well-nourished, well-developed white [id number]     Doc#: [id number]    CC:

## 2020-11-21 LAB
METER GLUCOSE: 115 MG/DL (ref 74–99)
METER GLUCOSE: 118 MG/DL (ref 74–99)
METER GLUCOSE: 120 MG/DL (ref 74–99)
METER GLUCOSE: 200 MG/DL (ref 74–99)

## 2020-11-21 PROCEDURE — 6370000000 HC RX 637 (ALT 250 FOR IP): Performed by: INTERNAL MEDICINE

## 2020-11-21 PROCEDURE — 6370000000 HC RX 637 (ALT 250 FOR IP): Performed by: PHYSICAL MEDICINE & REHABILITATION

## 2020-11-21 PROCEDURE — 6360000002 HC RX W HCPCS: Performed by: INTERNAL MEDICINE

## 2020-11-21 PROCEDURE — 1280000000 HC REHAB R&B

## 2020-11-21 PROCEDURE — 97530 THERAPEUTIC ACTIVITIES: CPT

## 2020-11-21 PROCEDURE — 82962 GLUCOSE BLOOD TEST: CPT

## 2020-11-21 PROCEDURE — 2580000003 HC RX 258: Performed by: INTERNAL MEDICINE

## 2020-11-21 RX ORDER — AMLODIPINE BESYLATE 5 MG/1
5 TABLET ORAL DAILY
Status: DISCONTINUED | OUTPATIENT
Start: 2020-11-21 | End: 2020-12-08 | Stop reason: HOSPADM

## 2020-11-21 RX ADMIN — Medication 2 G: at 07:29

## 2020-11-21 RX ADMIN — INSULIN LISPRO 5 UNITS: 100 INJECTION, SOLUTION INTRAVENOUS; SUBCUTANEOUS at 12:07

## 2020-11-21 RX ADMIN — Medication 10 ML: at 07:29

## 2020-11-21 RX ADMIN — ATORVASTATIN CALCIUM 5 MG: 10 TABLET, FILM COATED ORAL at 20:34

## 2020-11-21 RX ADMIN — Medication 10 ML: at 08:57

## 2020-11-21 RX ADMIN — Medication 10 ML: at 14:19

## 2020-11-21 RX ADMIN — LEVOFLOXACIN 750 MG: 750 TABLET, FILM COATED ORAL at 08:06

## 2020-11-21 RX ADMIN — INSULIN LISPRO 5 UNITS: 100 INJECTION, SOLUTION INTRAVENOUS; SUBCUTANEOUS at 08:12

## 2020-11-21 RX ADMIN — Medication 2 G: at 14:18

## 2020-11-21 RX ADMIN — INSULIN LISPRO 5 UNITS: 100 INJECTION, SOLUTION INTRAVENOUS; SUBCUTANEOUS at 16:55

## 2020-11-21 RX ADMIN — ENOXAPARIN SODIUM 40 MG: 40 INJECTION SUBCUTANEOUS at 08:06

## 2020-11-21 RX ADMIN — AMLODIPINE BESYLATE 5 MG: 5 TABLET ORAL at 17:04

## 2020-11-21 RX ADMIN — Medication 10 ML: at 23:26

## 2020-11-21 RX ADMIN — INSULIN GLARGINE 15 UNITS: 100 INJECTION, SOLUTION SUBCUTANEOUS at 20:35

## 2020-11-21 RX ADMIN — SODIUM CHLORIDE, PRESERVATIVE FREE 300 UNITS: 5 INJECTION INTRAVENOUS at 23:26

## 2020-11-21 RX ADMIN — Medication 2 G: at 23:25

## 2020-11-21 RX ADMIN — METFORMIN HYDROCHLORIDE 500 MG: 500 TABLET ORAL at 08:06

## 2020-11-21 RX ADMIN — SODIUM CHLORIDE, PRESERVATIVE FREE 300 UNITS: 5 INJECTION INTRAVENOUS at 08:56

## 2020-11-21 ASSESSMENT — PAIN SCALES - GENERAL
PAINLEVEL_OUTOF10: 0
PAINLEVEL_OUTOF10: 0

## 2020-11-21 ASSESSMENT — PAIN DESCRIPTION - PROGRESSION
CLINICAL_PROGRESSION: NOT CHANGED
CLINICAL_PROGRESSION: NOT CHANGED

## 2020-11-21 ASSESSMENT — PAIN SCALES - WONG BAKER
WONGBAKER_NUMERICALRESPONSE: 0
WONGBAKER_NUMERICALRESPONSE: 0

## 2020-11-21 NOTE — PROGRESS NOTES
[facility] PROGRESS NOTE      Chief complaint: Follow-up of ongoing antibiotic treatment for MSSA bacteremia    The patient is a 54 y.o. male with history of DM, Charcot arthropathy, hypertension, peripheral vascular disease, previously admitted from 11/10-11/18 for Pseudomonas aeruginosa and MSSA left diabetic foot infection with osteomyelitis, s/p Symes amputation at level of ankle and partial excision of tib-fib on 44/69, complicated by MSSA bacteremia for which he was seen by Dr. Michelle Caldwell and discharged to inpatient rehab on 11/19 on 2-week course of levofloxacin and 6-week course of cefazolin. On admission, he was afebrile and hemodynamically stable with leukocytosis of 13,000. Cefazolin and levofloxacin were resumed. Subjective: Patient was seen and examined. No chills, no abdominal pain, no diarrhea, no rash, no itching. Objective:    Vitals:    11/21/20 0800   BP: (!) 156/90   Pulse: 98   Resp: 18   Temp: 97.5 °F (36.4 °C)   SpO2: 99%     Constitutional: Alert, not in distress  Respiratory: Clear breath sounds, no crackles, no wheezes  Cardiovascular: Regular rate and rhythm, no murmurs  Gastrointestinal: Bowel sounds present, soft, nontender  Skin: Warm and dry, no active dermatoses  Musculoskeletal: Left foot with dressing and Ace wrap in place  Labs, imaging, and medical records/notes were personally reviewed. Assessment:  Pseudomonas aeruginosa and MSSA left diabetic foot ulcer with osteomyelitis status post Symes amputation at the level of ankle and partial excision of tib-fib on 11/14, ongoing antibiotic treatment  MSSA bacteremia, ongoing antibiotic treatment    Recommendations:  Continue levofloxacin for 2 weeks as planned from 11/14-11/28. Continue cefazolin 2 g every 8 hours for 6 weeks as planned from 11/14-12/26. Continue local wound care.     Thank you for involving me in the care of Christina Gutierrez. I will continue to follow.  Please do not hesitate to call for any questions or concerns.     Electronically signed by Tara Causey MD on 11/21/2020 at 9:39 AM

## 2020-11-21 NOTE — PROGRESS NOTES
insulin glargine  15 Units Subcutaneous Nightly    insulin lispro  5 Units Subcutaneous TID WC    levoFLOXacin  750 mg Oral Daily    metFORMIN  500 mg Oral Daily with breakfast    sodium chloride flush  10 mL Intravenous BID     Continuous Infusions:   dextrose       PRN Meds:.dextrose, dextrose, glucagon (rDNA), glucose, acetaminophen, heparin flush, sodium chloride flush, acetaminophen, polyethylene glycol    RADIOLOGY:  No orders to display     BP (!) 156/90   Pulse 98   Temp 97.5 °F (36.4 °C) (Oral)   Resp 18   Ht 6' 3\" (1.905 m)   Wt 289 lb 11.8 oz (131.4 kg)   SpO2 99%   BMI 36.22 kg/m²     LABS:    Recent Labs     11/20/20  0540   WBC 13.8*   HGB 9.4*   HCT 30.4*   *        Recent Labs     11/20/20  0540      K 4.1      CO2 21*   BUN 14   CREATININE 1.0        No results for input(s): PROT, INR, APTT in the last 72 hours. ASSESSMENT:  Principal Problem:  -Diabetic foot infection   -S/P left foot Syme's amputation performed 11/14/20    Active Problems:    Charcot foot due to diabetes mellitus ([facility] [address]    Diabetic neuropathy associated with diabetes mellitus due to underlying condition ([facility] [address]    Essential hypertension    Diabetic foot ulcer with osteomyelitis ([facility] [address]      PLAN:  - Patient's labs, charts and images were reviewed   - Antibiotics as per ID: Levaquin, Ancef  - Surgical culture of left foot: Pseudomonas, Staph, Corynebacterium   -Dressings changed today   - Patient to be NWB to operative extremity   - No further podiatric surgical intervention indicated. Patient is stable for discharge from podiatric standpoint.   - Recommend discharge to SNF   - Will continue to follow patient while they are in-house.     Phu Galindo DPM  Fellowship-Trained Foot and Ankle Surgeon  [phone]

## 2020-11-21 NOTE — PROGRESS NOTES
Progress Note - covering Dr. Benson Ziegler    Subjective/   54y.o. year old male on the rehab unit for partial left foot amputation. He denies complaints. He is tolerating therapy program.  Pain is controlled. No shortness of breath or chest pain. No palpitations. Objective/   VITALS:  BP (!) 156/90   Pulse 98   Temp 97.5 °F (36.4 °C) (Oral)   Resp 18   Ht 6' 3\" (1.905 m)   Wt 289 lb 11.8 oz (131.4 kg)   SpO2 99%   BMI 36.22 kg/m²   24HR INTAKE/OUTPUT:      Intake/Output Summary (Last 24 hours) at 11/21/2020 1545  Last data filed at 11/21/2020 1422  Gross per 24 hour   Intake 1260 ml   Output 800 ml   Net 460 ml     Constitutional:  Alert, awake, no apparent distress   Cardiovascular:  S1, S2 without m/r/g   Respiratory:  CTA B without w/r/r, decreased breath sounds  Abdomen: Protuberant, positive bowel sounds  Ext: 2+ bilateral LE edema, partial left foot amputation. Ace wrap in place clean and dry. No calf tenderness. Alignment is good. Neuro: Awake, alert and oriented x3. Functional Level    Initial Evaluation  11/20/20 AM     PM    Short Term Goals Long Term Goals    Was pt agreeable to Eval/treatment? Yes Yes  NA       Does pt have pain? Minimal LLE soreness \"maybe . 5/10) No c/o pain initially. Reported R ankle pain following WB tasks. Did not quantify.         Bed Mobility  Rolling: Supervision  Supine<>sit: Supervision  Scooting: Supervision NT     Independent   Transfers Sit to stand: Max A  Stand to sit: Max A  Stand pivot:  Max A [employer] Sit to stand: Min A  Stand to sit: Min A  Stand pivot: Min A [employer]   SBA Modified Independent   Ambulation   15 feet with [employer] with Min A + WC follow 15 feet, 25 feet with [employer] with Min A   20 feet with AAD with SBA >150 feet with AAD with Modified [place]   Walking 10 feet on uneven surface NT NT   10 feet with AAD with SBA >10 feet with AAD with Modified [place]   Wheel Chair Mobility 150 feet with BUE with SBA NT     >200 feet with BUE with Modified [place]   Car Transfers NT NT   SBA Modified Independent   Stair negotiation: ascended and descended NT, attempted but pt unable to perform safely NT   4 steps with 2 rails with SBA >8 steps with 1 rail + AAD with Modified [place]   Curb Step:   ascended and descended NT NT   4 inch step with AAD and Min A 7.5 inch step with AAD and SBA   Picking up object off the floor NT NT   Will  an object with Min A Will  an object with Modified [place]   BLE ROM R ankle limited d/t past fusion, otherwise WFL           BLE Strength RLE: grossly 4+/5  LLE: grossly 4/5 at the hip and knee           Balance  Seated: Independent  Standing: Min A<>Max A Seated: Independent  Standing: Min A               Scheduled Meds:   atorvastatin  5 mg Oral Nightly    ceFAZolin  2 g Intravenous Q8H    enoxaparin  40 mg Subcutaneous Daily    heparin flush  3 mL Intravenous 2 times per day    insulin glargine  15 Units Subcutaneous Nightly    insulin lispro  5 Units Subcutaneous TID WC    levoFLOXacin  750 mg Oral Daily    metFORMIN  500 mg Oral Daily with breakfast    sodium chloride flush  10 mL Intravenous BID     Continuous Infusions:   dextrose       PRN Meds:dextrose, dextrose, glucagon (rDNA), glucose, acetaminophen, heparin flush, sodium chloride flush, acetaminophen, polyethylene glycol  I/O last 3 completed shifts: In: 1260 [P.O.:1260]  Out: 800 [Urine:800]  No intake/output data recorded. Labs reviewed  CBC:   Recent Labs     11/20/20  0540   WBC 13.8*   HGB 9.4*   *     BMP:    Recent Labs     11/20/20  0540      K 4.1      CO2 21*   BUN 14   CREATININE 1.0   GLUCOSE 106*     Hepatic: No results for input(s): AST, ALT, ALB, BILITOT, ALKPHOS in the last 72 hours. BNP: No results for input(s): BNP in the last 72 hours. Lipids: No results for input(s): CHOL, HDL in the last 72 hours.     Invalid input(s): LDLCALCU  INR: No results for input(s): INR in the last 72 hours.    Assessment/  Patient Active Problem List:     Diabetic foot infection ([facility] [address]     Charcot foot due to diabetes mellitus ([facility] [address]     Diabetic neuropathy associated with diabetes mellitus due to underlying condition [facility])     Essential hypertension     Diabetic foot ulcer with osteomyelitis ([facility] [address]     Partial nontraumatic amputation of foot, left ([facility] [address]      Plan/  1. ID note appreciated. Continue Levaquin for 2 weeks from 11/14/2020 through 11/28/2020 and continue cefazolin 2 g every 8 hours for 6 weeks from 11/14/2020 through 12/26/2020.  2. Podiatry note appreciated. Continue wound care  3. Continue other meds without change  4. Continue DVT prophylaxis  5.  We will add amlodipine for blood pressure control          Samantha Johnson MD

## 2020-11-21 NOTE — PROGRESS NOTES
Physical Therapy    Facility/Department: [address] REHAB  Treatment Note    NAME: Martina Velasco  : 1975  MRN: [id number]    Date of Service: 2020  Evaluating Therapist: Reece Severin. Yvette Cheng P.T.    ROOM: [facility]  DIAGNOSIS: BKA  PRECAUTIONS: Fall risk, NWB L residual limb  PROCEDURE(S):   : I&D with bone debridement/biopsy   : Syme's amputation at ankle with partial excision of distal tib/fib  HPI: The pt was admitted to [facility] on 11/10 with a diabetic foot infection with osteomyelitis. Social:  Pt lives with his wife in a 2nd floor apartment with a curb step+2+1 steps with 1 rail to enter building and 7 steps with 2 rails to the 2nd floor. 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     Initial Evaluation  20 AM     PM    Short Term Goals Long Term Goals    Was pt agreeable to Eval/treatment? Yes Yes  NA     Does pt have pain? Minimal LLE soreness \"maybe . 5/10) No c/o pain initially. Reported R ankle pain following WB tasks. Did not quantify. Bed Mobility  Rolling: Supervision  Supine<>sit: Supervision  Scooting: Supervision NT   Independent   Transfers Sit to stand: Max A  Stand to sit: Max A  Stand pivot:  Max A [employer] Sit to stand: Min A  Stand to sit: Min A  Stand pivot: Min A [employer]  SBA Modified Independent   Ambulation   15 feet with [employer] with Min A + WC follow 15 feet, 25 feet with [employer] with Min A  20 feet with AAD with SBA >150 feet with AAD with Modified [place]   Walking 10 feet on uneven surface NT NT  10 feet with AAD with SBA >10 feet with AAD with Modified [place]   Wheel Chair Mobility 150 feet with BUE with SBA NT   >200 feet with BUE with Modified [place]   Car Transfers NT NT  SBA Modified Independent   Stair negotiation: ascended and descended NT, attempted but pt unable to perform safely NT  4 steps with 2 rails with SBA >8 steps with 1 rail + AAD with Modified [place]   Curb Step:   ascended and descended NT NT 4 inch step with AAD and Min A 7.5 inch step with AAD and SBA   Picking up object off the floor NT NT  Will  an object with Min A Will  an object with Modified [place]   BLE ROM R ankle limited d/t past fusion, otherwise WFL       BLE Strength RLE: grossly 4+/5  LLE: grossly 4/5 at the hip and knee       Balance  Seated: Independent  Standing: Min A<>Max A Seated: Independent  Standing: Min A      Date Family Teach Completed No family present at evlauation       Is additional Family Teaching Needed? Y or N Yes       Hindering Progress Limited endurance, medical history, obesity Decreased endurance, WB restrictions      PT recommended ELOS 3 weeks       Team's Discharge Plan        Therapist at Team Meeting          Therapeutic Exercise:   AM:   Sit<>stand x 10, SPT x 8, Gait per grid    Additional Comments: Patient found in [facility] in room and agreeable to treatment. Patient exhibits increased effort to achieve sit to stand transfer from various surface levels. Transfer ability increased and effort/energy expenditure decreased with repetition. Patient with fair safety with gait as he would hop too far forward in walker with BLEs nearly touching the front of the walker. Verbal cues and education given for proper technique and safety. Patient informed PT that he has had multiple surgeries on both ankles/feet and \"this is how I do it\". Patient with decreased endurance during mobility, especially with gait/hopping and only able to manage short distances safely. Patient not willing to attempt steps at this time due to fatigue. Patient returned to the room following treatment and remained in [facility]. Patient/family education  Pt/family educated on transfer techniques, proper walker management during transfers.      Patient/family response to education:   Pt/family verbalized understanding Pt/family demonstrated skill Pt/family requires further education in this area   Yes Yes Reinforce      PM  Time in: 1454  Time out: 1120    Pt is making good progress toward established Physical Therapy goals. Continue with physical therapy current plan of care.     Preethi Vieira, PT, DPT  License [id number]

## 2020-11-22 LAB
METER GLUCOSE: 105 MG/DL (ref 74–99)
METER GLUCOSE: 129 MG/DL (ref 74–99)
METER GLUCOSE: 145 MG/DL (ref 74–99)
METER GLUCOSE: 148 MG/DL (ref 74–99)

## 2020-11-22 PROCEDURE — 2580000003 HC RX 258: Performed by: INTERNAL MEDICINE

## 2020-11-22 PROCEDURE — 6360000002 HC RX W HCPCS: Performed by: INTERNAL MEDICINE

## 2020-11-22 PROCEDURE — 6370000000 HC RX 637 (ALT 250 FOR IP): Performed by: INTERNAL MEDICINE

## 2020-11-22 PROCEDURE — 1280000000 HC REHAB R&B

## 2020-11-22 PROCEDURE — 82962 GLUCOSE BLOOD TEST: CPT

## 2020-11-22 PROCEDURE — 97110 THERAPEUTIC EXERCISES: CPT

## 2020-11-22 PROCEDURE — 6370000000 HC RX 637 (ALT 250 FOR IP): Performed by: PHYSICAL MEDICINE & REHABILITATION

## 2020-11-22 PROCEDURE — 97530 THERAPEUTIC ACTIVITIES: CPT

## 2020-11-22 RX ADMIN — Medication 2 G: at 06:58

## 2020-11-22 RX ADMIN — ENOXAPARIN SODIUM 40 MG: 40 INJECTION SUBCUTANEOUS at 08:05

## 2020-11-22 RX ADMIN — INSULIN GLARGINE 15 UNITS: 100 INJECTION, SOLUTION SUBCUTANEOUS at 20:15

## 2020-11-22 RX ADMIN — AMLODIPINE BESYLATE 5 MG: 5 TABLET ORAL at 08:05

## 2020-11-22 RX ADMIN — METFORMIN HYDROCHLORIDE 500 MG: 500 TABLET ORAL at 08:05

## 2020-11-22 RX ADMIN — Medication 2 G: at 23:35

## 2020-11-22 RX ADMIN — LEVOFLOXACIN 750 MG: 750 TABLET, FILM COATED ORAL at 08:05

## 2020-11-22 RX ADMIN — SODIUM CHLORIDE, PRESERVATIVE FREE 300 UNITS: 5 INJECTION INTRAVENOUS at 07:25

## 2020-11-22 RX ADMIN — SODIUM CHLORIDE, PRESERVATIVE FREE 300 UNITS: 5 INJECTION INTRAVENOUS at 23:36

## 2020-11-22 RX ADMIN — INSULIN LISPRO 5 UNITS: 100 INJECTION, SOLUTION INTRAVENOUS; SUBCUTANEOUS at 12:45

## 2020-11-22 RX ADMIN — INSULIN LISPRO 5 UNITS: 100 INJECTION, SOLUTION INTRAVENOUS; SUBCUTANEOUS at 07:31

## 2020-11-22 RX ADMIN — ATORVASTATIN CALCIUM 5 MG: 10 TABLET, FILM COATED ORAL at 20:12

## 2020-11-22 RX ADMIN — Medication 10 ML: at 06:59

## 2020-11-22 RX ADMIN — Medication 10 ML: at 23:35

## 2020-11-22 RX ADMIN — Medication 2 G: at 14:25

## 2020-11-22 RX ADMIN — INSULIN LISPRO 5 UNITS: 100 INJECTION, SOLUTION INTRAVENOUS; SUBCUTANEOUS at 17:05

## 2020-11-22 ASSESSMENT — PAIN DESCRIPTION - PROGRESSION
CLINICAL_PROGRESSION: NOT CHANGED
CLINICAL_PROGRESSION: NOT CHANGED

## 2020-11-22 ASSESSMENT — PAIN SCALES - WONG BAKER
WONGBAKER_NUMERICALRESPONSE: 0
WONGBAKER_NUMERICALRESPONSE: 0

## 2020-11-22 ASSESSMENT — PAIN SCALES - GENERAL
PAINLEVEL_OUTOF10: 0

## 2020-11-22 NOTE — PROGRESS NOTES
[facility] PROGRESS NOTE      Chief complaint: Follow-up of ongoing antibiotic treatment for MSSA bacteremia    The patient is a 54 y.o. male with history of DM, Charcot arthropathy, hypertension, peripheral vascular disease, previously admitted from 11/10-11/18 for Pseudomonas aeruginosa and MSSA left diabetic foot infection with osteomyelitis, s/p Symes amputation at level of ankle and partial excision of tib-fib on 79/34, complicated by MSSA bacteremia for which he was seen by Dr. Palmira Mccrary and discharged to inpatient rehab on 11/19 on 2-week course of levofloxacin and 6-week course of cefazolin. On admission, he was afebrile and hemodynamically stable with leukocytosis of 13,000. Cefazolin and levofloxacin were resumed. Subjective: Patient was seen and examined. No chills, no abdominal pain, no diarrhea, no rash, no itching. Objective:    Vitals:    11/22/20 0800   BP: (!) 154/83   Pulse: 98   Resp: 18   Temp: 97.9 °F (36.6 °C)   SpO2: 97%     Constitutional: Alert, not in distress  Respiratory: Clear breath sounds, no crackles, no wheezes  Cardiovascular: Regular rate and rhythm, no murmurs  Gastrointestinal: Bowel sounds present, soft, nontender  Skin: Warm and dry, no active dermatoses  Musculoskeletal: Left foot with dressing and Ace wrap in place  Labs, imaging, and medical records/notes were personally reviewed. Assessment:  Pseudomonas aeruginosa and MSSA left diabetic foot ulcer with osteomyelitis status post Symes amputation at the level of ankle and partial excision of tib-fib on 11/14, ongoing antibiotic treatment  MSSA bacteremia, ongoing antibiotic treatment    Recommendations:  Continue levofloxacin for 2 weeks as planned from 11/14-11/28. Continue cefazolin 2 g every 8 hours for 6 weeks as planned from 11/14-12/26. Monitor CBC, CMP, CRP, ESR weekly. Continue local wound care.     Thank you for involving me in the care of Katheryn Crowder. I will continue to follow.  Please do not hesitate to call for any questions or concerns.     Electronically signed by Alber Valentin MD on 11/22/2020 at 9:26 AM

## 2020-11-22 NOTE — PROGRESS NOTES
OCCUPATIONAL THERAPY DAILY NOTE    Date:2020  Patient Name: Jessica Lawton  MRN: [id number]  : 1975  Room: [address] [place] [address]A     Diagnosis: L Syme's amputation- amputation @ ankle, partial excision distal/fibula 2020  Additional Pertinent Hx: Charcot foot, neuropathy, DM, HTN, osteomyilitis  Precautions: Falls, NWB L LE    Functional Assessment:   Date Status AE  Comments   Feeding 20 Setup     Grooming 20 S/Setup           Oral Care 20 S/Setup     Bathing 20 Min A     UB Dressing 20 S/sStup     LB Dressing 20 Min A            Footwear 20 Min A     Toileting 20 Min A     Homemaking  TBA       Functional Transfers / Balance:   Date Status DME  Comments   Sit Balance 20 Supervision     Stand Balance 20 CGA     [x] Tub  [] Shower   Transfer 20 MIN A  Extended tub bench, w/w  Pt completed v/c's for hand placement and safety    Commode   Transfer 20 Min A     Functional   Mobility 20 Min A    SBA W/w    W/c  Short distances in bathroom         Other:    Sit<>stand      20     MIN A      w/w     Pt requiring v/c's for hand placement      Functional Exercises / Activity:  Pt seated engaged in B UE ex's with 3# dumb bells in all planes 3 x 10 reps focusing on UE strength/endurance to increase independence with transfers/mobility and ADL tasks; Sensory / Neuromuscular Re-Education:      Cognitive Skills:   Status Comments   Problem   Solving good     Memory good     Sequencing fair +    Safety fair +      Visual Perception:    Education:  -Pt educated on role of OT and w/c mobility techniques. [] Family teach completed on:    Pain Level: 3/10 L LE    Additional Notes:       Patient has made good  progress during treatment sessions toward set goals.  Therapy emphasis to obtain goals:   Long term goal 1: Pt demo independent to eat all meals  Long term goal 2: Pt dmeo MOd I grooming seated or standing @ sink level  Long term goal 3: Pt demo SBA bathing @ shower level or sponge bathing both seated & standing  Long term goal 4: Pt demo I UE & Mod I LE dress with AE as needed  Long term goal 5: Pt demo MOd i commode trf wth appropraite DME to ensure pt safety  Long term goals 6: Pt demo Sup tub trf with appropraite DME to ensure pt safety  Long term goal 7: Pt demo Mod I light homemaking task @ wc level  Long term goal 8: Pt demo G endurance for a 30 minute funcitonal activity  Long term goal 9: Pt demo increased BUE strength to 5/5 & improved  strength- On eval R  strength 95# & norm for pt age & gender is 110# & L hand 80# & norm for tp age & gender is 101#  Long term goal 10: Pt demo Mod I wc porpulsion throughout a living environment & around obstacles      [x] Continue with current OT Plan of care.   [] Prepare for Discharge     DISCHARGE RECOMMENDATIONS  Recommended DME:    Post Discharge Care:   []Home Independently  []Home with 24hr Care / Supervision []Home with Partial Supervision []Home with Home Health OT []Home with Out Pt OT []Other: ___   Comments:         Time in Time out Tx Time Breakdown  Variance:   First Session   [id number] [x] Individual Tx- 35 min  [] Concurrent Tx -  [] Co-Tx -   [] Group Tx -   [] Time Missed -     Second Session    [] Individual Tx-    [] Concurrent Tx -  [] Co-Tx -   [] Group Tx -   [] Time Missed -     Third Session    [] Individual Tx-   [] Concurrent Tx -  [] Co-Tx -   [] Group Tx -   [] Time Missed -         Total Tx Time  [address]  [place] [id number]

## 2020-11-22 NOTE — PROGRESS NOTES
Therapeutic Recreation Assessment     LEISURE INTEREST SURVEY               Key: P = Past Interest C = Current Interest F = Future Interest     Arts/Crafts:  ___Mechanical  ___Woodworking    ___ Painting   ___Floral arranging ___ Ceramics         _ __ Knitting                                                     ___ Crocheting        ___Other: ___________      Cooking:  _ _Baking _C__Cooking    ___   Other: _______   Comments:       Games:  C___Cards  ___Darts  _C__Board games    ___Word games  ___Crossword puzzles    ___Seek-n-find/jumble                   ___Other: _________      Horticulture:  ___Vegetables  ___Flowers  ___House plants                                                     ___Other: ___________      House/Yard Care:  ___Ironing  ___Laundry  ___Cleaning                                                      ___Repairs              ___Lawn care                                                     ___Other: ___________      Music Listening/Playing:  ___Classical       ___Big Band       ___Rock-n-Roll                                                     ___Country          ___R&B             ___Gospel/Hymns                                                     ___Other: ___________      Outdoor Activities:  ___Hunting          _C__Fishing          ___Camping                                                     ___Other: ___________      Pets/Animals:  P___Dogs             _C__Cats                ___Fish                                                     ___Birds             ___Livestock         ___Other: _________    Reading:   ___Newspapers  ___Magazines ___Other:stories                                                     ___Other: ___________      Judaism Activities:  Mandaeism: ___________   [place] Activities: ___________      Shopping:   ___Grocery  ___Clothing  ___Flea market     ___Other: ___________      Socialization: _C__Family  ___Friends  ___Neighbors       ___Church ___Volunteer ___Club/Organization                                                     ___Other: ___________    Sports/Exercises:  ___Walking  ___Football  ___Basketball     _C__Golf  _P__Baseball  ___Tennis       _P__Bowling  ___Other: ___________      Traveling:   ___Global  _C__[place]  _C__Local       ___Other: ___________      TV/Radio:   ___Mysteries  ___Comedies ___Romance                                                     ___Game show       _C__Sports       ___News                                                      ___Talk show          _C__Other: _Movies__________      Other:   Personal Leisure Profile:   Question Response   Attributes Identify a positive quality: []Ambitious  []Appreciative  []Caring  []Courteous  []Considerate  []Compassionate  []Creative  []Dependable   []Generous  []Honest  []Hard working  [employer]  []Kind  [][place]   [][place]  [][place]  []Patient  []Polite  []Respectful  []Sociable  []Sense of humor  []Thoughtful  [x]Other: __Thinking of others_________    Gwinda Sole are very good at Cooking   Awareness Why do you participate in your leisure interest: []Competition  []Creativity  []Concentration  []Exercise  []Enjoyment  []Fun  []Hand/eye Coordination  []Increase confidence  []Learning a new skill  [x]Relaxation  []Social Interaction  []Supporting one another  []Thinking  []Other: ___________    Are your leisure activities limited at this time? [x]Yes  []No  Comments: _________    How often do you participate in your leisure interest? 2-3 x a week   Resources Name a restaurant, store or business you frequent? [employer]   Personal When are you most happy?  Being with my wife     Barriers to Leisure Participation:  []Visual   [][place]  []Cognition/Communication  []Motivation  []Financial  []Transportation  []Time Constraints  [x]Physical Ability  []Leisure Awareness  []Drug/Alcohol  []Other: ___________    Recommendations:  []Group Session  [x]Individual Session  []Client Refused Services  []No Therapy  []Other: ___________    Objectives:  []Establish adaptations for leisure involvement   []Increase Self worth/self esteem  []Community reintegration training   [x]Social interaction  [x]Leisure participation  []Other: ___________    Goals for Therapeutic Recreation Services:   Social Interaction:   Admission Functional [place] Measure: ___6________  Discharge Functional [place] Measure: ___7________   Other:________________________________      Leisure Choice/ Increase [place] Quality of Life: To participate in 1 premorbid leisure activities of choice by discharge to increase leisure choice and independence thus increasing the overall quality of his/her life. Socialization/Social Interaction: To increase social interaction skills by introducing self 1 x per week at the beginning of TR session Socialization/Social Interaction: To initiate conversation 1 x per week during the TR session    Adaptations: To increase knowledge of adaptations to leisure involvement by participating in 1 modified leisure activities by dc. Leisure Activity Tolerance: To increase leisure tolerance by attending 1 leisure activities per week for 20 minutes with active participation. Self Expression: To participate in 1 leisure activity(s) of choice, identifying 1 benefits to leisure participation. Kendall Castañeda

## 2020-11-23 LAB
ALBUMIN SERPL-MCNC: 3.6 G/DL (ref 3.5–5.2)
ALP BLD-CCNC: 83 U/L (ref 40–129)
ALT SERPL-CCNC: <5 U/L (ref 0–40)
ANION GAP SERPL CALCULATED.3IONS-SCNC: 9 MMOL/L (ref 7–16)
AST SERPL-CCNC: 12 U/L (ref 0–39)
BASOPHILS ABSOLUTE: 0 E9/L (ref 0–0.2)
BASOPHILS RELATIVE PERCENT: 0.7 % (ref 0–2)
BILIRUB SERPL-MCNC: 0.4 MG/DL (ref 0–1.2)
BUN BLDV-MCNC: 13 MG/DL (ref 6–20)
C-REACTIVE PROTEIN: 1.9 MG/DL (ref 0–0.4)
CALCIUM SERPL-MCNC: 9 MG/DL (ref 8.6–10.2)
CHLORIDE BLD-SCNC: 104 MMOL/L (ref 98–107)
CO2: 26 MMOL/L (ref 22–29)
CREAT SERPL-MCNC: 0.8 MG/DL (ref 0.7–1.2)
EOSINOPHILS ABSOLUTE: 0.6 E9/L (ref 0.05–0.5)
EOSINOPHILS RELATIVE PERCENT: 5.3 % (ref 0–6)
FUNGUS (MYCOLOGY) CULTURE: NORMAL
FUNGUS (MYCOLOGY) CULTURE: NORMAL
FUNGUS STAIN: NORMAL
FUNGUS STAIN: NORMAL
GFR AFRICAN AMERICAN: >60
GFR NON-AFRICAN AMERICAN: >60 ML/MIN/1.73
GLUCOSE BLD-MCNC: 104 MG/DL (ref 74–99)
HCT VFR BLD CALC: 31 % (ref 37–54)
HEMOGLOBIN: 9.4 G/DL (ref 12.5–16.5)
LYMPHOCYTES ABSOLUTE: 1.58 E9/L (ref 1.5–4)
LYMPHOCYTES RELATIVE PERCENT: 14.2 % (ref 20–42)
MCH RBC QN AUTO: 25.2 PG (ref 26–35)
MCHC RBC AUTO-ENTMCNC: 30.3 % (ref 32–34.5)
MCV RBC AUTO: 83.1 FL (ref 80–99.9)
METER GLUCOSE: 133 MG/DL (ref 74–99)
METER GLUCOSE: 138 MG/DL (ref 74–99)
METER GLUCOSE: 143 MG/DL (ref 74–99)
METER GLUCOSE: 87 MG/DL (ref 74–99)
MONOCYTES ABSOLUTE: 0.68 E9/L (ref 0.1–0.95)
MONOCYTES RELATIVE PERCENT: 6.2 % (ref 2–12)
MYELOCYTE PERCENT: 1.8 % (ref 0–0)
NEUTROPHILS ABSOLUTE: 8.36 E9/L (ref 1.8–7.3)
NEUTROPHILS RELATIVE PERCENT: 72.6 % (ref 43–80)
OVALOCYTES: ABNORMAL
PDW BLD-RTO: 18 FL (ref 11.5–15)
PLATELET # BLD: 558 E9/L (ref 130–450)
PMV BLD AUTO: 8.8 FL (ref 7–12)
POIKILOCYTES: ABNORMAL
POLYCHROMASIA: ABNORMAL
POTASSIUM SERPL-SCNC: 4.3 MMOL/L (ref 3.5–5)
RBC # BLD: 3.73 E12/L (ref 3.8–5.8)
SEDIMENTATION RATE, ERYTHROCYTE: 58 MM/HR (ref 0–15)
SODIUM BLD-SCNC: 139 MMOL/L (ref 132–146)
TOTAL PROTEIN: 7.9 G/DL (ref 6.4–8.3)
WBC # BLD: 11.3 E9/L (ref 4.5–11.5)

## 2020-11-23 PROCEDURE — 97110 THERAPEUTIC EXERCISES: CPT

## 2020-11-23 PROCEDURE — 6360000002 HC RX W HCPCS: Performed by: INTERNAL MEDICINE

## 2020-11-23 PROCEDURE — 82962 GLUCOSE BLOOD TEST: CPT

## 2020-11-23 PROCEDURE — 36415 COLL VENOUS BLD VENIPUNCTURE: CPT

## 2020-11-23 PROCEDURE — 97535 SELF CARE MNGMENT TRAINING: CPT

## 2020-11-23 PROCEDURE — 85025 COMPLETE CBC W/AUTO DIFF WBC: CPT

## 2020-11-23 PROCEDURE — 1280000000 HC REHAB R&B

## 2020-11-23 PROCEDURE — 97530 THERAPEUTIC ACTIVITIES: CPT

## 2020-11-23 PROCEDURE — 86140 C-REACTIVE PROTEIN: CPT

## 2020-11-23 PROCEDURE — 6370000000 HC RX 637 (ALT 250 FOR IP): Performed by: INTERNAL MEDICINE

## 2020-11-23 PROCEDURE — 80053 COMPREHEN METABOLIC PANEL: CPT

## 2020-11-23 PROCEDURE — 6370000000 HC RX 637 (ALT 250 FOR IP): Performed by: PHYSICAL MEDICINE & REHABILITATION

## 2020-11-23 PROCEDURE — 36592 COLLECT BLOOD FROM PICC: CPT

## 2020-11-23 PROCEDURE — 85651 RBC SED RATE NONAUTOMATED: CPT

## 2020-11-23 PROCEDURE — 2580000003 HC RX 258: Performed by: INTERNAL MEDICINE

## 2020-11-23 RX ADMIN — Medication 2 G: at 21:31

## 2020-11-23 RX ADMIN — ENOXAPARIN SODIUM 40 MG: 40 INJECTION SUBCUTANEOUS at 07:43

## 2020-11-23 RX ADMIN — ATORVASTATIN CALCIUM 5 MG: 10 TABLET, FILM COATED ORAL at 21:15

## 2020-11-23 RX ADMIN — INSULIN GLARGINE 15 UNITS: 100 INJECTION, SOLUTION SUBCUTANEOUS at 21:15

## 2020-11-23 RX ADMIN — SODIUM CHLORIDE, PRESERVATIVE FREE 300 UNITS: 5 INJECTION INTRAVENOUS at 11:23

## 2020-11-23 RX ADMIN — AMLODIPINE BESYLATE 5 MG: 5 TABLET ORAL at 07:43

## 2020-11-23 RX ADMIN — Medication 2 G: at 07:00

## 2020-11-23 RX ADMIN — INSULIN LISPRO 5 UNITS: 100 INJECTION, SOLUTION INTRAVENOUS; SUBCUTANEOUS at 16:26

## 2020-11-23 RX ADMIN — INSULIN LISPRO 5 UNITS: 100 INJECTION, SOLUTION INTRAVENOUS; SUBCUTANEOUS at 11:27

## 2020-11-23 RX ADMIN — SODIUM CHLORIDE, PRESERVATIVE FREE 300 UNITS: 5 INJECTION INTRAVENOUS at 21:00

## 2020-11-23 RX ADMIN — LEVOFLOXACIN 750 MG: 750 TABLET, FILM COATED ORAL at 07:43

## 2020-11-23 RX ADMIN — Medication 2 G: at 13:45

## 2020-11-23 RX ADMIN — Medication 10 ML: at 21:31

## 2020-11-23 RX ADMIN — METFORMIN HYDROCHLORIDE 500 MG: 500 TABLET ORAL at 07:43

## 2020-11-23 RX ADMIN — Medication 10 ML: at 13:45

## 2020-11-23 RX ADMIN — INSULIN LISPRO 5 UNITS: 100 INJECTION, SOLUTION INTRAVENOUS; SUBCUTANEOUS at 06:58

## 2020-11-23 RX ADMIN — Medication 10 ML: at 11:26

## 2020-11-23 RX ADMIN — Medication 10 ML: at 07:30

## 2020-11-23 RX ADMIN — Medication 300 UNITS: at 07:30

## 2020-11-23 ASSESSMENT — PAIN SCALES - GENERAL
PAINLEVEL_OUTOF10: 0

## 2020-11-23 NOTE — PROGRESS NOTES
[facility] PROGRESS NOTE      Chief complaint: Follow-up of ongoing antibiotic treatment for MSSA bacteremia    The patient is a 54 y.o. male with history of DM, Charcot arthropathy, hypertension, peripheral vascular disease, previously admitted from 11/10-11/18 for Pseudomonas aeruginosa and MSSA left diabetic foot infection with osteomyelitis, s/p Symes amputation at level of ankle and partial excision of tib-fib on 61/61, complicated by MSSA bacteremia for which he was seen by Dr. De Crook and discharged to inpatient rehab on 11/19 on 2-week course of levofloxacin and 6-week course of cefazolin. On admission, he was afebrile and hemodynamically stable with leukocytosis of 13,000. Cefazolin and levofloxacin were resumed. Subjective: Patient was seen and examined. No chills, no abdominal pain, no diarrhea, no rash, no itching. Objective:    Vitals:    11/23/20 0740   BP: 127/63   Pulse: 74   Resp: 18   Temp: 98.7 °F (37.1 °C)   SpO2:      Constitutional: Alert, not in distress  Respiratory: Clear breath sounds, no crackles, no wheezes  Cardiovascular: Regular rate and rhythm, no murmurs  Gastrointestinal: Bowel sounds present, soft, nontender  Skin: Warm and dry, no active dermatoses  Musculoskeletal: Left foot with dressing and Ace wrap in place  Labs, imaging, and medical records/notes were personally reviewed. Assessment:  Pseudomonas aeruginosa and MSSA left diabetic foot ulcer with osteomyelitis status post Symes amputation at the level of ankle and partial excision of tib-fib on 11/14, ongoing antibiotic treatment  MSSA bacteremia, ongoing antibiotic treatment    Recommendations:  Continue levofloxacin for 2 weeks as planned from 11/14-11/28. Continue cefazolin 2 g every 8 hours for 6 weeks as planned from 11/14-12/26. Monitor CBC, CMP, CRP, ESR weekly. Continue local wound care.     Thank you for involving me in the care of Jessica Lawton. I will continue to follow peripherally.  Please do not

## 2020-11-23 NOTE — PROGRESS NOTES
ankle limited d/t past fusion, otherwise WFL R ankle limited d/t past fusion, otherwise WFL      BLE Strength RLE: grossly 4+/5  LLE: grossly 4/5 at the hip and knee RLE: grossly 4+/5  LLE: grossly 4/5 at the hip and knee      Balance  Seated: Independent  Standing: Min A<>Max A Seated: Independent  Standing: Min A<>Max A      Date Family Teach Completed No family present at evlauation No family present to this date      Is additional Family Teaching Needed? Y or N Yes Yes      Hindering Progress Limited endurance, medical history, obesity Limited endurance, medical hx, obesity      PT recommended ELOS 3 weeks 2 weeks      Team's Discharge Plan  2 weeks      Therapist at [facility], THE  [place], [place], DPT [id number]          Date:  11/24/20  Supporting factors: Beverly Butts, motivated  Barriers to discharge:  Stairs into home, body habitus  Additional comments: The pt is motivated and is progressing well, he has a long history with B foot wounds and maintains his NWB well. The pt reports having used a knee scooter in the past in an inpatient setting with good results as well as a walker with sling. Due to his R ankle fusion and history with R foot wounds I would like to limit the force going through his R foot in [address] and move the pt to a knee scooter or a walker with a sling pending availability of equipment. The stairs will continue to be an issue, working on steps in parallel bars at this time to work toward performing steps with a single crutch and the rail, the pt states he has done this in the past.   DME:  TBD, at this time recommending a heavy duty walker, possibly with sling attachment vs a knee scooter  After Care:  [address] DEEPIKA Turner, [address]  number:  [phone] [id number]

## 2020-11-23 NOTE — PROGRESS NOTES
Progress Note  Date:2020       [id number]  Patient Sharon Mckeon     Date of Birth:[birth year]     Age:45 y.o. Subjective    Subjective:  Symptoms:  Stable. He reports weakness. Diet:  Adequate intake. Activity level: Impaired due to weakness. Pain:  He reports no pain. Review of Systems   Neurological: Positive for weakness. Objective         Vitals Last 24 Hours:  TEMPERATURE:  Temp  Av.9 °F (36.6 °C)  Min: 97.9 °F (36.6 °C)  Max: 97.9 °F (36.6 °C)  RESPIRATIONS RANGE: Resp  Av  Min: 18  Max: 18  PULSE OXIMETRY RANGE: SpO2  Av %  Min: 97 %  Max: 97 %  PULSE RANGE: Pulse  Av  Min: 98  Max: 98  BLOOD PRESSURE RANGE: Systolic (95TRE), BC:820 , Min:154 , BPL:168   ; Diastolic (17MPJ), VPB:18, Min:83, Max:83    I/O (24Hr): Intake/Output Summary (Last 24 hours) at 2020 0758  Last data filed at 2020 0730  Gross per 24 hour   Intake 1216 ml   Output 1750 ml   Net -534 ml     Objective:  General Appearance: In no acute distress. Vital signs: (most recent): Blood pressure (!) 154/83, pulse 98, temperature 97.9 °F (36.6 °C), temperature source Oral, resp. rate 18, height 6' 3\" (1.905 m), weight 289 lb 11.8 oz (131.4 kg), SpO2 97 %. Vital signs are normal.    Output: Producing urine and producing stool. Lungs:  Normal effort and normal respiratory rate. Breath sounds clear to auscultation. Heart: Normal rate. Regular rhythm. S1 normal and S2 normal.    Abdomen: Abdomen is soft. Bowel sounds are normal.   There is no abdominal tenderness. Extremities: There is deformity. Neurological: Patient is alert. (Sensory loss both lowers). Labs/Imaging/Diagnostics    Labs:  CBC:No results for input(s): WBC, RBC, HGB, HCT, MCV, RDW, PLT in the last 72 hours. CHEMISTRIES:No results for input(s): NA, K, CL, CO2, BUN, CREATININE, GLUCOSE, PHOS, MG in the last 72 hours.     Invalid input(s): CA  PT/INR:No results for input(s):

## 2020-11-23 NOTE — PROGRESS NOTES
OCCUPATIONAL THERAPY DAILY NOTE    Date:2020  Patient Name: Bert Aleman  MRN: [id number]  : 1975  Room: [address] [place] [address]A     Diagnosis: L Syme's amputation- amputation @ ankle, partial excision distal/fibula 2020  Additional Pertinent Hx: Charcot foot, neuropathy, DM, HTN, osteomyilitis  Precautions: Falls, NWB L LE    Functional Assessment:   Date Status AE  Comments   Feeding 20 Independent  Seated. Grooming 20 S/Setup           Oral Care 20 S/Setup     Bathing 20 Min A     UB Dressing 20 Setup  To don/doff pull over shirt. LB Dressing 20 Min A            Footwear 20 Min A     Toileting 20 Min A     Homemaking 20 Min A W/c, w/c Pt completed simple meal prep activity of making toast seated/standing with min cues for safe functional item retrieval techniques and assist for dynamic balance/safety d/t unsteadiness. Discussed pt's home setup and plan for safe functional item retrieval upon discharge. Functional Transfers / Balance:   Date Status DME  Comments   Sit Balance 20 Supervision  Unsupported sitting in w/c. Stand Balance 20 Min A  Demo'd during functional homemaking task with assist required for dynamic balance/safety during functional ADL tasks. [x] Tub  [] Shower   Transfer 20 MIN A  Extended tub bench, w/w     Commode   Transfer 20 Min A     Functional   Mobility 20 Min A           SBA W/w    W/c  Completed on carpeted surface with min cues for safety and assist for balance/safety d/t unsteadiness secondary to increased difficulty advancing walker/R LE on carpet. Min cues for safety/techniques required during w/c management. Other:    Sit<>stand     Couch     20     Min/CGA    Mod A     W/w     For safety. Pt required Mod A for sit>stand from couch d/t low/soft surfaces. Discussed utilizing pillows to build up surface with pt.       Functional Exercises / Activity:  -6# ladder climb completed x10 reps & 4# ladder climb completed x10 reps to increase strength/endurance for transfers/mobility and ADL tasks. Fair+ tolerance. [place] completed x30 in all planes with focus on increasing B UE strength/endurance for functional transfers/mobility and ADL tasks.   -Pt participated in therapeutic leisure activity seated at table with 2# wrist weights donned with focus on increasing B UE strength/endurance for increased independence with transfers/ADL tasks and overall endurance. Fair tolerance. Pt reports improved mood following activity. Sensory / Neuromuscular Re-Education:      Cognitive Skills:   Status Comments   Problem   Solving good     Memory good     Sequencing fair +    Safety fair +      Visual Perception:    Education:  -Pt educated on safety during homemaking tasks. Discussed pt's home setup and pt's way of life extensive this date. Pt reports he spends all his free time watching tv in bed and doesn't ever sit in the living room on a couch or recliner.     [] Family teach completed on:    Pain Level: 4/10 L LE    Additional Notes:       Patient has made good  progress during treatment sessions toward set goals.  Therapy emphasis to obtain goals:   Long term goal 1: Pt demo independent to eat all meals  Long term goal 2: Pt dmeo MOd I grooming seated or standing @ sink level  Long term goal 3: Pt demo SBA bathing @ shower level or sponge bathing both seated & standing  Long term goal 4: Pt demo I UE & Mod I LE dress with AE as needed  Long term goal 5: Pt demo MOd i commode trf wt appropraite DME to ensure pt safety  Long term goals 6: Pt demo Sup tub trf with appropraite DME to ensure pt safety  Long term goal 7: Pt demo Mod I light homemaking task @ wc level  Long term goal 8: Pt demo G endurance for a 30 minute funcitonal activity  Long term goal 9: Pt demo increased BUE strength to 5/5 & improved  strength- On eval R  strength 95# & norm for pt age & gender is 110# & L hand 80# & norm for tp age & gender is 101#  Long term goal 10: Pt demo Mod I wc porpulsion throughout a living environment & around obstacles      [x] Continue with current OT Plan of care.   [] Prepare for Discharge     DISCHARGE RECOMMENDATIONS  Recommended DME:    Post Discharge Care:   []Home Independently  []Home with 24hr Care / Supervision []Home with Partial Supervision []Home with Home Health OT []Home with Out Pt OT []Other: ___   Comments:         Time in Time out Tx Time Breakdown  Variance:   First Session  0915 1000 [x] Individual Tx- 45 Mins  [] Concurrent Tx -  [] Co-Tx -   [] Group Tx -   [] Time Missed -     Second Session  [id number] [x] Individual Tx-  45 Mins  [] Concurrent Tx -  [] Co-Tx -   [] Group Tx -   [] Time Missed -     Third Session    [] Individual Tx-   [] Concurrent Tx -  [] Co-Tx -   [] Group Tx -   [] Time Missed -         Total Tx Time  90 Mins       Bora Zapata  PERES/L [id number]

## 2020-11-23 NOTE — PROGRESS NOTES
Department of Podiatry  Progress Note    SUBJECTIVE:  Patient seen at bedside for s/p left foot Syme's amputation performed on 11/14/20. Patient states his pain is well controlled and decreasing daily. He is curious about weight-bearing timeline. Patient denies any N/V/D/F/C/SOB/CP     OBJECTIVE:    Scheduled Meds:   amLODIPine  5 mg Oral Daily    atorvastatin  5 mg Oral Nightly    ceFAZolin  2 g Intravenous Q8H    enoxaparin  40 mg Subcutaneous Daily    heparin flush  3 mL Intravenous 2 times per day    insulin glargine  15 Units Subcutaneous Nightly    insulin lispro  5 Units Subcutaneous TID     levoFLOXacin  750 mg Oral Daily    metFORMIN  500 mg Oral Daily with breakfast    sodium chloride flush  10 mL Intravenous BID     Continuous Infusions:   dextrose       PRN Meds:.dextrose, dextrose, glucagon (rDNA), glucose, acetaminophen, heparin flush, sodium chloride flush, acetaminophen, polyethylene glycol    Allergies   Allergen Reactions    Vancomycin Other (See Comments)     Red man syndrome    Semaglutide Itching and Rash     Pink itch rash         /63   Pulse 74   Temp 98.7 °F (37.1 °C) (Oral)   Resp 18   Ht 6' 3\" (1.905 m)   Wt 289 lb 11.8 oz (131.4 kg)   SpO2 97%   BMI 36.22 kg/m²       EXAM: Dressing clean, dry and intact with no strike through noted. No pain on palpation of calf. Previous exam below:    VASCULAR:  PT pulses are palpable. CFT < 5 seconds to dorsal ankle b/l. Warm to warm throughout entirety of the LEs    NEUROLOGIC:  Protective sensation is diminished b/l    DERM:  Surgical site is well coapted, sutures are intact. No noted pus, drainage or active bleeding at the time of evaluation. No SOI. Minimal edema noted to left lower extremity     MUSCULOSKELETAL:  5/5 Gross Muscle strength in all 4 quadrants.  No pain on palpation of posterior calf b/l       Scheduled Meds:   amLODIPine  5 mg Oral Daily    atorvastatin  5 mg Oral Nightly    ceFAZolin  2 g Intravenous

## 2020-11-23 NOTE — PROGRESS NOTES
negotiation: ascended and descended NT, attempted but pt unable to perform safely NT NT, see comments 4 steps with 2 rails with SBA >8 steps with 1 rail + AAD with Modified [place]   Curb Step:   ascended and descended NT NT NT 4 inch step with AAD and Min A 7.5 inch step with AAD and SBA   Picking up object off the floor NT NT NT Will  an object with Min A Will  an object with Modified [place]   BLE ROM R ankle limited d/t past fusion, otherwise WFL  R ankle limited d/t past fusion, otherwise WFL     BLE Strength RLE: grossly 4+/5  LLE: grossly 4/5 at the hip and knee  RLE: grossly 4+/5  LLE: grossly 4/5 at the hip and knee     Balance  Seated: Independent  Standing: Min A<>Max A Seated: Independent  Standing: Min A Seated: Independent  Standing: CGA     Date Family Teach Completed No family present at evlauation  No family present to this date     Is additional Family Teaching Needed? Y or N Yes  Yes     Hindering Progress Limited endurance, medical history, obesity Decreased endurance, WB restrictions Decreased endurance, WB restriction     PT recommended ELOS 3 weeks       Team's Discharge Plan        Therapist at Team Meeting          Therapeutic Exercise:   AM:   Sit<>stand x 6 reps different heights of mat table. Hip flexion R LE 4# 2x15, LAQS 2x15 B, w/c push ups 2x5. Additional Comments:. Improved sit <> stand transfers this a.m. Increased amb distance this a.m. Pt maintains NWB throughout. Pt returned to room at end of tx session. Pt given call light. Patient/family education  Pt/family educated on transfer techniques, proper walker management during transfers. Patient/family response to education:   Pt/family verbalized understanding Pt/family demonstrated skill Pt/family requires further education in this area   Yes Yes Reinforce      A.M. Time in: 10:00  Time out:10:45    Pt is making good progress toward established Physical Therapy goals.   Continue with physical therapy current plan of care. Adrian bAel [id number]    Therapeutic Exercise:   PM:   Ambulation: 20 feet, 2x40 feet with bariatric WW with CGA<>SBA  Sit<>stand x3 at [facility] with SBA  Sit<>stand 4x2 at mat table (lowered between each set) with SBA  4 inch step negotiation 2x2 in parallel bars ascending backward with Min A    Additional Comments: The pt reported using a knee sling and a knee scooter in the past, it is thought that this would reduce shock through the R ankle that is causing discomfort with repeated standing activities. Discussed options for use of either knee scooter or sling with staff. The pt performed multiple reps of single step negotiation to improve BUE strength and build confidence with activity. The pt stated that in the past he has \"scooted\" up and down steps when he has been NWB. Patient/family education  Pt/family educated on stair negotiation technique. Patient/family response to education:   Pt/family verbalized understanding Pt/family demonstrated skill Pt/family requires further education in this area   Yes Yes Reinforce     PM  Time in: 1300  Time out: 1345    Pt is making good progress toward established Physical Therapy goals. Continue with physical therapy current plan of care. Fer De Los Santos.  [place], [place]  License Number: [id number]

## 2020-11-24 LAB
METER GLUCOSE: 109 MG/DL (ref 74–99)
METER GLUCOSE: 123 MG/DL (ref 74–99)
METER GLUCOSE: 125 MG/DL (ref 74–99)
METER GLUCOSE: 152 MG/DL (ref 74–99)

## 2020-11-24 PROCEDURE — 97530 THERAPEUTIC ACTIVITIES: CPT

## 2020-11-24 PROCEDURE — 6370000000 HC RX 637 (ALT 250 FOR IP): Performed by: INTERNAL MEDICINE

## 2020-11-24 PROCEDURE — 1280000000 HC REHAB R&B

## 2020-11-24 PROCEDURE — 2580000003 HC RX 258: Performed by: INTERNAL MEDICINE

## 2020-11-24 PROCEDURE — 97110 THERAPEUTIC EXERCISES: CPT

## 2020-11-24 PROCEDURE — 82962 GLUCOSE BLOOD TEST: CPT

## 2020-11-24 PROCEDURE — 97535 SELF CARE MNGMENT TRAINING: CPT

## 2020-11-24 PROCEDURE — 6370000000 HC RX 637 (ALT 250 FOR IP): Performed by: PHYSICAL MEDICINE & REHABILITATION

## 2020-11-24 PROCEDURE — 6360000002 HC RX W HCPCS: Performed by: INTERNAL MEDICINE

## 2020-11-24 RX ADMIN — AMLODIPINE BESYLATE 5 MG: 5 TABLET ORAL at 07:52

## 2020-11-24 RX ADMIN — Medication 300 UNITS: at 06:35

## 2020-11-24 RX ADMIN — Medication 10 ML: at 08:40

## 2020-11-24 RX ADMIN — INSULIN LISPRO 5 UNITS: 100 INJECTION, SOLUTION INTRAVENOUS; SUBCUTANEOUS at 07:03

## 2020-11-24 RX ADMIN — INSULIN LISPRO 5 UNITS: 100 INJECTION, SOLUTION INTRAVENOUS; SUBCUTANEOUS at 11:22

## 2020-11-24 RX ADMIN — Medication 10 ML: at 21:39

## 2020-11-24 RX ADMIN — Medication 2 G: at 06:30

## 2020-11-24 RX ADMIN — Medication 2 G: at 21:39

## 2020-11-24 RX ADMIN — ENOXAPARIN SODIUM 40 MG: 40 INJECTION SUBCUTANEOUS at 07:51

## 2020-11-24 RX ADMIN — Medication 2 G: at 13:56

## 2020-11-24 RX ADMIN — INSULIN LISPRO 5 UNITS: 100 INJECTION, SOLUTION INTRAVENOUS; SUBCUTANEOUS at 16:29

## 2020-11-24 RX ADMIN — LEVOFLOXACIN 750 MG: 750 TABLET, FILM COATED ORAL at 07:52

## 2020-11-24 RX ADMIN — SODIUM CHLORIDE, PRESERVATIVE FREE 300 UNITS: 5 INJECTION INTRAVENOUS at 08:40

## 2020-11-24 RX ADMIN — ATORVASTATIN CALCIUM 5 MG: 10 TABLET, FILM COATED ORAL at 22:02

## 2020-11-24 RX ADMIN — SODIUM CHLORIDE, PRESERVATIVE FREE 300 UNITS: 5 INJECTION INTRAVENOUS at 21:39

## 2020-11-24 RX ADMIN — INSULIN GLARGINE 15 UNITS: 100 INJECTION, SOLUTION SUBCUTANEOUS at 22:02

## 2020-11-24 RX ADMIN — METFORMIN HYDROCHLORIDE 500 MG: 500 TABLET ORAL at 07:52

## 2020-11-24 RX ADMIN — Medication 10 ML: at 06:30

## 2020-11-24 ASSESSMENT — PAIN SCALES - GENERAL
PAINLEVEL_OUTOF10: 0

## 2020-11-24 NOTE — PROGRESS NOTES
Progress Note  Date:2020       DFOE:[id number]  Patient Marcella Ramirez     Date of Birth:[birth year]     Age:45 y.o. Subjective    Subjective:  Symptoms:  Stable. He reports weakness. Diet:  Adequate intake. Activity level: Impaired due to weakness. Pain:  He reports no pain. Review of Systems   Neurological: Positive for weakness. Objective         Vitals Last 24 Hours:  TEMPERATURE:  Temp  Av.7 °F (36.5 °C)  Min: 97.7 °F (36.5 °C)  Max: 97.7 °F (36.5 °C)  RESPIRATIONS RANGE: Resp  Av  Min: 16  Max: 16  PULSE OXIMETRY RANGE: No data recorded  PULSE RANGE: Pulse  Av  Min: 77  Max: 77  BLOOD PRESSURE RANGE: Systolic (65NEP), VDP:611 , Min:123 , TUK:067   ; Diastolic (27JLZ), TCT:48, Min:69, Max:69    I/O (24Hr): Intake/Output Summary (Last 24 hours) at 2020 0804  Last data filed at 2020 0635  Gross per 24 hour   Intake 973 ml   Output 800 ml   Net 173 ml     Objective:  General Appearance: In no acute distress. Vital signs: (most recent): Blood pressure 123/69, pulse 77, temperature 97.7 °F (36.5 °C), temperature source Temporal, resp. rate 16, height 6' 3\" (1.905 m), weight 289 lb 11.8 oz (131.4 kg), SpO2 97 %. Vital signs are normal.    Output: Producing urine and producing stool. Lungs:  Normal effort and normal respiratory rate. Breath sounds clear to auscultation. Heart: Normal rate. Regular rhythm. S1 normal and S2 normal.    Abdomen: Abdomen is soft. Bowel sounds are normal.   There is no abdominal tenderness. Extremities: There is deformity. Neurological: Patient is alert. (4/5 strength  Distal sensory loss).       Labs/Imaging/Diagnostics    Labs:  CBC:  Recent Labs     20  0730   WBC 11.3   RBC 3.73*   HGB 9.4*   HCT 31.0*   MCV 83.1   RDW 18.0*   *     CHEMISTRIES:  Recent Labs     20  0730      K 4.3      CO2 26   BUN 13   CREATININE 0.8   GLUCOSE 104*     PT/INR:No results for input(s): PROTIME, INR in the last 72 hours. APTT:No results for input(s): APTT in the last 72 hours. LIVER PROFILE:  Recent Labs     11/23/20  0730   AST 12   ALT <5   BILITOT 0.4   ALKPHOS 83       Imaging Last 24 Hours:  No results found. Assessment//Plan           Hospital Problems           Last Modified POA    Partial nontraumatic amputation of foot, left ([facility] [address] 11/19/2020 Yes        Assessment:    Condition: In stable condition. Improving. (Left Symes amputation). Plan:   Encourage ambulation. (Tolerating therapy well  Blood sugars and blood pressure are well controlled  I will review his progress with the therapists in team today).        Electronically signed by Army Barrington MD on 11/24/20 at 8:04 AM EST

## 2020-11-24 NOTE — PROGRESS NOTES
Department of Podiatry  Progress Note    SUBJECTIVE:  Matt Drake is a 54year old male who was seen at bedside for s/p left foot Syme's amputation performed on 11/14/20. Patient states his pain is well controlled and decreasing daily. He is curious about weight-bearing timeline. Patient denies any N/V/D/F/C/SOB/CP     OBJECTIVE:    Scheduled Meds:   amLODIPine  5 mg Oral Daily    atorvastatin  5 mg Oral Nightly    ceFAZolin  2 g Intravenous Q8H    enoxaparin  40 mg Subcutaneous Daily    heparin flush  3 mL Intravenous 2 times per day    insulin glargine  15 Units Subcutaneous Nightly    insulin lispro  5 Units Subcutaneous TID WC    levoFLOXacin  750 mg Oral Daily    metFORMIN  500 mg Oral Daily with breakfast    sodium chloride flush  10 mL Intravenous BID     Continuous Infusions:   dextrose       PRN Meds:.dextrose, dextrose, glucagon (rDNA), glucose, acetaminophen, heparin flush, sodium chloride flush, acetaminophen, polyethylene glycol    Allergies   Allergen Reactions    Vancomycin Other (See Comments)     Red man syndrome    Semaglutide Itching and Rash     Pink itch rash         /69   Pulse 77   Temp 97.7 °F (36.5 °C) (Temporal)   Resp 16   Ht 6' 3\" (1.905 m)   Wt 289 lb 11.8 oz (131.4 kg)   SpO2 97%   BMI 36.22 kg/m²       EXAM:       VASCULAR:  PT pulses are palpable. CFT < 5 seconds to dorsal ankle b/l. Warm to warm throughout entirety of the LEs    NEUROLOGIC:  Protective sensation is diminished b/l    DERM:  Surgical site is well coapted, sutures are intact. No noted pus, drainage or active bleeding. No SOI.  Minimal edema noted to left lower extremity     MUSCULOSKELETAL: No pain on palpation of posterior calf b/l       Scheduled Meds:   amLODIPine  5 mg Oral Daily    atorvastatin  5 mg Oral Nightly    ceFAZolin  2 g Intravenous Q8H    enoxaparin  40 mg Subcutaneous Daily    heparin flush  3 mL Intravenous 2 times per day    insulin glargine  15 Units Subcutaneous Nightly    insulin lispro  5 Units Subcutaneous TID     levoFLOXacin  750 mg Oral Daily    metFORMIN  500 mg Oral Daily with breakfast    sodium chloride flush  10 mL Intravenous BID     Continuous Infusions:   dextrose       PRN Meds:.dextrose, dextrose, glucagon (rDNA), glucose, acetaminophen, heparin flush, sodium chloride flush, acetaminophen, polyethylene glycol    RADIOLOGY:  No orders to display     /69   Pulse 77   Temp 97.7 °F (36.5 °C) (Temporal)   Resp 16   Ht 6' 3\" (1.905 m)   Wt 289 lb 11.8 oz (131.4 kg)   SpO2 97%   BMI 36.22 kg/m²     LABS:    Recent Labs     11/23/20  0730   WBC 11.3   HGB 9.4*   HCT 31.0*   *        Recent Labs     11/23/20  0730      K 4.3      CO2 26   BUN 13   CREATININE 0.8        Recent Labs     11/23/20  0730   PROT 7.9         ASSESSMENT:  Principal Problem:  -Diabetic foot infection   -S/P left foot Syme's amputation performed 11/14/20    Active Problems:    Charcot foot due to diabetes mellitus ([facility] [address]    Diabetic neuropathy associated with diabetes mellitus due to underlying condition ([facility] [address]    Essential hypertension    Diabetic foot ulcer with osteomyelitis ([facility] [address]      PLAN:  - Patient's labs, charts and images were reviewed   - Antibiotics as per ID  - Surgical culture of left foot: Pseudomonas, Staph, Corynebacterium   - Dressings changed to today: xeroform, DSD, ACE  - Patient to be NWB to operative extremity   - No further podiatric surgical intervention indicated. Patient is stable for discharge from podiatric standpoint.   - Recommend discharge to SNF   - Will continue to follow patient while they are in-house.     Rashida Vaca DPM  Fellowship-Trained Foot and Ankle Surgeon  [phone]

## 2020-11-24 NOTE — FLOWSHEET NOTE
11/24/20 0038   Oxygen Therapy   SpO2 97 %   Pulse Oximeter Device Mode Intermittent   Pulse Oximeter Device Location Left;Finger   O2 Device None (Room air)  (*Patient states he is not bring in his C-pap from home.)

## 2020-11-24 NOTE — PROGRESS NOTES
11/24/20 11/23/20 11/24/20     Min/CGA    Mod A    Min A     W/w        w/w     Min cues for safety. Min cues for safety required. Functional Exercises / Activity:  -5# weighted ball exercises completed 3x10 reps with focus on increasing B UE strength and overall endurance for functional activities. Frequent rest breaks required to complete. [place] completed x30 reps in all planes with focus on increasing B UE strength/endurnace for functional activities. Fair tolerance. Sensory / Neuromuscular Re-Education:      Cognitive Skills:   Status Comments   Problem   Solving good     Memory good     Sequencing fair +    Safety fair +      Visual Perception:    Education:  -Pt educated on safety techniques during ADL tasks. [] Family teach completed on:    Pain Level: 4/10 L LE    Additional Notes:       Patient has made good  progress during treatment sessions toward set goals. Therapy emphasis to obtain goals:   Long term goal 1: Pt demo independent to eat all meals  Long term goal 2: Pt dmeo MOd I grooming seated or standing @ sink level  Long term goal 3: Pt demo SBA bathing @ shower level or sponge bathing both seated & standing  Long term goal 4: Pt demo I UE & Mod I LE dress with AE as needed  Long term goal 5: Pt demo MOd i commode trf wth appropraite DME to ensure pt safety  Long term goals 6: Pt demo Sup tub trf with appropraite DME to ensure pt safety  Long term goal 7: Pt demo Mod I light homemaking task @ wc level  Long term goal 8: Pt demo G endurance for a 30 minute funcitonal activity  Long term goal 9: Pt demo increased BUE strength to 5/5 & improved  strength- On eval R  strength 95# & norm for pt age & gender is 110# & L hand 80# & norm for tp age & gender is 101#  Long term goal 10: Pt demo Mod I wc porpulsion throughout a living environment & around obstacles      [x] Continue with current OT Plan of care.   [] Prepare for Discharge     DISCHARGE

## 2020-11-24 NOTE — PATIENT CARE CONFERENCE
[address] [place], [place] [address]Th [facility] ACUTE REHABILITATION  TEAM CONFERENCE NOTE/PATIENT PLAN OF CARE    Date: 2020  Admission date: 2020  Patient Name: Lucia Casanova        MRN: [id number]    : 1975  (54 y.o.)  Gender: male   Rehab diagnosis/surgery with date: Acute osteomyelitis s/p Left BKA   Left foot Syme's amputation Dr. Martha Spivey 20  Impairment Group Code:  5.4      MEDICAL/FUNCTIONAL HISTORY/STATUS:  Patient admitted to acute rehab on 20 s/p osteomyelitis with Syme's amputation on left foot. He is on IV antibiotics and has a left arm PICC line    Consultations/Labs/X-rays: Podiatry on for dressing changes with xeroform, dry sterile dressing and ACE wrap  Patient non weight bearing on left foot  Infectious disease on for antibiotic management and will be on levofloxacin until  and cefazolin until   Blood sugars stable in 100s   chemistry and CBC OK    MEDICATION UPDATE: antibiotics as above  On sliding scale insulin and lantus    NURSING FIMS:    Bowel:   Current level: continent    Bladder:   Current level: continent    Toilet Hygiene:   Current level : Minimum assistance to Moderate Assist   Short term Toilet hygiene goal: Contact Guard assistance   Long term toilet hygiene goal:  Supervision     Skin integrity: sutures on amputation, podiatry changes dressing every other day  Pain: none    NUTRITION    Diet  Carb controlled   Liquid consistency   thin    SOCIAL INFORMATION:  Lives with: wife, she works at [address] [place] [address] services:  none  [address]:  2nd floor apartment, 2 steps with 1 rail, then 7 steps to second floor  Prior Level of function:  independent  DME:  Quad cane, wheeled walker, glucometer    FAMILY / PATIENT EDUCATION:  Safety and education are ongoing    PHYSICAL THERAPY    Bed mobility:   Current level:  Independent   Short term bed mobility goal: met  Long term bed mobility goal: met    Chair/bed transfers:  Current level: Stand by Assist to Contact Guard assistance with bar wheeled walker, NWB LLE  Short term Chair/bed transfers goal: Stand by Assist   Long term Chair/bed transfers goal: Modified Independent       Ambulation:   Current level: 40 ft with bar wheeled walker Contact Guard assistance, fatigues quickly, may benefit from knee scooter rental  Short term ambulation goal: met  Long term ambulation goal: 150ft Modified Independent     Wheelchair Mobility:  Current level: 150 ft with Supervision   Short term wheelchair goal: met  Long term wheelchair goal: 200 ft [facility] transfers:   Current level: To be assessed, not appropriate at this time    Stairs:   Current level : unable at this time    Lower Extremity Strength Issues:  RLE: grossly 4+/5  LLE: grossly 4/5 at the hip and knee        OCCUPATIONAL THERAPY:    Tub/shower:   Current level: Extended tub bench and wheeled walker Minimum assistance  Short term tub/shower goal: Contact Guard assistance   Long term tub/shower goal: Stand by Assist       Feeding:  Current level:  Independent   Short term feeding goal: met  Long term feeding goal: met     Grooming:   Current level: Modified Independent   Short term grooming goal: met  Long term grooming goal: Modified Independent     Bathing:  Current level: Minimum assistance seating and  Short term bathing goal: Contact Guard assistance   Long term bathing goal: Stand by Assist     Homemaking:   Current level: Minimum assistance standing  Short term homemaking goal: Contact Guard assistance   Long term homemaking goal: Modified Independent     Upper body dressing:  Current level: Set up   Short term upper body dressing goal: Independent   Long term upper body dressing goal: Independent     Lower body dressing:  Current level: Minimum assistance   Short term lower body dressing goal: Contact Guard assistance   Long term lower body dressing goal: Modified Independent     Toilet transfer:   Current level: Minimum assistance with wheeled walker elevared commode, min verbal cues   Short term toilet transfer goal: Contact Guard assistance   Long term toilet transfer goal: Modified Independent     Safety awareness: fair    Patient/family's personal goals: get back to regular living  Factors supporting goal achievement:  Strong willed and support at home  Factors hindering goal achievement:  Weight bearing restrictions          Discharge Plan   Estimated Length of Stay: 2 weeks            Destination: home  Services at Discharge: will likely need [place] [id number] for IV antibiotics  Equipment at Discharge: To be assessed       INTERDISCIPLINARY TEAM/PHYSICIAN RECOMMENDATION AND/OR REVISIONS OF PLAN OF CARE:  Look into renting knee scooter, continue to work toward goals      I approve the established interdisciplinary plan of care as documented within the medical record of Melvin Cj. Electronically signed by Dominique Castellano RN on 11/24/2020 at 8:40 AM  The following interdisciplinary team members were present:  VELIA Dickson, MSSA,LSW  Davon Purvis.  Cuco, PT, DPT  Blanquita Kirby OTR

## 2020-11-24 NOTE — PROGRESS NOTES
Physical Therapy    [facility]  Treatment Note    NAME: Muna Griggs  : 1975  MRN: [id number]    Date of Service: 2020  Evaluating Therapist: Leopold Allis. Theora Lob, P.T.    ROOM: [facility]  DIAGNOSIS: BKA  PRECAUTIONS: Fall risk, NWB L residual limb  PROCEDURE(S):   : I&D with bone debridement/biopsy   : Syme's amputation at ankle with partial excision of distal tib/fib  HPI: The pt was admitted to [facility] on [facility]/10 with a diabetic foot infection with osteomyelitis. Social:  Pt lives with his wife in a 2nd floor apartment with a curb step+2+1 steps with 1 rail to enter building and 7 steps with 2 rails to the 2nd floor. 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     Initial Evaluation  20 AM     PM    Short Term Goals Long Term Goals    Was pt agreeable to Eval/treatment? Yes Yes  Yes     Does pt have pain? Minimal LLE soreness \"maybe . 5/10) No c/o pain initially. Reported R ankle pain following WB tasks. Did not quantify. No c/o pain     Bed Mobility  Rolling: Supervision  Supine<>sit: Supervision  Scooting: Supervision NT NT  Independent   Transfers Sit to stand: Max A  Stand to sit: Max A  Stand pivot:  Max A [employer] Sit to stand: SBA  Stand to sit: SBA  Stand pivot: SBA [employer] Sit<>stand: SBA  Stand pivot: SBA with [employer] SBA Modified Independent   Ambulation   15 feet with WW with Min A + WC follow 50 feet with WW with SBA 40 feet with WW with CGA 20 feet with AAD with SBA >150 feet with AAD with Modified [place]   Walking 10 feet on uneven surface NT NT NT 10 feet with AAD with SBA >10 feet with AAD with Modified [place]   Wheel Chair Mobility 150 feet with BUE with SBA NT NT  >200 feet with BUE with Modified [place]   Car Transfers NT NT SBA SBA Modified Independent   Stair negotiation: ascended and descended NT, attempted but pt unable to perform safely NT Scooted up 4 steps backward to a platform but was unable to stand at the top of the steps, Mod A to stand at the bottom of the steps 4 steps with 2 rails with SBA >8 steps with 1 rail + AAD with Modified [place]   Curb Step:   ascended and descended NT NT NT 4 inch step with AAD and Min A 7.5 inch step with AAD and SBA   Picking up object off the floor NT NT NT Will  an object with Min A Will  an object with Modified [place]   BLE ROM R ankle limited d/t past fusion, otherwise WFL  R ankle limited d/t past fusion, otherwise WFL     BLE Strength RLE: grossly 4+/5  LLE: grossly 4/5 at the hip and knee  RLE: grossly 4+/5  LLE: grossly 4/5 at the hip and knee     Balance  Seated: Independent  Standing: Min A<>Max A Seated: Independent  Standing: Min A Seated: Independent  Standing: CGA     Date Family Teach Completed No family present at evlauation  No family present to this date     Is additional Family Teaching Needed? Y or N Yes  Yes     Hindering Progress Limited endurance, medical history, obesity Decreased endurance, WB restrictions Decreased endurance, WB restriction     PT recommended ELOS 3 weeks       Team's Discharge Plan        Therapist at Team Meeting          Therapeutic Exercise:   AM:   Ambulation: x35 feet, 2x25 feet, x15 feet, x10 feet with WW with SBA  Stair negotiation in parallel bars with CGA ascending backward:  -4 inch step x3  -6 inch step x2  Stand pivot transfer x3 with WW with SBA  PM:   Emphasis on functional mobility  Ambulation: 2x40 feet, 2x20 feet, 2x10 feet with WW with SBA    Additional Comments: Reinforced hand placement and technique for single step negotiation as strength and endurance continue to be built to attempt stair negotiation. Stand pivot transfer technique was reviewed to reinforce sequencing to turn the walker prior to adjusting the R foot.  The pt's sit<>stand transfer technique continues to improve, attempted car transfer and the pt completed successfully with several attempts trialing various hand placement strategies. Attempted multiple strategies to complete stair negotiation with scooting technique but the pt was unable to achieve a standing position from the top of the steps and required Mod A to stand from the bottom of the steps. The pt scooted well on the steps and maintained WBing well throughout the session. Patient/family education  Pt/family educated on step negotiation, stand pivot transfer technique. Patient/family response to education:   Pt/family verbalized understanding Pt/family demonstrated skill Pt/family requires further education in this area   Yes Yes, with cues Reinforce if needed     AM  Time in: 1000  Time out: 1045    PM  Time in: 1300  Time out: 1345    Pt is making good progress toward established Physical Therapy goals. Continue with physical therapy current plan of care. Milady Galeano.  [place], [place]  License Number: [id number]

## 2020-11-24 NOTE — CARE COORDINATION
Per team meeting:  Re-eval 2 weeks. Updated patient and left message for wife. Patient goals range from Sup in bathing to Mod I /I . Currently NWB on LLE. Stairs will be an issue as patient has 7 to get - PT aware. Patient unable to practice in our car due to small size of it. Patient has a Jason SUV. DME - TBE. [facility] following for Jeri Ray verified that patient has 100% coverage for the IV Ancef at d/c. FT - To be scheduled.     Janell Dhillon

## 2020-11-25 LAB
METER GLUCOSE: 105 MG/DL (ref 74–99)
METER GLUCOSE: 110 MG/DL (ref 74–99)
METER GLUCOSE: 123 MG/DL (ref 74–99)
METER GLUCOSE: 127 MG/DL (ref 74–99)

## 2020-11-25 PROCEDURE — 97530 THERAPEUTIC ACTIVITIES: CPT

## 2020-11-25 PROCEDURE — 6370000000 HC RX 637 (ALT 250 FOR IP): Performed by: INTERNAL MEDICINE

## 2020-11-25 PROCEDURE — 6370000000 HC RX 637 (ALT 250 FOR IP): Performed by: PHYSICAL MEDICINE & REHABILITATION

## 2020-11-25 PROCEDURE — 1280000000 HC REHAB R&B

## 2020-11-25 PROCEDURE — 82962 GLUCOSE BLOOD TEST: CPT

## 2020-11-25 PROCEDURE — 97110 THERAPEUTIC EXERCISES: CPT

## 2020-11-25 PROCEDURE — 6360000002 HC RX W HCPCS: Performed by: INTERNAL MEDICINE

## 2020-11-25 PROCEDURE — 2580000003 HC RX 258: Performed by: INTERNAL MEDICINE

## 2020-11-25 RX ADMIN — METFORMIN HYDROCHLORIDE 500 MG: 500 TABLET ORAL at 10:05

## 2020-11-25 RX ADMIN — Medication 2 G: at 22:48

## 2020-11-25 RX ADMIN — AMLODIPINE BESYLATE 5 MG: 5 TABLET ORAL at 10:04

## 2020-11-25 RX ADMIN — Medication 10 ML: at 06:32

## 2020-11-25 RX ADMIN — SODIUM CHLORIDE, PRESERVATIVE FREE 300 UNITS: 5 INJECTION INTRAVENOUS at 11:47

## 2020-11-25 RX ADMIN — Medication 10 ML: at 11:48

## 2020-11-25 RX ADMIN — Medication 2 G: at 06:32

## 2020-11-25 RX ADMIN — LEVOFLOXACIN 750 MG: 750 TABLET, FILM COATED ORAL at 10:04

## 2020-11-25 RX ADMIN — ATORVASTATIN CALCIUM 5 MG: 10 TABLET, FILM COATED ORAL at 21:26

## 2020-11-25 RX ADMIN — Medication 300 UNITS: at 06:37

## 2020-11-25 RX ADMIN — Medication 2 G: at 15:03

## 2020-11-25 RX ADMIN — INSULIN GLARGINE 15 UNITS: 100 INJECTION, SOLUTION SUBCUTANEOUS at 21:31

## 2020-11-25 RX ADMIN — SODIUM CHLORIDE, PRESERVATIVE FREE 300 UNITS: 5 INJECTION INTRAVENOUS at 22:49

## 2020-11-25 RX ADMIN — INSULIN LISPRO 5 UNITS: 100 INJECTION, SOLUTION INTRAVENOUS; SUBCUTANEOUS at 17:39

## 2020-11-25 RX ADMIN — ENOXAPARIN SODIUM 40 MG: 40 INJECTION SUBCUTANEOUS at 11:15

## 2020-11-25 RX ADMIN — Medication: at 15:04

## 2020-11-25 RX ADMIN — Medication 10 ML: at 22:49

## 2020-11-25 RX ADMIN — Medication 10 ML: at 15:04

## 2020-11-25 RX ADMIN — INSULIN LISPRO 5 UNITS: 100 INJECTION, SOLUTION INTRAVENOUS; SUBCUTANEOUS at 11:22

## 2020-11-25 RX ADMIN — INSULIN LISPRO 5 UNITS: 100 INJECTION, SOLUTION INTRAVENOUS; SUBCUTANEOUS at 07:20

## 2020-11-25 ASSESSMENT — PAIN SCALES - GENERAL
PAINLEVEL_OUTOF10: 0

## 2020-11-25 NOTE — PROGRESS NOTES
OCCUPATIONAL THERAPY DAILY NOTE    Date:2020  Patient Name: Mily Grover  MRN: [id number]  : 1975  Room: [address] [place] [address]A     Diagnosis: L Syme's amputation- amputation @ ankle, partial excision distal/fibula 2020  Additional Pertinent Hx: Charcot foot, neuropathy, DM, HTN, osteomyilitis  Precautions: Falls, NWB L LE    Functional Assessment:   Date Status AE  Comments   Feeding 20 Independent  Goal Met. Grooming 20 Mod I  Goal Met         Oral Care 20 Mod I  Goal Met. Bathing 20 Min/CGA     UB Dressing 20 Independent  Goal Met. LB Dressing 20 Min A            Footwear 20 Supervision  To doff R shoe seated at EOB. Toileting 20 Min A     Homemaking 20 Min A W/c, w/c      Functional Transfers / Balance:   Date Status DME  Comments   Sit Balance 20 Supervision  Seated at EOB. Stand Balance 20 Min A  Demo'd during transfer   [x] Tub  [] Shower   Transfer 20 MIN A  Extended tub bench, w/w  For safety during sit>stand from bench. Min cues for safety required. Commode   Transfer 20 Min A W/w, 3-in-1    Functional   Mobility 20 Min A           SBA W/w          W/c  Short distances with min cues for safety completed in AM and PM.          Short distances. With min cues for maneuvering in smaller spaces. Completed in AM and PM.    Other:    Sit<>stand     Couch    Bed<>W/c     20     Min/CGA    Mod A    Min A     W/w        w/w     For safety. Functional Exercises / Activity:  -Shoulder arc completed seated at table with 2# wrist weights donned with focus on increasing B UE strength and overall endurance for functional transfers and ADL tasks. -Yellow Candobar completed x40 in all planes to increase B UE strength and overall endurance for functional transfers/mobility and ADL tasks.    -5# dowel zarina exercises completed 3x10 reps in all planes with focus on increasing B UE Care / Supervision []Home with Partial Supervision []Home with Home Health OT []Home with Out Pt OT []Other: ___   Comments:         Time in Time out Tx Time Breakdown  Variance:   First Session  0915 1000 [x] Individual Tx- 45 Mins  [] Concurrent Tx -  [] Co-Tx -   [] Group Tx -   [] Time Missed -     Second Session [id number] [x] Individual Tx-  45 Mins  [] Concurrent Tx -  [] Co-Tx -   [] Group Tx -   [] Time Missed -     Third Session    [] Individual Tx-   [] Concurrent Tx -  [] Co-Tx -   [] Group Tx -   [] Time Missed -         Total Tx Time  90 Mins       Zoila Shaffer  PERES/L [id number]

## 2020-11-25 NOTE — PROGRESS NOTES
Department of Podiatry  Progress Note    SUBJECTIVE:  Patient seen at bedside for s/p left foot Syme's amputation performed on 11/14/20. Patient relates he is doing well today. He states his physical therapy is going well too. Patient denies any N/V/D/F/C/SOB/CP     OBJECTIVE:    Scheduled Meds:   amLODIPine  5 mg Oral Daily    atorvastatin  5 mg Oral Nightly    ceFAZolin  2 g Intravenous Q8H    enoxaparin  40 mg Subcutaneous Daily    heparin flush  3 mL Intravenous 2 times per day    insulin glargine  15 Units Subcutaneous Nightly    insulin lispro  5 Units Subcutaneous TID     levoFLOXacin  750 mg Oral Daily    metFORMIN  500 mg Oral Daily with breakfast    sodium chloride flush  10 mL Intravenous BID     Continuous Infusions:   dextrose       PRN Meds:.dextrose, dextrose, glucagon (rDNA), glucose, acetaminophen, heparin flush, sodium chloride flush, acetaminophen, polyethylene glycol    Allergies   Allergen Reactions    Vancomycin Other (See Comments)     Red man syndrome    Semaglutide Itching and Rash     Pink itch rash         /71   Pulse 101   Temp 97.7 °F (36.5 °C) (Temporal)   Resp 16   Ht 6' 3\" (1.905 m)   Wt 289 lb 11.8 oz (131.4 kg)   SpO2 97%   BMI 36.22 kg/m²       EXAM: Dressings CDI with no strike through noted. No pain to palpation of calf. Previous exam 11/24/20:   VASCULAR:  PT pulses are palpable. CFT < 5 seconds to dorsal ankle b/l. Warm to warm throughout entirety of the LEs    NEUROLOGIC:  Protective sensation is diminished b/l    DERM:  Surgical site is well coapted, sutures are intact. No noted pus, drainage or active bleeding. No SOI.  Minimal edema noted to left lower extremity     MUSCULOSKELETAL: No pain on palpation of posterior calf b/l       Scheduled Meds:   amLODIPine  5 mg Oral Daily    atorvastatin  5 mg Oral Nightly    ceFAZolin  2 g Intravenous Q8H    enoxaparin  40 mg Subcutaneous Daily    heparin flush  3 mL Intravenous 2 times per day    insulin glargine  15 Units Subcutaneous Nightly    insulin lispro  5 Units Subcutaneous TID     levoFLOXacin  750 mg Oral Daily    metFORMIN  500 mg Oral Daily with breakfast    sodium chloride flush  10 mL Intravenous BID     Continuous Infusions:   dextrose       PRN Meds:.dextrose, dextrose, glucagon (rDNA), glucose, acetaminophen, heparin flush, sodium chloride flush, acetaminophen, polyethylene glycol    RADIOLOGY:  No orders to display     /71   Pulse 101   Temp 97.7 °F (36.5 °C) (Temporal)   Resp 16   Ht 6' 3\" (1.905 m)   Wt 289 lb 11.8 oz (131.4 kg)   SpO2 97%   BMI 36.22 kg/m²     LABS:    Recent Labs     11/23/20  0730   WBC 11.3   HGB 9.4*   HCT 31.0*   *        Recent Labs     11/23/20  0730      K 4.3      CO2 26   BUN 13   CREATININE 0.8        Recent Labs     11/23/20  0730   PROT 7.9         ASSESSMENT:  Principal Problem:  -Diabetic foot infection   -S/P left foot Syme's amputation performed 11/14/20    Active Problems:    Charcot foot due to diabetes mellitus ([facility] [address]    Diabetic neuropathy associated with diabetes mellitus due to underlying condition ([facility] [address]    Essential hypertension    Diabetic foot ulcer with osteomyelitis ([facility] [address]      PLAN:  - Patient's labs, charts and images were reviewed   - Antibiotics as per ID  - Surgical culture of left foot: Pseudomonas, Staph, Corynebacterium   - Dressings to be changed every 2 days to allow for optimal healing and not disrupt surgical site: xeroform, DSD, ACE  - Patient to be NWB to operative extremity   - No further podiatric surgical intervention indicated. Patient is stable for discharge from podiatric standpoint.   - Patient would likely benefit from HBOT. Consult placed. - Recommend discharge to SNF   - Patient to follow up in clinic 1 week after discharge. - Will continue to follow patient while they are in-house.     Josue Howard DPM  Fellowship-Trained Foot and Ankle Surgeon  [phone]

## 2020-11-25 NOTE — PLAN OF CARE
Problem: Falls - Risk of:  Goal: Absence of physical injury  Description: Absence of physical injury  Outcome: Met This Shift     Problem: Skin Integrity:  Goal: Absence of new skin breakdown  Description: Absence of new skin breakdown  Outcome: Met This Shift     Problem: Increased nutrient needs (NI-5.1)  Goal: Food and/or Nutrient Delivery  Description: Individualized approach for food/nutrient provision.   11/25/2020 0959 by Jolly Phoenix, MS, RD, LD  Outcome: Met This Shift     Problem: Falls - Risk of:  Goal: Will remain free from falls  Description: Will remain free from falls  Outcome: Ongoing     Problem: Skin Integrity:  Goal: Will show no infection signs and symptoms  Description: Will show no infection signs and symptoms  Outcome: Ongoing     Problem: Pain:  Goal: Pain level will decrease  Description: Pain level will decrease  Outcome: Ongoing     Problem: Pain:  Goal: Control of chronic pain  Description: Control of chronic pain  Outcome: Ongoing     Problem: Mobility - Impaired:  Goal: Mobility will improve  Description: Mobility will improve  Outcome: Ongoing     Problem: Pain:  Goal: Control of acute pain  Description: Control of acute pain  Outcome: Ongoing

## 2020-11-25 NOTE — PROGRESS NOTES
Physical Therapy    Facility/Department: [address] REHAB  Treatment Note    NAME: Melvin Corona  : 1975  MRN: [id number]    Date of Service: 2020  Evaluating Therapist: Fer Johnston PJeremiTJeremi    ROOM: [facility]  DIAGNOSIS: BKA  PRECAUTIONS: Fall risk, NWB L residual limb  PROCEDURE(S):   : I&D with bone debridement/biopsy   : Syme's amputation at ankle with partial excision of distal tib/fib  HPI: The pt was admitted to [facility] on [facility]10 with a diabetic foot infection with osteomyelitis. Social:  Pt lives with his wife in a 2nd floor apartment with a curb step+2+1 steps with 1 rail to enter building and 7 steps with 2 rails to the 2nd floor. 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     Initial Evaluation  20 AM     PM    Short Term Goals Long Term Goals    Was pt agreeable to Eval/treatment? Yes Yes  Yes     Does pt have pain? Minimal LLE soreness \"maybe . 5/10) No c/o pain No c/o pain     Bed Mobility  Rolling: Supervision  Supine<>sit: Supervision  Scooting: Supervision NT Independent for all aspects  Independent   Transfers Sit to stand: Max A  Stand to sit: Max A  Stand pivot:  Max A [employer] Sit to stand: SBA  Stand to sit: SBA  Stand pivot: SBA [employer] Sit<>stand: SBA  Stand pivot: CGA to knee scooter SBA Modified Independent   Ambulation   15 feet with WW with Min A + WC follow NT, pt used a knee scooter to propel himself 100 feet with CGA NT, pt used the knee scooter to propel himself 150 feet with SBA 20 feet with AAD with SBA >150 feet with AAD with Modified [place]   Walking 10 feet on uneven surface NT NT NT 10 feet with AAD with SBA >10 feet with AAD with Modified [place]   Wheel Chair Mobility 150 feet with BUE with SBA NT NT  >200 feet with BUE with Modified [place]   Car Transfers NT NT NT SBA Modified Independent   Stair negotiation: ascended and descended NT, attempted but pt unable to perform safely 4 steps with tub seat + 1 rail with Min A 4 steps with tub seat + 2 rails with SBA (assist to move seat) 4 steps with 2 rails with SBA >8 steps with 1 rail + AAD with Modified [place]   Curb Step:   ascended and descended NT NT NT 4 inch step with AAD and Min A 7.5 inch step with AAD and SBA   Picking up object off the floor NT NT NT Will  an object with Min A Will  an object with Modified [place]   BLE ROM R ankle limited d/t past fusion, otherwise WFL  R ankle limited d/t past fusion, otherwise WFL     BLE Strength RLE: grossly 4+/5  LLE: grossly 4/5 at the hip and knee  RLE: grossly 4+/5  LLE: grossly 4/5 at the hip and knee     Balance  Seated: Independent  Standing: Min A<>Max A  Seated: Independent  Standing: CGA     Date Family Teach Completed No family present at evlauation  No family present to this date     Is additional Family Teaching Needed? Y or N Yes  Yes     Hindering Progress Limited endurance, medical history, obesity  Decreased endurance, WB restriction     PT recommended ELOS 3 weeks       Team's Discharge Plan        Therapist at Team Meeting          Therapeutic Exercise:   AM:   Stair negotiation: 4 steps with tub seat + 2 rails with Min A  PM:   Knee scooter propulsion 2x75 feet, 1x150 feet with knee scooter with SBA  Stand pivot transfer x8 with CGA that improved to SBA    Additional Comments: Reviewed stair negotiation and the pt was able to perform with a tub seat with light assistance for balance during the stand to sit and sit to stand to and from the tub bench. The pt was able to use the knee scooter well and demonstrated less impact on his R ankle and WBing through the L hip. The pt was able to propel the knee scooter a greater distance in the afternoon, worked on stand pivot transfers with proper technique to and from the knee scooter including to and from the tub seat on the steps.  The pt had his wife send him a picture of the entry way and reviewed possible methods for entering

## 2020-11-25 NOTE — PROGRESS NOTES
Comprehensive Nutrition Assessment    Type and Reason for Visit:  Initial, RD Nutrition Re-Screen/LOS    Nutrition Recommendations/Plan: Recommend addition of ONS to current diet. (Ensure HP w/ Jakob BID)    Nutrition Assessment:  Pt w/ pmh DM admin for LBKA w/ increased needs for wound healing. Will add ONS. Malnutrition Assessment:  Malnutrition Status:  No malnutrition    Context:  Chronic Illness     Findings of the 6 clinical characteristics of malnutrition:  Energy Intake:  No significant decrease in energy intake  Weight Loss:  No significant weight loss     Body Fat Loss:  No significant body fat loss     Muscle Mass Loss:  No significant muscle mass loss    Fluid Accumulation:  No significant fluid accumulation     Strength:  Not Performed    Estimated Daily Nutrient Needs:  Energy (kcal):  2061; kcal; Weight Used for Energy Requirements:  Adjusted(306.8 lb)     Protein (g):  125-150 g; Weight Used for Protein Requirements:  Adjusted(Adjusted ideal; 1.5-1.8 g/kg)        Fluid (ml/day):  2100 ml; Method Used for Fluid Requirements:  1 ml/kcal      Nutrition Related Findings:  A/Ox4, abd wdl, +BS, +1 edema, +I/O's      Wounds:  Surgical Incision       Current Nutrition Therapies:    DIET CARB CONTROL; Carb Control: 4 carbs/meal (approximate 1800 kcals/day)    Anthropometric Measures:  · Height: 6' 3\" (190.5 cm)  · Current Body Weight: 289 lb 11.8 oz (131.4 kg)(11/19)   · Admission Body Weight:      · Usual Body Weight: 250 lb (113.4 kg)(Per EMR 06/2020)     · Ideal Body Weight: 196 lbs; % Ideal Body Weight 147.8 %   · BMI: 36.2  · Adjusted Body Weight: 306.8;  Amputation   · Adjusted BMI: 38.3    · BMI Categories: Obese Class 2 (BMI 35.0 -39.9)       Nutrition Diagnosis:   · Increased nutrient needs related to increase demand for energy/nutrients as evidenced by wounds      Nutrition Interventions:   Food and/or Nutrient Delivery:  Continue Current Diet, Start Oral Nutrition Supplement  Nutrition Education/Counseling:  No recommendation at this time   Coordination of Nutrition Care:  Continue to monitor while inpatient    Goals:  Pt to consume >75% of all meals/ONS       Nutrition Monitoring and Evaluation:   Food/Nutrient Intake Outcomes:  Food and Nutrient Intake, Supplement Intake  Physical Signs/Symptoms Outcomes:  Nutrition Focused Physical Findings, Biochemical Data, Skin, Weight, GI Status, Fluid Status or Edema, Hemodynamic Status     Discharge Planning:     Too soon to determine     Electronically signed by Lorena Hebert RD, LD on 11/25/20 at 9:58 AM EST    Contact: [phone]

## 2020-11-25 NOTE — PROGRESS NOTES
Progress Note  Date:11/25/2020       SKRQ:[id number]  Patient Name:Michael J Leschak     Date of Birth:[birth year]     Age:45 y.o. Subjective    Subjective:  Symptoms:  Stable. He reports weakness. Diet:  Adequate intake. Activity level: Impaired due to weakness. Pain:  He complains of pain that is mild. Review of Systems   Neurological: Positive for weakness. Objective         Vitals Last 24 Hours:  TEMPERATURE:  No data recorded  RESPIRATIONS RANGE: No data recorded  PULSE OXIMETRY RANGE: No data recorded  PULSE RANGE: No data recorded  BLOOD PRESSURE RANGE: No data recorded.  ; No data recorded. I/O (24Hr): Intake/Output Summary (Last 24 hours) at 11/25/2020 0725  Last data filed at 11/25/2020 0706  Gross per 24 hour   Intake 2133 ml   Output 1900 ml   Net 233 ml     Objective:  General Appearance: In no acute distress. Vital signs: (most recent): Blood pressure 123/69, pulse 77, temperature 97.7 °F (36.5 °C), temperature source Temporal, resp. rate 16, height 6' 3\" (1.905 m), weight 289 lb 11.8 oz (131.4 kg), SpO2 97 %. Vital signs are normal.    Output: Producing urine and producing stool. Lungs:  Normal effort and normal respiratory rate. Breath sounds clear to auscultation. Heart: Normal rate. Regular rhythm. S1 normal and S2 normal.    Abdomen: Abdomen is soft. Bowel sounds are normal.   There is no abdominal tenderness. Extremities: There is deformity. Neurological: Patient is alert. (4/5 strength). Labs/Imaging/Diagnostics    Labs:  CBC:  Recent Labs     11/23/20 0730   WBC 11.3   RBC 3.73*   HGB 9.4*   HCT 31.0*   MCV 83.1   RDW 18.0*   *     CHEMISTRIES:  Recent Labs     11/23/20 0730      K 4.3      CO2 26   BUN 13   CREATININE 0.8   GLUCOSE 104*     PT/INR:No results for input(s): PROTIME, INR in the last 72 hours. APTT:No results for input(s): APTT in the last 72 hours.   LIVER PROFILE:  Recent Labs     11/23/20  0730 AST 12   ALT <5   BILITOT 0.4   ALKPHOS 83       Imaging Last 24 Hours:  No results found. Assessment//Plan           Hospital Problems           Last Modified POA    Partial nontraumatic amputation of foot, left ([address] 11/19/2020 Yes      Assessment:      Date   Status   AE    Comments     Feeding   11/23/20   Independent             Grooming   11/24/20   Mod I       Seated at sink. Oral Care   11/24/20   Mod I       Seated at sink pt brushed teeth. Bathing   11/24/20   Min/CGA       Sponge bath completed seated/standing at sink with assist required for dynamic balance/safety while standing to wash. Min cues for safety required. UB Dressing   11/24/20   Independent       To don/doff pull over shirt. LB Dressing   11/24/20   Min A       To don/doff underwear and shorts with assist required for balance while standing to manage clothing over hips. Footwear   11/24/20   SBA       To don R sock and shoe seated at EOB. Toileting   11/24/20   Min A       Pt able to complete posterior personal hygiene seated on commode. Steadying assist required for balance/safety while standing to complete clothing management prior to/after treatment. Homemaking   11/23/20   Min A   W/c, w/c              Functional Transfers / Balance:      Date Status DME  Comments   Sit Balance 11/24/20   Supervision   Seated at EOB and on commode. Stand Balance 11/24/20   Min A   Demo'd during transfers and ADL tasks. [x]? Tub  []? Shower   Transfer 11/22/20   MIN A  Extended tub bench, w/w      Commode   Transfer 11/24/20   Min A W/w, 3-in-1 Pt required assist for balance during SPT from w/c <> 3-in-1. Min cues for safety required. Functional   Mobility 11/24/20   Min A              SBA W/w              W/c  Completed few steps forward with assist required for balance/safety d/t unsteadiness.         Min cues for technique required when maneuvering around obstacles.     Other:     Sit<>stand    Couch     Bed<>W/c       11/24/20 11/23/20 11/24/20       Min/CGA     Mod A     Min A       W/w           w/w       Min cues for safety.            Min cues for safety required.         Assessment:    Condition: In stable condition. Improving. (Symes amputation). Plan:   Encourage ambulation. (Improving in therapy  Very limited because of the fused right ankle  We may try a scooter).        Electronically signed by Zenaida Salamanca MD on 11/25/20 at 7:25 AM EST

## 2020-11-26 LAB
METER GLUCOSE: 101 MG/DL (ref 74–99)
METER GLUCOSE: 104 MG/DL (ref 74–99)
METER GLUCOSE: 159 MG/DL (ref 74–99)
METER GLUCOSE: 93 MG/DL (ref 74–99)

## 2020-11-26 PROCEDURE — 82962 GLUCOSE BLOOD TEST: CPT

## 2020-11-26 PROCEDURE — 97530 THERAPEUTIC ACTIVITIES: CPT

## 2020-11-26 PROCEDURE — 1280000000 HC REHAB R&B

## 2020-11-26 PROCEDURE — 6370000000 HC RX 637 (ALT 250 FOR IP): Performed by: INTERNAL MEDICINE

## 2020-11-26 PROCEDURE — 6370000000 HC RX 637 (ALT 250 FOR IP): Performed by: PHYSICAL MEDICINE & REHABILITATION

## 2020-11-26 PROCEDURE — 2580000003 HC RX 258: Performed by: INTERNAL MEDICINE

## 2020-11-26 PROCEDURE — 6360000002 HC RX W HCPCS: Performed by: INTERNAL MEDICINE

## 2020-11-26 RX ADMIN — Medication 2 G: at 06:30

## 2020-11-26 RX ADMIN — METFORMIN HYDROCHLORIDE 500 MG: 500 TABLET ORAL at 08:23

## 2020-11-26 RX ADMIN — Medication 2 G: at 22:51

## 2020-11-26 RX ADMIN — Medication 10 ML: at 06:30

## 2020-11-26 RX ADMIN — INSULIN GLARGINE 15 UNITS: 100 INJECTION, SOLUTION SUBCUTANEOUS at 20:45

## 2020-11-26 RX ADMIN — ATORVASTATIN CALCIUM 5 MG: 10 TABLET, FILM COATED ORAL at 20:41

## 2020-11-26 RX ADMIN — AMLODIPINE BESYLATE 5 MG: 5 TABLET ORAL at 08:23

## 2020-11-26 RX ADMIN — SODIUM CHLORIDE, PRESERVATIVE FREE 300 UNITS: 5 INJECTION INTRAVENOUS at 22:52

## 2020-11-26 RX ADMIN — Medication 10 ML: at 22:51

## 2020-11-26 RX ADMIN — ENOXAPARIN SODIUM 40 MG: 40 INJECTION SUBCUTANEOUS at 08:22

## 2020-11-26 RX ADMIN — SODIUM CHLORIDE, PRESERVATIVE FREE 300 UNITS: 5 INJECTION INTRAVENOUS at 08:27

## 2020-11-26 RX ADMIN — INSULIN LISPRO 5 UNITS: 100 INJECTION, SOLUTION INTRAVENOUS; SUBCUTANEOUS at 07:30

## 2020-11-26 RX ADMIN — LEVOFLOXACIN 750 MG: 750 TABLET, FILM COATED ORAL at 08:23

## 2020-11-26 RX ADMIN — Medication 10 ML: at 08:27

## 2020-11-26 RX ADMIN — Medication 2 G: at 14:22

## 2020-11-26 RX ADMIN — Medication 300 UNITS: at 06:36

## 2020-11-26 RX ADMIN — Medication 10 ML: at 14:22

## 2020-11-26 RX ADMIN — INSULIN LISPRO 5 UNITS: 100 INJECTION, SOLUTION INTRAVENOUS; SUBCUTANEOUS at 11:43

## 2020-11-26 ASSESSMENT — PAIN SCALES - GENERAL
PAINLEVEL_OUTOF10: 0

## 2020-11-26 ASSESSMENT — PAIN SCALES - WONG BAKER
WONGBAKER_NUMERICALRESPONSE: 0
WONGBAKER_NUMERICALRESPONSE: 0

## 2020-11-26 ASSESSMENT — PAIN DESCRIPTION - PROGRESSION: CLINICAL_PROGRESSION: NOT CHANGED

## 2020-11-26 NOTE — PROGRESS NOTES
Department of Podiatry  Progress Note    SUBJECTIVE:  Mr. Mateo Pollock is a 54year old male who was seen at bedside for s/p left foot Syme's amputation performed on 11/14/20. Patient relates he is doing well and PT is evolving. Patient denies any N/V/D/F/C/SOB/CP     OBJECTIVE:    Scheduled Meds:   amLODIPine  5 mg Oral Daily    atorvastatin  5 mg Oral Nightly    ceFAZolin  2 g Intravenous Q8H    enoxaparin  40 mg Subcutaneous Daily    heparin flush  3 mL Intravenous 2 times per day    insulin glargine  15 Units Subcutaneous Nightly    insulin lispro  5 Units Subcutaneous TID WC    levoFLOXacin  750 mg Oral Daily    metFORMIN  500 mg Oral Daily with breakfast    sodium chloride flush  10 mL Intravenous BID     Continuous Infusions:   dextrose       PRN Meds:.dextrose, dextrose, glucagon (rDNA), glucose, acetaminophen, heparin flush, sodium chloride flush, acetaminophen, polyethylene glycol    Allergies   Allergen Reactions    Vancomycin Other (See Comments)     Red man syndrome    Semaglutide Itching and Rash     Pink itch rash         /70   Pulse 90   Temp 98 °F (36.7 °C) (Temporal)   Resp 20   Ht 6' 3\" (1.905 m)   Wt 289 lb 11.8 oz (131.4 kg)   SpO2 97%   BMI 36.22 kg/m²       EXAM: Dressings CDI with no strike through noted. No pain to palpation of calf. Previous exam 11/24/20:   VASCULAR:  PT pulses are palpable. CFT < 5 seconds to dorsal ankle b/l. Warm to warm throughout entirety of the LEs    NEUROLOGIC:  Protective sensation is diminished b/l    DERM:  Surgical site is well coapted, sutures are intact. No noted pus, drainage or active bleeding. No SOI.  Minimal edema noted to left lower extremity     MUSCULOSKELETAL: No pain on palpation of posterior calf b/l       Scheduled Meds:   amLODIPine  5 mg Oral Daily    atorvastatin  5 mg Oral Nightly    ceFAZolin  2 g Intravenous Q8H    enoxaparin  40 mg Subcutaneous Daily    heparin flush  3 mL Intravenous 2 times per day    insulin glargine  15 Units Subcutaneous Nightly    insulin lispro  5 Units Subcutaneous TID     levoFLOXacin  750 mg Oral Daily    metFORMIN  500 mg Oral Daily with breakfast    sodium chloride flush  10 mL Intravenous BID     Continuous Infusions:   dextrose       PRN Meds:.dextrose, dextrose, glucagon (rDNA), glucose, acetaminophen, heparin flush, sodium chloride flush, acetaminophen, polyethylene glycol    RADIOLOGY:  No orders to display     /70   Pulse 90   Temp 98 °F (36.7 °C) (Temporal)   Resp 20   Ht 6' 3\" (1.905 m)   Wt 289 lb 11.8 oz (131.4 kg)   SpO2 97%   BMI 36.22 kg/m²     LABS:    No results for input(s): WBC, HGB, HCT, PLT in the last 72 hours. No results for input(s): NA, K, CL, CO2, PHOS, BUN, CREATININE in the last 72 hours. Invalid input(s): CA     No results for input(s): PROT, INR, APTT in the last 72 hours. ASSESSMENT:  Principal Problem:  -Diabetic foot infection   -S/P left foot Syme's amputation performed 11/14/20    Active Problems:    Charcot foot due to diabetes mellitus ([facility]    Diabetic neuropathy associated with diabetes mellitus due to underlying condition [facility])    Essential hypertension    [facility]      PLAN:  - Patient's labs, charts and images were reviewed   - Antibiotics as per ID  - Surgical culture of left foot: Pseudomonas, Staph, Corynebacterium   - Dressings to be changed every 2 days to allow for optimal healing and not disrupt surgical site: xeroform, DSD, ACE  - WBC trending down. 11/26/20: 11.3  - Patient to be NWB to operative extremity   - No further podiatric surgical intervention indicated. Patient is stable for discharge from podiatric standpoint.   - Patient would likely benefit from HBOT. Consult placed. - Recommend discharge to SNF   - Patient to follow up in clinic 1 week after discharge. - Will continue to follow patient while they are in-house.     Calos Mchugh DPM  Fellowship-Trained Foot and Ankle Surgeon  [phone]

## 2020-11-26 NOTE — PROGRESS NOTES
Physical Therapy    Facility/Department: [facility]  Treatment Note    NAME: Mara Bruce  : 1975  MRN: [id number]    Date of Service: 2020  Evaluating Therapist: Gonzalo Huerta. Jackie Bess P.T.    ROOM: [facility]  DIAGNOSIS: BKA  PRECAUTIONS: Fall risk, NWB L residual limb  PROCEDURE(S):   : I&D with bone debridement/biopsy   : Syme's amputation at ankle with partial excision of distal tib/fib  HPI: The pt was admitted to [facility] on 11/10 with a diabetic foot infection with osteomyelitis. Social:  Pt lives with his wife in a 2nd floor apartment with a curb step+2+1 steps with 1 rail to enter building and 7 steps with 2 rails to the 2nd floor. Prior to admission pt ambulated with no AD but owns a [facility] and has been NWB in the past.     Initial Evaluation  20 AM   Short Term Goals Long Term Goals    Was pt agreeable to Eval/treatment? Yes Yes     Does pt have pain? Minimal LLE soreness \"maybe . 5/10) No c/o pain     Bed Mobility  Rolling: Supervision  Supine<>sit: Supervision  Scooting: Supervision Independent for all aspects  Independent   Transfers Sit to stand: Max A  Stand to sit: Max A  Stand pivot:  Max A [facility] Sit<>stand: SBA  Stand pivot: CGA to knee scooter SBA Modified Independent   Ambulation   15 feet with WW with Min A + WC follow NT, pt used the knee scooter to propel himself 150 feet with SBA x 2 reps and 100 feet with SBA x 2 reps 20 feet with AAD with SBA >150 feet with AAD with Modified [place]   Walking 10 feet on uneven surface NT NT 10 feet with AAD with SBA >10 feet with AAD with Modified [place]   Wheel Chair Mobility 150 feet with BUE with  feet with BUE SBA  >200 feet with BUE with Modified [place]   Car Transfers NT NT SBA Modified Independent   Stair negotiation: ascended and descended NT, attempted but pt unable to perform safely 4 steps with tub seat + 1 rail1 with SBA (assist to move seat) 4 steps with 2 rails with SBA >8 steps with 1 rail + AAD with Modified [place]   Curb Step:   ascended and descended NT NT 4 inch step with AAD and Min A 7.5 inch step with AAD and SBA   Picking up object off the floor NT NT Will  an object with Min A Will  an object with Modified [place]   BLE ROM R ankle limited d/t past fusion, otherwise WFL R ankle limited d/t past fusion, otherwise WFL     BLE Strength RLE: grossly 4+/5  LLE: grossly 4/5 at the hip and knee RLE: grossly 4+/5  LLE: grossly 4/5 at the hip and knee     Balance  Seated: Independent  Standing: Min A<>Max A Seated: Independent  Standing: CGA     Date Family Teach Completed No family present at evlauation No family present to this date     Is additional Family Teaching Needed? Y or N Yes Yes     Hindering Progress Limited endurance, medical history, obesity Decreased endurance, WB restriction     PT recommended ELOS 3 weeks      Team's Discharge Plan       Therapist at Team Meeting         Therapeutic Exercise:   AM:   Functional mobility as noted above  Multiple stand pivot transfers (bed>chair><commode) with knee scooter SBA  Knee scooter maneuverability training in smaller environments (pt's room and bathroom)    Additional Comments: Reviewed all functional mobility as noted above. Multiple reps of knee scooter propulsion for improved functional activity tolerance and L hip strengthening. Pt practiced maneuverability in tight spaces demonstrating good safety awareness and technique. Overall, pt continues to show improvement regarding functional mobility with adaptive equipment and weight bearing restrictions to LLE. Patient/family education  Pt educated on stair negotiation technique and strategies for negotiating smaller environments with knee scooter.      Patient/family response to education:   Pt/family verbalized understanding Pt/family demonstrated skill Pt/family requires further education in this area   Yes Yes Reinforce     AM  Time in: Justin Vyas  Time out: 36      Pt is making good progress toward established Physical Therapy goals. Continue with physical therapy current plan of care.     Kenisha Ortiz, PT, DPT  [id number]

## 2020-11-27 LAB
METER GLUCOSE: 104 MG/DL (ref 74–99)
METER GLUCOSE: 121 MG/DL (ref 74–99)
METER GLUCOSE: 155 MG/DL (ref 74–99)

## 2020-11-27 PROCEDURE — 97530 THERAPEUTIC ACTIVITIES: CPT

## 2020-11-27 PROCEDURE — 82962 GLUCOSE BLOOD TEST: CPT

## 2020-11-27 PROCEDURE — 1280000000 HC REHAB R&B

## 2020-11-27 PROCEDURE — 2580000003 HC RX 258: Performed by: INTERNAL MEDICINE

## 2020-11-27 PROCEDURE — 6370000000 HC RX 637 (ALT 250 FOR IP): Performed by: INTERNAL MEDICINE

## 2020-11-27 PROCEDURE — 6360000002 HC RX W HCPCS: Performed by: INTERNAL MEDICINE

## 2020-11-27 PROCEDURE — 97110 THERAPEUTIC EXERCISES: CPT

## 2020-11-27 PROCEDURE — 6370000000 HC RX 637 (ALT 250 FOR IP): Performed by: PHYSICAL MEDICINE & REHABILITATION

## 2020-11-27 RX ADMIN — SODIUM CHLORIDE, PRESERVATIVE FREE 300 UNITS: 5 INJECTION INTRAVENOUS at 14:40

## 2020-11-27 RX ADMIN — METFORMIN HYDROCHLORIDE 500 MG: 500 TABLET ORAL at 08:52

## 2020-11-27 RX ADMIN — AMLODIPINE BESYLATE 5 MG: 5 TABLET ORAL at 08:52

## 2020-11-27 RX ADMIN — INSULIN LISPRO 5 UNITS: 100 INJECTION, SOLUTION INTRAVENOUS; SUBCUTANEOUS at 16:07

## 2020-11-27 RX ADMIN — Medication 10 ML: at 14:40

## 2020-11-27 RX ADMIN — SODIUM CHLORIDE, PRESERVATIVE FREE 300 UNITS: 5 INJECTION INTRAVENOUS at 21:49

## 2020-11-27 RX ADMIN — ENOXAPARIN SODIUM 40 MG: 40 INJECTION SUBCUTANEOUS at 08:52

## 2020-11-27 RX ADMIN — LEVOFLOXACIN 750 MG: 750 TABLET, FILM COATED ORAL at 08:52

## 2020-11-27 RX ADMIN — INSULIN GLARGINE 15 UNITS: 100 INJECTION, SOLUTION SUBCUTANEOUS at 21:16

## 2020-11-27 RX ADMIN — Medication 2 G: at 06:50

## 2020-11-27 RX ADMIN — INSULIN LISPRO 5 UNITS: 100 INJECTION, SOLUTION INTRAVENOUS; SUBCUTANEOUS at 11:39

## 2020-11-27 RX ADMIN — Medication 10 ML: at 06:50

## 2020-11-27 RX ADMIN — Medication 10 ML: at 21:48

## 2020-11-27 RX ADMIN — INSULIN LISPRO 5 UNITS: 100 INJECTION, SOLUTION INTRAVENOUS; SUBCUTANEOUS at 07:03

## 2020-11-27 RX ADMIN — ATORVASTATIN CALCIUM 5 MG: 10 TABLET, FILM COATED ORAL at 21:16

## 2020-11-27 RX ADMIN — Medication 300 UNITS: at 06:57

## 2020-11-27 RX ADMIN — Medication 2 G: at 14:40

## 2020-11-27 RX ADMIN — Medication 2 G: at 21:48

## 2020-11-27 ASSESSMENT — PAIN SCALES - GENERAL
PAINLEVEL_OUTOF10: 0

## 2020-11-27 NOTE — PROGRESS NOTES
OCCUPATIONAL THERAPY DAILY NOTE    Date:2020  Patient Name: Salvador Carvajal  MRN: [id number]  : 1975  Room: [address] [place] [address]A     Diagnosis: L Syme's amputation- amputation @ ankle, partial excision distal/fibula 2020  Additional Pertinent Hx: Charcot foot, neuropathy, DM, HTN, osteomyilitis  Precautions: Falls, NWB L LE    Functional Assessment:   Date Status AE  Comments   Feeding 20 Independent  Goal Met. Grooming 20 Mod I  Goal Met         Oral Care 20 Mod I  Goal Met. Bathing 20 Min/CGA     UB Dressing 20 Independent  Goal Met. LB Dressing 20 Min A            Footwear 20 Supervision  To doff R shoe seated at EOB. Toileting 20 Min A     Homemaking 20 Min A W/c, w/c      Functional Transfers / Balance:   Date Status DME  Comments   Sit Balance 20 Supervision  Seated at EOB. Stand Balance 20  Min A  Demo'd during transfer   [x] Tub  [x] Shower   Transfer 20 MIN A  Extended tub bench, w/w      Commode   Transfer 20 Min A W/w, 3-in-1    Functional   Mobility 20  Min A           Sup W/w          W/c  Short distances with min cues for safety completed in AM and PM.          Short distances. With min cues for maneuvering in smaller spaces. Completed in AM and PM.    Other:    Sit<>stand     Couch    Bed<>W/c     20      CGA     Mod A    Min A      W/w        w/w     For safety. Stand pivot transfer from w/c to bed      Functional Exercises / Activity:   -5# dumb bell exercises- performing 15 reps x 3 sets. Pt performed shoulder flexion/exptension, abduction/adduction & internal/external rotation, elbow flexion/extension, wrist flexion/extension. Pt took rests as needed & demo F- tolerance.     BUE strength exercises with arm bike 5 mins for 3 reps   Core strength exercises with 5 # weighted ball 3 sets 10 reps in all planes of pt's tolerance     Sensory / Neuromuscular Re-Education:      Cognitive Skills:   Status Comments   Problem   Solving good     Memory good     Sequencing fair +    Safety fair +      Visual Perception:    Education:  -Pt educated on pacing and energy conservation techniques to utilize during ADL's and functional activities. Pt states he is planning on getting a w/c for when he needs to ambulate longer distance. Pt was also educated on using w/c when completing meal prep to help avoid fatigue and for easier and more independent transport of items from one area to another     [] Family teach completed on:    Pain Level: 4/10 L LE    Additional Notes:  Pt requested to focus on B UE strengthening exercises to increase independence and endurance for transfers/mobility and ADL tasks daily. Patient has made good  progress during treatment sessions toward set goals. Therapy emphasis to obtain goals:   Long term goal 1: Pt demo independent to eat all meals  Long term goal 2: Pt dmeo MOd I grooming seated or standing @ sink level  Long term goal 3: Pt demo SBA bathing @ shower level or sponge bathing both seated & standing  Long term goal 4: Pt demo I UE & Mod I LE dress with AE as needed  Long term goal 5: Pt demo MOd i commode trf wth appropraite DME to ensure pt safety  Long term goals 6: Pt demo Sup tub trf with appropraite DME to ensure pt safety  Long term goal 7: Pt demo Mod I light homemaking task @ wc level  Long term goal 8: Pt demo G endurance for a 30 minute funcitonal activity  Long term goal 9: Pt demo increased BUE strength to 5/5 & improved  strength- On eval R  strength 95# & norm for pt age & gender is 110# & L hand 80# & norm for tp age & gender is 101#  Long term goal 10: Pt demo Mod I wc porpulsion throughout a living environment & around obstacles      [x] Continue with current OT Plan of care.   [] Prepare for Discharge     DISCHARGE RECOMMENDATIONS  Recommended DME:    Post Discharge Care:   []Home Independently []Home with 24hr Care / Supervision []Home with Partial Supervision []Home with Home Health OT []Home with Out Pt OT []Other: ___   Comments:         Time in Time out Tx Time Breakdown  Variance:   First Session  0915 1000 [] Individual Tx-  Mins  [x] Concurrent Tx - 45  [] Co-Tx -   [] Group Tx -   [] Time Missed -     Second Session [id number]  [] Individual Tx-   [x] Concurrent Tx -45  [] Co-Tx -   [] Group Tx -   [] Time Missed -     Third Session    [] Individual Tx-   [] Concurrent Tx -  [] Co-Tx -   [] Group Tx -   [] Time Missed -         Total Tx Time: 90 mins        [facility]  OTR/L [address] [facility] OTR/L [id number]

## 2020-11-27 NOTE — PROGRESS NOTES
Department of Podiatry  Progress Note    SUBJECTIVE:  Mr. Ace Ward is a 54year old male who was seen at bedside for s/p left foot Syme's amputation performed on 11/14/20. Patient relates he is doing well. Patient denies any N/V/D/F/C/SOB/CP     OBJECTIVE:    Scheduled Meds:   amLODIPine  5 mg Oral Daily    atorvastatin  5 mg Oral Nightly    ceFAZolin  2 g Intravenous Q8H    enoxaparin  40 mg Subcutaneous Daily    heparin flush  3 mL Intravenous 2 times per day    insulin glargine  15 Units Subcutaneous Nightly    insulin lispro  5 Units Subcutaneous TID     levoFLOXacin  750 mg Oral Daily    metFORMIN  500 mg Oral Daily with breakfast    sodium chloride flush  10 mL Intravenous BID     Continuous Infusions:   dextrose       PRN Meds:.dextrose, dextrose, glucagon (rDNA), glucose, acetaminophen, heparin flush, sodium chloride flush, acetaminophen, polyethylene glycol    Allergies   Allergen Reactions    Vancomycin Other (See Comments)     Red man syndrome    Semaglutide Itching and Rash     Pink itch rash         /74   Pulse 91   Temp 97.6 °F (36.4 °C) (Temporal)   Resp 18   Ht 6' 3\" (1.905 m)   Wt 289 lb 11.8 oz (131.4 kg)   SpO2 97%   BMI 36.22 kg/m²       EXAM: Dressings were changed this morning    VASCULAR: CFT < 5 seconds to dorsal ankle b/l. Warm to warm throughout entirety of the LEs    NEUROLOGIC:  Protective sensation is diminished b/l    DERM:  Surgical site is coapted plantarly with some dehiscence to the proximal medial aspect of the incision site. Mild hyperpigmentation noted to the anterior aspect of the wound. Sutures are intact. No noted pus, drainage or active bleeding. No SOI. Minimal edema noted to left lower extremity.      MUSCULOSKELETAL: No pain on palpation of posterior calf b/l       Scheduled Meds:   amLODIPine  5 mg Oral Daily    atorvastatin  5 mg Oral Nightly    ceFAZolin  2 g Intravenous Q8H    enoxaparin  40 mg Subcutaneous Daily    heparin flush  3 mL Intravenous 2 times per day    insulin glargine  15 Units Subcutaneous Nightly    insulin lispro  5 Units Subcutaneous TID     levoFLOXacin  750 mg Oral Daily    metFORMIN  500 mg Oral Daily with breakfast    sodium chloride flush  10 mL Intravenous BID     Continuous Infusions:   dextrose       PRN Meds:.dextrose, dextrose, glucagon (rDNA), glucose, acetaminophen, heparin flush, sodium chloride flush, acetaminophen, polyethylene glycol    RADIOLOGY:  No orders to display     /74   Pulse 91   Temp 97.6 °F (36.4 °C) (Temporal)   Resp 18   Ht 6' 3\" (1.905 m)   Wt 289 lb 11.8 oz (131.4 kg)   SpO2 97%   BMI 36.22 kg/m²     LABS:    No results for input(s): WBC, HGB, HCT, PLT in the last 72 hours. No results for input(s): NA, K, CL, CO2, PHOS, BUN, CREATININE in the last 72 hours. Invalid input(s): CA     No results for input(s): PROT, INR, APTT in the last 72 hours. ASSESSMENT:  Principal Problem:  -Diabetic foot infection   -S/P left foot Syme's amputation performed 11/14/20    Active Problems:    Charcot foot due to diabetes mellitus ([facility]    Diabetic neuropathy associated with diabetes mellitus due to underlying condition [facility])    Essential hypertension    Diabetic foot ulcer with osteomyelitis ([facility]      PLAN:  - Patient's labs, charts and images were reviewed   - Antibiotics as per ID  - Surgical culture of left foot: Pseudomonas, Staph, Corynebacterium   - Dressings to be changed every 2 days to allow for optimal healing and not disrupt surgical site: xeroform, DSD, ACE  - WBC 11/26/20: 11.3  - Patient to be NWB to operative extremity   - No further podiatric surgical intervention indicated. Patient is stable for discharge from podiatric standpoint.   - Patient would likely benefit from HBOT. Consult placed. - Recommend discharge to SNF   - Patient to follow up in clinic 1 week after discharge. - Will continue to follow patient while they are in-house.     Yaya Sanchez ANDIE  Fellowship-Trained Foot and Ankle Surgeon  [phone]

## 2020-11-27 NOTE — PROGRESS NOTES
Physical Therapy    Facility/Department: [address] REHAB  Treatment Note    NAME: David Kirk  : 1975  MRN: [id number]    Date of Service: 2020  Evaluating Therapist: Thomas Hernandez P.T.    ROOM: [facility]  DIAGNOSIS: BKA  PRECAUTIONS: Fall risk, NWB L residual limb  PROCEDURE(S):   : I&D with bone debridement/biopsy   : Syme's amputation at ankle with partial excision of distal tib/fib  HPI: The pt was admitted to [facility] on 11/10 with a diabetic foot infection with osteomyelitis. Social:  Pt lives with his wife in a 2nd floor apartment with a curb step+2+1 steps with 1 rail to enter building and 7 steps with 2 rails to the 2nd floor. 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     Initial Evaluation  20 AM     PM    Short Term Goals Long Term Goals    Was pt agreeable to Eval/treatment? Yes Yes  Yes     Does pt have pain? Minimal LLE soreness \"maybe . 5/10) No c/o pain No c/o pain     Bed Mobility  Rolling: Supervision  Supine<>sit: Supervision  Scooting: Supervision NT Independent for all aspects  Independent   Transfers Sit to stand: Max A  Stand to sit: Max A  Stand pivot:  Max A [employer] Sit to stand: SBA  Stand to sit: SBA  Stand pivot: SBA Sit<>stand: SBA  Stand pivot: SBA to knee scooter SBA Modified Independent   Ambulation   15 feet with WW with Min A + WC follow NT, pt used a knee scooter to propel himself 150 feet with SBA NT, pt used the knee scooter to propel himself 225 feet with Supervision 20 feet with AAD with SBA >150 feet with AAD with Modified [place]   Walking 10 feet on uneven surface NT NT NT 10 feet with AAD with SBA >10 feet with AAD with Modified [place]   Wheel Chair Mobility 150 feet with BUE with SBA  feet with BUE with Modified [place]  >200 feet with BUE with Modified [place]   Car Transfers NT NT SBA to and from knee scooter SBA Modified Independent   Stair negotiation: ascended and

## 2020-11-27 NOTE — PROGRESS NOTES
Progress Note  Date:11/27/2020       BTQR:[id number]  Patient Durga Peoples     Date of Birth:[birth year]     Age:45 y.o. Subjective    Subjective:  Symptoms:  Stable. He reports weakness. Diet:  Adequate intake. Activity level: Impaired due to weakness. Pain:  He reports no pain. Review of Systems   Neurological: Positive for weakness. Objective         Vitals Last 24 Hours:  TEMPERATURE:  No data recorded  RESPIRATIONS RANGE: No data recorded  PULSE OXIMETRY RANGE: No data recorded  PULSE RANGE: No data recorded  BLOOD PRESSURE RANGE: No data recorded.  ; No data recorded. I/O (24Hr): Intake/Output Summary (Last 24 hours) at 11/27/2020 0839  Last data filed at 11/27/2020 0657  Gross per 24 hour   Intake 1936 ml   Output 900 ml   Net 1036 ml     Objective:  General Appearance: In no acute distress. Vital signs: (most recent): Blood pressure 134/70, pulse 90, temperature 98 °F (36.7 °C), temperature source Temporal, resp. rate 20, height 6' 3\" (1.905 m), weight 289 lb 11.8 oz (131.4 kg), SpO2 97 %. Vital signs are normal.    Output: Producing urine and producing stool. Lungs:  Normal effort and normal respiratory rate. Breath sounds clear to auscultation. Heart: Normal rate. Regular rhythm. S1 normal and S2 normal.    Abdomen: Abdomen is soft. Bowel sounds are normal.   There is no abdominal tenderness. Extremities: There is deformity. Neurological: Patient is alert. Labs/Imaging/Diagnostics    Labs:  CBC:No results for input(s): WBC, RBC, HGB, HCT, MCV, RDW, PLT in the last 72 hours. CHEMISTRIES:No results for input(s): NA, K, CL, CO2, BUN, CREATININE, GLUCOSE, PHOS, MG in the last 72 hours. Invalid input(s): CA  PT/INR:No results for input(s): PROTIME, INR in the last 72 hours. APTT:No results for input(s): APTT in the last 72 hours. LIVER PROFILE:No results for input(s): AST, ALT, BILIDIR, BILITOT, ALKPHOS in the last 72 hours.     Imaging Last 24 Hours:  No results found. Assessment//Plan           Hospital Problems           Last Modified POA    Partial nontraumatic amputation of foot, left ([address] 11/19/2020 Yes        Initial Evaluation  11/20/20 AM   Short Term Goals Long Term Goals      Was pt agreeable to Eval/treatment? Yes Yes       Does pt have pain? Minimal LLE soreness \"maybe . 5/10) No c/o pain       Bed Mobility  Rolling: Supervision  Supine<>sit: Supervision  Scooting: Supervision Independent for all aspects   Independent   Transfers Sit to stand: Max A  Stand to sit: Max A  Stand pivot:  Max A [facility] Sit<>stand: SBA  Stand pivot: CGA to knee scooter SBA Modified [place]   Ambulation   15 feet with WW with Min A + WC follow NT, pt used the knee scooter to propel himself 150 feet with SBA x 2 reps and 100 feet with SBA x 2 reps 20 feet with AAD with SBA >150 feet with AAD with Modified [place]   Walking 10 feet on uneven surface NT NT 10 feet with AAD with SBA >10 feet with AAD with Modified [place]   Wheel Chair Mobility 150 feet with BUE with  feet with BUE SBA   >200 feet with BUE with Modified [place]   Car Transfers NT NT SBA Modified [place]   Stair negotiation: ascended and descended NT, attempted but pt unable to perform safely 4 steps with tub seat + 1 rail1 with SBA (assist to move seat) 4 steps with 2 rails with SBA >8 steps with 1 rail + AAD with Modified [place]   Curb Step:   ascended and descended NT NT 4 inch step with AAD and Min A 7.5 inch step with AAD and SBA   Picking up object off the floor NT NT Will  an object with Min A Will  an object with Modified [place]   BLE ROM R ankle limited d/t past fusion, otherwise WFL R ankle limited d/t past fusion, otherwise WFL       BLE Strength RLE: grossly 4+/5  LLE: grossly 4/5 at the hip and knee RLE: grossly 4+/5  LLE: grossly 4/5 at the hip and knee       Balance  Seated: Independent  Standing: Min A<>Max A Seated: Independent  Standing: CGA       Date Family Teach Completed No family present at evlauation No family present to this date       Is additional Family Teaching Needed? Y or N Yes Yes       Hindering Progress Limited endurance, medical history, obesity Decreased endurance, WB restriction       PT recommended ELOS 3 weeks         Team's Discharge Plan           Therapist at Team Meeting                Assessment:    Condition: In stable condition. Improving. (Left Symes amputation). Plan:   Encourage ambulation. (Tolerating therapy well  Minimal pain  Difficulty with ambulation due to ankle fusion  There was a question from podiatry about hyperbaric treatments  We cannot do hyperbaric treatments while he is on rehab  if it is felt that that is necessary then we will begin after he has completed his rehab).        Electronically signed by Olimpia Gaspar MD on 11/27/20 at 8:39 AM EST

## 2020-11-28 LAB
METER GLUCOSE: 110 MG/DL (ref 74–99)
METER GLUCOSE: 118 MG/DL (ref 74–99)
METER GLUCOSE: 98 MG/DL (ref 74–99)
METER GLUCOSE: 98 MG/DL (ref 74–99)

## 2020-11-28 PROCEDURE — 1280000000 HC REHAB R&B

## 2020-11-28 PROCEDURE — 2580000003 HC RX 258: Performed by: INTERNAL MEDICINE

## 2020-11-28 PROCEDURE — 6360000002 HC RX W HCPCS: Performed by: INTERNAL MEDICINE

## 2020-11-28 PROCEDURE — 97110 THERAPEUTIC EXERCISES: CPT

## 2020-11-28 PROCEDURE — 82962 GLUCOSE BLOOD TEST: CPT

## 2020-11-28 PROCEDURE — 6370000000 HC RX 637 (ALT 250 FOR IP): Performed by: INTERNAL MEDICINE

## 2020-11-28 PROCEDURE — 6370000000 HC RX 637 (ALT 250 FOR IP): Performed by: PHYSICAL MEDICINE & REHABILITATION

## 2020-11-28 RX ADMIN — INSULIN LISPRO 5 UNITS: 100 INJECTION, SOLUTION INTRAVENOUS; SUBCUTANEOUS at 07:11

## 2020-11-28 RX ADMIN — LEVOFLOXACIN 750 MG: 750 TABLET, FILM COATED ORAL at 08:36

## 2020-11-28 RX ADMIN — Medication 2 G: at 06:10

## 2020-11-28 RX ADMIN — SODIUM CHLORIDE, PRESERVATIVE FREE 300 UNITS: 5 INJECTION INTRAVENOUS at 22:07

## 2020-11-28 RX ADMIN — Medication 10 ML: at 08:36

## 2020-11-28 RX ADMIN — Medication 10 ML: at 22:09

## 2020-11-28 RX ADMIN — Medication 2 G: at 22:06

## 2020-11-28 RX ADMIN — AMLODIPINE BESYLATE 5 MG: 5 TABLET ORAL at 08:36

## 2020-11-28 RX ADMIN — SODIUM CHLORIDE, PRESERVATIVE FREE: 5 INJECTION INTRAVENOUS at 08:35

## 2020-11-28 RX ADMIN — ATORVASTATIN CALCIUM 5 MG: 10 TABLET, FILM COATED ORAL at 21:28

## 2020-11-28 RX ADMIN — Medication 2 G: at 14:06

## 2020-11-28 RX ADMIN — Medication 10 ML: at 06:10

## 2020-11-28 RX ADMIN — INSULIN LISPRO 5 UNITS: 100 INJECTION, SOLUTION INTRAVENOUS; SUBCUTANEOUS at 16:10

## 2020-11-28 RX ADMIN — Medication 10 ML: at 14:08

## 2020-11-28 RX ADMIN — Medication: at 14:07

## 2020-11-28 RX ADMIN — INSULIN LISPRO 5 UNITS: 100 INJECTION, SOLUTION INTRAVENOUS; SUBCUTANEOUS at 11:26

## 2020-11-28 RX ADMIN — ENOXAPARIN SODIUM 40 MG: 40 INJECTION SUBCUTANEOUS at 08:35

## 2020-11-28 RX ADMIN — METFORMIN HYDROCHLORIDE 500 MG: 500 TABLET ORAL at 08:36

## 2020-11-28 ASSESSMENT — PAIN SCALES - GENERAL
PAINLEVEL_OUTOF10: 0

## 2020-11-28 NOTE — PROGRESS NOTES
Department of Podiatry  Progress Note    SUBJECTIVE:  Patient seen bedside for 2 weeks post-op for L foot Syme's amputation . No acute events overnight. Patient denies any N/V/D/F/C/SOB/CP and has no other pedal complaints at this time. States he would like to be discharged soon. OBJECTIVE:    Scheduled Meds:   amLODIPine  5 mg Oral Daily    atorvastatin  5 mg Oral Nightly    ceFAZolin  2 g Intravenous Q8H    enoxaparin  40 mg Subcutaneous Daily    heparin flush  3 mL Intravenous 2 times per day    insulin glargine  15 Units Subcutaneous Nightly    insulin lispro  5 Units Subcutaneous TID WC    metFORMIN  500 mg Oral Daily with breakfast    sodium chloride flush  10 mL Intravenous BID     Continuous Infusions:   dextrose       PRN Meds:.dextrose, dextrose, glucagon (rDNA), glucose, acetaminophen, heparin flush, sodium chloride flush, acetaminophen, polyethylene glycol    Allergies   Allergen Reactions    Vancomycin Other (See Comments)     Red man syndrome    Semaglutide Itching and Rash     Pink itch rash         /75   Pulse 80   Temp 98.1 °F (36.7 °C) (Temporal)   Resp 18   Ht 6' 3\" (1.905 m)   Wt 289 lb 11.8 oz (131.4 kg)   SpO2 96%   BMI 36.22 kg/m²       EXAM:    VASCULAR:  DP and PT pulses are palpable. CFT < 5 seconds B/L. Warm to warm from the tibial tuberosity to the distal aspect of the digits dorsally. Hair growth noted to the distal aspects dorsally. NEUROLOGIC:  Protective sensation is diminished by grossly intact    DERM:  Skin is intact and well hydrated to the bilateral lower extremities. No Edema, No Erythema, No Hyperkeratotic tissue notes. Webspaces 1-4 b/l are C/D/I. MUSCULOSKELETAL:  5/5 Gross Muscle strength in all 4 quadrants.        Wound Care:   Wound #1:    Size -   Appearance -   Drainage -   Odor -           Scheduled Meds:   amLODIPine  5 mg Oral Daily    atorvastatin  5 mg Oral Nightly    ceFAZolin  2 g Intravenous Q8H    enoxaparin  40 mg Subcutaneous Daily    heparin flush  3 mL Intravenous 2 times per day    insulin glargine  15 Units Subcutaneous Nightly    insulin lispro  5 Units Subcutaneous TID WC    metFORMIN  500 mg Oral Daily with breakfast    sodium chloride flush  10 mL Intravenous BID     Continuous Infusions:   dextrose       PRN Meds:.dextrose, dextrose, glucagon (rDNA), glucose, acetaminophen, heparin flush, sodium chloride flush, acetaminophen, polyethylene glycol    RADIOLOGY:  No orders to display     /75   Pulse 80   Temp 98.1 °F (36.7 °C) (Temporal)   Resp 18   Ht 6' 3\" (1.905 m)   Wt 289 lb 11.8 oz (131.4 kg)   SpO2 96%   BMI 36.22 kg/m²     LABS:  No results for input(s): WBC, HGB, HCT, PLT in the last 72 hours. No results for input(s): NA, K, CL, CO2, PHOS, BUN, CREATININE in the last 72 hours. Invalid input(s): CA   No results for input(s): PROT, INR, APTT in the last 72 hours. Principal Problem:  -Diabetic foot infection   -S/P left foot Syme's amputation performed 11/14/20     Active Problems:    Charcot foot due to diabetes mellitus ([facility]    Diabetic neuropathy associated with diabetes mellitus due to underlying condition [facility])    Essential hypertension    Diabetic foot ulcer with osteomyelitis ([facility]        PLAN:  - Patient's labs, charts and images were reviewed   - Antibiotics as per ID  - Surgical culture of left foot: Pseudomonas, Staph, Corynebacterium   - Dressings to be changed every 2 days to allow for optimal healing and not disrupt surgical site: xeroform, DSD, ACE  - WBC 11/26/20: 11.3  - Patient to be NWB to operative extremity   - No further podiatric surgical intervention indicated. Patient is stable for discharge from podiatric standpoint.   - Patient would likely benefit from HBOT. Consult placed. - Recommend discharge to SNF   - Patient to follow up in clinic 1 week after discharge. - Will continue to follow patient while they are in-house.

## 2020-11-28 NOTE — PROGRESS NOTES
Progress Note  Date:2020       GV:[id number]  Patient Manisha Magana     Date of Birth:[birth year]     Age:45 y.o. Subjective    Subjective:  Symptoms:  Stable. He reports weakness. Diet:  Adequate intake. Activity level: Impaired due to weakness. Pain:  He complains of pain that is mild. Review of Systems   Neurological: Positive for weakness. Objective         Vitals Last 24 Hours:  TEMPERATURE:  Temp  Av.6 °F (36.4 °C)  Min: 97.6 °F (36.4 °C)  Max: 97.6 °F (36.4 °C)  RESPIRATIONS RANGE: Resp  Av  Min: 18  Max: 18  PULSE OXIMETRY RANGE: No data recorded  PULSE RANGE: Pulse  Av  Min: 91  Max: 91  BLOOD PRESSURE RANGE: Systolic (27GMJ), KHJ:587 , Min:134 , VNT:105   ; Diastolic (48LRY), QHQ:77, Min:74, Max:74    I/O (24Hr): Intake/Output Summary (Last 24 hours) at 2020 0810  Last data filed at 2020 2155  Gross per 24 hour   Intake 1140 ml   Output 1100 ml   Net 40 ml     Objective:  General Appearance: In no acute distress. Vital signs: (most recent): Blood pressure 134/74, pulse 91, temperature 97.6 °F (36.4 °C), temperature source Temporal, resp. rate 18, height 6' 3\" (1.905 m), weight 289 lb 11.8 oz (131.4 kg), SpO2 97 %. Vital signs are normal.    Output: Producing urine and producing stool. Lungs:  Normal effort and normal respiratory rate. Breath sounds clear to auscultation. Heart: Normal rate. Regular rhythm. S1 normal and S2 normal.    Abdomen: Abdomen is soft. Bowel sounds are normal.   There is no abdominal tenderness. Extremities: There is deformity. Neurological: Patient is alert. Labs/Imaging/Diagnostics    Labs:  CBC:No results for input(s): WBC, RBC, HGB, HCT, MCV, RDW, PLT in the last 72 hours. CHEMISTRIES:No results for input(s): NA, K, CL, CO2, BUN, CREATININE, GLUCOSE, PHOS, MG in the last 72 hours. Invalid input(s): CA  PT/INR:No results for input(s): PROTIME, INR in the last 72 hours.   APTT:No results for input(s): APTT in the last 72 hours. LIVER PROFILE:No results for input(s): AST, ALT, BILIDIR, BILITOT, ALKPHOS in the last 72 hours. Imaging Last 24 Hours:  No results found. Assessment//Plan           Hospital Problems           Last Modified POA    Partial nontraumatic amputation of foot, left ([facility] 11/19/2020 Yes        Assessment:    Condition: In stable condition. Improving. (Left Symes amputation). Plan:   Encourage ambulation. (Tolerating therapy well  Minimal pain  Working on gait  Limited by the ankle).        Electronically signed by Army Barrington MD on 11/28/20 at 8:10 AM EST

## 2020-11-28 NOTE — PROGRESS NOTES
with SBA >8 steps with 1 rail + AAD with Modified [place]   Curb Step:   ascended and descended NT NT  4 inch step with AAD and Min A 7.5 inch step with AAD and SBA   Picking up object off the floor NT NT  Will  an object with Min A Will  an object with Modified [place]   BLE ROM R ankle limited d/t past fusion, otherwise WFL       BLE Strength RLE: grossly 4+/5  LLE: grossly 4/5 at the hip and knee       Balance  Seated: Independent  Standing: Min A<>Max A Seated: Independent  Standing: Supervision      Date Family Teach Completed No family present at evlauation No family present to this date      Is additional Family Teaching Needed? Y or N Yes yes      Hindering Progress Limited endurance, medical history, obesity Decreased endurance, WB restriction      PT recommended ELOS 3 weeks       Team's Discharge Plan        Therapist at Team Meeting          Therapeutic Exercise:   AM:   Stand pivot transfer x 4 to and from knee scooter with SBA  Functional mobility as noted above      Additional Comments: The pt functional mobility is improving but pt remains limited by decreased endurance and WB restrictions    Patient/family education  Pt/family educated on stair negotiation technique with a tub seat. Patient/family response to education:   Pt/family verbalized understanding Pt/family demonstrated skill Pt/family requires further education in this area   Yes Yes Reinforce     AM  Time in: 830  Time out: 905    Pt is making good progress toward established Physical Therapy goals. Continue with physical therapy current plan of care.     Esperanza Cruz PTA [id number]

## 2020-11-28 NOTE — PLAN OF CARE
Problem: Falls - Risk of:  Goal: Absence of physical injury  Description: Absence of physical injury  Outcome: Met This Shift     Problem: Skin Integrity:  Goal: Absence of new skin breakdown  Description: Absence of new skin breakdown  Outcome: Met This Shift     Problem: Falls - Risk of:  Goal: Will remain free from falls  Description: Will remain free from falls  Outcome: Ongoing     Problem: Skin Integrity:  Goal: Will show no infection signs and symptoms  Description: Will show no infection signs and symptoms  Outcome: Ongoing     Problem: Pain:  Goal: Pain level will decrease  Description: Pain level will decrease  Outcome: Ongoing     Problem: Pain:  Goal: Control of acute pain  Description: Control of acute pain  Outcome: Ongoing     Problem: Pain:  Goal: Control of chronic pain  Description: Control of chronic pain  Outcome: Ongoing     Problem: Mobility - Impaired:  Goal: Mobility will improve  Description: Mobility will improve  Outcome: Ongoing

## 2020-11-29 LAB
METER GLUCOSE: 101 MG/DL (ref 74–99)
METER GLUCOSE: 105 MG/DL (ref 74–99)
METER GLUCOSE: 133 MG/DL (ref 74–99)
METER GLUCOSE: 92 MG/DL (ref 74–99)

## 2020-11-29 PROCEDURE — 2580000003 HC RX 258: Performed by: INTERNAL MEDICINE

## 2020-11-29 PROCEDURE — 6370000000 HC RX 637 (ALT 250 FOR IP): Performed by: PHYSICAL MEDICINE & REHABILITATION

## 2020-11-29 PROCEDURE — 97110 THERAPEUTIC EXERCISES: CPT

## 2020-11-29 PROCEDURE — 6360000002 HC RX W HCPCS: Performed by: INTERNAL MEDICINE

## 2020-11-29 PROCEDURE — 1280000000 HC REHAB R&B

## 2020-11-29 PROCEDURE — 82962 GLUCOSE BLOOD TEST: CPT

## 2020-11-29 PROCEDURE — 97535 SELF CARE MNGMENT TRAINING: CPT

## 2020-11-29 PROCEDURE — 97530 THERAPEUTIC ACTIVITIES: CPT

## 2020-11-29 PROCEDURE — 6370000000 HC RX 637 (ALT 250 FOR IP): Performed by: INTERNAL MEDICINE

## 2020-11-29 RX ADMIN — AMLODIPINE BESYLATE 5 MG: 5 TABLET ORAL at 07:19

## 2020-11-29 RX ADMIN — INSULIN GLARGINE 15 UNITS: 100 INJECTION, SOLUTION SUBCUTANEOUS at 00:03

## 2020-11-29 RX ADMIN — INSULIN LISPRO 5 UNITS: 100 INJECTION, SOLUTION INTRAVENOUS; SUBCUTANEOUS at 07:18

## 2020-11-29 RX ADMIN — METFORMIN HYDROCHLORIDE 500 MG: 500 TABLET ORAL at 07:19

## 2020-11-29 RX ADMIN — Medication 2 G: at 22:54

## 2020-11-29 RX ADMIN — Medication 2 G: at 06:25

## 2020-11-29 RX ADMIN — ENOXAPARIN SODIUM 40 MG: 40 INJECTION SUBCUTANEOUS at 07:19

## 2020-11-29 RX ADMIN — INSULIN LISPRO 5 UNITS: 100 INJECTION, SOLUTION INTRAVENOUS; SUBCUTANEOUS at 11:26

## 2020-11-29 RX ADMIN — INSULIN LISPRO 5 UNITS: 100 INJECTION, SOLUTION INTRAVENOUS; SUBCUTANEOUS at 16:24

## 2020-11-29 RX ADMIN — Medication 10 ML: at 22:54

## 2020-11-29 RX ADMIN — SODIUM CHLORIDE, PRESERVATIVE FREE 300 UNITS: 5 INJECTION INTRAVENOUS at 22:54

## 2020-11-29 RX ADMIN — Medication 10 ML: at 13:55

## 2020-11-29 RX ADMIN — INSULIN GLARGINE 15 UNITS: 100 INJECTION, SOLUTION SUBCUTANEOUS at 20:27

## 2020-11-29 RX ADMIN — Medication 2 G: at 13:56

## 2020-11-29 RX ADMIN — SODIUM CHLORIDE, PRESERVATIVE FREE 300 UNITS: 5 INJECTION INTRAVENOUS at 13:55

## 2020-11-29 RX ADMIN — ATORVASTATIN CALCIUM 5 MG: 10 TABLET, FILM COATED ORAL at 20:27

## 2020-11-29 ASSESSMENT — PAIN SCALES - GENERAL
PAINLEVEL_OUTOF10: 0

## 2020-11-29 NOTE — PROGRESS NOTES
Department of Podiatry  Progress Note    SUBJECTIVE:  Patient seen bedside for 15 days post-op of left foot Syme's amputation performed on 11/14/2020 . No acute events overnight. Patient denies any N/V/D/F/C/SOB/CP and has no other pedal complaints at this time. States he is doing well. OBJECTIVE:    Scheduled Meds:   amLODIPine  5 mg Oral Daily    atorvastatin  5 mg Oral Nightly    ceFAZolin  2 g Intravenous Q8H    enoxaparin  40 mg Subcutaneous Daily    heparin flush  3 mL Intravenous 2 times per day    insulin glargine  15 Units Subcutaneous Nightly    insulin lispro  5 Units Subcutaneous TID WC    metFORMIN  500 mg Oral Daily with breakfast    sodium chloride flush  10 mL Intravenous BID     Continuous Infusions:   dextrose       PRN Meds:.dextrose, dextrose, glucagon (rDNA), glucose, acetaminophen, heparin flush, sodium chloride flush, acetaminophen, polyethylene glycol    Allergies   Allergen Reactions    Vancomycin Other (See Comments)     Red man syndrome    Semaglutide Itching and Rash     Pink itch rash         BP (!) 143/93   Pulse 81   Temp 97.7 °F (36.5 °C) (Temporal)   Resp 16   Ht 6' 3\" (1.905 m)   Wt 289 lb 11.8 oz (131.4 kg)   SpO2 96%   BMI 36.22 kg/m²       EXAM:    VASCULAR:  DP and PT pulses are palpable. CFT < 5 seconds B/L. Warm to warm from the tibial tuberosity to the distal aspect of the digits dorsally. Hair growth noted to the distal aspects dorsally. NEUROLOGIC:  Protective sensation is diminished by grossly intact    DERM:  Skin is intact and well hydrated to the bilateral lower extremities. No Edema, No Erythema, No Hyperkeratotic tissue notes. Webspaces 1-4 b/l are C/D/I.     MUSCULOSKELETAL:  5/5 Gross Muscle strength in all 4 quadrants         Scheduled Meds:   amLODIPine  5 mg Oral Daily    atorvastatin  5 mg Oral Nightly    ceFAZolin  2 g Intravenous Q8H    enoxaparin  40 mg Subcutaneous Daily    heparin flush  3 mL Intravenous 2 times per day    insulin glargine  15 Units Subcutaneous Nightly    insulin lispro  5 Units Subcutaneous TID     metFORMIN  500 mg Oral Daily with breakfast    sodium chloride flush  10 mL Intravenous BID     Continuous Infusions:   dextrose       PRN Meds:.dextrose, dextrose, glucagon (rDNA), glucose, acetaminophen, heparin flush, sodium chloride flush, acetaminophen, polyethylene glycol    RADIOLOGY:  No orders to display     BP (!) 143/93   Pulse 81   Temp 97.7 °F (36.5 °C) (Temporal)   Resp 16   Ht 6' 3\" (1.905 m)   Wt 289 lb 11.8 oz (131.4 kg)   SpO2 96%   BMI 36.22 kg/m²     LABS:  No results for input(s): WBC, HGB, HCT, PLT in the last 72 hours. No results for input(s): NA, K, CL, CO2, PHOS, BUN, CREATININE in the last 72 hours. Invalid input(s): CA   No results for input(s): PROT, INR, APTT in the last 72 hours. ASSESSMENT:  Principal Problem:  -Diabetic foot infection   -S/P 15 days left foot Syme's amputation performed 11/14/20     Active Problems:    Charcot foot due to diabetes mellitus ([facility]    Diabetic neuropathy associated with diabetes mellitus due to underlying condition ([facility]    Essential hypertension    Diabetic foot ulcer with osteomyelitis ([facility]        PLAN:  - Patient's labs, charts and images were reviewed   - Antibiotics as per ID  - Surgical culture of left foot: Pseudomonas, Staph, Corynebacterium   - Dressings to be changed every 2 days to allow for optimal healing and not disrupt surgical site: xeroform, DSD, ACE. Left intact today. - WBC 11/26/20: 11.3  - Patient to be NWB to operative extremity   - No further podiatric surgical intervention indicated. Patient is stable for discharge from podiatric standpoint.   - Patient would likely benefit from HBOT. Consult placed. - Recommend discharge to SNF   - Patient to follow up in clinic 1 week after discharge.    - Will continue to follow patient while they are in-house.     Naomi De Los Santos DPM  Fellowship-Trained Foot and Ankle Surgeon  [phone]

## 2020-11-29 NOTE — PROGRESS NOTES
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 Grooming 20 Mod I  Goal Met         Oral Care 20 Mod I  Goal Met. Bathing 20 CGA simulated  Pt engaged in simulated UB/LB bathing up in w/c to increase skills. UB Dressing 20 Independent  Goal Met. LB Dressing 20 Min A             Footwear 20 SBA  To doff R shoe and simulated sock seated  up in w/c using crossover method. Toileting 20 Min A     Homemaking 20 SBA  w/c level Pt able to retrieve items from fridge, pantry, and drawers at wc level while wearing 2# wts to improve overall strength, endurance along with core strength for transfers. Functional Transfers / Balance:   Date Status DME  Comments   Sit Balance 20 Supervision  Seated up in w/c during LB dressing, simulated bathing, arm ex's and kitchen mobility and simple hmkg tasks. Stand Balance 20  Min A     [x] Tub  [x] Shower   Transfer 20 MIN A  Extended tub bench, w/w      Commode   Transfer 20 Min A W/w, 3-in-1    Functional   Mobility 20  Min A           Sup W/w          W/c           Pt performed w/c mobility in OT kitchen with occ cues for maneuvering in smaller area.     Other:    Sit<>stand     Couch    Bed<>W/c     20      CGA     Mod A    Min A      W/w        w/w     .    20 Deferred sofa transfer since patient stated \" I will not be sitting on my couch at home\"        Functional Exercises / Activity:   Pt engaged in simulated bathing and LB dressing to improve independence with ADL's at CGA/Min assist.   Pt tolerated 2# wts on BUE's during hmkg/kitchen mobility training to increase overall strength & endurance for ADLls, transfers and mobility skills. BUE ex's seated at table with resistive bike while wearing 2# wts tolerating 2 reps of 6 mins each to increase strength for functional activities. Sensory / Neuromuscular Re-Education:      Cognitive Skills:   Status Comments   Problem   Solving good     Memory good     Sequencing fair +good-    Safety fair +      Visual Perception:    Education:  Pt educated with safety during kitchen mobility and hmkg at w/c vs w. Walker level due to NWB LLE.       [] Family teach completed on:    Pain Level: 4/10 L LE    Additional Notes:   11/27/20  Pt requested to focus on B UE strengthening exercises to increase independence and endurance for transfers/mobility and ADL tasks daily. 11/29/20 Request done on this date for ADLls and BUE strengthening exercises. Patient has made good  progress during treatment sessions toward set goals.  Therapy emphasis to obtain goals:   Long term goal 1: Pt demo independent to eat all meals  Long term goal 2: Pt dmeo MOd I grooming seated or standing @ sink level  Long term goal 3: Pt demo SBA bathing @ shower level or sponge bathing both seated & standing  Long term goal 4: Pt demo I UE & Mod I LE dress with AE as needed  Long term goal 5: Pt demo MOd i commode trf wth appropraite DME to ensure pt safety  Long term goals 6: Pt demo Sup tub trf with appropraite DME to ensure pt safety  Long term goal 7: Pt demo Mod I light homemaking task @ wc level  Long term goal 8: Pt demo G endurance for a 30 minute funcitonal activity  Long term goal 9: Pt demo increased BUE strength to 5/5 & improved  strength- On eval R  strength 95# & norm for pt age & gender is 110# & L hand 80# & norm for tp age & gender is 101#  Long term goal 10: Pt demo Mod I wc porpulsion throughout a living environment & around obstacles      [x] Continue with current OT Plan of care. [] Prepare for Discharge     DISCHARGE RECOMMENDATIONS  Recommended DME:    Post Discharge Care:   []Home Independently  []Home with 24hr Care / Supervision []Home with Partial Supervision []Home with Home Health OT []Home with Out Pt OT []Other: ___   Comments:         Time in Time out Tx Time Breakdown  Variance:   First Session  10:55am 11:33am [x] Individual Tx-38  Mins  [] Concurrent Tx -   [] Co-Tx -   [] Group Tx -   [] Time Missed -     Second Session   [] Individual Tx-   [] Concurrent Tx -  [] Co-Tx -   [] Group Tx -   [] Time Missed -     Third Session    [] Individual Tx-   [] Concurrent Tx -  [] Co-Tx -   [] Group Tx -   [] Time Missed -         Total Tx Time: 38 mins      Abida Garrett  PERES/L [id number]    I have read & agree with the above status.     Viridiana Hester OTR/L [id number]

## 2020-11-30 LAB
ALBUMIN SERPL-MCNC: 4 G/DL (ref 3.5–5.2)
ALP BLD-CCNC: 82 U/L (ref 40–129)
ALT SERPL-CCNC: 5 U/L (ref 0–40)
ANION GAP SERPL CALCULATED.3IONS-SCNC: 11 MMOL/L (ref 7–16)
AST SERPL-CCNC: 18 U/L (ref 0–39)
BASOPHILS ABSOLUTE: 0.05 E9/L (ref 0–0.2)
BASOPHILS RELATIVE PERCENT: 0.7 % (ref 0–2)
BILIRUB SERPL-MCNC: 0.6 MG/DL (ref 0–1.2)
BUN BLDV-MCNC: 16 MG/DL (ref 6–20)
C-REACTIVE PROTEIN: 1.3 MG/DL (ref 0–0.4)
CALCIUM SERPL-MCNC: 9.4 MG/DL (ref 8.6–10.2)
CHLORIDE BLD-SCNC: 104 MMOL/L (ref 98–107)
CO2: 26 MMOL/L (ref 22–29)
CREAT SERPL-MCNC: 0.8 MG/DL (ref 0.7–1.2)
EOSINOPHILS ABSOLUTE: 0.27 E9/L (ref 0.05–0.5)
EOSINOPHILS RELATIVE PERCENT: 3.7 % (ref 0–6)
GFR AFRICAN AMERICAN: >60
GFR NON-AFRICAN AMERICAN: >60 ML/MIN/1.73
GLUCOSE BLD-MCNC: 96 MG/DL (ref 74–99)
HCT VFR BLD CALC: 34.4 % (ref 37–54)
HEMOGLOBIN: 10.5 G/DL (ref 12.5–16.5)
IMMATURE GRANULOCYTES #: 0.07 E9/L
IMMATURE GRANULOCYTES %: 1 % (ref 0–5)
LYMPHOCYTES ABSOLUTE: 1.7 E9/L (ref 1.5–4)
LYMPHOCYTES RELATIVE PERCENT: 23.6 % (ref 20–42)
MCH RBC QN AUTO: 25.7 PG (ref 26–35)
MCHC RBC AUTO-ENTMCNC: 30.5 % (ref 32–34.5)
MCV RBC AUTO: 84.1 FL (ref 80–99.9)
METER GLUCOSE: 121 MG/DL (ref 74–99)
METER GLUCOSE: 99 MG/DL (ref 74–99)
METER GLUCOSE: 99 MG/DL (ref 74–99)
MONOCYTES ABSOLUTE: 0.44 E9/L (ref 0.1–0.95)
MONOCYTES RELATIVE PERCENT: 6.1 % (ref 2–12)
NEUTROPHILS ABSOLUTE: 4.68 E9/L (ref 1.8–7.3)
NEUTROPHILS RELATIVE PERCENT: 64.9 % (ref 43–80)
PDW BLD-RTO: 17.2 FL (ref 11.5–15)
PLATELET # BLD: 319 E9/L (ref 130–450)
PMV BLD AUTO: 9.4 FL (ref 7–12)
POTASSIUM SERPL-SCNC: 4.4 MMOL/L (ref 3.5–5)
RBC # BLD: 4.09 E12/L (ref 3.8–5.8)
SEDIMENTATION RATE, ERYTHROCYTE: 54 MM/HR (ref 0–15)
SODIUM BLD-SCNC: 141 MMOL/L (ref 132–146)
TOTAL PROTEIN: 8.1 G/DL (ref 6.4–8.3)
WBC # BLD: 7.2 E9/L (ref 4.5–11.5)

## 2020-11-30 PROCEDURE — 6360000002 HC RX W HCPCS: Performed by: INTERNAL MEDICINE

## 2020-11-30 PROCEDURE — 6370000000 HC RX 637 (ALT 250 FOR IP): Performed by: PHYSICAL MEDICINE & REHABILITATION

## 2020-11-30 PROCEDURE — 36415 COLL VENOUS BLD VENIPUNCTURE: CPT

## 2020-11-30 PROCEDURE — 80053 COMPREHEN METABOLIC PANEL: CPT

## 2020-11-30 PROCEDURE — 85651 RBC SED RATE NONAUTOMATED: CPT

## 2020-11-30 PROCEDURE — 6370000000 HC RX 637 (ALT 250 FOR IP): Performed by: INTERNAL MEDICINE

## 2020-11-30 PROCEDURE — 1280000000 HC REHAB R&B

## 2020-11-30 PROCEDURE — 97530 THERAPEUTIC ACTIVITIES: CPT

## 2020-11-30 PROCEDURE — 97110 THERAPEUTIC EXERCISES: CPT

## 2020-11-30 PROCEDURE — 2580000003 HC RX 258: Performed by: INTERNAL MEDICINE

## 2020-11-30 PROCEDURE — 85025 COMPLETE CBC W/AUTO DIFF WBC: CPT

## 2020-11-30 PROCEDURE — 82962 GLUCOSE BLOOD TEST: CPT

## 2020-11-30 PROCEDURE — 86140 C-REACTIVE PROTEIN: CPT

## 2020-11-30 PROCEDURE — 97535 SELF CARE MNGMENT TRAINING: CPT

## 2020-11-30 RX ADMIN — INSULIN LISPRO 5 UNITS: 100 INJECTION, SOLUTION INTRAVENOUS; SUBCUTANEOUS at 11:21

## 2020-11-30 RX ADMIN — SODIUM CHLORIDE, PRESERVATIVE FREE 300 UNITS: 5 INJECTION INTRAVENOUS at 21:09

## 2020-11-30 RX ADMIN — Medication 2 G: at 21:09

## 2020-11-30 RX ADMIN — AMLODIPINE BESYLATE 5 MG: 5 TABLET ORAL at 08:17

## 2020-11-30 RX ADMIN — Medication 2 G: at 14:02

## 2020-11-30 RX ADMIN — ATORVASTATIN CALCIUM 5 MG: 10 TABLET, FILM COATED ORAL at 21:08

## 2020-11-30 RX ADMIN — Medication 10 ML: at 21:09

## 2020-11-30 RX ADMIN — SODIUM CHLORIDE, PRESERVATIVE FREE 300 UNITS: 5 INJECTION INTRAVENOUS at 08:46

## 2020-11-30 RX ADMIN — Medication 10 ML: at 14:02

## 2020-11-30 RX ADMIN — INSULIN LISPRO 5 UNITS: 100 INJECTION, SOLUTION INTRAVENOUS; SUBCUTANEOUS at 16:19

## 2020-11-30 RX ADMIN — INSULIN LISPRO 5 UNITS: 100 INJECTION, SOLUTION INTRAVENOUS; SUBCUTANEOUS at 07:11

## 2020-11-30 RX ADMIN — Medication 10 ML: at 07:24

## 2020-11-30 RX ADMIN — Medication 10 ML: at 08:46

## 2020-11-30 RX ADMIN — METFORMIN HYDROCHLORIDE 500 MG: 500 TABLET ORAL at 08:17

## 2020-11-30 RX ADMIN — INSULIN GLARGINE 15 UNITS: 100 INJECTION, SOLUTION SUBCUTANEOUS at 21:09

## 2020-11-30 RX ADMIN — Medication 2 G: at 07:24

## 2020-11-30 RX ADMIN — ENOXAPARIN SODIUM 40 MG: 40 INJECTION SUBCUTANEOUS at 08:17

## 2020-11-30 RX ADMIN — Medication 300 UNITS: at 14:02

## 2020-11-30 ASSESSMENT — PAIN SCALES - GENERAL
PAINLEVEL_OUTOF10: 0
PAINLEVEL_OUTOF10: 0

## 2020-11-30 NOTE — PROGRESS NOTES
Progress Note  Date:11/30/2020       [id number]  Patient Name:Michael J Leschak     Date of Birth:[birth year]     Age:45 y.o. Subjective    Subjective:  Symptoms:  Stable. He reports weakness. Diet:  Adequate intake. Activity level: Impaired due to weakness. Pain:  He reports no pain. Review of Systems   Neurological: Positive for weakness. Objective         Vitals Last 24 Hours:  TEMPERATURE:  No data recorded  RESPIRATIONS RANGE: No data recorded  PULSE OXIMETRY RANGE: No data recorded  PULSE RANGE: No data recorded  BLOOD PRESSURE RANGE: No data recorded.  ; No data recorded. I/O (24Hr): Intake/Output Summary (Last 24 hours) at 11/30/2020 0753  Last data filed at 11/30/2020 0630  Gross per 24 hour   Intake 1560 ml   Output 2400 ml   Net -840 ml     Objective:  General Appearance: In no acute distress. Vital signs: (most recent): Blood pressure (!) 143/93, pulse 81, temperature 97.7 °F (36.5 °C), temperature source Temporal, resp. rate 16, height 6' 3\" (1.905 m), weight 289 lb 11.8 oz (131.4 kg), SpO2 96 %. Vital signs are normal.    Output: Producing urine and producing stool. Lungs:  Normal effort and normal respiratory rate. Breath sounds clear to auscultation. Heart: Normal rate. Regular rhythm. S1 normal and S2 normal.    Abdomen: Abdomen is soft. Bowel sounds are normal.   There is no abdominal tenderness. Extremities: There is deformity. (Left Symes amputation)  Neurological: Patient is alert. (4/5 strength  Right ankle is limited by the fusion). Labs/Imaging/Diagnostics    Labs:  CBC:No results for input(s): WBC, RBC, HGB, HCT, MCV, RDW, PLT in the last 72 hours. CHEMISTRIES:No results for input(s): NA, K, CL, CO2, BUN, CREATININE, GLUCOSE, PHOS, MG in the last 72 hours. Invalid input(s): CA  PT/INR:No results for input(s): PROTIME, INR in the last 72 hours. APTT:No results for input(s): APTT in the last 72 hours.   LIVER PROFILE:No results for input(s): AST, ALT, BILIDIR, BILITOT, ALKPHOS in the last 72 hours. Imaging Last 24 Hours:  No results found. Assessment//Plan           Hospital Problems           Last Modified POA    Partial nontraumatic amputation of foot, left ([address] 11/19/2020 Yes      Assessment:      Date   Status   AE    Comments     Feeding   11/27/20   Independent       Goal Met. Grooming   11/24/20   Mod I       Goal Met           Oral Care   11/24/20   Mod I       Goal Met. Bathing   11/29/20   CGA   simulated    Pt engaged in simulated UB/LB bathing up in w/c to increase skills. UB Dressing   11/24/20   Independent       Goal Met. LB Dressing   11/24/20   Min A                    Footwear   11/29/20   SBA       To doff R shoe and simulated sock seated  up in w/c using crossover method. Toileting   11/24/20   Min A             Homemaking   11/29/20   SBA    w/c level   Pt able to retrieve items from fridge, pantry, and drawers at wc level while wearing 2# wts to improve overall strength, endurance along with core strength for transfers.           Functional Transfers / Balance:      Date Status DME  Comments   Sit Balance 11/29/20   Supervision   Seated up in w/c during LB dressing, simulated bathing, arm ex's and kitchen mobility and simple hmkg tasks. Stand Balance 11/27/20    Min A       [x]? Tub  [x]? Shower   Transfer 11/25/20   MIN A  Extended tub bench, w/w      Commode   Transfer 11/27/20   Min A W/w, 3-in-1     Functional   Mobility 11/27/20 11/29/20  Min A              Sup W/w              W/c              Pt performed w/c mobility in OT kitchen with occ cues for maneuvering in smaller area.     Other:     Sit<>stand      Couch     Bed<>W/c       11/27/20 11/23/20 11/27/20        CGA      Mod A     Min A        W/w           w/w       .     11/29/20 Deferred sofa transfer since patient stated \" I will not be sitting on my couch at home\"

## 2020-11-30 NOTE — PROGRESS NOTES
Physical Therapy    Facility/Department: [facility] 5SE REHAB  Weekly Note    NAME: Josephine Alvarado  : 1975  MRN: [id number]    Date of Service: 2020  Evaluating Therapist: Ivan Haile. Ama Stephens P.T.    ROOM: [facility]  DIAGNOSIS: BKA  PRECAUTIONS: Fall risk, NWB L residual limb  PROCEDURE(S):   : I&D with bone debridement/biopsy   : Syme's amputation at ankle with partial excision of distal tib/fib  HPI: The pt was admitted to [facility] on 11/10 with a diabetic foot infection with osteomyelitis. Social:  Pt lives with his wife in a 2nd floor apartment with a curb step+2+1 steps with 1 rail to enter building and 7 steps with 2 wide rails to the 2nd floor. 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     Initial Evaluation  20 Comments  Short Term Goals Long Term Goals    Was pt agreeable to Eval/treatment? Yes Initial Evaluation Yes, motivated      Does pt have pain? Minimal LLE soreness \"maybe . 5/10) No c/o pain No c/o pain      Bed Mobility  Rolling: Supervision  Supine<>sit: Supervision  Scooting: Supervision Independent for all aspects Independent   Independent   Transfers Sit to stand: Max A  Stand to sit: Max A  Stand pivot:  Max A [employer] Sit to stand: SBA  Stand pivot: CGA with bariatric [employer] Sit<>stand: Supervision  Stand pivot: Supervision Occasional difficulty from low surface but Modified Independent from WC SBA Modified Independent   Ambulation   15 feet with WW with Min A + WC follow 40 feet with bariatric WW with CGA 10 feet with WW with Supervision  Knee scooter propulsion x200 feet with Supervision Fatigues quickly with [employer], repetitive force on R ankle, uses scooter well, no longer using bariatric walker 20 feet with AAD with SBA >150 feet with AAD with Modified [place]   Wheel Chair Mobility 150 feet with BUE with  feet with BUE with Supervision 200 feet with BUE with Modified [place] Used for longer distances  >200 feet with BUE with Modified [place]   Car Transfers NT NT SBA to and from knee scooter Difficulty getting out but no assistance required SBA Modified Independent   Stair negotiation: ascended and descended NT, attempted but pt unable to perform safely NT 12 steps with tub seat + 1 rail with SBA (assist to move seat) Working on steps in parallel bars 4 steps with 2 rails with SBA >8 steps with 1 rail + AAD with Modified [place]   BLE ROM R ankle limited d/t past fusion, otherwise WFL R ankle limited d/t past fusion, otherwise WFL R ankle limited d/t past fusion, otherwise WFL      BLE Strength RLE: grossly 4+/5  LLE: grossly 4/5 at the hip and knee RLE: grossly 4+/5  LLE: grossly 4/5 at the hip and knee RLE: grossly 4+/5  LLE: grossly 4/5 at the hip and knee      Balance  Seated: Independent  Standing: Min A<>Max A Seated: Independent  Standing: Min A<>Max A Seated: Independent  Standing: Supervision      Date Family Teach Completed No family present at evlauation No family present to this date No family present to this date      Is additional Family Teaching Needed? Y or N Yes Yes Yes      Hindering Progress Limited endurance, medical history, obesity Limited endurance, medical hx, obesity Limited by endurance, medical history      PT recommended ELOS 3 weeks 2 weeks 1 week See additional comments     Team's Discharge Plan  2 weeks Discharge Tuesday 12/8/20      Therapist at Team Meeting  Ele Antony PT, DPT [id number]   Ele Antony PT, DPT [id number]          Date:  11/24/20  Supporting factors: [facility], motivated  Barriers to discharge:  Stairs into home, body habitus  Additional comments: The pt is motivated and is progressing well, he has a long history with B foot wounds and maintains his NWB well. The pt reports having used a knee scooter in the past in an inpatient setting with good results as well as a walker with sling.  Due to his R ankle fusion and history with R foot wounds I would

## 2020-11-30 NOTE — PROGRESS NOTES
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 Grooming 20 Mod I  Goal Met         Oral Care 20 Mod I  Goal Met. 20-  Pt deferred task despite encourgement   Bathing 20 CGA  UE: Tasks performed seated with S/U  LE: CGA for balance standing at ww during ADL. UB Dressing 20 Independent  Goal Met. LB Dressing 20 Min A ww  Pt doffed/ donned underwear and shorts          Footwear 20 Sup  Pt used crossover method to doff/ don R shoe   Toileting 20 CGA  Pt SBA for clothing management and performed hygiene seated. Homemaking 20 SBA  w/c level      Functional Transfers / Balance:   Date Status DME  Comments   Sit Balance 20 Supervision  Demoed during ADLs. Stand Balance 20  CGA     [x] Tub  [x] Shower   Transfer 20 CGA Extended tub bench, w/w   Pt demoed good carry over of previous training with use of extended tub bench. Cues for hand placement provided to prevent falls. Commode   Transfer 20 CGA W/w, 3-in-1 Pt demoed good carry over of hand placement during tranfers. Functional   Mobility 20  CGA           Sup W/w          W/c  Pt performed limited mobility in apartment using ww. Pt performed w/c mobility in room. Other:    Sit<>stand     Couch    Bed<>W/c        Bed mobility     20      CGA     Mod A    CGA        SBA     W/w        W/w        Bed rails     Fucntional use of B UE        Pt displayed adequate safety awareness and good management of ww during transfers.     Pt performed bed mobility in hospital bed and bed in rehab apartment. Functional Exercises / Activity:   Pt performed BUE strengthening exercises in all planes using 5# dumbbells requiring min vc and demos for correct form. Exercises were performed to improve strength during ADLs. Pt utilized arm ergometer x6 mins forward/ backward to improve endurance. Pt stood during bean bag toss activity with CGA at ww to facilitate balance training and crossing midline to simulate ADLs. Sensory / Neuromuscular Re-Education:      Cognitive Skills:   Status Comments   Problem   Solving good     Memory good     Sequencing fair +good-    Safety fair +      Visual Perception:    Education:  Hand placement during tub transfers, compensatory techniques during IADL activities such as meal prep, AD management during functional activity and energy conservation techniques to maximize independence with ADLs    [] Family teach completed on:    Pain Level: 0/10    Additional Notes:   11/27/20  Pt requested to focus on B UE strengthening exercises to increase independence and endurance for transfers/mobility and ADL tasks daily. 11/29/20 Request done on this date for ADLls and BUE strengthening exercises. Patient has made good  progress during treatment sessions toward set goals.  Therapy emphasis to obtain goals:   Long term goal 1: Pt demo independent to eat all meals  Long term goal 2: Pt dmeo MOd I grooming seated or standing @ sink level  Long term goal 3: Pt demo SBA bathing @ shower level or sponge bathing both seated & standing  Long term goal 4: Pt demo I UE & Mod I LE dress with AE as needed  Long term goal 5: Pt demo MOd i commode trf wt appropraite DME to ensure pt safety  Long term goals 6: Pt demo Sup tub trf with appropraite DME to ensure pt safety  Long term goal 7: Pt demo Mod I light homemaking task @ wc level  Long term goal 8: Pt demo G endurance for a 30 minute funcitonal activity  Long term goal 9: Pt demo increased BUE strength to 5/5 & improved  strength- On eval R  strength 95# & norm for pt age & gender is 110# & L hand 80# & norm for tp age & gender is 101#  Long term goal 10: Pt demo Mod I wc porpulsion throughout a living environment & around obstacles      [x] Continue with current OT Plan of care.   [] Prepare for Discharge     DISCHARGE RECOMMENDATIONS  Recommended DME:    Post Discharge Care:   []Home Independently  []Home with 24hr Care / Supervision []Home with Partial Supervision []Home with Home Health OT []Home with Out Pt OT []Other: ___   Comments:         Time in Time out Tx Time Breakdown  Variance:   First Session  8:00 9:30 [x] Individual Tx-75  Mins  [x] Concurrent Tx - 15 Mins  [] Co-Tx -   [] Group Tx -   [] Time Missed -     Second Session   [] Individual Tx-   [] Concurrent Tx -  [] Co-Tx -   [] Group Tx -   [] Time Missed -     Third Session    [] Individual Tx-   [] Concurrent Tx -  [] Co-Tx -   [] Group Tx -   [] Time Missed -         Total Tx Time: 90 mins      Ginna PERES/KOLE [id number]

## 2020-11-30 NOTE — PROGRESS NOTES
Physical Therapy    Facility/Department: [facility] 5SE REHAB  Treatment Note    NAME: Amy Mata  : 1975  MRN: [id number]    Date of Service: 2020  Evaluating Therapist: Chelsea Lin. Irlanda Singh P.T.    ROOM: [facility]  DIAGNOSIS: BKA  PRECAUTIONS: Fall risk, NWB L residual limb  PROCEDURE(S):   : I&D with bone debridement/biopsy   : Syme's amputation at ankle with partial excision of distal tib/fib  HPI: The pt was admitted to [facility] on 11/10 with a diabetic foot infection with osteomyelitis. Social:  Pt lives with his wife in a 2nd floor apartment with a curb step+2+1 steps with 1 rail to enter building and 7 steps with 2 rails to the 2nd floor. 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     Initial Evaluation  20 AM     PM    Short Term Goals Long Term Goals    Was pt agreeable to Eval/treatment? Yes Yes  Yes     Does pt have pain? Minimal LLE soreness \"maybe . 5/10) No c/o pain No c/o pain     Bed Mobility  Rolling: Supervision  Supine<>sit: Supervision  Scooting: Supervision Independent Independent for all aspects  Independent   Transfers Sit to stand: Max A  Stand to sit: Max A  Stand pivot:  Max A [employer] Sit<>stand: Supervision  Stand pivot: Supervision Sit<>stand: Supervision  Stand pivot: Supervision SBA Modified Independent   Ambulation   15 feet with WW with Min A + WC follow 10 feet with WW with Supervision  Knee scooter propulsion x300 feet with Supervision 10 feet with WW with Supervision  Knee scooter propulsion x200 feet with Supervision 20 feet with AAD with SBA >150 feet with AAD with Modified [place]   Walking 10 feet on uneven surface NT NT NT 10 feet with AAD with SBA >10 feet with AAD with Modified [place]   Wheel Chair Mobility 150 feet with BUE with SBA  feet with BUE with Modified [place]  >200 feet with BUE with Modified [place]   Car Transfers NT NT SBA to and from knee scooter SBA Modified Independent Stair negotiation: ascended and descended NT, attempted but pt unable to perform safely 8 steps with tub seat + 1 rail with SBA (assist to move seat) 12 steps with tub seat + 1 rail with SBA (assist to move seat) 4 steps with 2 rails with SBA >8 steps with 1 rail + AAD with Modified [place]   Curb Step:   ascended and descended NT NT NT 4 inch step with AAD and Min A 7.5 inch step with AAD and SBA   Picking up object off the floor NT NT NT Will  an object with Min A Will  an object with Modified [place]   BLE ROM R ankle limited d/t past fusion, otherwise WFL  R ankle limited d/t past fusion, otherwise WFL     BLE Strength RLE: grossly 4+/5  LLE: grossly 4/5 at the hip and knee  RLE: grossly 4+/5  LLE: grossly 4/5 at the hip and knee     Balance  Seated: Independent  Standing: Min A<>Max A  Seated: Independent  Standing: Supervision     Date Family Teach Completed No family present at evlauation  No family present to this date     Is additional Family Teaching Needed? Y or N Yes  Yes     Hindering Progress Limited endurance, medical history, obesity  Decreased endurance, WB restriction     PT recommended ELOS 3 weeks       Team's Discharge Plan        Therapist at Team Meeting          Therapeutic Exercise:   AM:   Ambulation: 2x10 feet with WW with Supervision  Stand pivot transfer x2 with [employer], x1 with no AD, x4 to and from knee scooter with Supervision for all transfers  PM:   Knee scooter mobility around gym to include multiple stand pivot transfers to and from scooter  Supine L residual limb Manual Resisted Exercises:   -Straight leg hip extension x15  -Hip abduction: x15  Standing L residual limb Manual Resisted Exercises:   -Straight leg hip extension x15    Additional Comments: Reviewed multiple standing transfer techniques and the pt was able to perform all with good technique, able to progress number of steps and distance propelling knee scooter as well indicating progress.  The pt was able to perform an entire flight of steps with a single rail with the tub seat and is pleased with his progress, however he did report some anxiety regarding the I&D that is planned for Wednesday as he is unsure about wound vac management afterward. Patient/family education  Pt/family educated on transfer techniques. Patient/family response to education:   Pt/family verbalized understanding Pt/family demonstrated skill Pt/family requires further education in this area   Yes Yes Reinforce     AM  Time in: 1000  Time out: 1045    PM  Time in: 1300  Time out: 1345    Pt is making good progress toward established Physical Therapy goals. Continue with physical therapy current plan of care. Bk Yeung.  [facility]– [place], [address] [place] [address]  License Number: [id number]

## 2020-11-30 NOTE — PROGRESS NOTES
Department of Podiatry  Progress Note    SUBJECTIVE:  Gale Baptiste is a 54year old male who was seen at bedside for s/p left foot Syme's amputation performed on 11/14/2020 . No acute events overnight. Patient denies any N/V/D/F/C/SOB/CP and has no other pedal complaints at this time. OBJECTIVE:    Scheduled Meds:   amLODIPine  5 mg Oral Daily    atorvastatin  5 mg Oral Nightly    ceFAZolin  2 g Intravenous Q8H    enoxaparin  40 mg Subcutaneous Daily    heparin flush  3 mL Intravenous 2 times per day    insulin glargine  15 Units Subcutaneous Nightly    insulin lispro  5 Units Subcutaneous TID WC    metFORMIN  500 mg Oral Daily with breakfast    sodium chloride flush  10 mL Intravenous BID     Continuous Infusions:   dextrose       PRN Meds:.dextrose, dextrose, glucagon (rDNA), glucose, acetaminophen, heparin flush, sodium chloride flush, acetaminophen, polyethylene glycol    Allergies   Allergen Reactions    Vancomycin Other (See Comments)     Red man syndrome    Semaglutide Itching and Rash     Pink itch rash         /75   Pulse 80   Temp 97.8 °F (36.6 °C) (Temporal)   Resp 18   Ht 6' 3\" (1.905 m)   Wt 289 lb 11.8 oz (131.4 kg)   SpO2 96%   BMI 36.22 kg/m²       EXAM:    VASCULAR: CFT < 5 seconds B/L. Warm to warm throughout the entirety of the LEs    NEUROLOGIC:  Protective sensation is diminished b/l    DERM:  Surgical site is coapted plantarly with some dehiscence to the proximal medial aspect of the incision site. Mild hyperpigmentation, eschar skin noted to the anterior lateral aspect of the wound. Sutures are intact. No noted pus, drainage or active bleeding. No SOI. Minimal edema noted to left lower extremity. MUSCULOSKELETAL:  5/5 Gross Muscle strength in all 4 quadrants. No pain on palpation to the posterior calf b/l.            Scheduled Meds:   amLODIPine  5 mg Oral Daily    atorvastatin  5 mg Oral Nightly    ceFAZolin  2 g Intravenous Q8H    enoxaparin  40 mg Subcutaneous Daily    heparin flush  3 mL Intravenous 2 times per day    insulin glargine  15 Units Subcutaneous Nightly    insulin lispro  5 Units Subcutaneous TID WC    metFORMIN  500 mg Oral Daily with breakfast    sodium chloride flush  10 mL Intravenous BID     Continuous Infusions:   dextrose       PRN Meds:.dextrose, dextrose, glucagon (rDNA), glucose, acetaminophen, heparin flush, sodium chloride flush, acetaminophen, polyethylene glycol    RADIOLOGY:  No orders to display     /75   Pulse 80   Temp 97.8 °F (36.6 °C) (Temporal)   Resp 18   Ht 6' 3\" (1.905 m)   Wt 289 lb 11.8 oz (131.4 kg)   SpO2 96%   BMI 36.22 kg/m²     LABS:    Recent Labs     11/30/20  0730   WBC 7.2   HGB 10.5*   HCT 34.4*           Recent Labs     11/30/20  0730      K 4.4      CO2 26   BUN 16   CREATININE 0.8        Recent Labs     11/30/20  0730   PROT 8.1         ASSESSMENT:  Principal Problem:  -Diabetic foot infection   -S/P 15 days left foot Syme's amputation performed 11/14/20     Active Problems:    Charcot foot due to diabetes mellitus ([facility] [address]    Diabetic neuropathy associated with diabetes mellitus due to underlying condition ([facility] [address]    Essential hypertension    Diabetic foot ulcer with osteomyelitis ([facility] [address]        PLAN:  - Patient's labs, charts and images were reviewed   - Antibiotics as per ID  - Surgical culture of left foot: Pseudomonas, Staph, Corynebacterium   - Dressings to be changed every 2 days to allow for optimal healing and not disrupt surgical site: xeroform, DSD, ACE. Dressings changed today. Next dressing change Thursday   - WBC 7.2  - Patient to be NWB to operative extremity.  - Podiatric Surgical intervention Wednesday 12/2/20: Left foot wound debridement, with possible application of graft and wound vac. - Recommend discharge to SNF   - Patient to follow up in clinic 1 week after discharge.    - Will continue to follow patient while they are in-house.     Yaya Sanchez ANDIE  Fellowship-Trained Foot and Ankle Surgeon  [phone]

## 2020-11-30 NOTE — PROGRESS NOTES
[facility] PROGRESS NOTE      Chief complaint: Follow-up of ongoing antibiotic treatment for MSSA bacteremia    The patient is a 54 y.o. male with history of DM, Charcot arthropathy, hypertension, peripheral vascular disease, previously admitted from 11/10-11/18 for Pseudomonas aeruginosa and MSSA left diabetic foot infection with osteomyelitis, s/p Symes amputation at level of ankle and partial excision of tib-fib on 30/15, complicated by MSSA bacteremia for which he was seen by Dr. Mat Melendez and discharged to inpatient rehab on 11/19 on 2-week course of levofloxacin and 6-week course of cefazolin. On admission, he was afebrile and hemodynamically stable with leukocytosis of 13,000. Cefazolin and levofloxacin were resumed. Subjective: Patient was seen and examined. No chills, no abdominal pain, no diarrhea, no rash, no itching. Objective:    Vitals:    11/30/20 0815   BP: 132/75   Pulse: 80   Resp: 18   Temp: 97.8 °F (36.6 °C)   SpO2:      Constitutional: Alert, not in distress  Respiratory: Clear breath sounds, no crackles, no wheezes  Cardiovascular: Regular rate and rhythm, no murmurs  Gastrointestinal: Bowel sounds present, soft, nontender  Skin: Warm and dry, no active dermatoses  Musculoskeletal: Left foot with dressing and Ace wrap in place  Labs, imaging, and medical records/notes were personally reviewed. Assessment:  Pseudomonas aeruginosa and MSSA left diabetic foot ulcer with osteomyelitis status post Symes amputation at the level of ankle and partial excision of tib-fib on 11/14. Received 2-week course of levofloxacin from 11/14-11/28. MSSA bacteremia, ongoing antibiotic treatment    Recommendations:  Continue cefazolin 2 g every 8 hours for 6 weeks as planned from 11/14-12/26. Monitor CBC, CMP, CRP, ESR weekly. Continue local wound care.     Thank you for involving me in the care of Mervat Donta. I will continue to follow peripherally.  Please do not hesitate to call for any questions

## 2020-11-30 NOTE — PLAN OF CARE
Problem: Falls - Risk of:  Goal: Will remain free from falls  Description: Will remain free from falls  Outcome: Met This Shift  Goal: Absence of physical injury  Description: Absence of physical injury  Outcome: Met This Shift     Problem: Skin Integrity:  Goal: Will show no infection signs and symptoms  Description: Will show no infection signs and symptoms  Outcome: Met This Shift  Goal: Absence of new skin breakdown  Description: Absence of new skin breakdown  Outcome: Met This Shift     Problem: Pain:  Goal: Pain level will decrease  Description: Pain level will decrease  Outcome: Met This Shift  Goal: Control of acute pain  Description: Control of acute pain  Outcome: Met This Shift  Goal: Control of chronic pain  Description: Control of chronic pain  Outcome: Met This Shift     Problem: Mobility - Impaired:  Goal: Mobility will improve  Description: Mobility will improve  Outcome: Met This Shift

## 2020-12-01 ENCOUNTER — ANESTHESIA EVENT (OUTPATIENT)
Dept: OPERATING ROOM | Age: 45
DRG: 041 | End: 2020-12-01

## 2020-12-01 LAB
METER GLUCOSE: 106 MG/DL (ref 74–99)
METER GLUCOSE: 143 MG/DL (ref 74–99)
METER GLUCOSE: 222 MG/DL (ref 74–99)
METER GLUCOSE: 80 MG/DL (ref 74–99)
METER GLUCOSE: 96 MG/DL (ref 74–99)

## 2020-12-01 PROCEDURE — 97530 THERAPEUTIC ACTIVITIES: CPT

## 2020-12-01 PROCEDURE — 2580000003 HC RX 258: Performed by: INTERNAL MEDICINE

## 2020-12-01 PROCEDURE — 6360000002 HC RX W HCPCS: Performed by: INTERNAL MEDICINE

## 2020-12-01 PROCEDURE — 82962 GLUCOSE BLOOD TEST: CPT

## 2020-12-01 PROCEDURE — 6370000000 HC RX 637 (ALT 250 FOR IP): Performed by: PHYSICAL MEDICINE & REHABILITATION

## 2020-12-01 PROCEDURE — 97535 SELF CARE MNGMENT TRAINING: CPT

## 2020-12-01 PROCEDURE — 97110 THERAPEUTIC EXERCISES: CPT

## 2020-12-01 PROCEDURE — 2580000003 HC RX 258: Performed by: PHYSICAL MEDICINE & REHABILITATION

## 2020-12-01 PROCEDURE — 6370000000 HC RX 637 (ALT 250 FOR IP): Performed by: INTERNAL MEDICINE

## 2020-12-01 PROCEDURE — 1280000000 HC REHAB R&B

## 2020-12-01 RX ORDER — DEXTROSE AND SODIUM CHLORIDE 5; .45 G/100ML; G/100ML
INJECTION, SOLUTION INTRAVENOUS CONTINUOUS
Status: DISCONTINUED | OUTPATIENT
Start: 2020-12-02 | End: 2020-12-04

## 2020-12-01 RX ADMIN — INSULIN LISPRO 5 UNITS: 100 INJECTION, SOLUTION INTRAVENOUS; SUBCUTANEOUS at 07:50

## 2020-12-01 RX ADMIN — Medication 10 ML: at 23:29

## 2020-12-01 RX ADMIN — SODIUM CHLORIDE, PRESERVATIVE FREE 300 UNITS: 5 INJECTION INTRAVENOUS at 21:41

## 2020-12-01 RX ADMIN — Medication 2 G: at 13:55

## 2020-12-01 RX ADMIN — AMLODIPINE BESYLATE 5 MG: 5 TABLET ORAL at 07:44

## 2020-12-01 RX ADMIN — INSULIN LISPRO 5 UNITS: 100 INJECTION, SOLUTION INTRAVENOUS; SUBCUTANEOUS at 16:56

## 2020-12-01 RX ADMIN — SODIUM CHLORIDE, PRESERVATIVE FREE 300 UNITS: 5 INJECTION INTRAVENOUS at 10:59

## 2020-12-01 RX ADMIN — Medication 2 G: at 06:36

## 2020-12-01 RX ADMIN — METFORMIN HYDROCHLORIDE 500 MG: 500 TABLET ORAL at 07:44

## 2020-12-01 RX ADMIN — Medication 10 ML: at 06:36

## 2020-12-01 RX ADMIN — Medication 10 ML: at 13:55

## 2020-12-01 RX ADMIN — Medication 10 ML: at 10:58

## 2020-12-01 RX ADMIN — DEXTROSE AND SODIUM CHLORIDE: 5; 450 INJECTION, SOLUTION INTRAVENOUS at 23:29

## 2020-12-01 RX ADMIN — INSULIN LISPRO 5 UNITS: 100 INJECTION, SOLUTION INTRAVENOUS; SUBCUTANEOUS at 11:34

## 2020-12-01 RX ADMIN — ENOXAPARIN SODIUM 40 MG: 40 INJECTION SUBCUTANEOUS at 07:44

## 2020-12-01 RX ADMIN — Medication 2 G: at 21:41

## 2020-12-01 RX ADMIN — ATORVASTATIN CALCIUM 5 MG: 10 TABLET, FILM COATED ORAL at 21:41

## 2020-12-01 RX ADMIN — Medication 10 ML: at 21:41

## 2020-12-01 ASSESSMENT — PAIN SCALES - GENERAL
PAINLEVEL_OUTOF10: 0

## 2020-12-01 ASSESSMENT — LIFESTYLE VARIABLES: SMOKING_STATUS: 0

## 2020-12-01 NOTE — PATIENT CARE CONFERENCE
[address] [place] [address]  ACUTE REHABILITATION  TEAM CONFERENCE NOTE/PATIENT PLAN OF CARE    Date: 2020  Admission date: 2020  Patient Name: Katheryn Crowder        MRN: [id number]    : 1975  (54 y.o.)  Gender: male   Rehab diagnosis/surgery with date:  osteomyelitis left foot and ankle, left BKA done 20  Impairment Group Code:  5.4      MEDICAL/FUNCTIONAL HISTORY/STATUS:  for debridement of lef stump with wound VAC application on     Consultations/Labs/X-rays: FBS 80 this am      MEDICATION UPDATE:  continues IV Ancef every 8 hours until  via PICC line left upper      NURSING FIMS:    Bowel:   Current level: continent    Bladder:   Current level: continent    Toilet Hygiene:   Current level : min assist  Short term Toilet hygiene goal: standby assist  Long term toilet hygiene goal:  standby assist    Skin integrity: left stump site, dressing per podiatry  Pain: none    NUTRITION    Diet  carb controlled  Liquid consistency   thin    SOCIAL INFORMATION:  Lives with: wife, she works at [address] [place] [address] services:  none  [address]:  2nd floor apartment, 2 steps with 1 rail, then 7 steps to second floor  Prior Level of function:  independent  DME:  Quad cane, wheeled walker, [address] / PATIENT EDUCATION:  safety and self care are ongoing with patient    PHYSICAL THERAPY    Bed mobility:   Current level: independent  Short term bed mobility goal: independent  Long term bed mobility goal: independent    Chair/bed transfers:  Current level: supervision  Short term Chair/bed transfers goal: supervision  Long term Chair/bed transfers goal: Modified independent      Ambulation:   Current level: with walker 10 ft at supervision, fatigues quickly, with knee scooter 200 ft at supervision  Short term ambulation goal: met  Long term ambulation goal: 200 ft at Modified independent    Wheelchair Mobility:  Current level: 200 ft at Modified independent  Short term wheelchair goal: met  Long term wheelchair goal: met      Car transfers:   Current level: standby assist  Short term car transfers goal: standby assist  Long term car transfers goal:Modified independent    Stairs:   Current level : 12 with tub seat at standby assist  Short term stairs goal: met  Long term stairs goal: 12 at Modified independent      OCCUPATIONAL THERAPY:      Tub/shower:   Current level: Contact guard assist with bench and wheeled walker  Short term tub/shower goal: standby assist  Long term tub/shower goal: standby assist      Feeding:  Current level: independent  Short term feeding goal: independent  Long term feeding goal: independent    Grooming:   Current level: Modified independent  Short term grooming goal: Modified independent  Long term grooming goal: Modified independent    Bathing:  Current level: Contact guard assist  Short term bathing goal: standby assist  Long term bathing goal: standby assist    Homemaking:   Current level: wheelchair  level is standby assist  Short term homemaking goal: Modified independent  Long term homemaking goal: Modified independent    Upper body dressing:  Current level: independent  Short term upper body dressing goal: independent  Long term upper body dressing goal: independent    Lower body dressing:  Current level: min assist, footwear is supervision  Short term lower body dressing goal: Contact guard assist  Long term lower body dressing goal: Modified independent    Toilet transfer:   Current level: Contact guard assist with wheeled walker  Short term toilet transfer goal: standby assist  Long term toilet transfer goal: Modified independent      Safety awareness: fair +      Patient/family's personal goals: \"get back to normal\"  Factors supporting goal achievement:  making gains  Factors hindering goal achievement:  lengthy course of IV antibiotics, possible wound vac for home      Discharge Plan   Estimated Length of Stay: 12/8 Destination: home  Services at Discharge: home health for nursing, PT, OT and an aide  Equipment at Discharge: extended tub bench, 3 in 1 commode, wheelchair vs. knee scooter      INTERDISCIPLINARY TEAM/PHYSICIAN RECOMMENDATION AND/OR REVISIONS OF PLAN OF CARE:  surgery tomorrow, discharge planning for 12/08/20      I approve the established interdisciplinary plan of care as documented within the medical record of Katheryn Crowder. Electronically signed by Sada Huertas RN  on 12/1/2020 at 8:43 AM  The following interdisciplinary team members were present:  Coye Brooke, RN Glorine Bamberger, MSSA,LSW  Davon Soto.  Cuco, PT, DPT  Darion Aguirre OTR

## 2020-12-01 NOTE — PROGRESS NOTES
Department of Podiatry  Progress Note    SUBJECTIVE:  Blake Edwards is a 54year old male who was seen at bedside for s/p left foot Syme's amputation performed on 11/14/2020 . No acute events overnight. Patient denies any N/V/D/F/C/SOB/CP. OBJECTIVE:    Scheduled Meds:   amLODIPine  5 mg Oral Daily    atorvastatin  5 mg Oral Nightly    ceFAZolin  2 g Intravenous Q8H    enoxaparin  40 mg Subcutaneous Daily    heparin flush  3 mL Intravenous 2 times per day    insulin glargine  15 Units Subcutaneous Nightly    insulin lispro  5 Units Subcutaneous TID WC    metFORMIN  500 mg Oral Daily with breakfast    sodium chloride flush  10 mL Intravenous BID     Continuous Infusions:   dextrose       PRN Meds:.dextrose, dextrose, glucagon (rDNA), glucose, acetaminophen, heparin flush, sodium chloride flush, acetaminophen, polyethylene glycol    Allergies   Allergen Reactions    Vancomycin Other (See Comments)     Red man syndrome    Semaglutide Itching and Rash     Pink itch rash         /72   Pulse 82   Temp 98.1 °F (36.7 °C) (Temporal)   Resp 18   Ht 6' 3\" (1.905 m)   Wt 289 lb 11.8 oz (131.4 kg)   SpO2 96%   BMI 36.22 kg/m²       EXAM:    VASCULAR: CFT < 5 seconds B/L. Warm to warm throughout the entirety of the LEs    NEUROLOGIC:  Protective sensation is diminished b/l    DERM:  Surgical site is coapted plantarly with some dehiscence to the proximal medial aspect of the incision site. Mild hyperpigmentation, eschar skin noted to the anterior lateral aspect of the wound. Sutures are intact. No noted pus, drainage or active bleeding. No SOI. Minimal edema noted to left lower extremity. MUSCULOSKELETAL:  5/5 Gross Muscle strength in all 4 quadrants. No pain on palpation to the posterior calf b/l.            Scheduled Meds:   amLODIPine  5 mg Oral Daily    atorvastatin  5 mg Oral Nightly    ceFAZolin  2 g Intravenous Q8H    enoxaparin  40 mg Subcutaneous Daily    heparin flush  3 mL Intravenous 2 times per day    insulin glargine  15 Units Subcutaneous Nightly    insulin lispro  5 Units Subcutaneous TID     metFORMIN  500 mg Oral Daily with breakfast    sodium chloride flush  10 mL Intravenous BID     Continuous Infusions:   dextrose       PRN Meds:.dextrose, dextrose, glucagon (rDNA), glucose, acetaminophen, heparin flush, sodium chloride flush, acetaminophen, polyethylene glycol    RADIOLOGY:  No orders to display     /72   Pulse 82   Temp 98.1 °F (36.7 °C) (Temporal)   Resp 18   Ht 6' 3\" (1.905 m)   Wt 289 lb 11.8 oz (131.4 kg)   SpO2 96%   BMI 36.22 kg/m²     LABS:    Recent Labs     11/30/20  0730   WBC 7.2   HGB 10.5*   HCT 34.4*           Recent Labs     11/30/20  0730      K 4.4      CO2 26   BUN 16   CREATININE 0.8        Recent Labs     11/30/20  0730   PROT 8.1         ASSESSMENT:  Principal Problem:  -Diabetic foot infection   -S/P 15 days left foot Syme's amputation performed 11/14/20  -Necrosis of left foot flap     Active Problems:    Charcot foot due to diabetes mellitus ([facility] [address]    Diabetic neuropathy associated with diabetes mellitus due to underlying condition ([facility] [address]    Essential hypertension    Diabetic foot ulcer with osteomyelitis ([facility] [address]        PLAN:  - Patient's labs, charts and images were reviewed   - Antibiotics as per ID:Cefazolin  - Surgical culture of left foot: Pseudomonas, Staph, Corynebacterium   - Dressings to be changed every 2 days to allow for optimal healing and not disrupt surgical site: xeroform, DSD, ACE. Dressings changed 11/30/20. Next dressing change post-op  - WBC 7.2 (11/30/20, last date)  - Patient to be NWB to operative extremity. But continue PT/OT  - Podiatric Surgical intervention Wednesday 12/2/20: Left foot wound debridement, with possible application of graft and wound vac. - Recommend discharge to SNF   - Patient to follow up in clinic 1 week after discharge.    - Will continue to follow patient while they are in-house.     Maddy Matthew, DPM  Fellowship-Trained Foot and Ankle Surgeon  [phone]

## 2020-12-01 NOTE — PROGRESS NOTES
NT NT SBA to and from [employer] SBA Modified Independent   Stair negotiation: ascended and descended NT, attempted but pt unable to perform safely 12 steps with tub seat + 1 rail with SBA (assist to move seat) 8 steps with tub seat + 1 rail in stairwell with SBA (assist to move seat) 4 steps with 2 rails with SBA >8 steps with 1 rail + AAD with Modified [place]   Curb Step:   ascended and descended NT NT NT 4 inch step with AAD and Min A 7.5 inch step with AAD and SBA   Picking up object off the floor NT NT NT Will  an object with Min A Will  an object with Modified [place]   BLE ROM R ankle limited d/t past fusion, otherwise WFL  R ankle limited d/t past fusion, otherwise WFL     BLE Strength RLE: grossly 4+/5  LLE: grossly 4/5 at the hip and knee  RLE: grossly 4+/5  LLE: grossly 4/5 at the hip and knee     Balance  Seated: Independent  Standing: Min A<>Max A  Seated: Independent  Standing: Supervision     Date Family Teach Completed No family present at evlauation  No family present to this date     Is additional Family Teaching Needed? Y or N Yes  Yes     Hindering Progress Limited endurance, medical history, obesity  Decreased endurance, WB restriction     PT recommended ELOS 3 weeks       Team's Discharge Plan        Therapist at Team Meeting          Therapeutic Exercise:   AM:   Ambulation: 4x12 feet with [employer] with Supervision  PM:   Stair negotiation: 4 steps with 1 rail + tub seat with Supervision (assist to move tub seat)  Ambulation: 3x10 feet with [employer] with Supervision    Additional Comments: The pt was able to complete negotiation of 12 steps with less resting seated on chair, he was able to complete easily with 1 rail and is no longer requiring 2 rails. The pt is making plans for returning home and is anxious to have his wife participate in a family teach session.  The pt performed stair negotiation in stairwell to progress confidence with stair negotiation and to better simulate home set-up. Patient/family education  Pt/family educated on stair negotiation with tub seat. Patient/family response to education:   Pt/family verbalized understanding Pt/family demonstrated skill Pt/family requires further education in this area   Yes Yes Reinforce     AM  Time in: 1000  Time out: 1045    PM  Time in: 1300  Time out: 1345    Pt is making good progress toward established Physical Therapy goals. Continue with physical therapy current plan of care. Padmini Julien.  [place], [facility] [employer]  License Number: [id number]

## 2020-12-01 NOTE — ANESTHESIA PRE PROCEDURE
Department of Anesthesiology  Preprocedure Note       Name:  Imelda Hampton   Age:  54 y.o.  :  1975                                          MRN:  [id number]         Date:  2020      Surgeon: Comfort Handy):  Yaya Smith DPM    Procedure: Procedure(s):  LEFT FOOT WOUND DEBRIDEMENT, POSSIBLE INTEGRA, POSSIBLE WOUND VAC    Medications prior to admission:   Prior to Admission medications    Medication Sig Start Date End Date Taking?  Authorizing Provider   insulin glargine (LANTUS) 100 UNIT/ML injection vial Inject 15 Units into the skin nightly   Yes Historical Provider, MD   atorvastatin (LIPITOR) 10 MG tablet Take 5 mg by mouth nightly    Yes Historical Provider, MD   metFORMIN (GLUCOPHAGE) 500 MG tablet Take 500 mg by mouth daily (with breakfast)   Yes Historical Provider, MD   enoxaparin (LOVENOX) 40 MG/0.4ML injection Inject 0.4 mLs into the skin daily 20   Niall Escudero MD   insulin lispro (HUMALOG) 100 UNIT/ML injection vial Inject 0-3 Units into the skin nightly 20   Niall Escudero MD   insulin lispro (HUMALOG) 100 UNIT/ML injection vial Inject 0-6 Units into the skin 3 times daily (with meals) 20   Huma Sanchez MD   glucagon, rDNA, 1 MG injection Inject 1 mg into the muscle as needed for Low blood sugar (Blood glucose less than 70 mg/dL and patient NOT ALERT or NPO and does not have IV access.) 20  Niall Escudero MD   glucose (GLUTOSE) 40 % GEL Take 37.5 mLs by mouth as needed (Hypoglycemia) 20   Niall Escudero MD   ceFAZolin (ANCEF) infusion Infuse 2,000 mg intravenously every 8 hours Compound per protocol 20  Ismael Last DO       Current medications:    Current Facility-Administered Medications   Medication Dose Route Frequency Provider Last Rate Last Dose    oxyCODONE-acetaminophen (PERCOCET) 5-325 MG per tablet 1 tablet  1 tablet Oral Q4H PRN MD David Hardy dextrose 5 % and 0.45 % sodium chloride infusion   Intravenous Continuous Lili Portillo MD 60 mL/hr at 12/02/20 1158      amLODIPine (NORVASC) tablet 5 mg  5 mg Oral Daily Kemi Mao MD   5 mg at 12/01/20 0744    dextrose 5 % solution  100 mL/hr Intravenous PRN Arthur Schmidt MD        dextrose 50 % IV solution  12.5 g Intravenous PRN Huma Meredith MD        glucagon (rDNA) injection 1 mg  1 mg Intramuscular PRN Huma Meredith MD        glucose (GLUTOSE) 40 % oral gel 15 g  15 g Oral PRN Huma Meredith MD        acetaminophen (TYLENOL) tablet 650 mg  650 mg Oral Q6H PRN Huma Meredith MD        atorvastatin (LIPITOR) tablet 5 mg  5 mg Oral Nightly Huma Meredith MD   5 mg at 12/01/20 2141    ceFAZolin (ANCEF) 2 g in sterile water 20 mL IV syringe  2 g Intravenous Q8H Huma Meredith MD   2 g at 12/02/20 1428    enoxaparin (LOVENOX) injection 40 mg  40 mg Subcutaneous Daily Huma Meredith MD   40 mg at 12/02/20 0751    heparin flush 100 UNIT/ML injection 300 Units  3 mL Intravenous 2 times per day Arthur Schmidt MD   300 Units at 12/01/20 2141    heparin flush 100 UNIT/ML injection 300 Units  3 mL Intracatheter PRN Arthur Schmidt MD   300 Units at 12/02/20 1429    insulin glargine (LANTUS) injection vial 15 Units  15 Units Subcutaneous Nightly Huma Meredith MD   15 Units at 11/30/20 2109    insulin lispro (HUMALOG) injection vial 5 Units  5 Units Subcutaneous TID WC Huma Meredith MD   5 Units at 12/01/20 1656    metFORMIN (GLUCOPHAGE) tablet 500 mg  500 mg Oral Daily with breakfast Huma Meredith MD   500 mg at 12/01/20 0744    sodium chloride flush 0.9 % injection 10 mL  10 mL Intravenous BID Huma Meredith MD   10 mL at 12/01/20 2141    sodium chloride flush 0.9 % injection 10 mL  10 mL Intravenous PRN Noreen Collins Renetta Simms MD   10 mL at 12/02/20 1429    acetaminophen (TYLENOL) tablet 650 mg  650 mg Oral Q4H PRN Army Barrington MD        polyethylene glycol (GLYCOLAX) packet 17 g  17 g Oral Daily PRN Army Barrington MD           Allergies: Allergies   Allergen Reactions    Vancomycin Other (See Comments)     Red man syndrome    Semaglutide Itching and Rash     Pink itch rash         Problem List:    Patient Active Problem List   Diagnosis Code    Di[facility] foot infection ([facility] E11.628, L08.9    Charcot foot due to diabetes mellitus ([facility] E11.610    Diabetic neuropathy associated with diabetes mellitus due to underlying condition ([facility] E08.40    Essential hypertension I10    Diabetic foot ulcer with osteomyelitis ([facility] E11.621, E11.69, L97.509, M86.9    Partial nontraumatic amputation of foot, left ([facility] Z80.285       Past Medical History:        Diagnosis Date    Charcot foot due to diabetes mellitus ([facility]     Diabetes mellitus ([facility]     Diabetic foot ulcer with osteomyelitis ([facility] 11/11/2020    Hypertension        Past Surgical History:        Procedure Laterality Date    ANKLE FUSION  02/2020    FOOT AMPUTATION Left 11/14/2020    LEFT FOOT SYMES AMPUTATION performed by Adrián Valenzuela DPM at [place] [address] Left 10/17/2020    LEFT FOOT  INCISION AND DRAINAGE, BONE DEBRIDEMENT AND BIOPSY performed by Delmi Kidd DPM at [place] [address] Left 11/11/2020    LEFT FOOT INCISION AND DRAINAGE APPLICATION OF WOUND VAC POSSIBLE INTEGRA GRAFT performed by Adrián Valenzuela DPM at [address] [place] [address]  11/18/2020            Social History:    Social History     Tobacco Use    Smoking status: Never Smoker    Smokeless tobacco: Never Used   Substance Use Topics    Alcohol use: Not Currently     Frequency: Never     Comment: 3 times a year maybe.  very rare                                Counseling given: Not Answered      Vital Signs (Current):   Vitals:    11/29/20 6930 sinus rhythm  Normal ECG  When compared with ECG of 16-OCT-2020 07:22,  No significant change was found  Confirmed by Mike Oven ([id number]) on 11/10/2020 5:51:21 AM    Chest Xray 11/10/20     FINDINGS:    Heart size is normal. No focal consolidation in the lungs. No pleural    effusion or pneumothorax. Anesthesia Evaluation  Patient summary reviewed and Nursing notes reviewed no history of anesthetic complications:   Airway: Mallampati: II  TM distance: >3 FB   Neck ROM: full  Mouth opening: > = 3 FB Dental:          Pulmonary: breath sounds clear to auscultation      (-) not a current smoker                           Cardiovascular:  Exercise tolerance: good (>4 METS),   (+) hypertension (Previously reported in medical chart but denies):, hyperlipidemia (Pt on Lipitor but states cholesterol is normal)      ECG reviewed  Rhythm: regular  Rate: normal           Beta Blocker:  Not on Beta Blocker         Neuro/Psych:   (+) neuromuscular disease (Charcot foot due to DM):,             GI/Hepatic/Renal:        (-) no morbid obesity       Endo/Other:    (+) DiabetesType II DM, using insulin, . Abdominal:   (+) obese,         Vascular:   + PVD, aortic or cerebral, . Anesthesia Plan      MAC     ASA 3       Induction: intravenous. Anesthetic plan and risks discussed with patient. Use of blood products discussed with patient whom consented to blood products. Plan discussed with CRNA and attending. Kimberly Chamberlain RN  Staff Anesthesiologist  1:54 PM    DOS STAFF ADDENDUM:    Pt seen and examined, chart reviewed (including anesthesia, drug and allergy history). Anesthetic plan, risks, benefits, alternatives, and personnel involved discussed with patient. Patient verbalized an understanding and agrees to proceed. Plan discussed with care team members and agreed upon.     Shania Herrera MD  Staff Anesthesiologist  3:10 PM

## 2020-12-01 NOTE — PROGRESS NOTES
results for input(s): APTT in the last 72 hours. LIVER PROFILE:  Recent Labs     11/30/20  0730   AST 18   ALT 5   BILITOT 0.6   ALKPHOS 82       Imaging Last 24 Hours:  No results found. Assessment//Plan           Hospital Problems           Last Modified POA    Partial nontraumatic amputation of foot, left ([facility] 11/19/2020 Yes        Assessment:    Condition: In stable condition. Improving. (Left Symes amputation). Plan:   Encourage ambulation.  (Will review progress in team today  Limited by the fused right ankle  Podiatry is apparently planning debridement and a wound VAC tomorrow  We will address the discharge plans further in team today).        Electronically signed by Al Nogueira MD on 12/1/20 at 7:48 AM EST

## 2020-12-01 NOTE — CARE COORDINATION
Per team meeting:  D/c 12/8. Updated patient - he will update wife due to her work and unable to talk at work. Patient is motivated and making gains. Goals range from Mod I to I.      DME - knee walker which is NOT covered by his pending Medicaid or any insurance and would be $60 to rent monthly OR $210.90 up front. In lieu of that ordered wheelchair, ETB and tub seat no back for stairs, and 3 in1 commode all via Medicaid pending. [facility]) Nursing - IV ATB, PT/OT/aide. FT - 12/5 - time to be determined - patient will notify  9 or 10:30. The Plan for Transition of Care is related to the following treatment goals: home with home care    The Patient was provided with a choice of provider and agrees   with the discharge plan. [x] Yes [] No    Freedom of choice list was provided with basic dialogue that supports the patient's individualized plan of care/goals, treatment preferences and shares the quality data associated with the providers.  [x] Yes [] No    Ulises Edouard

## 2020-12-01 NOTE — PROGRESS NOTES
engaged in am ADL;s to increase independence with grooming, dressing, bathing and transfers. Pt completed w/c<>3:1 over preexisting toilet using heather Nieves for balance and safety while following NWB LLE. BUE AROM Ex's performing resistive arm bike while wearing 2# wts tolerating 2 reps of 6 ea then 5# ball ex's seated in w/c for shld flexion/extension and bicep curls tolerating 3 reps of 10-15 ea. Sensory / Neuromuscular Re-Education:      Cognitive Skills:   Status Comments   Problem   Solving good     Memory good     Sequencing fair +good-    Safety fair +      Visual Perception:    Education:  Hand placement during tub transfers, compensatory techniques during IADL activities such as meal prep, AD management during functional activity and energy conservation techniques to maximize independence with ADLs    [] Family teach completed on:    Pain Level: 0/10    Additional Notes:   11/27/20  Pt requested to focus on B UE strengthening exercises to increase independence and endurance for transfers/mobility and ADL tasks daily. 11/29/20 Request done on this date for ADLls and BUE strengthening exercises. Patient has made good  progress during treatment sessions toward set goals.  Therapy emphasis to obtain goals:   Long term goal 1: Pt demo independent to eat all meals  Long term goal 2: Pt dmeo MOd I grooming seated or standing @ sink level  Long term goal 3: Pt demo SBA bathing @ shower level or sponge bathing both seated & standing  Long term goal 4: Pt demo I UE & Mod I LE dress with AE as needed  Long term goal 5: Pt demo MOd i commode trf wt appropraite DME to ensure pt safety  Long term goals 6: Pt demo Sup tub trf with appropraite DME to ensure pt safety  Long term goal 7: Pt demo Mod I light homemaking task @ wc level  Long term goal 8: Pt demo G endurance for a 30 minute funcitonal activity  Long term goal 9: Pt demo increased BUE strength to 5/5 & improved  strength- On eval R

## 2020-12-02 ENCOUNTER — ANESTHESIA (OUTPATIENT)
Dept: OPERATING ROOM | Age: 45
DRG: 041 | End: 2020-12-02

## 2020-12-02 VITALS
SYSTOLIC BLOOD PRESSURE: 132 MMHG | OXYGEN SATURATION: 99 % | DIASTOLIC BLOOD PRESSURE: 72 MMHG | RESPIRATION RATE: 14 BRPM

## 2020-12-02 LAB
METER GLUCOSE: 102 MG/DL (ref 74–99)
METER GLUCOSE: 103 MG/DL (ref 74–99)
METER GLUCOSE: 125 MG/DL (ref 74–99)
METER GLUCOSE: 145 MG/DL (ref 74–99)

## 2020-12-02 PROCEDURE — 87102 FUNGUS ISOLATION CULTURE: CPT

## 2020-12-02 PROCEDURE — 7100000001 HC PACU RECOVERY - ADDTL 15 MIN: Performed by: PODIATRIST

## 2020-12-02 PROCEDURE — 6360000002 HC RX W HCPCS

## 2020-12-02 PROCEDURE — 6360000002 HC RX W HCPCS: Performed by: ANESTHESIOLOGY

## 2020-12-02 PROCEDURE — 82962 GLUCOSE BLOOD TEST: CPT

## 2020-12-02 PROCEDURE — 6360000002 HC RX W HCPCS: Performed by: INTERNAL MEDICINE

## 2020-12-02 PROCEDURE — 2709999900 HC NON-CHARGEABLE SUPPLY: Performed by: PODIATRIST

## 2020-12-02 PROCEDURE — 1280000000 HC REHAB R&B

## 2020-12-02 PROCEDURE — 87176 TISSUE HOMOGENIZATION CULTR: CPT

## 2020-12-02 PROCEDURE — 87070 CULTURE OTHR SPECIMN AEROBIC: CPT

## 2020-12-02 PROCEDURE — 3600000002 HC SURGERY LEVEL 2 BASE: Performed by: PODIATRIST

## 2020-12-02 PROCEDURE — 3600000012 HC SURGERY LEVEL 2 ADDTL 15MIN: Performed by: PODIATRIST

## 2020-12-02 PROCEDURE — 3700000001 HC ADD 15 MINUTES (ANESTHESIA): Performed by: PODIATRIST

## 2020-12-02 PROCEDURE — 2580000003 HC RX 258: Performed by: INTERNAL MEDICINE

## 2020-12-02 PROCEDURE — 6370000000 HC RX 637 (ALT 250 FOR IP): Performed by: INTERNAL MEDICINE

## 2020-12-02 PROCEDURE — 87205 SMEAR GRAM STAIN: CPT

## 2020-12-02 PROCEDURE — 87075 CULTR BACTERIA EXCEPT BLOOD: CPT

## 2020-12-02 PROCEDURE — 2580000003 HC RX 258

## 2020-12-02 PROCEDURE — 3700000000 HC ANESTHESIA ATTENDED CARE: Performed by: PODIATRIST

## 2020-12-02 PROCEDURE — 7100000000 HC PACU RECOVERY - FIRST 15 MIN: Performed by: PODIATRIST

## 2020-12-02 PROCEDURE — 2500000003 HC RX 250 WO HCPCS: Performed by: PODIATRIST

## 2020-12-02 RX ORDER — PROMETHAZINE HYDROCHLORIDE 25 MG/ML
6.25 INJECTION, SOLUTION INTRAMUSCULAR; INTRAVENOUS
Status: DISCONTINUED | OUTPATIENT
Start: 2020-12-02 | End: 2020-12-02 | Stop reason: HOSPADM

## 2020-12-02 RX ORDER — SODIUM CHLORIDE 9 MG/ML
INJECTION, SOLUTION INTRAVENOUS CONTINUOUS PRN
Status: DISCONTINUED | OUTPATIENT
Start: 2020-12-02 | End: 2020-12-02 | Stop reason: SDUPTHER

## 2020-12-02 RX ORDER — PROPOFOL 10 MG/ML
INJECTION, EMULSION INTRAVENOUS CONTINUOUS PRN
Status: DISCONTINUED | OUTPATIENT
Start: 2020-12-02 | End: 2020-12-02 | Stop reason: SDUPTHER

## 2020-12-02 RX ORDER — MORPHINE SULFATE 2 MG/ML
2 INJECTION, SOLUTION INTRAMUSCULAR; INTRAVENOUS EVERY 5 MIN PRN
Status: DISCONTINUED | OUTPATIENT
Start: 2020-12-02 | End: 2020-12-02 | Stop reason: HOSPADM

## 2020-12-02 RX ORDER — BUPIVACAINE HYDROCHLORIDE 5 MG/ML
INJECTION, SOLUTION EPIDURAL; INTRACAUDAL PRN
Status: DISCONTINUED | OUTPATIENT
Start: 2020-12-02 | End: 2020-12-02 | Stop reason: ALTCHOICE

## 2020-12-02 RX ORDER — ONDANSETRON 2 MG/ML
4 INJECTION INTRAMUSCULAR; INTRAVENOUS
Status: DISCONTINUED | OUTPATIENT
Start: 2020-12-02 | End: 2020-12-02 | Stop reason: HOSPADM

## 2020-12-02 RX ORDER — MIDAZOLAM HYDROCHLORIDE 1 MG/ML
INJECTION INTRAMUSCULAR; INTRAVENOUS PRN
Status: DISCONTINUED | OUTPATIENT
Start: 2020-12-02 | End: 2020-12-02 | Stop reason: SDUPTHER

## 2020-12-02 RX ORDER — MEPERIDINE HYDROCHLORIDE 25 MG/ML
12.5 INJECTION INTRAMUSCULAR; INTRAVENOUS; SUBCUTANEOUS EVERY 5 MIN PRN
Status: DISCONTINUED | OUTPATIENT
Start: 2020-12-02 | End: 2020-12-02 | Stop reason: HOSPADM

## 2020-12-02 RX ORDER — OXYCODONE HYDROCHLORIDE AND ACETAMINOPHEN 5; 325 MG/1; MG/1
1 TABLET ORAL EVERY 4 HOURS PRN
Status: DISCONTINUED | OUTPATIENT
Start: 2020-12-02 | End: 2020-12-05

## 2020-12-02 RX ORDER — FENTANYL CITRATE 50 UG/ML
INJECTION, SOLUTION INTRAMUSCULAR; INTRAVENOUS PRN
Status: DISCONTINUED | OUTPATIENT
Start: 2020-12-02 | End: 2020-12-02 | Stop reason: SDUPTHER

## 2020-12-02 RX ADMIN — ENOXAPARIN SODIUM 40 MG: 40 INJECTION SUBCUTANEOUS at 07:51

## 2020-12-02 RX ADMIN — Medication 300 UNITS: at 14:29

## 2020-12-02 RX ADMIN — ATORVASTATIN CALCIUM 5 MG: 10 TABLET, FILM COATED ORAL at 20:32

## 2020-12-02 RX ADMIN — Medication 10 ML: at 22:00

## 2020-12-02 RX ADMIN — INSULIN GLARGINE 15 UNITS: 100 INJECTION, SOLUTION SUBCUTANEOUS at 20:32

## 2020-12-02 RX ADMIN — MIDAZOLAM 2 MG: 1 INJECTION INTRAMUSCULAR; INTRAVENOUS at 15:32

## 2020-12-02 RX ADMIN — Medication 2 G: at 14:28

## 2020-12-02 RX ADMIN — PROPOFOL 100 MCG/KG/MIN: 10 INJECTION, EMULSION INTRAVENOUS at 15:35

## 2020-12-02 RX ADMIN — SODIUM CHLORIDE, PRESERVATIVE FREE 300 UNITS: 5 INJECTION INTRAVENOUS at 22:00

## 2020-12-02 RX ADMIN — Medication 2 G: at 22:00

## 2020-12-02 RX ADMIN — HYDROMORPHONE HYDROCHLORIDE 0.5 MG: 1 INJECTION, SOLUTION INTRAMUSCULAR; INTRAVENOUS; SUBCUTANEOUS at 16:51

## 2020-12-02 RX ADMIN — FENTANYL CITRATE 50 MCG: 50 INJECTION, SOLUTION INTRAMUSCULAR; INTRAVENOUS at 15:35

## 2020-12-02 RX ADMIN — Medication 10 ML: at 14:29

## 2020-12-02 RX ADMIN — SODIUM CHLORIDE: 9 INJECTION, SOLUTION INTRAVENOUS at 15:34

## 2020-12-02 RX ADMIN — Medication 2 G: at 06:06

## 2020-12-02 RX ADMIN — HYDROMORPHONE HYDROCHLORIDE 0.5 MG: 1 INJECTION, SOLUTION INTRAMUSCULAR; INTRAVENOUS; SUBCUTANEOUS at 16:39

## 2020-12-02 ASSESSMENT — PAIN SCALES - GENERAL
PAINLEVEL_OUTOF10: 0
PAINLEVEL_OUTOF10: 7
PAINLEVEL_OUTOF10: 0
PAINLEVEL_OUTOF10: 0
PAINLEVEL_OUTOF10: 3
PAINLEVEL_OUTOF10: 0
PAINLEVEL_OUTOF10: 7
PAINLEVEL_OUTOF10: 3
PAINLEVEL_OUTOF10: 0

## 2020-12-02 ASSESSMENT — PULMONARY FUNCTION TESTS
PIF_VALUE: 1
PIF_VALUE: 0

## 2020-12-02 ASSESSMENT — PAIN DESCRIPTION - PROGRESSION: CLINICAL_PROGRESSION: GRADUALLY IMPROVING

## 2020-12-02 ASSESSMENT — PAIN DESCRIPTION - LOCATION
LOCATION: LEG
LOCATION: FOOT

## 2020-12-02 ASSESSMENT — PAIN DESCRIPTION - PAIN TYPE
TYPE: SURGICAL PAIN
TYPE: SURGICAL PAIN

## 2020-12-02 ASSESSMENT — PAIN DESCRIPTION - DESCRIPTORS: DESCRIPTORS: PATIENT UNABLE TO DESCRIBE

## 2020-12-02 ASSESSMENT — PAIN DESCRIPTION - ORIENTATION
ORIENTATION: LEFT
ORIENTATION: LEFT

## 2020-12-02 ASSESSMENT — PAIN DESCRIPTION - FREQUENCY: FREQUENCY: INTERMITTENT

## 2020-12-02 NOTE — PROGRESS NOTES
OCCUPATIONAL THERAPY DAILY NOTE    Date:2020  Patient Name: Annmarie Broderick  MRN: [id number]  : 1975  Room: [address] [place], [place] [address]     Diagnosis: L Syme's amputation- amputation @ ankle, partial excision distal/fibula 2020  Additional Pertinent Hx: Charcot foot, neuropathy, DM, HTN, osteomyilitis  Precautions: Falls, NWB L LE    Functional Assessment:   Date Status AE  Comments   Feeding 20 Independent  Goal Met. Grooming 20 Mod I seated Goal Met- including shaving         Oral Care 20 Mod I seated Goal Met. Bathing 20 CGA     UB Dressing 20 Independent     LB Dressing 20 SBA/CGA ww           Footwear 20 Sup     Toileting 20 SBA/CGA ww    Homemaking 20 SBA  w/c level      Functional Transfers / Balance:   Date Status DME  Comments   Sit Balance 20 Supervision     Stand Balance 20  CGA     [x] Tub  [x] Shower   Transfer 20 CGA Extended tub bench, w/w     Commode   Transfer 20 CGA W/w, 3-in-1    Functional   Mobility 20  CGA           Sup W/w          W/c     Other:    Sit<>stand     Couch    Bed<>W/c        Bed mobility     20      SBA/CGA     Mod A    CGA        SBA     W/w        W/w        Bed rails      Functional Exercises / Activity:   Pt refused tx.   See comments  Sensory / Neuromuscular Re-Education:      Cognitive Skills:   Status Comments   Problem   Solving good     Memory good     Sequencing fair +good-    Safety fair +      Visual Perception:    Education:  Hand placement during tub transfers, compensatory techniques during IADL activities such as meal prep, AD management during functional activity and energy conservation techniques to maximize independence with ADLs    [] Family teach completed on:    Pain Level: 0/10    Additional Notes:   20  Pt requested to focus on B UE strengthening exercises to increase independence and endurance for transfers/mobility and ADL tasks daily. 11/29/20 Request done on this date for ADLls and BUE strengthening exercises. Patient has made good  progress during treatment sessions toward set goals. Therapy emphasis to obtain goals:   Long term goal 1: Pt demo independent to eat all meals  Long term goal 2: Pt dmeo MOd I grooming seated or standing @ sink level  Long term goal 3: Pt demo SBA bathing @ shower level or sponge bathing both seated & standing  Long term goal 4: Pt demo I UE & Mod I LE dress with AE as needed  Long term goal 5: Pt demo MOd i commode trf wt appropraite DME to ensure pt safety  Long term goals 6: Pt demo Sup tub trf with appropraite DME to ensure pt safety  Long term goal 7: Pt demo Mod I light homemaking task @ wc level  Long term goal 8: Pt demo G endurance for a 30 minute funcitonal activity  Long term goal 9: Pt demo increased BUE strength to 5/5 & improved  strength- On eval R  strength 95# & norm for pt age & gender is 110# & L hand 80# & norm for tp age & gender is 101#  Long term goal 10: Pt demo Mod I wc porpulsion throughout a living environment & around obstacles      [x] Continue with current OT Plan of care. [] Prepare for Discharge     DISCHARGE RECOMMENDATIONS  Recommended DME:    Post Discharge Care:   []Home Independently  []Home with 24hr Care / Supervision []Home with Partial Supervision []Home with Home Health OT []Home with Out Pt OT []Other: ___   Comments:         Time in Time out Tx Time Breakdown  Variance:   First Session    [] Individual Tx-  [] Concurrent Tx -   [] Co-Tx -   [] Group Tx -   [x] Time Missed - 45 mins Pt declined all activity requesting to rest prior to surgery today. Wishes respected.    Second Session   [] Individual Tx-   [] Concurrent Tx -  [] Co-Tx -   [] Group Tx -   [x] Time Missed - 45 \" \"   Third Session    [] Individual Tx-   [] Concurrent Tx -  [] Co-Tx -   [] Group Tx -   [] Time Missed -         Total Tx Time: 0     Crystal Mehran  OTR/L SUSAN [facility] [facility], OTR/L [id number]

## 2020-12-02 NOTE — PROGRESS NOTES
Progress Note  Date:2020       EQSQ:[id number]  Patient Marcella Ramirez     Date of Birth:[birth year]     Age:45 y.o. Subjective    Subjective:  Symptoms:  Stable. He reports weakness. Diet:  Adequate intake. Activity level: Impaired due to weakness. Pain:  He reports no pain. Review of Systems   Neurological: Positive for weakness. Objective         Vitals Last 24 Hours:  TEMPERATURE:  Temp  Av.1 °F (36.7 °C)  Min: 98.1 °F (36.7 °C)  Max: 98.1 °F (36.7 °C)  RESPIRATIONS RANGE: Resp  Av  Min: 18  Max: 18  PULSE OXIMETRY RANGE: No data recorded  PULSE RANGE: Pulse  Av  Min: 71  Max: 71  BLOOD PRESSURE RANGE: Systolic (52EBQ), TXT:281 , Min:146 , TUK:162   ; Diastolic (40MFS), NZI:51, Min:83, Max:83    I/O (24Hr): Intake/Output Summary (Last 24 hours) at 2020 0747  Last data filed at 2020 0606  Gross per 24 hour   Intake 1076 ml   Output 2200 ml   Net -1124 ml     Objective:  General Appearance: In no acute distress. Vital signs: (most recent): Blood pressure (!) 146/83, pulse 71, temperature 98.1 °F (36.7 °C), temperature source Temporal, resp. rate 18, height 6' 3\" (1.905 m), weight 289 lb 11.8 oz (131.4 kg), SpO2 96 %. Vital signs are normal.    Output: Producing urine and producing stool. Lungs:  Normal effort and normal respiratory rate. Breath sounds clear to auscultation. Heart: Normal rate. Regular rhythm. S1 normal and S2 normal.    Abdomen: Abdomen is soft. Bowel sounds are normal.   There is no abdominal tenderness. Extremities: There is deformity. Neurological: Patient is alert.       Labs/Imaging/Diagnostics    Labs:  CBC:  Recent Labs     20  0730   WBC 7.2   RBC 4.09   HGB 10.5*   HCT 34.4*   MCV 84.1   RDW 17.2*        CHEMISTRIES:  Recent Labs     20  0730      K 4.4      CO2 26   BUN 16   CREATININE 0.8   GLUCOSE 96     PT/INR:No results for input(s): PROTIME, INR in the last 72 hours.  APTT:No results for input(s): APTT in the last 72 hours. LIVER PROFILE:  Recent Labs     11/30/20  0730   AST 18   ALT 5   BILITOT 0.6   ALKPHOS 82       Imaging Last 24 Hours:  No results found. Assessment//Plan           Hospital Problems           Last Modified POA    Partial nontraumatic amputation of foot, left ([address] 11/19/2020 Yes      Assessment:      Date   Status   AE    Comments     Feeding   12/1/20   Independent       Goal Met. Grooming   12/1/20   Mod I   seated   Goal Met- including shaving           Oral Care   12/1/20   Mod I   seated   Goal Met. Bathing   12/1/20   CGA       UE: Tasks performed seated with S/U    LE: CGA for balance standing at ww during ADL. UB Dressing   12/1/20   Independent       Goal Met. LB Dressing   12/1/20   SBA/CGA   ww    Pt doffed/ donned underwear and shorts then pulled garments up over hips using wJeremi Encarnacion Cortez for balance. Footwear   12/1/20   Sup       Pt used crossover method to doff/ don R shoe     Toileting   12/1/20   SBA/CGA   ww   Pt SBA for clothing management and performed hygiene while seated. Homemaking   11/29/20   SBA    w/c level              Functional Transfers / Balance:      Date Status DME  Comments   Sit Balance 12/1/20   Supervision   Demo'd during ADLs up in wc and on commode    Stand Balance 12/1/20    CGA   Pt stood for clothing mgmt using w. Walker and w/c<>toilet transfers while following NWB LLE. [x]? Tub  [x]? Shower   Transfer 11/30/20   CGA Extended tub bench, w/w      Commode   Transfer 12/1/20   CGA W/w, 3-in-1 Pt used w.walker with wc/<>commode transfers for balance and safety following NWB LLE. Functional   Mobility 11/30/20 11/30/20  CGA              Sup W/w              W/c  .                Other:     Sit<>stand      Couch     Bed<>W/c           Bed mobility       12/1/20 11/23/20 11/30/20 11/30/20        SBA/CGA      Mod A     CGA           SBA       W/w           W/w           Bed rails       Sit to stand transfers during w/c<>commode transfers along with clothing mgmt.                           Assessment:    Condition: In stable condition. Improving. (Left Symes amputation). Plan:   Encourage ambulation. (Tolerating therapy well  For debridement and wound VAC later today  Currently n.p.o. and on IV fluids  Planning discharge next week if all goes well with the surgery).        Electronically signed by Jane Jama MD on 12/2/20 at 7:47 AM EST

## 2020-12-02 NOTE — PROGRESS NOTES
Physical Therapy    Facility/Department: [address] REHAB    NAME: Nadine Watters  : 1975  MRN: [id number]    Date of Service: 2020  Evaluating Therapist: Stalin Diallo. Nick Hernandez P.T.    ROOM: [facility]  DIAGNOSIS: BKA  PRECAUTIONS: Fall risk, NWB L residual limb  PROCEDURE(S):   : I&D with bone debridement/biopsy   : Syme's amputation at ankle with partial excision of distal tib/fib  HPI: The pt was admitted to [facility] on 11/10 with a diabetic foot infection with osteomyelitis. Social:  Pt lives with his wife in a 2nd floor apartment with a curb step+2+1 steps with 1 rail to enter building and 7 steps with 2 rails to the 2nd floor. 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     Initial Evaluation  20          Short Term Goals Long Term Goals    Was pt agreeable to Eval/treatment? Yes No, pt refused No, pt refused     Does pt have pain? Minimal LLE soreness \"maybe . 5/10)        Bed Mobility  Rolling: Supervision  Supine<>sit: Supervision  Scooting: Supervision    Independent   Transfers Sit to stand: Max A  Stand to sit: Max A  Stand pivot:  Max A [employer]   SBA Modified Independent   Ambulation   15 feet with [employer] with Min A + WC follow   20 feet with AAD with SBA >150 feet with AAD with Modified [place]   Walking 10 feet on uneven surface NT   10 feet with AAD with SBA >10 feet with AAD with Modified [place]   Wheel Chair Mobility 150 feet with BUE with SBA    >200 feet with BUE with Modified [place]   Car Transfers NT   SBA Modified Independent   Stair negotiation: ascended and descended NT, attempted but pt unable to perform safely   4 steps with 2 rails with SBA >8 steps with 1 rail + AAD with Modified [place]   Curb Step:   ascended and descended NT   4 inch step with AAD and Min A 7.5 inch step with AAD and SBA   Picking up object off the floor NT   Will  an object with Min A Will  an object with Modified [place] BLE ROM R ankle limited d/t past fusion, otherwise WFL       BLE Strength RLE: grossly 4+/5  LLE: grossly 4/5 at the hip and knee       Balance  Seated: Independent  Standing: Min A<>Max A       Date Family Teach Completed No family present at evlauation       Is additional Family Teaching Needed? Y or N Yes       Hindering Progress Limited endurance, medical history, obesity       PT recommended ELOS 3 weeks       Team's Discharge Plan        Therapist at Team Meeting          Additional Comments: The pt refused to work with therapy in the morning and the afternoon as he was NPO for a procedure that was scheduled for the afternoon and stated he did not want to jeopardize the surgery or his recovery due to his diabetes. Becky Zavala.  Finesse Leon, [address]  number:  PT [phone]

## 2020-12-02 NOTE — BRIEF OP NOTE
Brief Postoperative Note      Patient: Vane Calzada  YOB: 1975  MRN: [id number]    Date of Procedure: 12/2/2020    Pre-Op Diagnosis: . Post-Op Diagnosis: Same       Procedure(s):  LEFT FOOT WOUND DEBRIDEMENT, INCISION AND DRAINAGE OF LEFT FOOT HEMATOMA, INTEGRA, WOUND VAC    Surgeon(s):  Yaya Sanchez DPM    Assistant:  * No surgical staff found *    Anesthesia: Monitor Anesthesia Care    Estimated Blood Loss (mL): Minimal    Complications: None    Specimens:   ID Type Source Tests Collected by Time Destination   1 : left leg wound Tissue Tissue CULTURE, ANAEROBIC, CULTURE, FUNGUS, CULTURE, SURGICAL Yaya Sanchez DPM 12/2/2020 1547        Implants:  * No implants in log *      Drains:   [REMOVED] Closed/Suction Drain Left Other (Comment) Bulb 15 [place] (Removed)   Site Description Unable to view 11/16/20 0621   Dressing Status Dry; Intact; Clean 11/16/20 0621   Drainage Appearance Bloody 11/16/20 0621   Status Compressed 11/16/20 0621   Output (ml) 10 ml 11/16/20 1230       [REMOVED] Negative Pressure Wound Therapy Foot Left (Removed)   Dressing Type Black foam 10/21/20 1123   Target Pressure (mmHg) 125 10/21/20 1123   Dressing Status Clean;Dry; Intact 10/22/20 0830   Dressing Changed Changed/New 10/19/20 1100   Drainage Amount Small 10/19/20 1100   Drainage Description Serous 10/19/20 1100   Wound Assessment Red;Purple 10/19/20 1100       [REMOVED] Negative Pressure Wound Therapy Foot Left (Removed)   Wound Type Surgical 11/14/20 0800   Dressing Type Black foam 11/11/20 0756   Cycle Continuous; On 11/14/20 1110   Target Pressure (mmHg) 125 11/14/20 0800   Dressing Status Clean;Dry; Intact 11/14/20 0800   Drainage Description Serosanguinous 11/12/20 2040       Findings: 20 cc dry hematoma    Electronically signed by Eleni Herzog DPM on 12/2/2020 at 4:24 PM English

## 2020-12-02 NOTE — PROGRESS NOTES
H and P reviewed. Plan for left ankle wound debridement, graft application and VAC therapy. Blood pressure (!) 146/83, pulse 71, temperature 98.1 °F (36.7 °C), temperature source Temporal, resp. rate 18, height 6' 3\" (1.905 m), weight 289 lb 11.8 oz (131.4 kg), SpO2 96 %.     Rashida Vaca DPM  Fellowship-Trained Foot and Ankle Surgeon  [phone]

## 2020-12-03 LAB
METER GLUCOSE: 103 MG/DL (ref 74–99)
METER GLUCOSE: 105 MG/DL (ref 74–99)
METER GLUCOSE: 96 MG/DL (ref 74–99)
METER GLUCOSE: 99 MG/DL (ref 74–99)

## 2020-12-03 PROCEDURE — 2580000003 HC RX 258: Performed by: PODIATRIST

## 2020-12-03 PROCEDURE — 6360000002 HC RX W HCPCS: Performed by: PODIATRIST

## 2020-12-03 PROCEDURE — 6370000000 HC RX 637 (ALT 250 FOR IP): Performed by: PODIATRIST

## 2020-12-03 PROCEDURE — 97110 THERAPEUTIC EXERCISES: CPT

## 2020-12-03 PROCEDURE — 1280000000 HC REHAB R&B

## 2020-12-03 PROCEDURE — 82962 GLUCOSE BLOOD TEST: CPT

## 2020-12-03 PROCEDURE — 97530 THERAPEUTIC ACTIVITIES: CPT

## 2020-12-03 RX ADMIN — METFORMIN HYDROCHLORIDE 500 MG: 500 TABLET ORAL at 08:44

## 2020-12-03 RX ADMIN — INSULIN LISPRO 5 UNITS: 100 INJECTION, SOLUTION INTRAVENOUS; SUBCUTANEOUS at 11:46

## 2020-12-03 RX ADMIN — Medication 10 ML: at 08:44

## 2020-12-03 RX ADMIN — ENOXAPARIN SODIUM 40 MG: 40 INJECTION SUBCUTANEOUS at 08:44

## 2020-12-03 RX ADMIN — AMLODIPINE BESYLATE 5 MG: 5 TABLET ORAL at 08:44

## 2020-12-03 RX ADMIN — SODIUM CHLORIDE, PRESERVATIVE FREE 300 UNITS: 5 INJECTION INTRAVENOUS at 22:01

## 2020-12-03 RX ADMIN — Medication 2 G: at 05:52

## 2020-12-03 RX ADMIN — ATORVASTATIN CALCIUM 5 MG: 10 TABLET, FILM COATED ORAL at 21:21

## 2020-12-03 RX ADMIN — INSULIN LISPRO 5 UNITS: 100 INJECTION, SOLUTION INTRAVENOUS; SUBCUTANEOUS at 07:02

## 2020-12-03 RX ADMIN — Medication 2 G: at 14:17

## 2020-12-03 RX ADMIN — Medication 300 UNITS: at 05:52

## 2020-12-03 RX ADMIN — INSULIN GLARGINE 15 UNITS: 100 INJECTION, SOLUTION SUBCUTANEOUS at 21:20

## 2020-12-03 RX ADMIN — INSULIN LISPRO 5 UNITS: 100 INJECTION, SOLUTION INTRAVENOUS; SUBCUTANEOUS at 16:08

## 2020-12-03 RX ADMIN — Medication 10 ML: at 22:01

## 2020-12-03 RX ADMIN — Medication 2 G: at 22:01

## 2020-12-03 RX ADMIN — SODIUM CHLORIDE, PRESERVATIVE FREE 300 UNITS: 5 INJECTION INTRAVENOUS at 08:45

## 2020-12-03 RX ADMIN — Medication 10 ML: at 05:52

## 2020-12-03 ASSESSMENT — PAIN SCALES - GENERAL
PAINLEVEL_OUTOF10: 0

## 2020-12-03 NOTE — PROGRESS NOTES
Spoke with Dereck Wilcox, [facility] rep for wound vacs. Face sheet and financial notes faxed for possible vac therapy at home.

## 2020-12-03 NOTE — PROGRESS NOTES
Progress Note  Date:12/3/2020       WKYY:[id number]  Patient Name:Michael J Leschak     Date of Birth:[birth year]     Age:45 y.o. Subjective    Subjective:  Symptoms:  Stable. He reports weakness. Diet:  Adequate intake. Activity level: Impaired due to weakness. Pain:  He complains of pain that is mild. Review of Systems   Neurological: Positive for weakness. Objective         Vitals Last 24 Hours:  TEMPERATURE:  Temp  Av °F (36.1 °C)  Min: 96.9 °F (36.1 °C)  Max: 97.4 °F (36.3 °C)  RESPIRATIONS RANGE: Resp  Av.4  Min: 0  Max: 20  PULSE OXIMETRY RANGE: SpO2  Av.9 %  Min: 94 %  Max: 100 %  PULSE RANGE: Pulse  Av.5  Min: 71  Max: 96  BLOOD PRESSURE RANGE: Systolic (04KDX), BSB:478 , Min:118 , ONM:268   ; Diastolic (47EJA), YULISA:60, Min:70, Max:88    I/O (24Hr): Intake/Output Summary (Last 24 hours) at 12/3/2020 0926  Last data filed at 12/3/2020 0906  Gross per 24 hour   Intake 2149 ml   Output 2550 ml   Net -401 ml     Objective:  General Appearance: In no acute distress. Vital signs: (most recent): Blood pressure 127/78, pulse 74, temperature 97.4 °F (36.3 °C), temperature source Temporal, resp. rate 18, height 6' 3\" (1.905 m), weight 289 lb 11.8 oz (131.4 kg), SpO2 98 %. Vital signs are normal.    Output: Producing urine and producing stool. Lungs:  Normal effort and normal respiratory rate. Breath sounds clear to auscultation. Heart: Normal rate. Regular rhythm. S1 normal and S2 normal.    Abdomen: Abdomen is soft. Bowel sounds are normal.   There is no abdominal tenderness. Extremities: Decreased range of motion. There is deformity. Neurological: Patient is alert. Labs/Imaging/Diagnostics    Labs:  CBC:No results for input(s): WBC, RBC, HGB, HCT, MCV, RDW, PLT in the last 72 hours. CHEMISTRIES:No results for input(s): NA, K, CL, CO2, BUN, CREATININE, GLUCOSE, PHOS, MG in the last 72 hours.     Invalid input(s): CA  PT/INR:No results for

## 2020-12-03 NOTE — PROGRESS NOTES
Physical Therapy    Facility/Department: [address] [facility]  Treatment Note    NAME: Martina Velasco  : 1975  MRN: [id number]    Date of Service: 12/3/2020  Evaluating Therapist: Reece Severin. Yvette Cheng P.T.    ROOM: [facility]  DIAGNOSIS: BKA  PRECAUTIONS: Fall risk, NWB L residual limb  PROCEDURE(S):   : I&D with bone debridement/biopsy   : Syme's amputation at ankle with partial excision of distal tib/fib  HPI: The pt was admitted to [facility] on 11/10 with a diabetic foot infection with osteomyelitis. Social:  Pt lives with his wife in a 2nd floor apartment with a curb step+2+1 steps with 1 rail to enter building and 7 steps with 2 rails to the 2nd floor. 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     Initial Evaluation  20 AM     PM    Short Term Goals Long Term Goals    Was pt agreeable to Eval/treatment? Yes Yes  Yes     Does pt have pain? Minimal LLE soreness \"maybe . 5/10) No c/o pain No c/o pain     Bed Mobility  Rolling: Supervision  Supine<>sit: Supervision  Scooting: Supervision Independent Independent for all aspects  Independent   Transfers Sit to stand: Max A  Stand to sit: Max A  Stand pivot:  Max A [employer] Sit<>stand: Modified Independent  Stand pivot: Modified Independent Sit<>stand: Modified Independent  Stand pivot: Modified Independent with [employer] SBA Modified Independent   Ambulation   15 feet with WW with Min A + WC follow NT 10 feet with WW with Supervision  Knee scooter propulsion x225 feet with Modified [place] 20 feet with AAD with SBA >150 feet with AAD with Modified [place]   Walking 10 feet on uneven surface NT NT NT 10 feet with AAD with SBA >10 feet with AAD with Modified [place]   Wheel Chair Mobility 150 feet with BUE with SBA  feet with BUE with Modified [place]  >200 feet with BUE with Modified [place]   Car Transfers NT NT NT SBA Modified Independent   Stair negotiation: ascended and descended NT, attempted but pt unable to perform safely NT 12 steps with tub seat + 1 rail in stairwell with SBA (assist to move seat) 4 steps with 2 rails with SBA >8 steps with 1 rail + AAD with Modified [place]   Curb Step:   ascended and descended NT NT NT 4 inch step with AAD and Min A 7.5 inch step with AAD and SBA   Picking up object off the floor NT NT NT Will  an object with Min A Will  an object with Modified [place]   BLE ROM R ankle limited d/t past fusion, otherwise WFL  R ankle limited d/t past fusion, otherwise WFL     BLE Strength RLE: grossly 4+/5  LLE: grossly 4/5 at the hip and knee  RLE: grossly 4+/5  LLE: grossly 4/5 at the hip and knee     Balance  Seated: Independent  Standing: Min A<>Max A  Seated: Independent  Standing: Modified Independent     Date Family Teach Completed No family present at evlauation  No family present to this date     Is additional Family Teaching Needed? Y or N Yes  Yes     Hindering Progress Limited endurance, medical history, obesity  Decreased endurance, WB restriction     PT recommended ELOS 3 weeks       Team's Discharge Plan        Therapist at Team Meeting          Therapeutic Exercise:   AM:   Supine PROM to LLE to include:  -HS stretching 2x30 seconds  -Adductor stretch 2x30 seconds  -PROM progressing to AAROM to L hip/knee joints  Supine LLE Manual Resisted Exercises:   -Straight leg hip extension x15 each  -Hip abduction: x15 each  PM:   Emphasis on functional mobility in the afternoon    Additional Comments: The pt was seen discussing his case with podiatry as there was some confusion regarding the pt's clearance for OOB activity. The pt was returned to the room as it was initially thought the pt was to be strict bedrest, podiatry returned to clarify that the pt is bedrest with the exception of participation with PT/OT. The pt completed bed level L hip strengthening exercises.  The pt was able to participate in functional mobility activities in the afternoon session, denied any pain but ambulation distances were limited after recent podiatric intervention. Patient/family education  Pt/family educated on use of knee scooter to reduce hopping forces. Patient/family response to education:   Pt/family verbalized understanding Pt/family demonstrated skill Pt/family requires further education in this area   Yes Yes No     AM  Time in: 1015  Time out: 1045    PM  Time in: 1300  Time out: 1345    Pt is making good progress toward established Physical Therapy goals. Continue with physical therapy current plan of care. Milady Galeano.  [place], [place]  License Number: [id number]

## 2020-12-03 NOTE — PROGRESS NOTES
OCCUPATIONAL THERAPY DAILY NOTE    Date:12/3/2020  Patient Name: Leslie Mckay  MRN: [id number]  : 1975  Room: [address] [place], [place] [address]     Diagnosis: L Syme's amputation- amputation @ ankle, partial excision distal/fibula 2020  Additional Pertinent Hx: Charcot foot, neuropathy, DM, HTN, osteomyilitis  Precautions: Falls, NWB L LE    Functional Assessment:   Date Status AE  Comments   Feeding 20 Independent  Goal Met. Grooming 20 Mod I seated Goal Met- including shaving         Oral Care 20 Mod I seated Goal Met. Bathing 20 CGA     UB Dressing 20 Independent     LB Dressing 20 SBA/CGA ww           Footwear 20 Sup     Toileting 20 SBA/CGA ww    Homemaking 20 SBA  w/c level      Functional Transfers / Balance:   Date Status DME  Comments   Sit Balance 20 Supervision     Stand Balance 20  CGA     [x] Tub  [x] Shower   Transfer 20 CGA Extended tub bench, w/w     Commode   Transfer 20 CGA W/w, 3-in-1    Functional   Mobility 20  CGA           Sup W/w          W/c     Other:    Sit<>stand     Couch    Bed<>W/c        Bed mobility     20      SBA/CGA     Mod A    CGA        SBA     W/w        W/w        Bed rails      Functional Exercises / Activity:   Pt sitting in chair upon arrival to OT gym in AM. Prior to session, there were not orders and verbal report from morning huddle that pt was to be on bedrest. As a result, pt was seen for OT. In OT gym, Engaged in therex using rickshaw, 5x15 reps, 35 and Mrr. using 3# wrist weight, 3x4 up/downs to increase BUE strength for ease with functional transfers. Completed shoulder ladder exercise, standing, using 5# dowel zarina, 3x5 up/downs to increase BUE strength and standing balance.  Attempted to have pt completed using both hands simultaneously, but pt had to hold onto table with 1 hand due to report of fear and difficulty weightshifting. Per new order, pt is on bedrest orders except for OT/PT sessions. Pt supine in bed upon arrival to  in PM. SUP for bed mobility to sit EOB and squat pivot transfer bed-w/c. Pt completed w/c propulsion to/from OT gym to increase BUE strength for ease with functional transfers. In OT gym, Pt engaged in therex using blue resistance theraband, 4x15 reps triceps and using 8# weighted ball, 3x15 reps (elbow/shoulder flex) to increase BUE strength for ease with functional transfers. Pt appeared to have tolerated session well. Sensory / Neuromuscular Re-Education:      Cognitive Skills:   Status Comments   Problem   Solving good     Memory good     Sequencing fair +good-    Safety fair +      Visual Perception:    Education:  Hand placement during tub transfers, compensatory techniques during IADL activities such as meal prep, AD management during functional activity and energy conservation techniques to maximize independence with ADLs    [] Family teach completed on:    Pain Level: 1/10    Additional Notes:   11/27/20  Pt requested to focus on B UE strengthening exercises to increase independence and endurance for transfers/mobility and ADL tasks daily. 11/29/20 Request done on this date for ADLls and BUE strengthening exercises. Patient has made good  progress during treatment sessions toward set goals.  Therapy emphasis to obtain goals:   Long term goal 1: Pt demo independent to eat all meals  Long term goal 2: Pt dmeo MOd I grooming seated or standing @ sink level  Long term goal 3: Pt demo SBA bathing @ shower level or sponge bathing both seated & standing  Long term goal 4: Pt demo I UE & Mod I LE dress with AE as needed  Long term goal 5: Pt demo MOd i commode trf wt appropraite DME to ensure pt safety  Long term goals 6: Pt demo Sup tub trf with appropraite DME to ensure pt safety  Long term goal 7: Pt demo Mod I light homemaking task @ wc level  Long term goal 8: Pt demo G endurance for a 30 minute funcitonal activity  Long term goal 9: Pt demo increased BUE strength to 5/5 & improved  strength- On eval R  strength 95# & norm for pt age & gender is 110# & L hand 80# & norm for tp age & gender is 101#  Long term goal 10: Pt demo Mod I wc porpulsion throughout a living environment & around obstacles      [x] Continue with current OT Plan of care.   [] Prepare for Discharge     DISCHARGE RECOMMENDATIONS  Recommended DME: extended tub bench, toilet riser with armrests    Post Discharge Care:   []Home Independently  []Home with 24hr Care / Supervision []Home with Partial Supervision []Home with Home Health OT []Home with Out Pt OT []Other: ___   Comments:         Time in Time out Tx Time Breakdown  Variance:   First Session  0915 1000 [x] Individual Tx-45  [] Concurrent Tx -   [] Co-Tx -   [] Group Tx -   [] Time Missed -     Second Session [id number] [x] Individual Tx-45   [] Concurrent Tx -  [] Co-Tx -   [] Group Tx -   [] Time Missed -     Third Session    [] Individual Tx-   [] Concurrent Tx -  [] Co-Tx -   [] Group Tx -   [] Time Missed -         Total Tx Time: [address], [place], OTR/L 082310

## 2020-12-03 NOTE — OP NOTE
[address]                  [address] [facility],  [facility]                                OPERATIVE REPORT    PATIENT NAME: Clarisa Smith                  :        1975  MED REC NO:   [id number]                            ROOM:       5509  ACCOUNT NO:   [de-identified]                           ADMIT DATE: 2020  PROVIDER:     Bora Mena DPM    DATE OF PROCEDURE:  2020    SURGEON:  Bora Mena DPM    ASSISTANT:  Rafa Duggan PGY1    PREOPERATIVE DIAGNOSES:  1. Flap necrosis, left ankle. 2.  Hematoma of left ankle. 3.  Ulceration, left midfoot with necrosis of muscle. 4.  Insulin-dependent diabetes mellitus and neuropathy. POSTOPERATIVE DIAGNOSES:  1. Flap necrosis, left ankle. 2.  Hematoma of left ankle. 3.  Ulceration, left midfoot with necrosis of muscle. 4.  Insulin-dependent diabetes mellitus and neuropathy. PROCEDURES:  1. Excisional wound debridement to and through level of the muscle,  measurement is 3 cm x 7.5 cm x 0.4 cm in dimension. 2.  Incision and drainage of left ankle hematoma. 3.  Application of skin substitute graft, Integra bilayer graft. 4.  Application of wound VAC negative pressure therapy. ANESTHESIA:  MAC with local anesthesia infiltration comprising of 10 mL  of 0.5% Marcaine plain. ESTIMATED BLOOD LOSS:  Minimal.    COMPLICATIONS:  None. SPECIMEN OBTAINED:  Left leg wound and hematoma. INTRAOPERATIVE FINDINGS:  Approximately 20 mL of dry hematoma noted in  that area. Healthy bleeding tissue post debridement. INDICATIONS:  A pleasant 54-year-old male presented today for left ankle  debridement and incision and drainage of hematoma. The patient has been  counseled of the nature of the problem, proposed course of procedure,  potential benefits, risks, complications, and convalescence in detail. All questions were answered to his apparent satisfaction.   No guarantees  were given as to the outcome of the procedure. OPERATIVE PROCEDURE:  Under mild sedation, the patient was brought to  the operating room and placed on the operating table in supine position. Left lower extremity was scrubbed, prepped, and draped in the usual  sterile fashion. At this time, using a #10 blade, we performed a sharp  excisional debridement of the wound to and through level of the deep  fascia and muscle. At this point, all the necrosed tissue from the  necrosed flap was removed from the surgical site in toto and sent off  for microbiological examination. Total measurement was as noted above  in the procedure. At this point, I then I opened up that portion  laterally. I could appreciate significant amount of dry hematoma  consistent with the intraoperative findings of the necrosed tissue. So,  at this point, the hematoma was evacuated at the level of the ankle and  irrigated with pulse lavage irrigation. 3 liters of normal sterile  saline was used for the pulse lavage irrigation. Post irrigation, we  applied 3-0 nylon suture for retention and then applied the Integra  bilayer graft after preparing it for 3 minutes in normal sterile saline  per 's recommendation. We secured the graft with skin  staples and applied the wound VAC negative pressure therapy set at 125  mmHg continuous over the graft. Excellent seal was maintained. Postoperative bandage was then applied. The patient tolerated the  procedure and anesthesia well in apparent satisfactory condition, vital  signs stable, vascular status intact to left lower extremity. Capillary  refill was brisk to the flap. The patient was transferred to PACU for  further monitoring prior to readmission to the nursing floor.         Zeny Mancini DPM    D: 12/02/2020 16:30:08       T: 12/02/2020 16:36:48     [email or web address]  Job#: [id number]     Doc#: [id number]    CC:

## 2020-12-03 NOTE — PROGRESS NOTES
Department of Podiatry  Progress Note    SUBJECTIVE:  Matt Drake is a 54year old male who was seen at bedside for s/p left excisional wound debridement, I&D of left ankle hematoma, application of graft and wound vac performed 12/2/20 and s/p left foot Syme's amputation performed on 11/14/2020 . Wound vac somehow turned off throughout the night. Patient denies any N/V/D/F/C/SOB/CP or any pedal complaints. OBJECTIVE:    Scheduled Meds:   amLODIPine  5 mg Oral Daily    atorvastatin  5 mg Oral Nightly    ceFAZolin  2 g Intravenous Q8H    enoxaparin  40 mg Subcutaneous Daily    heparin flush  3 mL Intravenous 2 times per day    insulin glargine  15 Units Subcutaneous Nightly    insulin lispro  5 Units Subcutaneous TID WC    metFORMIN  500 mg Oral Daily with breakfast    sodium chloride flush  10 mL Intravenous BID     Continuous Infusions:   dextrose 5 % and 0.45 % NaCl 60 mL/hr at 12/02/20 1158    dextrose       PRN Meds:.oxyCODONE-acetaminophen, dextrose, dextrose, glucagon (rDNA), glucose, acetaminophen, heparin flush, sodium chloride flush, acetaminophen, polyethylene glycol    Allergies   Allergen Reactions    Vancomycin Other (See Comments)     Red man syndrome    Semaglutide Itching and Rash     Pink itch rash         /78   Pulse 101   Temp 97.5 °F (36.4 °C) (Temporal)   Resp 18   Ht 6' 3\" (1.905 m)   Wt 289 lb 11.8 oz (131.4 kg)   SpO2 98%   BMI 36.22 kg/m²       EXAM:  Patient's dressings were clean, dry and intact without strike-through this morning. The wound vac was turned off sometime throughout the night. Wound vac turned on when evaluated, had a good seal and was running at 125mmHg low continuous before leaving his room. LAST PHYSICAL EXAM 12/1/20:   VASCULAR: CFT < 5 seconds B/L.   Warm to warm throughout the entirety of the LEs    NEUROLOGIC:  Protective sensation is diminished b/l    DERM:  Surgical site is coapted plantarly with some dehiscence to the proximal medial aspect of the incision site. Mild hyperpigmentation, eschar skin noted to the anterior lateral aspect of the wound. Sutures are intact. No noted pus, drainage or active bleeding. No SOI. Minimal edema noted to left lower extremity. MUSCULOSKELETAL:  5/5 Gross Muscle strength in all 4 quadrants. No pain on palpation to the posterior calf b/l. Scheduled Meds:   amLODIPine  5 mg Oral Daily    atorvastatin  5 mg Oral Nightly    ceFAZolin  2 g Intravenous Q8H    enoxaparin  40 mg Subcutaneous Daily    heparin flush  3 mL Intravenous 2 times per day    insulin glargine  15 Units Subcutaneous Nightly    insulin lispro  5 Units Subcutaneous TID WC    metFORMIN  500 mg Oral Daily with breakfast    sodium chloride flush  10 mL Intravenous BID     Continuous Infusions:   dextrose 5 % and 0.45 % NaCl 60 mL/hr at 12/02/20 1158    dextrose       PRN Meds:.oxyCODONE-acetaminophen, dextrose, dextrose, glucagon (rDNA), glucose, acetaminophen, heparin flush, sodium chloride flush, acetaminophen, polyethylene glycol    RADIOLOGY:  No orders to display     /78   Pulse 101   Temp 97.5 °F (36.4 °C) (Temporal)   Resp 18   Ht 6' 3\" (1.905 m)   Wt 289 lb 11.8 oz (131.4 kg)   SpO2 98%   BMI 36.22 kg/m²     LABS:    No results for input(s): WBC, HGB, HCT, PLT in the last 72 hours. No results for input(s): NA, K, CL, CO2, PHOS, BUN, CREATININE in the last 72 hours. Invalid input(s): CA     No results for input(s): PROT, INR, APTT in the last 72 hours.       ASSESSMENT:  Principal Problem:  -Diabetic foot infection   - S/P excisional wound debridement to the level of the muscle, I&D of left ankle hematoma, application of graft and wound vac 12/2/20  -S/P left foot Syme's amputation performed 11/14/20  -Necrosis of left foot flap     Active Problems:    Charcot foot due to diabetes mellitus ([facility] [address]    Diabetic neuropathy associated with diabetes mellitus due to underlying condition [facility])    Essential hypertension    Diabetic foot ulcer with osteomyelitis [facility])        PLAN:  - Patient was evaluated this morning  - Patient's labs, charts and images were reviewed   - Antibiotics:Cefazolin  - New Surgical cultures pending (previous surgical culture of left foot: Pseudomonas, Staph, Corynebacterium)   - First dressing change to be performed Friday 12/4/20. Wound vac changes performed MWF. -Wound vac is to be kept on at all times until discharge or change in orders are placed. Running at low continuous.   -Patient is to remain on strict bed rest with the exceptions of PT/OT evaluations and sessions.  - WBC 7.2 (11/30/20, last date)  - Recommend discharge to SNF   - Patient to follow up in clinic 1 week after discharge.    - Patient was discussed with    - Will continue to follow patient while they are in-house.

## 2020-12-03 NOTE — ANESTHESIA POSTPROCEDURE EVALUATION
Department of Anesthesiology  Postprocedure Note    Patient: Emilie Xiong  MRN: [id number]  YOB: 1975  Date of evaluation: 12/2/2020  Time:  7:59 PM     Procedure Summary     Date:  12/02/20 Room / Location:  [facility] OR  / [facility]    Anesthesia Start:  3029 Anesthesia Stop:  1627    Procedure:  LEFT FOOT WOUND DEBRIDEMENT, INCISION AND DRAINAGE OF LEFT FOOT HEMATOMA, INTEGRA, WOUND VAC (Left Foot) Diagnosis:  (.)    Surgeon:  Alonso De La Vega DPM Responsible Provider:  Andria Saldana MD    Anesthesia Type:  MAC ASA Status:  3          Anesthesia Type: MAC    Jesenia Phase I: Jesenia Score: 10    Jesenia Phase II:      Last vitals: Reviewed and per EMR flowsheets.        Anesthesia Post Evaluation    Patient location during evaluation: PACU  Patient participation: complete - patient participated  Level of consciousness: awake  Airway patency: patent  Nausea & Vomiting: no nausea and no vomiting  Complications: no  Cardiovascular status: hemodynamically stable  Respiratory status: acceptable  Hydration status: euvolemic

## 2020-12-04 LAB
METER GLUCOSE: 105 MG/DL (ref 74–99)
METER GLUCOSE: 111 MG/DL (ref 74–99)
METER GLUCOSE: 113 MG/DL (ref 74–99)
METER GLUCOSE: 97 MG/DL (ref 74–99)

## 2020-12-04 PROCEDURE — 97530 THERAPEUTIC ACTIVITIES: CPT

## 2020-12-04 PROCEDURE — 97535 SELF CARE MNGMENT TRAINING: CPT

## 2020-12-04 PROCEDURE — 97110 THERAPEUTIC EXERCISES: CPT

## 2020-12-04 PROCEDURE — 82962 GLUCOSE BLOOD TEST: CPT

## 2020-12-04 PROCEDURE — 2580000003 HC RX 258: Performed by: PODIATRIST

## 2020-12-04 PROCEDURE — 6360000002 HC RX W HCPCS: Performed by: PODIATRIST

## 2020-12-04 PROCEDURE — 6370000000 HC RX 637 (ALT 250 FOR IP): Performed by: PODIATRIST

## 2020-12-04 PROCEDURE — 1280000000 HC REHAB R&B

## 2020-12-04 PROCEDURE — 97606 NEG PRS WND THER DME>50 SQCM: CPT

## 2020-12-04 RX ADMIN — SODIUM CHLORIDE, PRESERVATIVE FREE 300 UNITS: 5 INJECTION INTRAVENOUS at 14:08

## 2020-12-04 RX ADMIN — Medication 10 ML: at 06:58

## 2020-12-04 RX ADMIN — INSULIN LISPRO 5 UNITS: 100 INJECTION, SOLUTION INTRAVENOUS; SUBCUTANEOUS at 16:20

## 2020-12-04 RX ADMIN — Medication 2 G: at 22:10

## 2020-12-04 RX ADMIN — INSULIN GLARGINE 15 UNITS: 100 INJECTION, SOLUTION SUBCUTANEOUS at 21:12

## 2020-12-04 RX ADMIN — Medication 2 G: at 06:57

## 2020-12-04 RX ADMIN — INSULIN LISPRO 5 UNITS: 100 INJECTION, SOLUTION INTRAVENOUS; SUBCUTANEOUS at 11:03

## 2020-12-04 RX ADMIN — Medication 10 ML: at 14:08

## 2020-12-04 RX ADMIN — AMLODIPINE BESYLATE 5 MG: 5 TABLET ORAL at 08:14

## 2020-12-04 RX ADMIN — Medication 300 UNITS: at 06:58

## 2020-12-04 RX ADMIN — ENOXAPARIN SODIUM 40 MG: 40 INJECTION SUBCUTANEOUS at 08:14

## 2020-12-04 RX ADMIN — METFORMIN HYDROCHLORIDE 500 MG: 500 TABLET ORAL at 08:14

## 2020-12-04 RX ADMIN — INSULIN LISPRO 5 UNITS: 100 INJECTION, SOLUTION INTRAVENOUS; SUBCUTANEOUS at 07:06

## 2020-12-04 RX ADMIN — Medication 10 ML: at 22:10

## 2020-12-04 RX ADMIN — Medication 2 G: at 14:08

## 2020-12-04 RX ADMIN — SODIUM CHLORIDE, PRESERVATIVE FREE 300 UNITS: 5 INJECTION INTRAVENOUS at 22:10

## 2020-12-04 RX ADMIN — ATORVASTATIN CALCIUM 5 MG: 10 TABLET, FILM COATED ORAL at 21:10

## 2020-12-04 ASSESSMENT — PAIN SCALES - GENERAL
PAINLEVEL_OUTOF10: 0

## 2020-12-04 NOTE — PROGRESS NOTES
Comprehensive Nutrition Assessment    Type and Reason for Visit:  Reassess    Nutrition Recommendations/Plan: Continue current diet as ordered. Will re-ordered Ensure Hp/ Jakob BID to continue to support nutritional status & wound healing. Nutrition Assessment:  Pt w/ stable PO intake since admit. Will re-add ONS to continue to promote healing w/ pt s/p BKA now s/p i&D w/ skin graft & woundVAC placement 12/2    Malnutrition Assessment:  Malnutrition Status:  No malnutrition      Estimated Daily Nutrient Needs:  Energy (kcal):  2061; kcal; Weight Used for Energy Requirements:  Adjusted(306.8 lb)     Protein (g):  125-150 g; Weight Used for Protein Requirements:  Adjusted(Adjusted ideal; 1.5-1.8 g/kg)        Fluid (ml/day):  2100 ml; Method Used for Fluid Requirements:  1 ml/kcal      Nutrition Related Findings:  -I/os, abd WNL, +2 BLE edema noted, woundVAC , BKA      Wounds:  Surgical Incision       Current Nutrition Therapies:    DIET CARB CONTROL; Carb Control: 4 carbs/meal (approximate 1800 kcals/day)    Anthropometric Measures:  · Height: 6' 3\" (190.5 cm)  · Current Body Weight: 289 lb 11.8 oz (131.4 kg)(11/19)   · Usual Body Weight: 250 lb (113.4 kg)(Per EMR 06/2020)     · Ideal Body Weight: 196 lbs; % Ideal Body Weight 147.8 %   · BMI: 36.2  · Adjusted Body Weight: 306.8;  Amputation   · Adjusted BMI: 38.3    · BMI Categories: Obese Class 2 (BMI 35.0 -39.9)       Nutrition Diagnosis:   · Increased nutrient needs related to increase demand for energy/nutrients as evidenced by wounds      Nutrition Interventions:   Food and/or Nutrient Delivery:  Continue Current Diet, Start Oral Nutrition Supplement  Nutrition Education/Counseling:  No recommendation at this time   Coordination of Nutrition Care:  Continue to monitor while inpatient    Goals:  >75% of meals/ONS       Nutrition Monitoring and Evaluation:   Food/Nutrient Intake Outcomes:  Food and Nutrient Intake, Supplement Intake  Physical Signs/Symptoms Outcomes:  Biochemical Data, GI Status, Fluid Status or Edema, Hemodynamic Status, Nutrition Focused Physical Findings, Skin, Weight     Discharge Planning:     Too soon to determine     Electronically signed by Mariah Garg RD, EDILSON on 12/4/20 at 10:42 AM EST    Contact: x [phone]

## 2020-12-04 NOTE — PLAN OF CARE
Problem: Falls - Risk of:  Goal: Will remain free from falls  Description: Will remain free from falls  Outcome: Met This Shift     Problem: Falls - Risk of:  Goal: Absence of physical injury  Description: Absence of physical injury  Outcome: Met This Shift     Problem: Skin Integrity:  Goal: Will show no infection signs and symptoms  Description: Will show no infection signs and symptoms  Outcome: Met This Shift     Problem: Pain:  Goal: Pain level will decrease  Description: Pain level will decrease  Outcome: Met This Shift     Problem: Mobility - Impaired:  Goal: Mobility will improve  Description: Mobility will improve  Outcome: Ongoing

## 2020-12-04 NOTE — PROGRESS NOTES
Notified via [employer] call to ask Dr. Seema Chaney if pt needed to still have I.V. dextrose 5% running.

## 2020-12-04 NOTE — PROGRESS NOTES
Department of Podiatry  Progress Note    SUBJECTIVE:  Suraj Arevalo is a 54year old male who was seen at bedside for s/p left excisional wound debridement, I&D of left ankle hematoma, application of graft and wound vac performed 12/2/20 and s/p left foot Syme's amputation performed on 11/14/2020 . No complaints over night. Patient denies any N/V/D/F/C/SOB/CP or any pedal complaints. OBJECTIVE:    Scheduled Meds:   amLODIPine  5 mg Oral Daily    atorvastatin  5 mg Oral Nightly    ceFAZolin  2 g Intravenous Q8H    enoxaparin  40 mg Subcutaneous Daily    heparin flush  3 mL Intravenous 2 times per day    insulin glargine  15 Units Subcutaneous Nightly    insulin lispro  5 Units Subcutaneous TID WC    metFORMIN  500 mg Oral Daily with breakfast    sodium chloride flush  10 mL Intravenous BID     Continuous Infusions:   dextrose       PRN Meds:.oxyCODONE-acetaminophen, dextrose, dextrose, glucagon (rDNA), glucose, acetaminophen, heparin flush, sodium chloride flush, acetaminophen, polyethylene glycol    Allergies   Allergen Reactions    Vancomycin Other (See Comments)     Red man syndrome    Semaglutide Itching and Rash     Pink itch rash         BP (!) 157/85   Pulse 86   Temp 98.2 °F (36.8 °C) (Temporal)   Resp 18   Ht 6' 3\" (1.905 m)   Wt 289 lb 11.8 oz (131.4 kg)   SpO2 98%   BMI 36.22 kg/m²       EXAM:    VASCULAR: CFT < 5 seconds B/L. Warm to warm throughout the entirety of the LEs    NEUROLOGIC:  Protective sensation is diminished b/l    DERM:  Surgical site is well coapted, sutures are intact no active pus, drainage or SOI. Integra graft well adhered in intended position. No signs of necrosis or devitalization at the time of evaluation. No active bleeding. No SOI. Minimal edema in correlation with surgical intervention noted to left lower extremity. MUSCULOSKELETAL:  5/5 Gross Muscle strength in all 4 quadrants. No pain on palpation to the posterior calf b/l.                Scheduled Meds:   amLODIPine  5 mg Oral Daily    atorvastatin  5 mg Oral Nightly    ceFAZolin  2 g Intravenous Q8H    enoxaparin  40 mg Subcutaneous Daily    heparin flush  3 mL Intravenous 2 times per day    insulin glargine  15 Units Subcutaneous Nightly    insulin lispro  5 Units Subcutaneous TID WC    metFORMIN  500 mg Oral Daily with breakfast    sodium chloride flush  10 mL Intravenous BID     Continuous Infusions:   dextrose       PRN Meds:.oxyCODONE-acetaminophen, dextrose, dextrose, glucagon (rDNA), glucose, acetaminophen, heparin flush, sodium chloride flush, acetaminophen, polyethylene glycol    RADIOLOGY:  No orders to display     BP (!) 157/85   Pulse 86   Temp 98.2 °F (36.8 °C) (Temporal)   Resp 18   Ht 6' 3\" (1.905 m)   Wt 289 lb 11.8 oz (131.4 kg)   SpO2 98%   BMI 36.22 kg/m²     LABS:    No results for input(s): WBC, HGB, HCT, PLT in the last 72 hours. No results for input(s): NA, K, CL, CO2, PHOS, BUN, CREATININE in the last 72 hours. Invalid input(s): CA     No results for input(s): PROT, INR, APTT in the last 72 hours. ASSESSMENT:  Principal Problem:  -Diabetic foot infection   - S/P excisional wound debridement to the level of the muscle, I&D of left ankle hematoma, application of graft and wound vac 12/2/20  -S/P left foot Syme's amputation performed 11/14/20  -Necrosis of left foot flap     Active Problems:    Charcot foot due to diabetes mellitus ([facility] [address]    Diabetic neuropathy associated with diabetes mellitus due to underlying condition ([facility] [address]    Essential hypertension    Diabetic foot ulcer with osteomyelitis ([facility] [address]        PLAN:  - Patient was evaluated this morning  - Patient's labs, charts and images were reviewed   - Antibiotics:Cefazolin  - New Surgical cultures pending (previous surgical culture of left foot: Pseudomonas, Staph, Corynebacterium)   - First dressing change to be performed Today with wound care nurse.  Wound vac changes performed MWF thereafter.  -Wound vac is to be kept on at all times until discharge or change in orders are placed. Running at low continuous.   -Patient is to remain on strict bed rest with the exceptions of PT/OT evaluations and sessions.  - WBC 7.2 (11/30/20, last date)  - Recommend discharge to SNF. Patient is amicable. - Patient to follow up in clinic 1 week after discharge.    - Patient was discussed with    - Will continue to follow patient while they are in-house.

## 2020-12-04 NOTE — PLAN OF CARE
Problem: Falls - Risk of:  Goal: Will remain free from falls  Description: Will remain free from falls  12/4/2020 1257 by Deisy Marin  Outcome: Met This Shift  12/4/2020 0913 by Juan Millan RN  Outcome: Met This Shift  Goal: Absence of physical injury  Description: Absence of physical injury  12/4/2020 1257 by Deisy Marin  Outcome: Met This Shift  12/4/2020 0913 by Juan Millan RN  Outcome: Met This Shift     Problem: Skin Integrity:  Goal: Will show no infection signs and symptoms  Description: Will show no infection signs and symptoms  12/4/2020 1257 by Deisy Marin  Outcome: Met This Shift  12/4/2020 0913 by Juan Millan RN  Outcome: Met This Shift  Goal: Absence of new skin breakdown  Description: Absence of new skin breakdown  Outcome: Met This Shift     Problem: Pain:  Goal: Pain level will decrease  Description: Pain level will decrease  12/4/2020 1257 by Deisy Marin  Outcome: Met This Shift  12/4/2020 0913 by Juan Millan RN  Outcome: Met This Shift  Goal: Control of acute pain  Description: Control of acute pain  Outcome: Met This Shift  Goal: Control of chronic pain  Description: Control of chronic pain  Outcome: Met This Shift     Problem: Mobility - Impaired:  Goal: Mobility will improve  Description: Mobility will improve  12/4/2020 1257 by Deisy Marin  Outcome: Met This Shift  12/4/2020 0913 by Juan Millan RN  Outcome: Ongoing     Problem: Increased nutrient needs (NI-5.1)  Goal: Food and/or Nutrient Delivery  Description: Individualized approach for food/nutrient provision.   12/4/2020 1042 by Jose Wilson RD, LD  Outcome: Met This Shift

## 2020-12-04 NOTE — PROGRESS NOTES
Progress Note  Date:2020       ZGXK:[id number]  Patient Name:Michael J Leschak     Date of Birth:[birth year]     Age:45 y.o. Subjective    Subjective:  Symptoms:  Stable. He reports weakness. Diet:  Adequate intake. Activity level: Impaired due to weakness. Pain:  He complains of pain that is mild. Review of Systems   Neurological: Positive for weakness. Objective         Vitals Last 24 Hours:  TEMPERATURE:  Temp  Av.9 °F (36.6 °C)  Min: 97.5 °F (36.4 °C)  Max: 98.2 °F (36.8 °C)  RESPIRATIONS RANGE: Resp  Av  Min: 18  Max: 18  PULSE OXIMETRY RANGE: No data recorded  PULSE RANGE: Pulse  Av.5  Min: 86  Max: 101  BLOOD PRESSURE RANGE: Systolic (60IGK), TSP:437 , Min:127 , OZH:183   ; Diastolic (68ZGS), LHV:38, Min:78, Max:85    I/O (24Hr): Intake/Output Summary (Last 24 hours) at 2020 0450  Last data filed at 12/3/2020 2350  Gross per 24 hour   Intake 2520 ml   Output 1300 ml   Net 1220 ml     Objective:  General Appearance: In no acute distress. Vital signs: (most recent): Blood pressure (!) 157/85, pulse 86, temperature 98.2 °F (36.8 °C), temperature source Temporal, resp. rate 18, height 6' 3\" (1.905 m), weight 289 lb 11.8 oz (131.4 kg), SpO2 98 %. Vital signs are normal.    Output: Producing urine and producing stool. Lungs:  Normal effort and normal respiratory rate. Breath sounds clear to auscultation. Heart: Normal rate. Regular rhythm. S1 normal and S2 normal.    Abdomen: Abdomen is soft. Bowel sounds are normal.   There is no abdominal tenderness. Extremities: Decreased range of motion. There is deformity. Neurological: Patient is alert. (4/5 strength). Labs/Imaging/Diagnostics    Labs:  CBC:No results for input(s): WBC, RBC, HGB, HCT, MCV, RDW, PLT in the last 72 hours. CHEMISTRIES:No results for input(s): NA, K, CL, CO2, BUN, CREATININE, GLUCOSE, PHOS, MG in the last 72 hours.     Invalid input(s): CA  PT/INR:No results for input(s): PROTIME, INR in the last 72 hours. APTT:No results for input(s): APTT in the last 72 hours. LIVER PROFILE:No results for input(s): AST, ALT, BILIDIR, BILITOT, ALKPHOS in the last 72 hours. Imaging Last 24 Hours:  No results found. Assessment//Plan           Hospital Problems           Last Modified POA    Partial nontraumatic amputation of foot, left ([address] 11/19/2020 Yes      Functional Assessment:      Date   Status   AE    Comments     Feeding   12/1/20   Independent       Goal Met. Grooming   12/1/20   Mod I   seated   Goal Met- including shaving           Oral Care   12/1/20   Mod I   seated   Goal Met. Bathing   12/1/20   CGA           UB Dressing   12/1/20   Independent             LB Dressing   12/1/20   SBA/CGA   ww                Footwear   12/1/20   Sup             Toileting   12/1/20   SBA/CGA   ww         Homemaking   11/29/20   SBA    w/c level              Functional Transfers / Balance:      Date Status DME  Comments   Sit Balance 12/1/20   Supervision       Stand Balance 12/1/20    CGA       [x]? Tub  [x]? Shower   Transfer 11/30/20   CGA Extended tub bench, w/w      Commode   Transfer 12/1/20   CGA W/w, 3-in-1     Functional   Mobility 11/30/20 11/30/20  CGA              Sup W/w              W/c      Other:     Sit<>stand      Couch     Bed<>W/c           Bed mobility       12/1/20 11/23/20 11/30/20 11/30/20        SBA/CGA      Mod A     CGA           SBA       W/w           W/w           Bed rails          Assessment:    Condition: In stable condition. Improving. (Left Symes amputation). Plan:   Encourage ambulation. (Tolerated the surgery well  Back on track with therapy  Pain is getting back to his baseline level).        Electronically signed by Lili Portillo MD on 12/4/20 at 8:22 AM EST

## 2020-12-04 NOTE — PROGRESS NOTES
Physical Therapy    Facility/Department: [address] [facility]  Treatment Note    NAME: Katheryn Crowder  : 1975  MRN: [id number]    Date of Service: 2020  Evaluating Therapist: Deborah George. Kal Schmidt P.T.    ROOM: [facility]  DIAGNOSIS: BKA  PRECAUTIONS: Fall risk, NWB L residual limb  PROCEDURE(S):   : I&D with bone debridement/biopsy   : Syme's amputation at ankle with partial excision of distal tib/fib  HPI: The pt was admitted to [facility] on 11/10 with a diabetic foot infection with osteomyelitis. Social:  Pt lives with his wife in a 2nd floor apartment with a curb step+2+1 steps with 1 rail to enter building and 7 steps with 2 rails to the 2nd floor. 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     Initial Evaluation  20 AM     PM    Short Term Goals Long Term Goals    Was pt agreeable to Eval/treatment? Yes Yes  Yes     Does pt have pain? Minimal LLE soreness \"maybe . 5/10) No c/o pain No c/o pain     Bed Mobility  Rolling: Supervision  Supine<>sit: Supervision  Scooting: Supervision Independent Independent for all aspects  Independent   Transfers Sit to stand: Max A  Stand to sit: Max A  Stand pivot:  Max A [employer] Sit<>stand: Modified Independent  Stand pivot: Modified Independent Sit<>stand: Modified Independent  Stand pivot: Modified Independent with [employer] SBA Modified Independent   Ambulation   15 feet with [employer] with Min A + WC follow Knee scooter propulsion x225 feet with Modified [place] 10 feet with WW with Supervision  Knee scooter propulsion x400 feet with Modified [place] 20 feet with AAD with SBA >150 feet with AAD with Modified [place]   Walking 10 feet on uneven surface NT NT NT 10 feet with AAD with SBA >10 feet with AAD with Modified [place]   Wheel Chair Mobility 150 feet with BUE with SBA NT NT  >200 feet with BUE with Modified [place]   Car Transfers NT Modified Independent with [employer] NT SBA Modified Independent   Stair negotiation: ascended and descended NT, attempted but pt unable to perform safely 12 steps with tub seat + 1 rail in stairwell with SBA (assist to move seat) NT 4 steps with 2 rails with SBA >8 steps with 1 rail + AAD with Modified [place]   Curb Step:   ascended and descended NT NT NT 4 inch step with AAD and Min A 7.5 inch step with AAD and SBA   Picking up object off the floor NT NT NT Will  an object with Min A Will  an object with Modified [place]   BLE ROM R ankle limited d/t past fusion, otherwise WFL  R ankle limited d/t past fusion, otherwise WFL     BLE Strength RLE: grossly 4+/5  LLE: grossly 4/5 at the hip and knee  RLE: grossly 4+/5  LLE: grossly 4/5 at the hip and knee     Balance  Seated: Independent  Standing: Min A<>Max A  Seated: Independent  Standing: Modified Independent     Date Family Teach Completed No family present at evlauation  No family present to this date     Is additional Family Teaching Needed? Y or N Yes  Yes     Hindering Progress Limited endurance, medical history, obesity  Decreased endurance, WB restriction     PT recommended ELOS 3 weeks       Team's Discharge Plan        Therapist at Team Meeting          Therapeutic Exercise:   AM:   Emphasis on functional mobility  PM:   Supine L hip/knee MREs:  -straight leg hip extensions 2x15  -hip abduction 2x15  -knee extension 2x15    Additional Comments: The pt is doing well, continuing to build on strength and performance with all mobility. The pt requires no physical assistance to perform activities and is improving well. There is no reason from a PT standpoint that the pt cannot return home, pt was directed to podiatry for questions related to discharge destination. The pt reported that his discharge plan has changed from home to SNF, focusing on hip strengthening exercises, promoted weight shifting on and off knee scooter to simulate gross L hip muscle activity during walking.      Patient/family

## 2020-12-04 NOTE — FLOWSHEET NOTE
Inpatient Wound Care(initial evaluation)  5519    Admit Date: 11/19/2020  1:24 PM    Reason for consult:  Wound vac management    Wound history: 12/2 with surgeries prior to most recent  1. Excisional wound debridement to and through level of the muscle,  measurement is 3 cm x 7.5 cm x 0.4 cm in dimension. 2.  Incision and drainage of left ankle hematoma. 3.  Application of skin substitute graft, Integra bilayer graft. 4.  Application of wound VAC negative pressure therapy.     Findings:       12/04/20 1145   Wound 12/02/20 Left;Medial   Date First Assessed: 12/02/20   Present on Hospital Admission: No  Wound Location Orientation: Left;Medial   Wound Image    Wound Etiology Surgical   Dressing Status New dressing applied   Wound Cleansed Cleansed with saline   Dressing/Treatment Non adherent;Negative pressure wound therapy   Dressing Change Due 12/07/20   Wound Length (cm) 9 cm   Wound Width (cm) 2 cm   Wound Depth (cm) 0.1 cm  (gratf)   Wound Surface Area (cm^2) 18 cm^2   Wound Volume (cm^3) 1.8 cm^3   Wound Assessment   (graft over wound)   Drainage Amount None   Luh-wound Assessment Intact   Wound 12/02/20 Foot Left;Posterior   Date First Assessed: 12/02/20   Present on Hospital Admission: Yes  Location: Foot  Wound Location Orientation: Left;Posterior   Wound Image    Wound Etiology Surgical   Dressing Status New dressing applied   Wound Cleansed Cleansed with saline   Dressing/Treatment Non adherent;Negative pressure wound therapy   Dressing Change Due 12/07/20   Wound Length (cm) 6 cm   Wound Width (cm) 2 cm   Wound Depth (cm) 0.1 cm  (graft)   Wound Surface Area (cm^2) 12 cm^2   Wound Volume (cm^3) 1.2 cm^3   Wound Assessment   (staples adhering graft)   Drainage Amount None   Luh-wound Assessment Intact   Negative Pressure Wound Therapy Foot Left   Placement Date: 12/02/20   Pre-existing: No  Location: Foot  Wound Location Orientation: Left   $ Standard NPWT >50 sq cm PER TX $ Yes   Wound Type Surgical Dressing Type Black foam;Non-adherant   Number of pieces used 5   Cycle Continuous   Target Pressure (mmHg) 125   Canister changed? No   Dressing Changed Changed/New   Drainage Amount None   Dressing Change Due 12/07/20   Wound Assessment   (graft)   Luh-wound Assessment Intact   Incision 12/02/20 Foot Anterior; Left   Date First Assessed: 12/02/20   Primary Wound Type: Surgical Type  Location: Foot  Wound Location Orientation: Anterior; Left   Wound Image    Dressing Status New dressing applied   Dressing Change Due 12/07/20   Incision Cleansed Cleansed with saline   Dressing/Treatment Negative pressure wound therapy; Non-adherent   Closure Sutures; Staples  (graft with sutures and staples)   Incision Assessment   (graft with incision)   Drainage Amount None   Luh-incision Assessment Intact     **Informed Consent**    The patient has given verbal consent to have photos taken of wounds and inserted into their chart as part of their permanent medical record for purposes of documentation, treatment management and/or medical review. All Images taken on 12/4/20 of patient name: Emilie Xiong were transmitted and stored on secured [employer] located within [facility] by a registered [employer] Mobile Application Device.    Dr. Jerman Salgado in room and assisted with wound vac application    Plan:  Next change Monday  podiatry request SNF for patient      Yobani Gamez 12/4/2020 1:48 PM

## 2020-12-04 NOTE — PROGRESS NOTES
Sadia care application form for wound vac faxed to [facility]. Patient verbalizing ambivalence regarding home vs. discharge to SNF. Encouraged patient to make decision that he felt was best for him and wife.

## 2020-12-05 LAB
CULTURE SURGICAL: NORMAL
METER GLUCOSE: 106 MG/DL (ref 74–99)
METER GLUCOSE: 110 MG/DL (ref 74–99)
METER GLUCOSE: 94 MG/DL (ref 74–99)

## 2020-12-05 PROCEDURE — 2580000003 HC RX 258: Performed by: PODIATRIST

## 2020-12-05 PROCEDURE — 6370000000 HC RX 637 (ALT 250 FOR IP): Performed by: PODIATRIST

## 2020-12-05 PROCEDURE — 6360000002 HC RX W HCPCS: Performed by: PODIATRIST

## 2020-12-05 PROCEDURE — 97535 SELF CARE MNGMENT TRAINING: CPT

## 2020-12-05 PROCEDURE — 97530 THERAPEUTIC ACTIVITIES: CPT

## 2020-12-05 PROCEDURE — 1280000000 HC REHAB R&B

## 2020-12-05 PROCEDURE — 82962 GLUCOSE BLOOD TEST: CPT

## 2020-12-05 RX ORDER — ACETAMINOPHEN 325 MG/1
650 TABLET ORAL 4 TIMES DAILY PRN
Status: DISCONTINUED | OUTPATIENT
Start: 2020-12-05 | End: 2020-12-08 | Stop reason: HOSPADM

## 2020-12-05 RX ORDER — OXYCODONE HYDROCHLORIDE AND ACETAMINOPHEN 5; 325 MG/1; MG/1
1 TABLET ORAL 2 TIMES DAILY PRN
Status: DISCONTINUED | OUTPATIENT
Start: 2020-12-05 | End: 2020-12-08 | Stop reason: HOSPADM

## 2020-12-05 RX ADMIN — SODIUM CHLORIDE, PRESERVATIVE FREE 300 UNITS: 5 INJECTION INTRAVENOUS at 14:44

## 2020-12-05 RX ADMIN — METFORMIN HYDROCHLORIDE 500 MG: 500 TABLET ORAL at 08:13

## 2020-12-05 RX ADMIN — ATORVASTATIN CALCIUM 5 MG: 10 TABLET, FILM COATED ORAL at 21:28

## 2020-12-05 RX ADMIN — ENOXAPARIN SODIUM 40 MG: 40 INJECTION SUBCUTANEOUS at 08:13

## 2020-12-05 RX ADMIN — INSULIN GLARGINE 15 UNITS: 100 INJECTION, SOLUTION SUBCUTANEOUS at 21:28

## 2020-12-05 RX ADMIN — INSULIN LISPRO 5 UNITS: 100 INJECTION, SOLUTION INTRAVENOUS; SUBCUTANEOUS at 11:28

## 2020-12-05 RX ADMIN — Medication 10 ML: at 06:05

## 2020-12-05 RX ADMIN — SODIUM CHLORIDE, PRESERVATIVE FREE 300 UNITS: 5 INJECTION INTRAVENOUS at 22:11

## 2020-12-05 RX ADMIN — Medication 2 G: at 22:11

## 2020-12-05 RX ADMIN — INSULIN LISPRO 5 UNITS: 100 INJECTION, SOLUTION INTRAVENOUS; SUBCUTANEOUS at 16:42

## 2020-12-05 RX ADMIN — Medication 10 ML: at 14:44

## 2020-12-05 RX ADMIN — Medication 2 G: at 14:43

## 2020-12-05 RX ADMIN — Medication 2 G: at 06:05

## 2020-12-05 RX ADMIN — Medication 300 UNITS: at 03:00

## 2020-12-05 RX ADMIN — AMLODIPINE BESYLATE 5 MG: 5 TABLET ORAL at 08:13

## 2020-12-05 RX ADMIN — Medication 10 ML: at 22:11

## 2020-12-05 ASSESSMENT — PAIN SCALES - GENERAL: PAINLEVEL_OUTOF10: 0

## 2020-12-05 NOTE — PROGRESS NOTES
[facility]  Progress Note    GENERAL:   Covering for Dr Colleen White. 54 y old M, with HTN, DM type II, Diabetic Neuropathy, and Charcot foot deformity, S/P Right Ankle fusion in Feb/2020, who was admitted to [facility] on 10/15 with infection of the left foot. Started on Antibiotics and underwent I & D. Followed by ID, currently, on Cephazolin IV. Transferred to the ARU on 11/19. Resting in bed, no complaints. BS well controlled since infection under control. Patient was on Lantus and Metformin at home with no Humalog. Rarely use pain meds.     VITALS:   Vitals:    12/02/20 1735 12/03/20 0844 12/04/20 0705 12/05/20 0810   BP:  127/78 (!) 157/85 134/70   Pulse: 74 101 86 82   Resp:  18 18 18   Temp:  97.5 °F (36.4 °C) 98.2 °F (36.8 °C) 98.2 °F (36.8 °C)   TempSrc:  Temporal Temporal Temporal   SpO2: 98%      Weight:       Height:           Scheduled Meds:   amLODIPine  5 mg Oral Daily    atorvastatin  5 mg Oral Nightly    ceFAZolin  2 g Intravenous Q8H    enoxaparin  40 mg Subcutaneous Daily    heparin flush  3 mL Intravenous 2 times per day    insulin glargine  15 Units Subcutaneous Nightly    insulin lispro  5 Units Subcutaneous TID WC    metFORMIN  500 mg Oral Daily with breakfast    sodium chloride flush  10 mL Intravenous BID     Continuous Infusions:   dextrose       PRN Meds:.oxyCODONE-acetaminophen, dextrose, dextrose, glucagon (rDNA), glucose, acetaminophen, heparin flush, sodium chloride flush, acetaminophen, polyethylene glycol      LABS:  CBC with Differential:    Lab Results   Component Value Date    WBC 7.2 11/30/2020    RBC 4.09 11/30/2020    HGB 10.5 11/30/2020    HCT 34.4 11/30/2020     11/30/2020    MCV 84.1 11/30/2020    MCH 25.7 11/30/2020    MCHC 30.5 11/30/2020    RDW 17.2 11/30/2020    METASPCT 4.4 11/20/2020    LYMPHOPCT 23.6 11/30/2020    MONOPCT 6.1 11/30/2020    MYELOPCT 1.8 11/23/2020    BASOPCT 0.7 11/30/2020    MONOSABS 0.44 11/30/2020 LYMPHSABS 1.70 11/30/2020    EOSABS 0.27 11/30/2020    BASOSABS 0.05 11/30/2020     CMP:    Lab Results   Component Value Date     11/30/2020    K 4.4 11/30/2020    K 4.1 11/20/2020     11/30/2020    CO2 26 11/30/2020    BUN 16 11/30/2020    CREATININE 0.8 11/30/2020    GFRAA >60 11/30/2020    LABGLOM >60 11/30/2020    GLUCOSE 96 11/30/2020    PROT 8.1 11/30/2020    LABALBU 4.0 11/30/2020    CALCIUM 9.4 11/30/2020    BILITOT 0.6 11/30/2020    ALKPHOS 82 11/30/2020    AST 18 11/30/2020    ALT 5 11/30/2020          CRP                                                              1.3         Sed rate                                                        54    FUNCTIONAL STATUS:     Cognition:   WFL. Speech:  WNL. ADLs and Self Care:  SBA to MOD I. Bed Mobility:  Independent. Transfers:   MOD I. To SUP    Gait:     400' with knee scooter with MOD I.    WC Mobility:                250' x 2 with MOD I. Stairs:    12 steps with tub seat technique and 1 rail in stairwell with SBA.    ROM:        PLAN:    1.) Recheck labs. 2.) Consider reducing Humalog intake. 3.) Reduce frequency of Percocet.   4.) Continue Rehab Program.

## 2020-12-05 NOTE — PROGRESS NOTES
Department of Podiatry  Progress Note    SUBJECTIVE:  Seen at bedside for s/p left excisional wound debridement, I&D of left ankle hematoma, application of graft and wound vac performed 12/2/20   s/p left foot Syme's amputation performed on 11/14/2020   No complaints over night. Patient denies any N/V/D/F/C/SOB/CP or any pedal complaints. OBJECTIVE:  Wound vac in tact to left foot    Scheduled Meds:   amLODIPine  5 mg Oral Daily    atorvastatin  5 mg Oral Nightly    ceFAZolin  2 g Intravenous Q8H    enoxaparin  40 mg Subcutaneous Daily    heparin flush  3 mL Intravenous 2 times per day    insulin glargine  15 Units Subcutaneous Nightly    insulin lispro  5 Units Subcutaneous TID WC    metFORMIN  500 mg Oral Daily with breakfast    sodium chloride flush  10 mL Intravenous BID     Continuous Infusions:   dextrose       PRN Meds:.oxyCODONE-acetaminophen, dextrose, dextrose, glucagon (rDNA), glucose, acetaminophen, heparin flush, sodium chloride flush, acetaminophen, polyethylene glycol    Allergies   Allergen Reactions    Vancomycin Other (See Comments)     Red man syndrome    Semaglutide Itching and Rash     Pink itch rash         /70   Pulse 82   Temp 98.2 °F (36.8 °C) (Temporal)   Resp 18   Ht 6' 3\" (1.905 m)   Wt 289 lb 11.8 oz (131.4 kg)   SpO2 98%   BMI 36.22 kg/m²       EXAM:    Wound vac in tact to left foot. Functioning 125mmHg. No Calf pain.               Scheduled Meds:   amLODIPine  5 mg Oral Daily    atorvastatin  5 mg Oral Nightly    ceFAZolin  2 g Intravenous Q8H    enoxaparin  40 mg Subcutaneous Daily    heparin flush  3 mL Intravenous 2 times per day    insulin glargine  15 Units Subcutaneous Nightly    insulin lispro  5 Units Subcutaneous TID WC    metFORMIN  500 mg Oral Daily with breakfast    sodium chloride flush  10 mL Intravenous BID     Continuous Infusions:   dextrose       PRN Meds:.oxyCODONE-acetaminophen, dextrose, dextrose, glucagon (rDNA), glucose, acetaminophen, heparin flush, sodium chloride flush, acetaminophen, polyethylene glycol    RADIOLOGY:  No orders to display     /70   Pulse 82   Temp 98.2 °F (36.8 °C) (Temporal)   Resp 18   Ht 6' 3\" (1.905 m)   Wt 289 lb 11.8 oz (131.4 kg)   SpO2 98%   BMI 36.22 kg/m²     LABS:    No results for input(s): WBC, HGB, HCT, PLT in the last 72 hours. No results for input(s): NA, K, CL, CO2, PHOS, BUN, CREATININE in the last 72 hours. Invalid input(s): CA     No results for input(s): PROT, INR, APTT in the last 72 hours. ASSESSMENT:  S/p excisional debridement of left foot with graft application and wound vac (dos 12/2/20)  S/p Symes amputation 11/14/20  Flat necrosis left foot      PLAN:  - Patient was evaluated this morning  - Patient's labs, charts and images were reviewed   - Antibiotics:Cefazolin  - Surgical cultures pending  - First dressing change to be performed Today with wound care nurse. Wound vac changes performed MWF thereafter.  -Wound vac is to be kept on at all times until discharge or change in orders are placed. Running at low continuous.   -Patient is to remain on strict bed rest with the exceptions of PT/OT evaluations and sessions.  - Recommend discharge to SNF. Patient is amicable. - Patient to follow up in clinic 1 week after discharge.    - Patient was discussed with    - Will continue to follow patient while they are in-house.

## 2020-12-05 NOTE — PROGRESS NOTES
Physical Therapy    Facility/Department: [address] [facility]  Treatment Note    NAME: Juwan Reagan  : 1975  MRN: [id number]    Date of Service: 2020  Evaluating Therapist: Darrick Painting PPRUDENCE.    ROOM: [facility]  DIAGNOSIS: BKA  PRECAUTIONS: Fall risk, NWB L residual limb  PROCEDURE(S):   : I&D with bone debridement/biopsy   : Syme's amputation at ankle with partial excision of distal tib/fib  HPI: The pt was admitted to [facility] on [facility]/10 with a diabetic foot infection with osteomyelitis. Social:  Pt lives with his wife in a 2nd floor apartment with a curb step+2+1 steps with 1 rail to enter building and 7 steps with 2 rails to the 2nd floor. 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     Initial Evaluation  20 AM      Short Term Goals Long Term Goals    Was pt agreeable to Eval/treatment? Yes Yes       Does pt have pain? Minimal LLE soreness \"maybe . 5/10) No c/o pain      Bed Mobility  Rolling: Supervision  Supine<>sit: Supervision  Scooting: Supervision Rolling: Independent   Supine to sit: NT  Sit to supine: Independent   Scooting: Independent     Independent   Transfers Sit to stand: Max A  Stand to sit: Max A  Stand pivot:  Max A [employer] Sit<>stand: Modified Independent  Stand pivot: Modified Independent  SBA Modified Independent   Ambulation   15 feet with [employer] with Min A + WC follow Knee scooter propulsion x400 feet with Modified [place]  20 feet with AAD with SBA >150 feet with AAD with Modified [place]   Walking 10 feet on uneven surface NT NT  10 feet with AAD with SBA >10 feet with AAD with Modified [place]   Wheel Chair Mobility 150 feet with BUE with  feet x2 reps using BUE with Mod Independent    >200 feet with BUE with Modified [place]   Car Transfers NT Supervision with knee scooter  VCs for approach and setup  SBA Modified Independent   Stair negotiation: ascended and descended NT, attempted but pt unable to perform safely 12 steps with tub seat + 1 rail in stairwell with SBA (assist to move seat)  4 steps with 2 rails with SBA >8 steps with 1 rail + AAD with Modified [place]   Curb Step:   ascended and descended NT NT  4 inch step with AAD and Min A 7.5 inch step with AAD and SBA   Picking up object off the floor NT NT  Will  an object with Min A Will  an object with Modified [place]   BLE ROM R ankle limited d/t past fusion, otherwise WFL R ankle limited d/t past fusion, otherwise WFL      BLE Strength RLE: grossly 4+/5  LLE: grossly 4/5 at the hip and knee RLE: grossly 4+/5  LLE: grossly 4/5 at the hip and knee      Balance  Seated: Independent  Standing: Min A<>Max A Seated: Independent  Standing: Modified Independent      Date Family Teach Completed No family present at evlauation No family present to this date      Is additional Family Teaching Needed? Y or N Yes Yes       Hindering Progress Limited endurance, medical history, obesity Decreased endurance, WB restriction      PT recommended ELOS 3 weeks       Team's Discharge Plan        Therapist at Team Meeting            Additional Comments: Pt performs all mobility well and demonstrates independence with setup of devices. Pt performs weight shifting on and off knee scooter to simulate gross L hip muscle activity during walking. Pt did take several standing rest breaks during amb due to fatigue and increased WOB. Pt required VCs for negotiation of scooter and approach to car for transfer. Pt demonstrated good balance with STS during steps and only required assist for advancement of seat. Pt returned to supine following session and was left with LLE elevated. Patient/family education  Pt/family educated safety with approach to car with use of knee scooter.      Patient/family response to education:   Pt/family verbalized understanding Pt/family demonstrated skill Pt/family requires further education in this area   Yes Yes Reinforce     AM  Time in: 0920  Time out: 1000    Pt is making good progress toward established Physical Therapy goals. Continue with physical therapy current plan of care.     Emily Cotton, PT, DPT  License [id number]

## 2020-12-05 NOTE — PROGRESS NOTES
OCCUPATIONAL THERAPY DAILY NOTE    Date:2020  Patient Name: Alondra Van  MRN: [id number]  : 1975  Room: [address] [place], [place] [address]     Diagnosis: KOLE Symhoney's amputation- amputation @ ankle, partial excision distal/fibula 2020  Additional Pertinent Hx: Charcot foot, neuropathy, DM, HTN, osteomyilitis  Precautions: Falls, NWB L LE    Functional Assessment:   Date Status AE  Comments   Feeding 20 Independent  Goal Met. Grooming 20 Mod I seated Goal Met- including shaving         Oral Care 20 Mod I seated Goal Met. Bathing 20 S/SBA sponge Pt completed sponge bathing  seated at sink for both UB/LB needing no assist.   UB Dressing 20 Independent  Goal met   LB Dressing 20 S/SBA ww Pt changed shorts while seated up in w/c shifting R/L side to pull garment down then up over hips. Footwear 20 I/Mod I  Pt able to mita/doff  R shoe while seated on EOB. Toileting 20 S/SBA ww Pt stood using w.w alker during toilet needs for clothing mgmt following NWB LLE   Homemaking 20 SBA  w/c level      Functional Transfers / Balance:   Date Status DME  Comments   Sit Balance 20 S  Sitting balance up in w/c, on EOB and on 3:1 commode over preexisting toilet. Stand Balance 20  S/SBA ww Standing balance during SPT's from EOB into w/c along with w/c<>commode using wJeremi Borreroo [place] for balance and safety. .   [x] Tub  [x] Shower   Transfer 20 CGA Extended tub bench, w/w     Commode   Transfer 20 S/SBA W/w, 3-in-1 W/c <>3:1  commode transfers using w. Soren [place] for balance.     Functional   Mobility 2020  SBA           Mod I W/w          W/c propel Pt able to propel w/c in room using BUE's and RLE only       Other:    Sit<>stand     Couch    Bed to w/c         Bed mobility     20      S/SBA     Mod A    S/SBA        Independent      W/w    Low surface    W/w        Bed rails     Sit to stand on/off bed, commode and w/c    . Lissette Alma Pt completed bed into w/c transfers using w. Walker following NWB LLE        Pt able to manage BLE's out of bed using bed rail for safety. Functional Exercises / Activity:   Pt engaged in am ADL's to increase independence with grooming, dressing, bathing and transfers while following NWB LLE with good safety awareness. .         Sensory / Neuromuscular Re-Education:      Cognitive Skills:   Status Comments   Problem   Solving good     Memory good     Sequencing good    Safety Good-      Visual Perception:    Education:  Pt educated with safety during bed >w/c transfers and w/c<>commode due to NWB LLE and wound vac mgmt. [] Family teach completed on:    Pain Level: 0/10    Additional Notes:   11/27/20  Pt requested to focus on B UE strengthening exercises to increase independence and endurance for transfers/mobility and ADL tasks daily. 11/29/20 Request done on this date for ADLls and BUE strengthening exercises. Patient has made good  progress during treatment sessions toward set goals.  Therapy emphasis to obtain goals:   Long term goal 1: Pt demo independent to eat all meals  Long term goal 2: Pt dmeo MOd I grooming seated or standing @ sink level  Long term goal 3: Pt demo SBA bathing @ shower level or sponge bathing both seated & standing  Long term goal 4: Pt demo I UE & Mod I LE dress with AE as needed  Long term goal 5: Pt demo MOd i commode trf wt appropraite DME to ensure pt safety  Long term goals 6: Pt demo Sup tub trf with appropraite DME to ensure pt safety  Long term goal 7: Pt demo Mod I light homemaking task @ wc level  Long term goal 8: Pt demo G endurance for a 30 minute funcitonal activity  Long term goal 9: Pt demo increased BUE strength to 5/5 & improved  strength- On eval R  strength 95# & norm for pt age & gender is 110# & L hand 80# & norm for tp age & gender is 101#  Long term goal 10: Pt demo Mod I wc porpulsion throughout a living environment & around obstacles      [x] Continue with current OT Plan of care.   [] Prepare for Discharge     DISCHARGE RECOMMENDATIONS  Recommended DME: extended tub bench, toilet riser with armrests    Post Discharge Care:   []Home Independently  []Home with 24hr Care / Supervision []Home with Partial Supervision []Home with Home Health OT []Home with Out Pt OT []Other: ___   Comments:         Time in Time out Tx Time Breakdown  Variance:   First Session  8:15am 8:55am [x] Individual Tx-40mins  [] Concurrent Tx -   [] Co-Tx -   [] Group Tx -   [] Time Missed -     Second Session   [] Individual Tx-   [] Concurrent Tx -  [] Co-Tx -   [] Group Tx -   [] Time Missed -     Third Session    [] Individual Tx-   [] Concurrent Tx -  [] Co-Tx -   [] Group Tx -   [] Time Missed -         Total Tx Time: [address] [facility]      I have read & agree with the above status    Blanquita Kirby OTR/L [id number]

## 2020-12-06 LAB
ALBUMIN SERPL-MCNC: 3.7 G/DL (ref 3.5–5.2)
ALP BLD-CCNC: 83 U/L (ref 40–129)
ALT SERPL-CCNC: <5 U/L (ref 0–40)
ANION GAP SERPL CALCULATED.3IONS-SCNC: 10 MMOL/L (ref 7–16)
AST SERPL-CCNC: 25 U/L (ref 0–39)
BASOPHILS ABSOLUTE: 0.04 E9/L (ref 0–0.2)
BASOPHILS RELATIVE PERCENT: 0.6 % (ref 0–2)
BILIRUB SERPL-MCNC: 0.3 MG/DL (ref 0–1.2)
BUN BLDV-MCNC: 13 MG/DL (ref 6–20)
CALCIUM SERPL-MCNC: 9.1 MG/DL (ref 8.6–10.2)
CHLORIDE BLD-SCNC: 105 MMOL/L (ref 98–107)
CO2: 25 MMOL/L (ref 22–29)
CREAT SERPL-MCNC: 0.8 MG/DL (ref 0.7–1.2)
EOSINOPHILS ABSOLUTE: 0.51 E9/L (ref 0.05–0.5)
EOSINOPHILS RELATIVE PERCENT: 7.2 % (ref 0–6)
GFR AFRICAN AMERICAN: >60
GFR NON-AFRICAN AMERICAN: >60 ML/MIN/1.73
GLUCOSE BLD-MCNC: 91 MG/DL (ref 74–99)
HCT VFR BLD CALC: 34.4 % (ref 37–54)
HEMOGLOBIN: 10.8 G/DL (ref 12.5–16.5)
IMMATURE GRANULOCYTES #: 0.08 E9/L
IMMATURE GRANULOCYTES %: 1.1 % (ref 0–5)
LYMPHOCYTES ABSOLUTE: 2.15 E9/L (ref 1.5–4)
LYMPHOCYTES RELATIVE PERCENT: 30.4 % (ref 20–42)
MCH RBC QN AUTO: 25.9 PG (ref 26–35)
MCHC RBC AUTO-ENTMCNC: 31.4 % (ref 32–34.5)
MCV RBC AUTO: 82.5 FL (ref 80–99.9)
METER GLUCOSE: 110 MG/DL (ref 74–99)
METER GLUCOSE: 93 MG/DL (ref 74–99)
METER GLUCOSE: 96 MG/DL (ref 74–99)
MONOCYTES ABSOLUTE: 0.5 E9/L (ref 0.1–0.95)
MONOCYTES RELATIVE PERCENT: 7.1 % (ref 2–12)
NEUTROPHILS ABSOLUTE: 3.8 E9/L (ref 1.8–7.3)
NEUTROPHILS RELATIVE PERCENT: 53.6 % (ref 43–80)
PDW BLD-RTO: 16.3 FL (ref 11.5–15)
PLATELET # BLD: 311 E9/L (ref 130–450)
PMV BLD AUTO: 9.6 FL (ref 7–12)
POTASSIUM SERPL-SCNC: 4.1 MMOL/L (ref 3.5–5)
RBC # BLD: 4.17 E12/L (ref 3.8–5.8)
SODIUM BLD-SCNC: 140 MMOL/L (ref 132–146)
TOTAL PROTEIN: 8 G/DL (ref 6.4–8.3)
WBC # BLD: 7.1 E9/L (ref 4.5–11.5)

## 2020-12-06 PROCEDURE — 36415 COLL VENOUS BLD VENIPUNCTURE: CPT

## 2020-12-06 PROCEDURE — 6360000002 HC RX W HCPCS: Performed by: PHYSICAL MEDICINE & REHABILITATION

## 2020-12-06 PROCEDURE — 1280000000 HC REHAB R&B

## 2020-12-06 PROCEDURE — 2580000003 HC RX 258: Performed by: PODIATRIST

## 2020-12-06 PROCEDURE — 97530 THERAPEUTIC ACTIVITIES: CPT

## 2020-12-06 PROCEDURE — 6370000000 HC RX 637 (ALT 250 FOR IP): Performed by: PODIATRIST

## 2020-12-06 PROCEDURE — 6360000002 HC RX W HCPCS: Performed by: PODIATRIST

## 2020-12-06 PROCEDURE — 82962 GLUCOSE BLOOD TEST: CPT

## 2020-12-06 PROCEDURE — 97110 THERAPEUTIC EXERCISES: CPT

## 2020-12-06 PROCEDURE — 80053 COMPREHEN METABOLIC PANEL: CPT

## 2020-12-06 PROCEDURE — 85025 COMPLETE CBC W/AUTO DIFF WBC: CPT

## 2020-12-06 RX ADMIN — ATORVASTATIN CALCIUM 5 MG: 10 TABLET, FILM COATED ORAL at 20:39

## 2020-12-06 RX ADMIN — Medication 2 G: at 06:36

## 2020-12-06 RX ADMIN — Medication 2 G: at 21:42

## 2020-12-06 RX ADMIN — INSULIN LISPRO 5 UNITS: 100 INJECTION, SOLUTION INTRAVENOUS; SUBCUTANEOUS at 11:21

## 2020-12-06 RX ADMIN — Medication 2 G: at 13:04

## 2020-12-06 RX ADMIN — ENOXAPARIN SODIUM 40 MG: 40 INJECTION SUBCUTANEOUS at 08:44

## 2020-12-06 RX ADMIN — Medication 10 ML: at 13:04

## 2020-12-06 RX ADMIN — Medication 10 ML: at 06:37

## 2020-12-06 RX ADMIN — INSULIN LISPRO 5 UNITS: 100 INJECTION, SOLUTION INTRAVENOUS; SUBCUTANEOUS at 07:02

## 2020-12-06 RX ADMIN — SODIUM CHLORIDE, PRESERVATIVE FREE 300 UNITS: 5 INJECTION INTRAVENOUS at 21:43

## 2020-12-06 RX ADMIN — Medication 300 UNITS: at 06:44

## 2020-12-06 RX ADMIN — Medication 10 ML: at 21:42

## 2020-12-06 RX ADMIN — SODIUM CHLORIDE, PRESERVATIVE FREE 300 UNITS: 5 INJECTION INTRAVENOUS at 13:05

## 2020-12-06 RX ADMIN — INSULIN GLARGINE 15 UNITS: 100 INJECTION, SOLUTION SUBCUTANEOUS at 20:42

## 2020-12-06 RX ADMIN — AMLODIPINE BESYLATE 5 MG: 5 TABLET ORAL at 08:44

## 2020-12-06 RX ADMIN — ALTEPLASE 1 MG: 2.2 INJECTION, POWDER, LYOPHILIZED, FOR SOLUTION INTRAVENOUS at 10:07

## 2020-12-06 RX ADMIN — Medication 10 ML: at 06:43

## 2020-12-06 RX ADMIN — INSULIN LISPRO 5 UNITS: 100 INJECTION, SOLUTION INTRAVENOUS; SUBCUTANEOUS at 16:57

## 2020-12-06 RX ADMIN — METFORMIN HYDROCHLORIDE 500 MG: 500 TABLET ORAL at 08:44

## 2020-12-06 ASSESSMENT — PAIN SCALES - GENERAL
PAINLEVEL_OUTOF10: 0

## 2020-12-06 NOTE — PROGRESS NOTES
Department of Podiatry  Progress Note    SUBJECTIVE:  Seen at bedside for s/p left excisional wound debridement, I&D of left ankle hematoma, application of graft and wound vac performed 12/2/20   s/p left foot Syme's amputation performed on 11/14/2020   Sleeping in bed this AM. No events reported overnight. OBJECTIVE:  Wound vac in tact to left foot. Dressing in tact to left foot. Scheduled Meds:   amLODIPine  5 mg Oral Daily    atorvastatin  5 mg Oral Nightly    ceFAZolin  2 g Intravenous Q8H    enoxaparin  40 mg Subcutaneous Daily    heparin flush  3 mL Intravenous 2 times per day    insulin glargine  15 Units Subcutaneous Nightly    insulin lispro  5 Units Subcutaneous TID WC    metFORMIN  500 mg Oral Daily with breakfast    sodium chloride flush  10 mL Intravenous BID     Continuous Infusions:   dextrose       PRN Meds:.acetaminophen, oxyCODONE-acetaminophen, dextrose, dextrose, glucagon (rDNA), glucose, acetaminophen, heparin flush, sodium chloride flush, polyethylene glycol    Allergies   Allergen Reactions    Vancomycin Other (See Comments)     Red man syndrome    Semaglutide Itching and Rash     Pink itch rash         /70   Pulse 82   Temp 98.2 °F (36.8 °C) (Temporal)   Resp 18   Ht 6' 3\" (1.905 m)   Wt 289 lb 11.8 oz (131.4 kg)   SpO2 98%   BMI 36.22 kg/m²       EXAM:    Wound vac in tact to left foot. Functioning 125mmHg. No Calf pain.               Scheduled Meds:   amLODIPine  5 mg Oral Daily    atorvastatin  5 mg Oral Nightly    ceFAZolin  2 g Intravenous Q8H    enoxaparin  40 mg Subcutaneous Daily    heparin flush  3 mL Intravenous 2 times per day    insulin glargine  15 Units Subcutaneous Nightly    insulin lispro  5 Units Subcutaneous TID WC    metFORMIN  500 mg Oral Daily with breakfast    sodium chloride flush  10 mL Intravenous BID     Continuous Infusions:   dextrose       PRN Meds:.acetaminophen, oxyCODONE-acetaminophen, dextrose, dextrose, glucagon (rDNA), glucose, acetaminophen, heparin flush, sodium chloride flush, polyethylene glycol    RADIOLOGY:  No orders to display     /70   Pulse 82   Temp 98.2 °F (36.8 °C) (Temporal)   Resp 18   Ht 6' 3\" (1.905 m)   Wt 289 lb 11.8 oz (131.4 kg)   SpO2 98%   BMI 36.22 kg/m²     LABS:    No results for input(s): WBC, HGB, HCT, PLT in the last 72 hours. No results for input(s): NA, K, CL, CO2, PHOS, BUN, CREATININE in the last 72 hours. Invalid input(s): CA     No results for input(s): PROT, INR, APTT in the last 72 hours. ASSESSMENT:  S/p excisional debridement of left foot with graft application and wound vac (dos 12/2/20)  S/p Symes amputation 11/14/20  Flat necrosis left foot      PLAN:  Patient was evaluated this morning  Patient's labs, charts and images were reviewed   Antibiotics:Cefazolin  Surgical cultures negative thusfar 12/2  Wound vac is to be kept on at all times until discharge or change in orders are placed. Running at low continuous 125mmHg  Patient is to remain on strict bed rest with the exceptions of PT/OT evaluations and sessions. Recommend discharge to SNF. Patient is amicable. Patient to follow up in clinic 1 week after discharge.    Patient was discussed with    Will continue to follow patient while they are in-house.

## 2020-12-06 NOTE — PROGRESS NOTES
OCCUPATIONAL THERAPY DAILY NOTE    Date:2020  Patient Name: Katheryn Crowder  MRN: [id number]  : 1975  Room: [address] [place] [address]A     Diagnosis: L Syme's amputation- amputation @ ankle, partial excision distal/fibula 2020  Additional Pertinent Hx: Charcot foot, neuropathy, DM, HTN, osteomyilitis  Precautions: Falls, NWB L LE    Functional Assessment:   Date Status AE  Comments   Feeding 20 Independent  Goal Met. Grooming 20 Mod I seated Goal Met- including shaving         Oral Care 20 Mod I seated Goal Met. Bathing 20 SBA sponge . UB Dressing 20 Independent  Goal met   LB Dressing 20 SBA ww           Footwear 20 independent  Pt donned R shoe seated on EOB without difficulty. Toileting 20 S/SBA ww    Homemaking 20 SBA  w/c level      Functional Transfers / Balance:   Date Status DME  Comments   Sit Balance 20 Sup  Sitting balance up in w/c, on EOB and on ext tub bench. Stand Balance 20  SBA ww  Wound vac Standing balance during SPT from EOB <>w/c and during fxl mobility using w. Walker    [x] Tub  [x] Shower   Transfer 20 SBA Extended tub bench, w/w  Pt completed ext tub bench transfers in gym needing one cue for hand/trunk placement with assistance for wound vac. Commode   Transfer 20 SBA W/w, 3-in-1     Functional   Mobility 2020  SBA           I/Mod I   W/w          W/c propel Pt ambulated in gym using w. Walker short distance using hopping over method on RLE only for NWB LLE. Pt able to propel w/c from room to gym and back using BUE's to improve overall strength & endurance for ADLs, transfers and mobility skills.        Other:    Sit<>stand     Couch    Bed to w/c         Bed mobility     20      SBA     Mod A    SBA        Independent      W/w    Low surface    W/w        Bed rails     Sit to stand on/off bed and w/c     .20 Pt continues to decline sofa transfers in order to maintain NWB LLE to prevent further injury. .    Pt completed bed <> w/c transfers using w. Walker following NWB LLE        Pt able to manage BLE's out of bed using bed rail for safety. Functional Exercises / Activity:   Pt completed EOB<>w/c transfers using w. Walker at S/SBA for wound vac mgmt. Pt completed BUE AROM ex's using 5# dowel zarina up & down dowel zarina ladder 2 reps of 25 ea seated at table to improve strength & endurance for ADLls, transfers and mobility tasks. Pt tolerated resistive arm bike while wearing 2# wts on BUE's completing backwards and forwards 5 mins each to improve strength & endurance for functional tasks. Pt engaged in short distance ambulation in gym using hopping over method on RLE only for NWB LLE  needing S/SBA. Pt required one cue for hand/trunk placement during ext tub bench  Transfers to increase safety with assistance for wound vac. Sensory / Neuromuscular Re-Education:      Cognitive Skills:   Status Comments   Problem   Solving good     Memory good     Sequencing good    Safety Good-      Visual Perception:    Education:  Pt educated with safety during bed >w/c transfers and ext tub bench transfers due to NWB LLE and wound vac mgmt. [] Family teach completed on:    Pain Level: 0/10    Additional Notes:   11/27/20  Pt requested to focus on B UE strengthening exercises to increase independence and endurance for transfers/mobility and ADL tasks daily. 11/29/20 Request done on this date for ADLls and BUE strengthening exercises. Patient has made good  progress during treatment sessions toward set goals.  Therapy emphasis to obtain goals:   Long term goal 1: Pt demo independent to eat all meals  Long term goal 2: Pt dmeo MOd I grooming seated or standing @ sink level  Long term goal 3: Pt demo SBA bathing @ shower level or sponge bathing both seated & standing  Long term goal 4: Pt demo I UE & Mod I LE dress with AE as

## 2020-12-07 LAB
ALBUMIN SERPL-MCNC: 3.9 G/DL (ref 3.5–5.2)
ALP BLD-CCNC: 80 U/L (ref 40–129)
ALT SERPL-CCNC: <5 U/L (ref 0–40)
ANAEROBIC CULTURE: NORMAL
ANION GAP SERPL CALCULATED.3IONS-SCNC: 14 MMOL/L (ref 7–16)
AST SERPL-CCNC: 20 U/L (ref 0–39)
BILIRUB SERPL-MCNC: 0.5 MG/DL (ref 0–1.2)
BUN BLDV-MCNC: 12 MG/DL (ref 6–20)
C-REACTIVE PROTEIN: 0.8 MG/DL (ref 0–0.4)
CALCIUM SERPL-MCNC: 9.5 MG/DL (ref 8.6–10.2)
CHLORIDE BLD-SCNC: 106 MMOL/L (ref 98–107)
CO2: 22 MMOL/L (ref 22–29)
CREAT SERPL-MCNC: 0.8 MG/DL (ref 0.7–1.2)
GFR AFRICAN AMERICAN: >60
GFR NON-AFRICAN AMERICAN: >60 ML/MIN/1.73
GLUCOSE BLD-MCNC: 82 MG/DL (ref 74–99)
METER GLUCOSE: 102 MG/DL (ref 74–99)
METER GLUCOSE: 118 MG/DL (ref 74–99)
METER GLUCOSE: 89 MG/DL (ref 74–99)
METER GLUCOSE: 99 MG/DL (ref 74–99)
POTASSIUM SERPL-SCNC: 4 MMOL/L (ref 3.5–5)
SODIUM BLD-SCNC: 142 MMOL/L (ref 132–146)
TOTAL PROTEIN: 8.2 G/DL (ref 6.4–8.3)

## 2020-12-07 PROCEDURE — 82962 GLUCOSE BLOOD TEST: CPT

## 2020-12-07 PROCEDURE — 97606 NEG PRS WND THER DME>50 SQCM: CPT

## 2020-12-07 PROCEDURE — 97530 THERAPEUTIC ACTIVITIES: CPT

## 2020-12-07 PROCEDURE — 86140 C-REACTIVE PROTEIN: CPT

## 2020-12-07 PROCEDURE — 6370000000 HC RX 637 (ALT 250 FOR IP): Performed by: PODIATRIST

## 2020-12-07 PROCEDURE — 2580000003 HC RX 258: Performed by: PODIATRIST

## 2020-12-07 PROCEDURE — 97110 THERAPEUTIC EXERCISES: CPT

## 2020-12-07 PROCEDURE — 1280000000 HC REHAB R&B

## 2020-12-07 PROCEDURE — 6360000002 HC RX W HCPCS: Performed by: PODIATRIST

## 2020-12-07 PROCEDURE — 36415 COLL VENOUS BLD VENIPUNCTURE: CPT

## 2020-12-07 PROCEDURE — 80053 COMPREHEN METABOLIC PANEL: CPT

## 2020-12-07 PROCEDURE — 97535 SELF CARE MNGMENT TRAINING: CPT

## 2020-12-07 RX ADMIN — INSULIN GLARGINE 15 UNITS: 100 INJECTION, SOLUTION SUBCUTANEOUS at 20:11

## 2020-12-07 RX ADMIN — Medication 10 ML: at 14:53

## 2020-12-07 RX ADMIN — Medication 2 G: at 23:05

## 2020-12-07 RX ADMIN — ATORVASTATIN CALCIUM 5 MG: 10 TABLET, FILM COATED ORAL at 20:10

## 2020-12-07 RX ADMIN — ENOXAPARIN SODIUM 40 MG: 40 INJECTION SUBCUTANEOUS at 07:51

## 2020-12-07 RX ADMIN — SODIUM CHLORIDE, PRESERVATIVE FREE 300 UNITS: 5 INJECTION INTRAVENOUS at 14:59

## 2020-12-07 RX ADMIN — INSULIN LISPRO 5 UNITS: 100 INJECTION, SOLUTION INTRAVENOUS; SUBCUTANEOUS at 11:06

## 2020-12-07 RX ADMIN — INSULIN LISPRO 5 UNITS: 100 INJECTION, SOLUTION INTRAVENOUS; SUBCUTANEOUS at 16:28

## 2020-12-07 RX ADMIN — METFORMIN HYDROCHLORIDE 500 MG: 500 TABLET ORAL at 07:52

## 2020-12-07 RX ADMIN — Medication 10 ML: at 23:05

## 2020-12-07 RX ADMIN — SODIUM CHLORIDE, PRESERVATIVE FREE 300 UNITS: 5 INJECTION INTRAVENOUS at 21:00

## 2020-12-07 RX ADMIN — INSULIN LISPRO 5 UNITS: 100 INJECTION, SOLUTION INTRAVENOUS; SUBCUTANEOUS at 07:07

## 2020-12-07 RX ADMIN — Medication 2 G: at 14:52

## 2020-12-07 RX ADMIN — AMLODIPINE BESYLATE 5 MG: 5 TABLET ORAL at 07:52

## 2020-12-07 RX ADMIN — Medication 2 G: at 05:28

## 2020-12-07 ASSESSMENT — PAIN SCALES - GENERAL
PAINLEVEL_OUTOF10: 0

## 2020-12-07 NOTE — PROGRESS NOTES
Physical Therapy    Facility/Department: [address] [facility]  Treatment Note    NAME: Jessica Lawton  : 1975  MRN: [id number]    Date of Service: 2020  Evaluating Therapist: Milady Galeano. Lorri Villanueva P.T.    ROOM: [facility]  DIAGNOSIS: BKA  PRECAUTIONS: Fall risk, NWB L residual limb  PROCEDURE(S):   : I&D with bone debridement/biopsy   : Syme's amputation at ankle with partial excision of distal tib/fib  HPI: The pt was admitted to [facility] on 11/10 with a diabetic foot infection with osteomyelitis. Social:  Pt lives with his wife in a 2nd floor apartment with a curb step+2+1 steps with 1 rail to enter building and 7 steps with 2 rails to the 2nd floor. 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     Initial Evaluation  20 AM     PM    Short Term Goals Long Term Goals    Was pt agreeable to Eval/treatment? Yes Yes  Yes     Does pt have pain? Minimal LLE soreness \"maybe . 5/10) No c/o pain No c/o pain     Bed Mobility  Rolling: Supervision  Supine<>sit: Supervision  Scooting: Supervision Independent Independent for all aspects  Independent   Transfers Sit to stand: Max A  Stand to sit: Max A  Stand pivot:  Max A [employer] Sit<>stand: Modified Independent  Stand pivot: Modified Independent Sit<>stand: Modified Independent  Stand pivot: Modified Independent with [employer] SBA Modified Independent   Ambulation   15 feet with [employer] with Min A + WC follow 10 feet with [employer] with Modified [place]  Knee scooter propulsion x400 feet with Modified [place] 10 feet with WW with Modified [place]  Knee scooter propulsion x300 feet with Modified [place] 20 feet with AAD with SBA >150 feet with AAD with Modified [place]   Walking 10 feet on uneven surface NT NT 10 feet with [employer] with Modified [place] 10 feet with AAD with SBA >10 feet with AAD with Modified [place]   Wheel Chair Mobility 150 feet with BUE with SBA  feet with Modified [place]  >200 feet with BUE with Modified [place]   Car Transfers NT Modified Independent with [employer] NT SBA Modified Independent   Stair negotiation: ascended and descended NT, attempted but pt unable to perform safely NT 12 steps with tub seat + 1 rail in stairwell with SBA (assist to move seat) 4 steps with 2 rails with SBA >8 steps with 1 rail + AAD with Modified [place]   Curb Step:   ascended and descended NT NT NT 4 inch step with AAD and Min A 7.5 inch step with AAD and SBA   Picking up object off the floor NT NT Picked up a cone with Modified [place] Will  an object with Min A Will  an object with Modified [place]   BLE ROM R ankle limited d/t past fusion, otherwise WFL  R ankle limited d/t past fusion, otherwise WFL     BLE Strength RLE: grossly 4+/5  LLE: grossly 4/5 at the hip and knee  RLE: grossly 4+/5  LLE: grossly 4/5 at the hip and knee     Balance  Seated: Independent  Standing: Min A<>Max A  Seated: Independent  Standing: Modified Independent     Date Family Teach Completed No family present at evlauation  No family present to this date     Is additional Family Teaching Needed? Y or N Yes  Yes     Hindering Progress Limited endurance, medical history, obesity  Decreased endurance, WB restriction     PT recommended ELOS 3 weeks       Team's Discharge Plan        Therapist at Team Meeting          Therapeutic Exercise:   AM:   Ambulation: 2x10 feet with WW with Modified [place]  Supine LLE Manual Resisted Exercises:   -Hip/knee flexion x15  -Knee extension x15  -Straight leg hip extension x15  -Hip abduction: x15  Standing LLE Manual Resisted Exercises:  -Hip extension x15  -Hip abduction x10  PM:   Emphsis on functional mobility    Additional Comments: Emphasis continues to be placed on L hip/knee strengthening to prepare the pt for WBing once he is able to use a prosthetic as the pt's functional mobility is at a Modified Independent level.  The pt reported he had been accepted at a

## 2020-12-07 NOTE — PROGRESS NOTES
12/07/20 1233   Attendance   Activity   ([employer])   Participation Active participation   [place] Demonstrates ability to complete social goals   Stress Management/Relaxation Training Identifies benefits of stress management/relaxation techniques  Krupa Tan identified takes my mind off things as a benefit.)   Social Skills Interacts independently in social activity   Leisure Education Demonstrates knowledge of benefits of leisure involvement  Krupa Tan identified practical exercise & brought back memories.)   Time Spent With Patient   Minutes 20

## 2020-12-07 NOTE — PROGRESS NOTES
Progress Note  Date:2020       PROT:[id number]  Patient Name:Michael J Leschak     Date of Birth:[birth year]     Age:45 y.o. Subjective    Subjective:  Symptoms:  Stable. He reports weakness. Diet:  Adequate intake. Activity level: Impaired due to weakness. Pain:  He reports no pain. Review of Systems   Neurological: Positive for weakness. Objective         Vitals Last 24 Hours:  TEMPERATURE:  Temp  Av.7 °F (36.5 °C)  Min: 97.7 °F (36.5 °C)  Max: 97.7 °F (36.5 °C)  RESPIRATIONS RANGE: Resp  Av  Min: 16  Max: 16  PULSE OXIMETRY RANGE: No data recorded  PULSE RANGE: Pulse  Av  Min: 66  Max: 66  BLOOD PRESSURE RANGE: Systolic (60UHJ), PWL:566 , Min:155 , TGP:483   ; Diastolic (69TJW), GXH:28, Min:91, Max:91    I/O (24Hr): Intake/Output Summary (Last 24 hours) at 2020 0811  Last data filed at 2020 0137  Gross per 24 hour   Intake 1680 ml   Output 1725 ml   Net -45 ml     Objective:  General Appearance: In no acute distress. Vital signs: (most recent): Blood pressure (!) 155/91, pulse 66, temperature 97.7 °F (36.5 °C), temperature source Temporal, resp. rate 16, height 6' 3\" (1.905 m), weight 289 lb 11.8 oz (131.4 kg), SpO2 98 %. Vital signs are normal.    Output: Producing urine and producing stool. Lungs:  Normal effort and normal respiratory rate. Breath sounds clear to auscultation. Heart: Normal rate. Regular rhythm. S1 normal and S2 normal.    Abdomen: Abdomen is soft. Bowel sounds are normal.   There is no abdominal tenderness. Extremities: There is deformity. (Symes amputation)  Neurological: Patient is alert. (4/5 strength).       Labs/Imaging/Diagnostics    Labs:  CBC:  Recent Labs     20  1550   WBC 7.1   RBC 4.17   HGB 10.8*   HCT 34.4*   MCV 82.5   RDW 16.3*        CHEMISTRIES:  Recent Labs     20  1550      K 4.1      CO2 25   BUN 13   CREATININE 0.8   GLUCOSE 91     PT/INR:No results for input(s): PROTIME, INR in the last 72 hours. APTT:No results for input(s): APTT in the last 72 hours. LIVER PROFILE:  Recent Labs     12/06/20  1550   AST 25   ALT <5   BILITOT 0.3   ALKPHOS 83       Imaging Last 24 Hours:  No results found. Assessment//Plan           Hospital Problems           Last Modified POA    Partial nontraumatic amputation of foot, left ([address] 11/19/2020 Yes      Assessment:      Date   Status   AE    Comments     Feeding   12/1/20   Independent       Goal Met. Grooming   12/1/20   Mod I   seated   Goal Met- including shaving           Oral Care   12/1/20   Mod I   seated   Goal Met. Bathing   12/5/20   SBA   sponge   . UB Dressing   12/5/20   Independent       Goal met     LB Dressing   12/5/20   SBA   ww                Footwear   12/6/20   independent       Pt donned R shoe seated on EOB without difficulty. Toileting   12/5/20   S/SBA   ww         Homemaking   11/29/20   SBA    w/c level              Functional Transfers / Balance:      Date Status DME  Comments   Sit Balance 12/6/20   Sup   Sitting balance up in w/c, on EOB and on ext tub bench. Stand Balance 12/6/20    SBA ww  Wound vac Standing balance during SPT from EOB <>w/c and during fxl mobility using w. Jolie Marilia    [x]? Tub  [x]? Shower   Transfer 12/6/20   SBA Extended tub bench, w/w  Pt completed ext tub bench transfers in gym needing one cue for hand/trunk placement with assistance for wound vac. Commode   Transfer 12/5/20   SBA W/w, 3-in-1     Functional   Mobility 12/6/2020 12/6/20  SBA              I/Mod I   W/w              W/c propel Pt ambulated in gym using w.  Walker short distance using hopping over method on RLE only for NWB LLE.        Pt able to propel w/c from room to gym and back using BUE's to improve overall strength & endurance for ADLs, transfers and mobility skills.        Other:     Sit<>stand      Couch     Bed to w/c            Bed mobility       12/6/20

## 2020-12-07 NOTE — FLOWSHEET NOTE
Inpatient Wound Care(follow up) 5509a    Admit Date: 11/19/2020  1:24 PM    Reason for consult:  Wound vac management    Findings:     12/07/20 1230   Wound 12/02/20 Foot Left;Medial   Date First Assessed: 12/02/20   Present on Hospital Admission: No  Location: Foot  Wound Location Orientation: Left;Medial   Wound Etiology Surgical   Dressing Status New dressing applied   Wound Cleansed Cleansed with saline   Dressing/Treatment Negative pressure wound therapy; Non adherent   Dressing Change Due 12/09/20   Wound Assessment   (graft with staples)   Drainage Amount None   Luh-wound Assessment Intact   Wound 12/02/20 Foot Left;Posterior   Date First Assessed: 12/02/20   Present on Hospital Admission: Yes  Location: Foot  Wound Location Orientation: Left;Posterior   Wound Etiology Surgical   Dressing Status New dressing applied   Wound Cleansed Cleansed with saline   Dressing/Treatment Negative pressure wound therapy; Non adherent   Dressing Change Due 12/09/20   Wound Assessment   (graft with staples)   Drainage Amount Scant   Drainage Description Serosanguinous   Odor None   Luh-wound Assessment Intact   Negative Pressure Wound Therapy Foot Left   Placement Date: 12/02/20   Pre-existing: No  Location: Foot  Wound Location Orientation: Left   $ Standard NPWT >50 sq cm PER TX $ Yes   Wound Type Surgical   Dressing Type Black foam   Number of pieces used 6   Cycle Continuous   Target Pressure (mmHg) 125   Canister changed? No   Incision 12/02/20 Foot Anterior; Left   Date First Assessed: 12/02/20   Primary Wound Type: Surgical Type  Location: Foot  Wound Location Orientation: Anterior; Left   Dressing Status New dressing applied   Dressing Change Due 12/09/20   Incision Cleansed Cleansed with saline   Dressing/Treatment Negative pressure wound therapy; Non-adherent   Closure Sutures; [place]   Incision Assessment   (under graft)   Drainage Amount None     Impression:  No change in appearance from Friday    Plan:  Wound vac dressing changed  Plans for discharge tomorrow per     Thea Pollard 12/7/2020 3:24 PM

## 2020-12-07 NOTE — PROGRESS NOTES
Department of Podiatry  Progress Note    SUBJECTIVE:  Ousmane Huerta is a 54year old male who was seen at bedside for s/p left excisional wound debridement, I&D of left ankle hematoma, application of graft and wound vac performed 12/2/20 and s/p left foot Syme's amputation performed on 11/14/2020 . Would like to inform team that wound vac was somehow turned off last night. Once staff noticed this morning, it was turned right back on. Patient denies any N/V/D/F/C/SOB/CP. OBJECTIVE:    Scheduled Meds:   amLODIPine  5 mg Oral Daily    atorvastatin  5 mg Oral Nightly    ceFAZolin  2 g Intravenous Q8H    enoxaparin  40 mg Subcutaneous Daily    heparin flush  3 mL Intravenous 2 times per day    insulin glargine  15 Units Subcutaneous Nightly    insulin lispro  5 Units Subcutaneous TID WC    metFORMIN  500 mg Oral Daily with breakfast    sodium chloride flush  10 mL Intravenous BID     Continuous Infusions:   dextrose       PRN Meds:.acetaminophen, oxyCODONE-acetaminophen, dextrose, dextrose, glucagon (rDNA), glucose, acetaminophen, heparin flush, sodium chloride flush, polyethylene glycol    Allergies   Allergen Reactions    Vancomycin Other (See Comments)     Red man syndrome    Semaglutide Itching and Rash     Pink itch rash         BP (!) 155/91   Pulse 66   Temp 97.7 °F (36.5 °C) (Temporal)   Resp 16   Ht 6' 3\" (1.905 m)   Wt 289 lb 11.8 oz (131.4 kg)   SpO2 98%   BMI 36.22 kg/m²       EXAM:  Patient had wound vac and dressing changed this morning by wound care team.  Dressings currently clean, dry and intact without strike-through. No pain on palpation of the posterior calf b/l. LAST EXAM 12/4/20:    VASCULAR: CFT < 5 seconds B/L. Warm to warm throughout the entirety of the LEs    NEUROLOGIC:  Protective sensation is diminished b/l    DERM:  Surgical site is well coapted, sutures and staples are intact; no active pus, drainage or SOI. Integra graft well adhered in intended position.  No signs of necrosis or devitalization at the time of evaluation. No active bleeding. No SOI. Minimal edema in correlation with surgical intervention noted to left lower extremity. MUSCULOSKELETAL:  5/5 Gross Muscle strength in all 4 quadrants. No pain on palpation to the posterior calf b/l.                Scheduled Meds:   amLODIPine  5 mg Oral Daily    atorvastatin  5 mg Oral Nightly    ceFAZolin  2 g Intravenous Q8H    enoxaparin  40 mg Subcutaneous Daily    heparin flush  3 mL Intravenous 2 times per day    insulin glargine  15 Units Subcutaneous Nightly    insulin lispro  5 Units Subcutaneous TID WC    metFORMIN  500 mg Oral Daily with breakfast    sodium chloride flush  10 mL Intravenous BID     Continuous Infusions:   dextrose       PRN Meds:.acetaminophen, oxyCODONE-acetaminophen, dextrose, dextrose, glucagon (rDNA), glucose, acetaminophen, heparin flush, sodium chloride flush, polyethylene glycol    RADIOLOGY:  No orders to display     BP (!) 155/91   Pulse 66   Temp 97.7 °F (36.5 °C) (Temporal)   Resp 16   Ht 6' 3\" (1.905 m)   Wt 289 lb 11.8 oz (131.4 kg)   SpO2 98%   BMI 36.22 kg/m²     LABS:    Recent Labs     12/06/20  1550   WBC 7.1   HGB 10.8*   HCT 34.4*           Recent Labs     12/07/20  0545      K 4.0      CO2 22   BUN 12   CREATININE 0.8        Recent Labs     12/06/20  1550 12/07/20  0545   PROT 8.0 8.2         ASSESSMENT:  Principal Problem:  -Diabetic foot infection   - S/P excisional wound debridement to the level of the muscle, I&D of left ankle hematoma, application of graft and wound vac 12/2/20  -S/P left foot Syme's amputation performed 11/14/20  -Necrosis of left foot flap     Active Problems:    Charcot foot due to diabetes mellitus ([address]    Diabetic neuropathy associated with diabetes mellitus due to underlying condition ([address]    Essential hypertension    Diabetic foot ulcer with osteomyelitis ([address]        PLAN:  - Patient was evaluated this morning  - Patient's labs, charts and images were reviewed   - Antibiotics:Ancef  - New Surgical cultures: No growth (previous surgical culture of left foot: Pseudomonas, Staph, Corynebacterium)   - Wound vac and dressing changed today and scheduled every MWF by wound care team.   - Wound vac is to be kept on at all times until discharge or change in orders are placed. Running at low continuous.   -Patient is to remain on strict bed rest with the exceptions of PT/OT evaluations and sessions.  - WBC 7.1 (12/6/20, last date)  - Recommend discharge to SNF. Patient is amicable. - Patient to follow up in clinic 1 week after discharge.    - Patient was discussed with    - Will continue to follow patient while they are in-house.

## 2020-12-07 NOTE — PROGRESS NOTES
OCCUPATIONAL THERAPY DAILY NOTE    Date:2020  Patient Name: Muna Griggs  MRN: [id number]  : 1975  Room: [address] [place] [address]-A     Diagnosis: L Syme's amputation- amputation @ ankle, partial excision distal/fibula 2020  Additional Pertinent Hx: Charcot foot, neuropathy, DM, HTN, osteomyilitis  Precautions: Falls, NWB L LE    Functional Assessment:   Date Status AE  Comments   Feeding 20 Independent  Goal Met. Grooming 20 Mod I seated Goal Met- including shaving         Oral Care 20 Mod I seated Goal Met. Bathing 20 SBA sponge . UB Dressing 20 Independent  Goal met   LB Dressing 20 SBA ww           Footwear 20 independent  Pt donned R shoe seated on EOB without difficulty. Toileting 20 S/SBA ww    Homemaking 20 SBA  w/c level 20- Pt denying this date, stating of going to [facility] and will not be completing homemaking task for awhile, not a focus for him at this time     Functional Transfers / Balance:   Date Status DME  Comments   Sit Balance 20 Sup  Sitting balance up in w/c, on EOB and on ext tub bench. Stand Balance 20  SBA ww  Wound vac Standing balance during SPT from EOB <>w/c and during fxl mobility using w. Walker    [x] Tub  [x] Shower   Transfer 20 SBA Extended tub bench, w/w  Pt completed ext tub bench transfers in gym needing one cue for hand/trunk placement with assistance for wound vac. Commode   Transfer 20 SBA W/w, 3-in-1     Functional   Mobility 2020  SBA           Mod I   W/w          W/c propel Pt ambulated in gym using w. Walker short distance using hopping over method on RLE only for NWB LLE. Pt able to propel w/c from room to gym and back using BUE's to improve overall strength & endurance for ADLs, transfers and mobility skills.        Other:    Sit<>stand     Couch      Bed to w/c         Bed mobility     20      SBA     Mod Long term goal 1: Pt demo independent to eat all meals  Long term goal 2: Pt dmeo MOd I grooming seated or standing @ sink level  Long term goal 3: Pt demo SBA bathing @ shower level or sponge bathing both seated & standing  Long term goal 4: Pt demo I UE & Mod I LE dress with AE as needed  Long term goal 5: Pt demo MOd i commode trf wth appropraite DME to ensure pt safety  Long term goals 6: Pt demo Sup tub trf with appropraite DME to ensure pt safety  Long term goal 7: Pt demo Mod I light homemaking task @ wc level  Long term goal 8: Pt demo G endurance for a 30 minute funcitonal activity  Long term goal 9: Pt demo increased BUE strength to 5/5 & improved  strength- On eval R  strength 95# & norm for pt age & gender is 110# & L hand 80# & norm for tp age & gender is 101#  Long term goal 10: Pt demo Mod I wc porpulsion throughout a living environment & around obstacles      [x] Continue with current OT Plan of care. [] Prepare for Discharge     DISCHARGE RECOMMENDATIONS  Recommended DME: extended tub bench, toilet riser with armrests    Post Discharge Care:   []Home Independently  []Home with 24hr Care / Supervision []Home with Partial Supervision []Home with Home Health OT []Home with Out Pt OT []Other: ___   Comments:         Time in Time out Tx Time Breakdown  Variance:   First Session  915 1000 [] Individual Tx-mins  [x] Concurrent Tx - 45  [] Co-Tx -   [] Group Tx -   [] Time Missed -     Second Session [id number] [x] Individual Tx- 45mins   [] Concurrent Tx -  [] Co-Tx -   [] Group Tx -   [] Time Missed -     Third Session    [] Individual Tx-   [] Concurrent Tx -  [] Co-Tx -   [] Group Tx -   [] Time Missed -         Total Tx Time: 90mins   Katelyn Ruiz OTR/L [id number]  Lanette Lee PERES/L [id number]      I have read & agree with the above status.     Liz Irwin OTR/L [id number]

## 2020-12-07 NOTE — PROGRESS NOTES
Physical Therapy    Facility/Department: [facility] 5SE REHAB  Weekly Note    NAME: Alondra Van  : 1975  MRN: [id number]    Date of Service: 2020  Evaluating Therapist: Mike Pierre. Shira Ortega P.T.    ROOM: [facility]  DIAGNOSIS: BKA  PRECAUTIONS: Fall risk, NWB L residual limb  PROCEDURE(S):   : I&D with bone debridement/biopsy   : Syme's amputation at ankle with partial excision of distal tib/fib  HPI: The pt was admitted to [facility] on 11/10 with a diabetic foot infection with osteomyelitis. Social:  Pt lives with his wife in a 2nd floor apartment with a curb step+2+1 steps with 1 rail to enter building and 7 steps with 2 wide rails to the 2nd floor. 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     Initial Evaluation  20 Comments  Short Term Goals Long Term Goals    Was pt agreeable to Eval/treatment? Yes Initial Evaluation Yes, motivated Yes      Does pt have pain? Minimal LLE soreness \"maybe . 5/10) No c/o pain No c/o pain No c/o pain      Bed Mobility  Rolling: Supervision  Supine<>sit: Supervision  Scooting: Supervision Independent for all aspects Independent Independent   Independent   Transfers Sit to stand: Max A  Stand to sit: Max A  Stand pivot:  Max A [employer] Sit to stand: SBA  Stand pivot: CGA with bariatric [employer] Sit<>stand: Supervision  Stand pivot: Supervision Sit<>stand: Modified Independent  Stand pivot: Modified Independent  SBA Modified Independent   Ambulation   15 feet with WW with Min A + WC follow 40 feet with bariatric WW with CGA 10 feet with WW with Supervision  Knee scooter propulsion x200 feet with Supervision 10 feet with WW with Modified [place]  Knee scooter: 400 feet with Modified [place] Fatigues quickly with [employer], repetitive force on R ankle, uses scooter well 20 feet with AAD with SBA >150 feet with AAD with Modified [place]   Wheel Chair Mobility 150 feet with BUE with  feet with BUE with Supervision 200 feet with BUE with Modified [place] 200 feet with BUE with Modified [place] Used for longer distances  >200 feet with BUE with Modified [place]   Car Transfers NT NT SBA to and from knee scooter Modified Independent Uses WW SBA Modified Independent   Stair negotiation: ascended and descended NT, attempted but pt unable to perform safely NT 12 steps with tub seat + 1 rail with SBA (assist to move seat) 12 steps with tub seat + 1 rail with SBA (assist to move seat) Able to do in stairwell and on PT gym steps 4 steps with 2 rails with SBA >8 steps with 1 rail + AAD with Modified [place]   BLE ROM R ankle limited d/t past fusion, otherwise WFL R ankle limited d/t past fusion, otherwise WFL R ankle limited d/t past fusion, otherwise WFL R ankle limited d/t past fusion, otherwise WFL      BLE Strength RLE: grossly 4+/5  LLE: grossly 4/5 at the hip and knee RLE: grossly 4+/5  LLE: grossly 4/5 at the hip and knee RLE: grossly 4+/5  LLE: grossly 4/5 at the hip and knee RLE: grossly 4+/5  LLE: grossly 4/5 at the hip and knee      Balance  Seated: Independent  Standing: Min A<>Max A Seated: Independent  Standing: Min A<>Max A Seated: Independent  Standing: Supervision Seated: Independent  Standing: Modified Independent      Date Family Teach Completed No family present at evlauation No family present to this date No family present to this date No family present to this date      Is additional Family Teaching Needed?   Y or N Yes Yes Yes Yes FT was canceled on 12/5 as DC plan changed to SNF     Hindering Progress Limited endurance, medical history, obesity Limited endurance, medical hx, obesity Limited by endurance, medical history Limited endurance, medical history      PT recommended ELOS 3 weeks 2 weeks 1 week Ready for discharge      Team's Discharge Plan  2 weeks Discharge Tuesday 12/8/20 Discharge today 12/8/20      Therapist at Team Meeting  Nancy Yeung, [address] [facility], DPT [id number] Petrosdiaia Patricfatmata, PT, DPT [id number]   Mayela Hyde, PT, DPT [id number]          Date:  11/24/20  Supporting factors: olivia Houser  Barriers to discharge:  Stairs into home, body habitus  Additional comments: The pt is motivated and is progressing well, he has a long history with B foot wounds and maintains his NWB well. The pt reports having used a knee scooter in the past in an inpatient setting with good results as well as a walker with sling. Due to his R ankle fusion and history with R foot wounds I would like to limit the force going through his R foot in [address] and move the pt to a knee scooter or a walker with a sling pending availability of equipment. The stairs will continue to be an issue, working on steps in parallel bars at this time to work toward performing steps with a single crutch and the rail, the pt states he has done this in the past.   DME:  TBD, at this time recommending a heavy duty walker, possibly with sling attachment vs a knee scooter  After Care:  HHPT    Date:  12/1/20  Supporting factors: olivia Houser  Barriers to discharge:  Stairs into home  Additional comments: The pt would be expected to be able to return home on Friday at his current level, however there is a planned I&D per Podiatry on Wednesday and it is unknown how the pt will respond to this; also, per the pt, there is expected to be a wound vac he will need to manage after the washout. 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 DME:  20\" WC with anti-tippers, desk-length arm rests, elevating leg rests, tub seat for stair negotiation, knee scooter (if insurance would cover could replace the [facility])  After Care:  HHPT    Date:  12/8/20  Supporting factors:   olivia Houser, good progress  Barriers to discharge:  Podiatry  Additional comments: The pt has progressed well and is ready for discharge, however if the pt does return home his wife should be present for a family teach session to review proper technique for stair negotiation with the tub seat as the pt's wife will need to assist the pt with tub seat placement. Recommend the pt use a knee scooter to promote WBing through the L hip and prepare him for a prosthetic once cleared by podiatry as well as to decrease stress on R ankle/foot. However, the pt stated he will not be able to afford a knee scooter and it is not covered by insurance, therefore a WC is recommended. DME:  20\" WC with anti-tippers, desk-length arm rests, elevating leg rests, tub seat for stair negotiation, knee scooter (if insurance would cover could replace the [facility])  After Care:  Alistair Villanueva, [id number]  number:  [phone]

## 2020-12-07 NOTE — PROGRESS NOTES
[facility] PROGRESS NOTE      Chief complaint: Follow-up of ongoing antibiotic treatment for MSSA bacteremia    The patient is a 54 y.o. male with history of DM, Charcot arthropathy, hypertension, peripheral vascular disease, previously admitted from 11/10-11/18 for Pseudomonas aeruginosa and MSSA left diabetic foot infection with osteomyelitis, s/p Symes amputation at level of ankle and partial excision of tib-fib on 84/78, complicated by MSSA bacteremia for which he was seen by Dr. Christian Jones and discharged to inpatient rehab on 11/19 on 2-week course of levofloxacin and 6-week course of cefazolin. On admission, he was afebrile and hemodynamically stable with leukocytosis of 13,000. Cefazolin and levofloxacin were resumed. He finished 2-week course of levofloxacin until 11/28. He underwent Excisional wound debridement to and through level of the muscle, I&D of left ankle hematoma, application of skin substitute graft and wound vac on 12/02 for flap necrosis. Tissue culture shoed no growth. Subjective: Patient was seen and examined. No chills, no abdominal pain, no diarrhea, no rash, no itching. Objective:    Vitals:    12/07/20 0710   BP: (!) 155/91   Pulse: 66   Resp: 16   Temp: 97.7 °F (36.5 °C)   SpO2:      Constitutional: Alert, not in distress  Respiratory: Clear breath sounds, no crackles, no wheezes  Cardiovascular: Regular rate and rhythm, no murmurs  Gastrointestinal: Bowel sounds present, soft, nontender  Skin: Warm and dry, no active dermatoses  Musculoskeletal: Left foot incision wounds clean    Labs, imaging, and medical records/notes were personally reviewed. Assessment:  Pseudomonas aeruginosa and MSSA left diabetic foot ulcer with osteomyelitis status post Symes amputation at the level of ankle and partial excision of tib-fib on 11/14. Received 2-week course of levofloxacin from 11/14-11/28.   MSSA bacteremia, ongoing antibiotic treatment    Recommendations:  Continue cefazolin 2 g every 8 hours for 6 weeks as previously planned from 11/14-12/26. Monitor CBC, CMP, CRP, ESR weekly. Continue local wound care.     Thank you for involving me in the care of Sun Pizarro. I will continue to follow peripherally. Please do not hesitate to call for any questions or concerns.     Electronically signed by Mariama Bentley MD on 12/7/2020 at 10:54 AM

## 2020-12-07 NOTE — CARE COORDINATION
Spoke with patient  - he again confirmed that he wants to d/c to [facility] at this time based on podiatry recommendations. [address] has accepted the patient and interviewed him about the pending Medicaid. Dr. Mirian Kennedy and Nursing marely CABRERA completed. Transfer via [facility] transport.     Neida Rose

## 2020-12-08 VITALS
TEMPERATURE: 97.7 F | WEIGHT: 289.74 LBS | HEART RATE: 82 BPM | BODY MASS INDEX: 36.02 KG/M2 | RESPIRATION RATE: 18 BRPM | HEIGHT: 75 IN | OXYGEN SATURATION: 98 % | SYSTOLIC BLOOD PRESSURE: 155 MMHG | DIASTOLIC BLOOD PRESSURE: 89 MMHG

## 2020-12-08 LAB
AFB CULTURE (MYCOBACTERIA): NORMAL
AFB CULTURE (MYCOBACTERIA): NORMAL
AFB SMEAR: NORMAL
AFB SMEAR: NORMAL
METER GLUCOSE: 96 MG/DL (ref 74–99)

## 2020-12-08 PROCEDURE — 97530 THERAPEUTIC ACTIVITIES: CPT

## 2020-12-08 PROCEDURE — 6370000000 HC RX 637 (ALT 250 FOR IP): Performed by: PODIATRIST

## 2020-12-08 PROCEDURE — 2580000003 HC RX 258: Performed by: PODIATRIST

## 2020-12-08 PROCEDURE — 97110 THERAPEUTIC EXERCISES: CPT

## 2020-12-08 PROCEDURE — 6360000002 HC RX W HCPCS: Performed by: PODIATRIST

## 2020-12-08 PROCEDURE — 82962 GLUCOSE BLOOD TEST: CPT

## 2020-12-08 RX ORDER — POLYETHYLENE GLYCOL 3350 17 G/17G
17 POWDER, FOR SOLUTION ORAL DAILY PRN
Qty: 527 G | Refills: 1 | Status: SHIPPED | OUTPATIENT
Start: 2020-12-08 | End: 2021-01-07

## 2020-12-08 RX ORDER — AMLODIPINE BESYLATE 5 MG/1
5 TABLET ORAL DAILY
Qty: 30 TABLET | Refills: 3 | Status: SHIPPED | OUTPATIENT
Start: 2020-12-08

## 2020-12-08 RX ADMIN — INSULIN LISPRO 5 UNITS: 100 INJECTION, SOLUTION INTRAVENOUS; SUBCUTANEOUS at 11:24

## 2020-12-08 RX ADMIN — Medication 2 G: at 06:22

## 2020-12-08 RX ADMIN — INSULIN LISPRO 5 UNITS: 100 INJECTION, SOLUTION INTRAVENOUS; SUBCUTANEOUS at 06:56

## 2020-12-08 RX ADMIN — SODIUM CHLORIDE, PRESERVATIVE FREE 300 UNITS: 5 INJECTION INTRAVENOUS at 14:38

## 2020-12-08 RX ADMIN — ENOXAPARIN SODIUM 40 MG: 40 INJECTION SUBCUTANEOUS at 08:08

## 2020-12-08 RX ADMIN — Medication 10 ML: at 06:22

## 2020-12-08 RX ADMIN — AMLODIPINE BESYLATE 5 MG: 5 TABLET ORAL at 08:08

## 2020-12-08 RX ADMIN — Medication 10 ML: at 14:37

## 2020-12-08 RX ADMIN — METFORMIN HYDROCHLORIDE 500 MG: 500 TABLET ORAL at 08:08

## 2020-12-08 RX ADMIN — Medication 2 G: at 14:36

## 2020-12-08 ASSESSMENT — PAIN SCALES - GENERAL
PAINLEVEL_OUTOF10: 0
PAINLEVEL_OUTOF10: 0

## 2020-12-08 NOTE — PROGRESS NOTES
Contacted Dr. Jaciel Malcolm call service to request her to reconcile and write prescriptions for antibiotics. He is discharging at 1400.

## 2020-12-08 NOTE — PROGRESS NOTES
Physical Therapy    Facility/Department: [address] REHAB  Treatment Note    NAME: Salvador Carvajal  : 1975  MRN: [id number]    Date of Service: 2020  Evaluating Therapist: Wade Silva. Yessenia Arrieta P.T.    ROOM: [facility]  DIAGNOSIS: BKA  PRECAUTIONS: Fall risk, NWB L residual limb  PROCEDURE(S):   : I&D with bone debridement/biopsy   : Syme's amputation at ankle with partial excision of distal tib/fib  HPI: The pt was admitted to [facility] on 11/10 with a diabetic foot infection with osteomyelitis. Social:  Pt lives with his wife in a 2nd floor apartment with a curb step+2+1 steps with 1 rail to enter building and 7 steps with 2 rails to the 2nd floor. 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     Initial Evaluation  20 AM         Short Term Goals Long Term Goals    Was pt agreeable to Eval/treatment? Yes Yes  Discharged     Does pt have pain? Minimal LLE soreness \"maybe . 5/10) No c/o pain      Bed Mobility  Rolling: Supervision  Supine<>sit: Supervision  Scooting: Supervision Independent   Independent   Transfers Sit to stand: Max A  Stand to sit: Max A  Stand pivot:  Max A [employer] Sit<>stand: Modified Independent  Stand pivot: Modified Independent  SBA Modified Independent   Ambulation   15 feet with WW with Min A + WC follow 10 feet with [employer] with Modified Hughes  Knee scooter propulsion x400 feet with Modified [place]  20 feet with AAD with SBA >150 feet with AAD with Modified [place]   Walking 10 feet on uneven surface NT NT  10 feet with AAD with SBA >10 feet with AAD with Modified [place]   Wheel Chair Mobility 150 feet with BUE with SBA NT   >200 feet with BUE with Modified [place]   Car Transfers NT NT  SBA Modified Independent   Stair negotiation: ascended and descended NT, attempted but pt unable to perform safely NT  4 steps with 2 rails with SBA >8 steps with 1 rail + AAD with Modified [place]   Curb Step:   ascended and descended NT 2 inch step with WW with SBA (ascended backward)  4 inch step with AAD and Min A 7.5 inch step with AAD and SBA   Picking up object off the floor NT NT  Will  an object with Min A Will  an object with Modified [place]   BLE ROM R ankle limited d/t past fusion, otherwise WFL R ankle limited d/t past fusion, otherwise WFL      BLE Strength RLE: grossly 4+/5  LLE: grossly 4/5 at the hip and knee RLE: grossly 4+/5  LLE: grossly 4/5 at the hip and knee      Balance  Seated: Independent  Standing: Min A<>Max A Seated: Independent  Standing: Modified Independent      Date Family Teach Completed No family present at evlauation No family present to this date      Is additional Family Teaching Needed? Y or N Yes Yes      Hindering Progress Limited endurance, medical history, obesity Decreased endurance, WB restriction      PT recommended ELOS 3 weeks       Team's Discharge Plan        Therapist at Team Meeting          Therapeutic Exercise:   AM:   Supine PROM to LLE to include:  -HS stretching 2x30 seconds  -Adductor stretching 2x30 seconds  -Quadriceps stretching 2x30 seconds  Supine LLE Manual Resisted Exercises:   -Knee extension x15  -Hip/knee flexion x15  -Straight leg hip extension x15  -Hip abduction: x15    Additional Comments: The pt was able to perform all functional mobility as noted above, he continues to progress well. The pt has been given hip strengthening exercises to prepare his L residual limb for gait once he is cleared by podiatry for a prosthetic. Unfortunately, despite the pt's progress, he is being discharged today to a SNF. Patient/family education  Pt/family educated on bed mobility, transfer technique.      Patient/family response to education:   Pt/family verbalized understanding Pt/family demonstrated skill Pt/family requires further education in this area   Yes Yes Reinforce     AM  Time in: 1000  Time out: 1045    Pt is making good progress toward established Physical Therapy goals. Continue with physical therapy current plan of care. Thomas Jose.  Bert Hernandez [place]  License Number: [id number]

## 2020-12-08 NOTE — DISCHARGE INSTR - COC
Continuity of Care Form    Patient Name: Ej Fleming   :  1975  MRN:  [id number]    Admit date:  2020  Discharge date:  2020    Code Status Order: Full Code   Advance Directives:   [facility] Documentation       Date/Time Healthcare Directive Type of Healthcare Directive Copy in [facility] [address] Agent's Name Healthcare Agent's Phone Number    20 0980  No, patient does not have an advance directive for healthcare treatment -- -- -- -- --            Admitting Physician:  Lisbeth Frye MD  PCP: No primary care provider on file. Discharging Nurse: [facility] Unit/Room#: 3564/4354-H  Discharging Unit Phone Number: [phone]    Emergency Contact:   Extended Emergency Contact Information  Primary Emergency Contact: Kalie Carney  [facility] [place] [address]           [address] [place] [address] Phone: [phone]  Mobile Phone: [phone]  Relation: Spouse  Preferred language: English   needed?  No    Past Surgical History:  Past Surgical History:   Procedure Laterality Date    ANKLE FUSION  2020    FOOT AMPUTATION Left 2020    LEFT FOOT SYMES AMPUTATION performed by Paul Baker DPM at [address] [place] [address] Left 10/17/2020    LEFT FOOT  INCISION AND DRAINAGE, BONE DEBRIDEMENT AND BIOPSY performed by Debbie Stephens DPM at [address] [place] [address] Left 2020    LEFT FOOT INCISION AND DRAINAGE APPLICATION OF WOUND VAC POSSIBLE INTEGRA GRAFT performed by Paul Baker DPM at [address] [place] [address] Left 2020    LEFT FOOT WOUND DEBRIDEMENT, INCISION AND DRAINAGE OF LEFT FOOT HEMATOMA, INTEGRA, WOUND VAC performed by Yaya Mc DPM at [place] [address] PICC LINE INSERTION NURSE  2020            Immunization History:   Immunization History   Administered Date(s) Administered    Influenza, Quadv, IM, PF (6 mo and older Fluzone, Flulaval, Fluarix, and 3 yrs and older Afluria) 10/22/2020       Active Problems:  Patient Active Problem List   Diagnosis Code    Diabetic foot infection ([facility] E11.628, L08.9    Charcot foot due to diabetes mellitus ([facility] E11.610    Diabetic neuropathy associated with diabetes mellitus due to underlying condition ([facility] E08.40    Essential hypertension I10    Diabetic foot ulcer with osteomyelitis ([facility] E11.621, E11.69, L97.509, M86.9    Partial nontraumatic amputation of foot, left ([facility] S71.260       Isolation/Infection:   Isolation            No Isolation          Unreconciled External Infections       Infection Onset Last Indicated Last Received Source    No mapped external infections found      .     Unmapped Infections (1)        MRSA 12/05/19 12/05/19 10/15/20                   Patient Infection Status       Infection Onset Added Last Indicated Last Indicated By Review Planned Expiration Resolved Resolved By    None active    Resolved    COVID-19 Rule Out 11/17/20 11/17/20 11/17/20 Covid-19 Ambulatory (Ordered)   11/19/20 Rule-Out Test Resulted    COVID-19 Rule Out 11/10/20 11/10/20 11/10/20 COVID-19 (Ordered)   11/10/20 Rule-Out Test Resulted            Nurse Assessment:  Last Vital Signs: BP (!) 155/89   Pulse 82   Temp 97.7 °F (36.5 °C) (Temporal)   Resp 18   Ht 6' 3\" (1.905 m)   Wt 289 lb 11.8 oz (131.4 kg)   SpO2 98%   BMI 36.22 kg/m²     Last documented pain score (0-10 scale): Pain Level: 0  Last Weight:   Wt Readings from Last 1 Encounters:   11/19/20 289 lb 11.8 oz (131.4 kg)     Mental Status:  oriented and alert    IV Access:  - PICC - site  L Basilic, insertion date: 11/18/2020    Nursing Mobility/ADLs:  Walking   Assisted  Transfer  Assisted  Bathing  Assisted  Dressing  Assisted  Toileting  Assisted  Feeding  Independent  Med Admin  Assisted  Med Delivery   whole    Wound Care Documentation and Therapy:  Negative Pressure Wound Therapy Foot Left (Active)   $ Standard NPWT >50 sq cm PER TX $ Yes 12/07/20 1230   Wound Type Surgical 12/07/20 1230   Dressing Type Black foam 12/07/20 1230   Number of pieces used 6 12/07/20 1230   Cycle Continuous 12/07/20 1230   Target Pressure (mmHg) 125 12/07/20 1230   Canister changed? No 12/07/20 1230   Dressing Status Clean;Dry; Intact 12/07/20 0912   Dressing Changed Changed/New 12/04/20 1145   Drainage Amount None 12/07/20 0912   Dressing Change Due 12/07/20 12/07/20 0912   Luh-wound Assessment Intact 12/07/20 0912   Number of days: 6       Wound 12/02/20 Foot Left;Medial (Active)   Wound Image   12/04/20 1145   Wound Etiology Surgical 12/08/20 0145   Dressing Status Clean;Dry; Intact 12/08/20 0145   Wound Cleansed Cleansed with saline 12/07/20 1230   Dressing/Treatment Negative pressure wound therapy; Non adherent 12/08/20 0145   Dressing Change Due 12/09/20 12/07/20 1230   Wound Length (cm) 9 cm 12/04/20 1145   Wound Width (cm) 2 cm 12/04/20 1145   Wound Depth (cm) 0.1 cm 12/04/20 1145   Wound Surface Area (cm^2) 18 cm^2 12/04/20 1145   Wound Volume (cm^3) 1.8 cm^3 12/04/20 1145   Wound Assessment Other (Comment) 12/07/20 0912   Drainage Amount None 12/07/20 1230   Odor None 12/07/20 0246   Luh-wound Assessment Intact 12/07/20 1230   Number of days: 6       Wound 12/02/20 Foot Left;Posterior (Active)   Wound Image   12/04/20 1145   Wound Etiology Surgical 12/08/20 0145   Dressing Status Clean;Dry; Intact 12/08/20 0145   Wound Cleansed Cleansed with saline 12/07/20 1230   Dressing/Treatment Negative pressure wound therapy; Non adherent 12/08/20 0145   Dressing Change Due 12/09/20 12/07/20 1230   Wound Length (cm) 6 cm 12/04/20 1145   Wound Width (cm) 2 cm 12/04/20 1145   Wound Depth (cm) 0.1 cm 12/04/20 1145   Wound Surface Area (cm^2) 12 cm^2 12/04/20 1145   Wound Volume (cm^3) 1.2 cm^3 12/04/20 1145   Wound Assessment Other (Comment) 12/07/20 0912   Drainage Amount Scant 12/07/20 1230   Drainage Description Serosanguinous 12/07/20 1230   Odor None 12/07/20 1230 Luh-wound Assessment Intact 12/07/20 1230   Number of days: 6        Elimination:  Continence:   · Bowel: Yes  · Bladder: Yes  Urinary Catheter: None   Colostomy/Ileostomy/Ileal Conduit: No       Date of Last BM: 12/07/2020    Intake/Output Summary (Last 24 hours) at 12/8/2020 0759  Last data filed at 12/7/2020 2013  Gross per 24 hour   Intake 1380 ml   Output 600 ml   Net 780 ml     I/O last 3 completed shifts: In: 0 [P.O.:1380]  Out: 1500 [Urine:1500]    Safety Concerns:     None    Impairments/Disabilities:      Amputation - left fot symes amputation     Nutrition Therapy:  Current Nutrition Therapy:   - Oral Diet:  Carb Control 4 carbs/meal (1800kcals/day)    Routes of Feeding: Oral  Liquids: Thin Liquids  Daily Fluid Restriction: no  Last Modified Barium Swallow with Video (Video Swallowing Test): {Done Not Done LDCI:[id number]:::0}    Treatments at the Time of Hospital Discharge:   Respiratory Treatments: none  Oxygen Therapy:  is not on home oxygen therapy.   Ventilator:    - No ventilator support    Rehab Therapies: Physical Therapy/ occupational therpay  Weight Bearing Status/Restrictions: Non-weight bearing on left leg  Other Medical Equipment (for information only, NOT a DME order):  wheelchair  Other Treatments: wound vac left foot 125 contionous    Patient's personal belongings (please select all that are sent with patient):  Juan    RN SIGNATURE:  Electronically signed by Danyel Reed RN on 12/8/20 at 9:46 AM EST    CASE MANAGEMENT/SOCIAL WORK SECTION    Inpatient Status Date: ***    Readmission Risk Assessment Score:  Readmission Risk              Risk of Unplanned Readmission:        17           Discharging to Facility/ Agency   · Name:   · Address:  · Phone:  · Fax:    Dialysis Facility (if applicable)   · Name:  · Address:  · Dialysis Schedule:  · Phone:  · Fax:    / signature: {Esignature:[id number]:::0}    PHYSICIAN SECTION    Prognosis: Good    Condition at Discharge: Stable    Rehab Potential (if transferring to Rehab): Good    Recommended Labs or Other Treatments After Discharge: PT, OT    Physician Certification: I certify the above information and transfer of Ovi Desai  is necessary for the continuing treatment of the diagnosis listed and that he requires Zack Anand for greater 30 days.      Update Admission H&P: No change in H&P    PHYSICIAN SIGNATURE:  Electronically signed by Sondra Kim MD on 12/8/20 at 8:00 AM EST

## 2020-12-08 NOTE — PROGRESS NOTES
Discharge order placed by MD. Prescription for Ancef written by Dr. Sheila Smith and will send with patient. [facility]. Due to be picked up at 1400.

## 2020-12-08 NOTE — PATIENT CARE CONFERENCE
[address] [place] [address]  ACUTE REHABILITATION  TEAM CONFERENCE NOTE/PATIENT PLAN OF CARE    Date: 2020  Admission date: 2020  Patient Name: Annmarie Broderick        MRN: [id number]    : 1975  (54 y.o.)  Gender: male   Rehab diagnosis/surgery with date:  osteomyelitis left foot and ankle, left BKA done 20:   Excisional wound debridement to and through level of the muscle,  measurement is 3 cm x 7.5 cm x 0.4 cm in dimension. 2.  Incision and drainage of left ankle hematoma. 3.  Application of skin substitute graft, Integra bilayer graft. 4.  Application of wound VAC negative pressure therapy.   Impairment Group Code:  5.4      MEDICAL/FUNCTIONAL HISTORY/STATUS:  stable, podiatry continues to follow, will go to skilled nursing at discharge er his decision    Consultations/Labs/X-rays: FBS 96      MEDICATION UPDATE:  continues on IV Ancef 2 grams every 8 hours until 20, via PICC line      NURSING FIMS:    Bowel:   Current level: continent    Bladder:   Current level: continent    Toilet Hygiene:   Current level : supervision  Short term Toilet hygiene goal: supervision  Long term toilet hygiene goal:  supervision    Skin integrity: left amp site has sutures  and staples, wound vac dressing changes 3 days per week  Pain: none    NUTRITION    Diet  carb controlled  Liquid consistency   thin    SOCIAL INFORMATION:  Lives with: wife, she works at [place] [address] Architecture:  2nd floor apartment, 2 steps with 1 rail, then 7 steps to second floor  Prior Level of function:  independent  DME:  Quad cane, wheeled walker, glucometer    FAMILY / PATIENT EDUCATION:  safety and self care are ongoing with patient    PHYSICAL THERAPY    Bed mobility:   Current level: independent  Short term bed mobility goal: independent  Long term bed mobility goal: independent    Chair/bed transfers:  Current level: Modified independent  Short term Chair/bed transfers goal: Modified independent  Long term Chair/bed transfers goal: Modified independent      Ambulation:   Current level: with knee scooter 400 ft at Modified independent, with wheeled walker 10 ft at Modified independent  Short term ambulation goal: met  Long term ambulation goal: met    Wheelchair Mobility:  Current level: 200 ft at Modified independent  Short term wheelchair goal: met  Long term wheelchair goal: met      Car transfers:   Current level: Modified independent  Short term car transfers goal: Modified independent  Long term car transfers goal:Modified independent    Stairs:   Current level : 12 with tub seat at standby assist  Short term stairs goal: met  Long term stairs goal: 8 at Modified independent      OCCUPATIONAL THERAPY:      Tub/shower:   Current level: standby assist with bench, walker  Short term tub/shower goal: met  Long term tub/shower goal: met      Feeding:  Current level: independent  Short term feeding goal: independent  Long term feeding goal: independent    Grooming:   Current level: Modified independent  Short term grooming goal: Modified independent  Long term grooming goal: Modified independent    Bathing:  Current level: standby assist  Short term bathing goal: standby assist  Long term bathing goal: standby assist    Homemaking:   Current level: standby assist  Short term homemaking goal: Modified independent  Long term homemaking goal: Modified independent    Upper body dressing:  Current level: independent  Short term upper body dressing goal: independent  Long term upper body dressing goal: independent    Lower body dressing:  Current level: standby assist  Short term lower body dressing goal: Modified independent  Long term lower body dressing goal: Modified independent    Toilet transfer:   Current level: standby assist  Short term toilet transfer goal: Modified independent  Long term toilet transfer goal: Modified independent    Safety awareness: good -      Patient/family's personal goals: go to nursing home, don't want to burden my wife  Factors supporting goal achievement:  made gains in therapy  Factors hindering goal achievement:  need for continuing wound VAC therapy and IV antibiotics      Discharge Plan   Estimated Length of Stay: 2 pm to facility 12/08/20         Destination: [address] [place] [address] TEAM/PHYSICIAN RECOMMENDATION AND/OR REVISIONS OF PLAN OF CARE:  Discharge today      I approve the established interdisciplinary plan of care as documented within the medical record of Ovi Desai. Electronically signed by Keiry Tan RN  on 12/8/2020 at 9:03 AM  The following interdisciplinary team members were present:  VELIA Nicole, MSSA,LSW  Davon Norwood, PT, DPT  Cassy Das, OTR

## 2020-12-08 NOTE — PROGRESS NOTES
Department of Podiatry  Progress Note    SUBJECTIVE:  Senait Lezama is a 54year old male who was seen for s/p left excisional wound debridement, I&D of left ankle hematoma, application of graft and wound vac performed 12/2/20 and s/p left foot Syme's amputation performed on 11/14/2020. Patient denies any N/V/D/F/C/SOB/CP. OBJECTIVE:    Scheduled Meds:   amLODIPine  5 mg Oral Daily    atorvastatin  5 mg Oral Nightly    ceFAZolin  2 g Intravenous Q8H    enoxaparin  40 mg Subcutaneous Daily    heparin flush  3 mL Intravenous 2 times per day    insulin glargine  15 Units Subcutaneous Nightly    insulin lispro  5 Units Subcutaneous TID WC    metFORMIN  500 mg Oral Daily with breakfast    sodium chloride flush  10 mL Intravenous BID     Continuous Infusions:   dextrose       PRN Meds:.acetaminophen, oxyCODONE-acetaminophen, dextrose, dextrose, glucagon (rDNA), glucose, acetaminophen, heparin flush, sodium chloride flush, polyethylene glycol    Allergies   Allergen Reactions    Vancomycin Other (See Comments)     Red man syndrome    Semaglutide Itching and Rash     Pink itch rash         BP (!) 155/89   Pulse 82   Temp 97.7 °F (36.5 °C) (Temporal)   Resp 18   Ht 6' 3\" (1.905 m)   Wt 289 lb 11.8 oz (131.4 kg)   SpO2 98%   BMI 36.22 kg/m²       EXAM:  Patient had wound vac and dressing changed yesterday by wound care team.  Dressings currently clean, dry and intact without strike-through. No pain on palpation of the posterior calf b/l. LAST EXAM 12/4/20:    VASCULAR: CFT < 5 seconds B/L. Warm to warm throughout the entirety of the LEs    NEUROLOGIC:  Protective sensation is diminished b/l    DERM:  Surgical site is well coapted, sutures and staples are intact; no active pus, drainage or SOI. Integra graft well adhered in intended position. No signs of necrosis or devitalization at the time of evaluation. No active bleeding. No SOI.  Minimal edema in correlation with surgical intervention noted to left lower extremity. MUSCULOSKELETAL:  5/5 Gross Muscle strength in all 4 quadrants. No pain on palpation to the posterior calf b/l.                Scheduled Meds:   amLODIPine  5 mg Oral Daily    atorvastatin  5 mg Oral Nightly    ceFAZolin  2 g Intravenous Q8H    enoxaparin  40 mg Subcutaneous Daily    heparin flush  3 mL Intravenous 2 times per day    insulin glargine  15 Units Subcutaneous Nightly    insulin lispro  5 Units Subcutaneous TID WC    metFORMIN  500 mg Oral Daily with breakfast    sodium chloride flush  10 mL Intravenous BID     Continuous Infusions:   dextrose       PRN Meds:.acetaminophen, oxyCODONE-acetaminophen, dextrose, dextrose, glucagon (rDNA), glucose, acetaminophen, heparin flush, sodium chloride flush, polyethylene glycol    RADIOLOGY:  No orders to display     BP (!) 155/89   Pulse 82   Temp 97.7 °F (36.5 °C) (Temporal)   Resp 18   Ht 6' 3\" (1.905 m)   Wt 289 lb 11.8 oz (131.4 kg)   SpO2 98%   BMI 36.22 kg/m²     LABS:    Recent Labs     12/06/20  1550   WBC 7.1   HGB 10.8*   HCT 34.4*           Recent Labs     12/07/20  0545      K 4.0      CO2 22   BUN 12   CREATININE 0.8        Recent Labs     12/06/20  1550 12/07/20  0545   PROT 8.0 8.2         ASSESSMENT:  Principal Problem:  -Diabetic foot infection   - S/P excisional wound debridement to the level of the muscle, I&D of left ankle hematoma, application of graft and wound vac 12/2/20  -S/P left foot Syme's amputation performed 11/14/20  -Necrosis of left foot flap     Active Problems:    Charcot foot due to diabetes mellitus (HCC)    Diabetic neuropathy associated with diabetes mellitus due to underlying condition ([address]    Essential hypertension    Diabetic foot ulcer with osteomyelitis ([facility]        PLAN:  - Patient was evaluated this morning  - Patient's labs, charts and images were reviewed   - Antibiotics as per ID: Cefazolin  - New Surgical cultures: No growth (previous surgical culture of left foot: Pseudomonas, Staph, Corynebacterium)   - Wound vac and dressing changed yesterday and scheduled every MWF by wound care team at SNF  -Patient is to remain on strict bed rest with the exceptions of PT/OT evaluations and sessions.  - WBC 7.1 (12/6/20, last date)   - Patient to follow up in clinic 1 week after discharge with .   - Patient was discussed with    - Patient will be discharged today to [address] [place], [place] [address] in [facility] where he will have a wound vac placed to E confirmed with .   - Will continue to follow patient while they are in-house.

## 2020-12-08 NOTE — PROGRESS NOTES
hours for 6 weeks as previously planned from 11/14-12/26. Maintain PICC care and monitor labs and schedule follow-up appointment per IV antibiotic protocol for OPAT as indicated on discharge instructions. Follow up with Dr. Rosanne Jefferson at [facility] in [facility] in 1 week. Continue local wound care.     Thank you for involving me in the care of Sun Pizarro. I will continue to follow peripherally. Please do not hesitate to call for any questions or concerns.     Electronically signed by Mariama Bentley MD on 12/8/2020 at 12:15 PM

## 2020-12-08 NOTE — PROGRESS NOTES
Physical Therapy    Facility/Department: [facility] 5SE REHAB  Discharge Summary    NAME: Amy Mata  : 1975  MRN: [id number]    Date of Service: 2020  Evaluating Therapist: Chelsea Lin. Irlanda Singh P.T.    ROOM: [facility]  DIAGNOSIS: BKA  PRECAUTIONS: Fall risk, NWB L residual limb  PROCEDURE(S):   : I&D with bone debridement/biopsy   : Syme's amputation at ankle with partial excision of distal tib/fib  HPI: The pt was admitted to [facility] on 11/10 with a diabetic foot infection with osteomyelitis. Social:  Pt lives with his wife in a 2nd floor apartment with a curb step+2+1 steps with 1 rail to enter building and 7 steps with 2 rails to the 2nd floor. 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     Initial Evaluation  20 Discharge  20 Short Term Goals Long Term Goals    Bed Mobility  Rolling: Supervision  Supine<>sit: Supervision  Scooting: Supervision Independent for all aspects  Independent   Transfers Sit to stand: Max A  Stand to sit: Max A  Stand pivot:  Max A [employer] Sit<>stand: Modified Independent  Stand pivot: Modified Independent with [employer] SBA Modified Independent   Ambulation   15 feet with [employer] with Min A + WC follow 10 feet with [employer] with Modified Finney  Knee scooter propulsion x400 feet with Modified Finney 20 feet with AAD with SBA >150 feet with AAD with Modified Finney   Walking 10 feet on uneven surface NT 10 feet with [employer] with Modified Finney 10 feet with AAD with SBA >10 feet with AAD with Modified Finney   Wheel Chair Mobility 150 feet with BUE with  feet with Modified Finney  >200 feet with BUE with Modified Finney   Car Transfers NT Modified Independent with [employer] SBA Modified Independent   Stair negotiation: ascended and descended NT, attempted but pt unable to perform safely 12 steps with tub seat + 1 rail in stairwell with SBA (assist to move seat) 4 steps with 2 rails with SBA >8 steps with 1 rail + AAD with Modified [place]   Curb Step:   ascended and descended NT 2 inch step with WW with SBA (ascended backward) 4 inch step with AAD and Min A 7.5 inch step with AAD and SBA   Picking up object off the floor NT Picked up a cone with Modified Kleberg Will  an object with Min A Will  an object with Modified Kleberg   BLE ROM R ankle limited d/t past fusion, otherwise WFL R ankle limited d/t past fusion, otherwise WFL     BLE Strength RLE: grossly 4+/5  LLE: grossly 4/5 at the hip and knee RLE: grossly 4+/5  LLE: grossly 4/5 at the hip and knee     Balance  Seated: Independent  Standing: Min A<>Max A Seated: Independent  Standing: Modified Independent     Date Family Teach Completed No family present at evlauation No family present to this date     Hindering Progress Limited endurance, medical history, obesity Decreased endurance, WB restriction       Additional Comments: The pt made good progress during his time in acute rehab and met nearly all of his established long-term PT goals. However, the pt is being discharged to a SNF under podiatry recommendations. Bk Yeung.  Cindy Dai, [address]  number:  PT [phone]

## 2020-12-08 NOTE — PROGRESS NOTES
OCCUPATIONAL THERAPY DAILY NOTE/Discharge Summary    Date:2020  Patient Name: Salas Fisher  MRN: [id number]  : 1975  Room: [address] [place], [place] [address]     Diagnosis: L Syme's amputation- amputation @ ankle, partial excision distal/fibula 2020  Additional Pertinent Hx: Charcot foot, neuropathy, DM, HTN, osteomyilitis  Precautions: Falls, NWB L LE    Functional Assessment:   Date Status AE  Comments   Feeding 20 Independent  Goal Met. Grooming 20 Mod I seated Goal Met- including shaving         Oral Care 20 Mod I seated Goal Met. Bathing 20 SBA sponge . UB Dressing 20 Independent  Goal met   LB Dressing 20 SBA ww           Footwear 20 independent     Toileting 20 S/SBA ww    Homemaking 20 SBA  w/c level      Functional Transfers / Balance:   Date Status DME  Comments   Sit Balance 20 Sup     Stand Balance 20  SBA ww  Wound vac    [x] Tub  [x] Shower   Transfer 20 SBA Extended tub bench, w/w     Commode   Transfer 20 SBA W/w, 3-in-1    Functional   Mobility 2020  SBA           Mod I   W/w          W/c propel    Other:    Sit<>stand     Couch      Bed to w/c         Bed mobility     20      SBA     Mod A        SBA        Independent      W/w    Low surface    W/w        Bed rails      Functional Exercises / Activity:   Pt sitting in chair upon arrival to OT gym in AM. Participated in armbike exercise, 2x6 mins, to increase endurance for ease with ADLs. Pt engaged in therex using blue resistance theraband, 4x15 reps triceps and  er using 3# wrist weight, 3x3 up/downs to increase BUE strength for ease with functional transfers.      Sensory / Neuromuscular Re-Education:      Cognitive Skills:   Status Comments   Problem   Solving good     Memory good     Sequencing good    Safety Good-      Visual Perception:    Education:  Pt educated with safety during bed >w/c transfers and ext tub bench transfers due to NWB LLE and wound vac mgmt. [] Family teach completed on:    Pain Level: 0/10    Additional Notes:   11/27/20  Pt requested to focus on B UE strengthening exercises to increase independence and endurance for transfers/mobility and ADL tasks daily. 11/29/20 Request done on this date for ADLls and BUE strengthening exercises. 12/7/2020- Discussed with pt discharge home vs. SNF. Pt stated his wife needs to be able to work & they financially can't afford for her to take time off & pt would prefer going to a snf for a short stay      Patient has made good  progress during treatment sessions toward set goals. Therapy emphasis to obtain goals:   Long term goal 1: Pt demo independent to eat all meals  Long term goal 2: Pt dmeo MOd I grooming seated or standing @ sink level  Long term goal 3: Pt demo SBA bathing @ shower level or sponge bathing both seated & standing  Long term goal 4: Pt demo I UE & Mod I LE dress with AE as needed  Long term goal 5: Pt demo MOd i commode trf wth appropraite DME to ensure pt safety  Long term goals 6: Pt demo Sup tub trf with appropraite DME to ensure pt safety  Long term goal 7: Pt demo Mod I light homemaking task @ wc level  Long term goal 8: Pt demo G endurance for a 30 minute funcitonal activity  Long term goal 9: Pt demo increased BUE strength to 5/5 & improved  strength- On eval R  strength 95# & norm for pt age & gender is 110# & L hand 80# & norm for tp age & gender is 101#  Long term goal 10: Pt demo Mod I wc porpulsion throughout a living environment & around obstacles      [] Continue with current OT Plan of care. [x] Prepare for Discharge     DISCHARGE RECOMMENDATIONS  OCCUPATIONAL THERAPY DISCHARGE SUMMARY    Discontinue Occupational Therapy intervention. Pt has:   [] met all set goals. [x] made good progress toward set goals.    [] has made slow progress toward goals and would benefit from rehab setting change.  [] had a medical decline and therefore was transferred off the Rehab unit.     Long term goals  Time Frame for Long term goals : 10 days to address above problem areas  Long term goal 1: Pt demo independent to eat all meals  Long term goal 2: Pt dmeo MOd I grooming seated or standing @ sink level  Long term goal 3: Pt demo SBA bathing @ shower level or sponge bathing both seated & standing  Long term goal 4: Pt demo I UE & Mod I LE dress with AE as needed  Long term goal 5: Pt demo MOd i commode trf wth appropraite DME to ensure pt safety  Long term goals 6: Pt demo Sup tub trf with appropraite DME to ensure pt safety  Long term goal 7: Pt demo Mod I light homemaking task @ wc level  Long term goal 8: Pt demo G endurance for a 30 minute funcitonal activity  Long term goal 9: Pt demo increased BUE strength to 5/5 & improved  strength- On eval R  strength 95# & norm for pt age & gender is 110# & L hand 80# & norm for tp age & gender is 101#  Long term goal 10: Pt demo Mod I wc porpulsion throughout a living environment & around obstacles    The Patient has received education on:  [x]Transfer Safety [x]Compensatory tech for ADLs [x]AE training [x]DME training [x]Energy Conservation [x]Home / Kitchen Safety  [x]Fall Prevention  []Other:    Family training was completed: no    Recommended DME: extended tub bench, toilet riser with armrests    Post Discharge Care:   []Home Independently  []Home with 24hr Care / Supervision [x]Home with Partial Supervision []Home with Home Health OT []Home with Out Pt OT []Other: ___   Comments:         Time in Time out Tx Time Breakdown  Variance:   First Session  0915 1000 [] Individual Tx-  [x] Concurrent Tx -45   [] Co-Tx -   [] Group Tx -   [] Time Missed -     Second Session   [] Individual Tx-   [] Concurrent Tx -  [] Co-Tx -   [] Group Tx -   [] Time Missed -     Third Session    [] Individual Tx-   [] Concurrent Tx -  [] Co-Tx -   [] Group Tx -   [] Time Missed - Total Tx Time: [address] [place], [place], OTR/L [id number]

## 2020-12-09 NOTE — DISCHARGE SUMMARY
[address]                  [address] [facility]  [facility]                               DISCHARGE SUMMARY    PATIENT NAME: Mile Rodriguez                  :        1975  MED REC NO:   [id number]                            ROOM:       5509  ACCOUNT NO:   [de-identified]                           ADMIT DATE: 2020  PROVIDER:     Erika Nugent MD                  DISCHARGE DATE:  2020    INTRODUCTION:  The patient had a left Syme's amputation. He also has  diabetic neuropathy. The right side with a fused ankle. He was  admitted to acute rehab on 2020. HOSPITAL COURSE:  On admission, the patient was felt to be a good rehab  candidate. He improved with his strength and mobility. He did have  debridement of the residual limb with a wound VAC put in place. Functionally, he improved to a supervisory level with a knee scooter for  gait or independent at a wheelchair level. However, he was still having  ongoing problems with the wound. Podiatry recommended that he be  discharged to a skilled nursing facility because they did not think he  could get adequate wound care at home, and so he was discharged to a  skilled nursing facility on 2020. DISCHARGE CONDITION:  Good. DISCHARGE DISPOSITION:  To skilled nursing facility. LABORATORY DATA:  CBC and metabolic profile were unremarkable other than  a hemoglobin of 10.8, hematocrit 34.4 which are about his baseline. MEDICATIONS:  Norvasc 5 mg daily, Lipitor 5 mg daily, Ancef 2 g IV q.8  hours, Lovenox 40 mg subcu daily, Lantus 15 units daily with 5 of  Humalog t.i.d., Glucophage 500 mg daily. DIAGNOSES:  1. Left Syme's amputation. 2.  Diabetic neuropathy. 3.  Previous right ankle fusion. 4.  Type 2 diabetes mellitus, insulin-dependent, uncontrolled. 5.  Hypertension. 6.  Peripheral vascular disease.         Jahaira Laguerre MD    D: 2020 8:09:30       T: 12/08/2020 8:17:23     [email or web address]  Job#: [id number]     Doc#: [id number]    CC:

## 2020-12-14 LAB
FUNGUS (MYCOLOGY) CULTURE: NORMAL
FUNGUS STAIN: NORMAL

## 2020-12-29 LAB
AFB CULTURE (MYCOBACTERIA): NORMAL
AFB SMEAR: NORMAL

## 2021-01-04 LAB
FUNGUS (MYCOLOGY) CULTURE: NORMAL
FUNGUS STAIN: NORMAL

## 2021-03-19 NOTE — PROGRESS NOTES
Call placed to Dr. Christopher Thompson regarding order for PCDs or anticoag - nothing ordered currently post op.  10:46 AM Unable to Assess

## 2022-01-01 NOTE — PROGRESS NOTES
OCCUPATIONAL THERAPY DAILY NOTE    Date:2020  Patient Name: Juwan Reagan  MRN: [id number]  : 1975  Room: [address] [place] [address]A     Diagnosis: L Syme's amputation- amputation @ ankle, partial excision distal/fibula 2020  Additional Pertinent Hx: Charcot foot, neuropathy, DM, HTN, osteomyilitis  Precautions: Falls, NWB L LE    Functional Assessment:   Date Status AE  Comments   Feeding 20 Independent  Goal Met. Grooming 20 Mod I seated Goal Met- including shaving         Oral Care 20 Mod I seated Goal Met. Bathing 20 CGA     UB Dressing 20 Independent     LB Dressing 20 SBA/CGA ww           Footwear 20 Mod I  Pt able to mita/doff  R shoe while seated on EOB. Toileting 20 SBA ww Pt stood using w.w alker during toilet needs for clothing mgmt    Homemaking 20 SBA  w/c level      Functional Transfers / Balance:   Date Status DME  Comments   Sit Balance 20 Mod I/Sup  Sitting balance up in w/c, on EOB and on 3:1 commode over preexisting toilet. Stand Balance 20  SBA ww Standing balance during SPT's from EOB<>w/c along with w/c<>commode using w. [employer] for balance due to NWB LLE and wound vac mgmt. .   [x] Tub  [x] Shower   Transfer 20 CGA Extended tub bench, w/w     Commode   Transfer 20 SBA W/w, 3-in-1 W/c to 3:1 commode transfers using w. [employer] for balance. Functional   Mobility 2020  SBA           Mod I/Sup W/w          W/c propel Short distance using w. [employer] for balance. Other:    Sit<>stand     Couch    Bed<>W/c        Bed mobility     20      SBA     Mod A    SBA        Mod I     W/w    Low surface    W/w        Bed rails     Sit to stand on/off bed, commode and w/c    Pt completed EOB<>w/c transfers both am/pm sessions using heather Dee for balance needing assistance with wound vac.  .      Functional Exercises / Activity:   AM:  Pt completed supine to EOB then EOB into w/c transfers at SBA using w. Supriya Derek for balance and safety. W/c<>3:1 commode transfers for toilet needs at SBA using w. Walker for balance during clothing mgmt. Pt completed w/c propulsion from room to gym using BUE's to increase strength & endurance for ADL's, transfers and mobility skills. Pt completed BUE ex's using 5# ball for punches, shld flexion/extension tolerating 3-4 reps of 10-15 ea while seated in w/c. PM:  Pt completed w/c propulsion using BUE's along with pulley ex's wearing 3# wts tolerating 2 reps of 5mins each. Resistive arm bike ex's completed wearing 2# wts tolerating 2 reps of 6 mins each to improve overall strength, endurance for ADL;s, transfers and functional tasks. Unsupported sitting balance on EOB to improve trunk control/core strengthening for transfers and mobility including donning/doffing R shoe only due to residual LLE performed both in am/pm sessions. Sensory / Neuromuscular Re-Education:      Cognitive Skills:   Status Comments   Problem   Solving good     Memory good     Sequencing good    Safety Good-      Visual Perception:    Education:  Pt educated with safety during bed <>w/c transfers and w/c<>commode due to NWB LLE and wound vac mgmt. [] Family teach completed on:    Pain Level: 1/10    Additional Notes:   11/27/20  Pt requested to focus on B UE strengthening exercises to increase independence and endurance for transfers/mobility and ADL tasks daily. 11/29/20 Request done on this date for ADLls and BUE strengthening exercises. Patient has made good  progress during treatment sessions toward set goals.  Therapy emphasis to obtain goals:   Long term goal 1: Pt demo independent to eat all meals  Long term goal 2: Pt dmeo MOd I grooming seated or standing @ sink level  Long term goal 3: Pt demo SBA bathing @ shower level or sponge bathing both seated & standing  Long term goal 4: Pt demo I UE & Mod I LE dress with AE as needed  Long term goal 5: Pt demo MOd i commode trf wth appropraite DME to ensure pt safety  Long term goals 6: Pt demo Sup tub trf with appropraite DME to ensure pt safety  Long term goal 7: Pt demo Mod I light homemaking task @ wc level  Long term goal 8: Pt demo G endurance for a 30 minute funcitonal activity  Long term goal 9: Pt demo increased BUE strength to 5/5 & improved  strength- On eval R  strength 95# & norm for pt age & gender is 110# & L hand 80# & norm for tp age & gender is 101#  Long term goal 10: Pt demo Mod I wc porpulsion throughout a living environment & around obstacles      [x] Continue with current OT Plan of care.   [] Prepare for Discharge     DISCHARGE RECOMMENDATIONS  Recommended DME: extended tub bench, toilet riser with armrests    Post Discharge Care:   []Home Independently  []Home with 24hr Care / Supervision []Home with Partial Supervision []Home with Home Health OT []Home with Out Pt OT []Other: ___   Comments:         Time in Time out Tx Time Breakdown  Variance:   First Session  0915 1000 [x] Individual Tx-45  [] Concurrent Tx -   [] Co-Tx -   [] Group Tx -   [] Time Missed -     Second Session 14:40pm 15:25pm [x] Individual Tx-45   [] Concurrent Tx -  [] Co-Tx -   [] Group Tx -   [] Time Missed -     Third Session    [] Individual Tx-   [] Concurrent Tx -  [] Co-Tx -   [] Group Tx -   [] Time Missed -         Total Tx Time: [address], [facility] Statement Selected

## 2022-04-06 NOTE — PROGRESS NOTES
[facility] Progress Note    Admitting Date and Time: 10/15/2020  7:57 PM  Admit Dx: Diabetic foot infection ([facility] [E11.628, L08.9]  [facility] ([facility] [O80.132, L08.9]    Subjective:  Patient is being followed for Diabetic foot infection ([facility] [G78.749, L08.9]  Diabetic foot infection ([facility] [O87.278, L08.9]   Pt feels fine, s/p I&D, debridement and bone biopsy    ROS: denies fever, chills, cp, sob, n/v, HA unless stated above.       sodium chloride flush  10 mL Intravenous BID    insulin lispro  0-12 Units Subcutaneous TID WC    insulin lispro  0-6 Units Subcutaneous Nightly    influenza virus vaccine  0.5 mL Intramuscular Prior to discharge    enoxaparin  40 mg Subcutaneous Daily    daptomycin (CUBICIN) IVPB  6 mg/kg (Adjusted) Intravenous Q24H    piperacillin-tazobactam  3.375 g Intravenous Q8H     sodium chloride flush, 10 mL, PRN  glucose, 15 g, PRN  dextrose, 12.5 g, PRN  glucagon (rDNA), 1 mg, PRN  dextrose, 100 mL/hr, PRN  labetalol, 10 mg, Q4H PRN         Objective:    BP (!) 140/81   Pulse 75   Temp 98.1 °F (36.7 °C) (Oral)   Resp 16   Ht 6' 3\" (1.905 m)   Wt (!) 303 lb (137.4 kg)   SpO2 98%   BMI 37.87 kg/m²     General Appearance: alert and oriented to person, place and time and in no acute distress  Skin: warm and dry  Head: normocephalic and atraumatic  Eyes: pupils equal, round, and reactive to light, extraocular eye movements intact, conjunctivae normal  Neck: neck supple and non tender without mass   Pulmonary/Chest: clear to auscultation bilaterally- no wheezes, rales or rhonchi, normal air movement, no respiratory distress  Cardiovascular: normal rate, normal S1 and S2 and no carotid bruits  Abdomen: soft, non-tender, non-distended, normal bowel sounds, no masses or organomegaly  Extremities: no cyanosis, no clubbing and left foot in dressing had open wound  Neurologic: no cranial nerve deficit and speech normal        Recent Labs     10/15/20  2027 10/16/20  0507 10/17/20  1148    135 139   K 4.2 4.1 4.6    104 105   CO2 24 24 24   BUN 10 11 13   CREATININE 1.1 1.1 1.0   GLUCOSE 116* 134* 130*   CALCIUM 9.3 8.3* 8.7       Recent Labs     10/15/20  2027 10/16/20  0507 10/17/20  1148   WBC 14.9* 13.4* 7.1   RBC 4.82 4.26 4.51   HGB 12.0* 10.6* 11.1*   HCT 39.7 34.8* 37.6   MCV 82.4 81.7 83.4   MCH 24.9* 24.9* 24.6*   MCHC 30.2* 30.5* 29.5*   RDW 14.0 14.1 14.3   * 479* 545*   MPV 9.4 9.7 9.3         Assessment:    Principal Problem:    Diabetic foot infection ([facility]  Active Problems:    Charcot foot due to diabetes mellitus ([facility]    Diabetic neuropathy associated with diabetes mellitus due to underlying condition ([facility]    Essential hypertension  Resolved Problems:    * No resolved hospital problems. *      Plan:  1. Open wound, diabetic foot infection of left foot with osteomyelitis, wound cultures were obtained as well as blood cultures, consulted podiatry and vascular surgery. Patient status post debridement with bone biopsy, appreciate ID input, antibiotics were switched to Zosyn and daptomycin  2. Diabetes type 2, patient is currently on Lantus 15 units and metformin, holding oral hypoglycemic agents, place the patient on sliding scale insulin, . 3.  History of hyperlipidemia, continue atorvastatin. 4.  Elevated blood pressure readings, patient has no history of hypertension, presented with a blood pressure of 170/89, trended down, with improved readings, no need to start antihypertensive medication      NOTE: This report was transcribed using voice recognition software. Every effort was made to ensure accuracy; however, inadvertent computerized transcription errors may be present.   Electronically signed by Fermin Finney MD on 10/18/2020 at 9:50 AM [External ears normal] : external ears normal [Person] : oriented to person [Place] : oriented to place [Time] : oriented to time [Normal] : patient has a normal gait [Attention Intact] : attention intact [Well-behaved during visit] : well-behaved during visit [Positive mood] : positive mood [Answered questions appropriately] : answered questions appropriately [Able to follow one step commands] : able to follow one step commands [Fidgets] : does not fidget

## 2022-06-20 ENCOUNTER — APPOINTMENT (OUTPATIENT)
Dept: ULTRASOUND IMAGING | Age: 47
DRG: 623 | End: 2022-06-20
Payer: COMMERCIAL

## 2022-06-20 ENCOUNTER — HOSPITAL ENCOUNTER (INPATIENT)
Age: 47
LOS: 4 days | Discharge: HOME HEALTH | DRG: 623 | End: 2022-06-24
Attending: EMERGENCY MEDICINE | Admitting: INTERNAL MEDICINE
Payer: COMMERCIAL

## 2022-06-20 ENCOUNTER — APPOINTMENT (OUTPATIENT)
Dept: GENERAL RADIOLOGY | Age: 47
DRG: 623 | End: 2022-06-20
Payer: COMMERCIAL

## 2022-06-20 ENCOUNTER — ANESTHESIA EVENT (OUTPATIENT)
Dept: OPERATING ROOM | Age: 47
DRG: 623 | End: 2022-06-20
Payer: COMMERCIAL

## 2022-06-20 DIAGNOSIS — L03.90 CELLULITIS, UNSPECIFIED CELLULITIS SITE: Primary | ICD-10-CM

## 2022-06-20 DIAGNOSIS — L02.611 ABSCESS OF RIGHT FOOT: ICD-10-CM

## 2022-06-20 PROBLEM — E11.628 TYPE 2 DIABETES MELLITUS WITH SKIN COMPLICATION, WITH LONG-TERM CURRENT USE OF INSULIN (HCC): Status: ACTIVE | Noted: 2022-06-20

## 2022-06-20 PROBLEM — E87.6 HYPOKALEMIA: Status: ACTIVE | Noted: 2022-06-20

## 2022-06-20 PROBLEM — Z79.4 TYPE 2 DIABETES MELLITUS WITH SKIN COMPLICATION, WITH LONG-TERM CURRENT USE OF INSULIN (HCC): Status: ACTIVE | Noted: 2022-06-20

## 2022-06-20 PROBLEM — L08.9 RIGHT FOOT INFECTION: Status: ACTIVE | Noted: 2022-06-20

## 2022-06-20 LAB
ALBUMIN SERPL-MCNC: 3.7 G/DL (ref 3.5–5.2)
ALP BLD-CCNC: 80 U/L (ref 40–129)
ALT SERPL-CCNC: 8 U/L (ref 0–40)
ANION GAP SERPL CALCULATED.3IONS-SCNC: 12 MMOL/L (ref 7–16)
AST SERPL-CCNC: 12 U/L (ref 0–39)
BACTERIA: ABNORMAL /HPF
BASOPHILS ABSOLUTE: 0.04 E9/L (ref 0–0.2)
BASOPHILS RELATIVE PERCENT: 0.3 % (ref 0–2)
BILIRUB SERPL-MCNC: 0.7 MG/DL (ref 0–1.2)
BILIRUBIN URINE: NEGATIVE
BLOOD, URINE: ABNORMAL
BUN BLDV-MCNC: 10 MG/DL (ref 6–20)
CALCIUM SERPL-MCNC: 8.5 MG/DL (ref 8.6–10.2)
CHLORIDE BLD-SCNC: 99 MMOL/L (ref 98–107)
CLARITY: CLEAR
CO2: 23 MMOL/L (ref 22–29)
COLOR: YELLOW
CREAT SERPL-MCNC: 1.1 MG/DL (ref 0.7–1.2)
EOSINOPHILS ABSOLUTE: 0.01 E9/L (ref 0.05–0.5)
EOSINOPHILS RELATIVE PERCENT: 0.1 % (ref 0–6)
EPITHELIAL CELLS, UA: ABNORMAL /HPF
GFR AFRICAN AMERICAN: >60
GFR NON-AFRICAN AMERICAN: >60 ML/MIN/1.73
GLUCOSE BLD-MCNC: 180 MG/DL (ref 74–99)
GLUCOSE URINE: NEGATIVE MG/DL
HBA1C MFR BLD: 8 % (ref 4–5.6)
HCT VFR BLD CALC: 38.3 % (ref 37–54)
HEMOGLOBIN: 12.1 G/DL (ref 12.5–16.5)
IMMATURE GRANULOCYTES #: 0.13 E9/L
IMMATURE GRANULOCYTES %: 1 % (ref 0–5)
INR BLD: 1.3
KETONES, URINE: NEGATIVE MG/DL
LACTIC ACID, SEPSIS: 1.4 MMOL/L (ref 0.5–1.9)
LEUKOCYTE ESTERASE, URINE: ABNORMAL
LYMPHOCYTES ABSOLUTE: 0.81 E9/L (ref 1.5–4)
LYMPHOCYTES RELATIVE PERCENT: 6.2 % (ref 20–42)
MAGNESIUM: 1.9 MG/DL (ref 1.6–2.6)
MCH RBC QN AUTO: 26.3 PG (ref 26–35)
MCHC RBC AUTO-ENTMCNC: 31.6 % (ref 32–34.5)
MCV RBC AUTO: 83.3 FL (ref 80–99.9)
METER GLUCOSE: 127 MG/DL (ref 74–99)
METER GLUCOSE: 237 MG/DL (ref 74–99)
MONOCYTES ABSOLUTE: 0.58 E9/L (ref 0.1–0.95)
MONOCYTES RELATIVE PERCENT: 4.4 % (ref 2–12)
NEUTROPHILS ABSOLUTE: 11.53 E9/L (ref 1.8–7.3)
NEUTROPHILS RELATIVE PERCENT: 88 % (ref 43–80)
NITRITE, URINE: NEGATIVE
PDW BLD-RTO: 13.4 FL (ref 11.5–15)
PH UA: 6 (ref 5–9)
PLATELET # BLD: 311 E9/L (ref 130–450)
PMV BLD AUTO: 9.1 FL (ref 7–12)
POTASSIUM REFLEX MAGNESIUM: 3.4 MMOL/L (ref 3.5–5)
PROTEIN UA: 100 MG/DL
PROTHROMBIN TIME: 14.5 SEC (ref 9.3–12.4)
RBC # BLD: 4.6 E12/L (ref 3.8–5.8)
RBC UA: ABNORMAL /HPF (ref 0–2)
SODIUM BLD-SCNC: 134 MMOL/L (ref 132–146)
SPECIFIC GRAVITY UA: 1.02 (ref 1–1.03)
TOTAL PROTEIN: 7.8 G/DL (ref 6.4–8.3)
UROBILINOGEN, URINE: 0.2 E.U./DL
WBC # BLD: 13.1 E9/L (ref 4.5–11.5)
WBC UA: >20 /HPF (ref 0–5)

## 2022-06-20 PROCEDURE — 87075 CULTR BACTERIA EXCEPT BLOOD: CPT

## 2022-06-20 PROCEDURE — 93971 EXTREMITY STUDY: CPT

## 2022-06-20 PROCEDURE — 85025 COMPLETE CBC W/AUTO DIFF WBC: CPT

## 2022-06-20 PROCEDURE — 87205 SMEAR GRAM STAIN: CPT

## 2022-06-20 PROCEDURE — APPSS45 APP SPLIT SHARED TIME 31-45 MINUTES: Performed by: NURSE PRACTITIONER

## 2022-06-20 PROCEDURE — 87040 BLOOD CULTURE FOR BACTERIA: CPT

## 2022-06-20 PROCEDURE — 6360000002 HC RX W HCPCS: Performed by: NURSE PRACTITIONER

## 2022-06-20 PROCEDURE — 87070 CULTURE OTHR SPECIMN AEROBIC: CPT

## 2022-06-20 PROCEDURE — 87077 CULTURE AEROBIC IDENTIFY: CPT

## 2022-06-20 PROCEDURE — 80053 COMPREHEN METABOLIC PANEL: CPT

## 2022-06-20 PROCEDURE — 87088 URINE BACTERIA CULTURE: CPT

## 2022-06-20 PROCEDURE — 99285 EMERGENCY DEPT VISIT HI MDM: CPT

## 2022-06-20 PROCEDURE — 6370000000 HC RX 637 (ALT 250 FOR IP): Performed by: NURSE PRACTITIONER

## 2022-06-20 PROCEDURE — 83605 ASSAY OF LACTIC ACID: CPT

## 2022-06-20 PROCEDURE — 85610 PROTHROMBIN TIME: CPT

## 2022-06-20 PROCEDURE — 2580000003 HC RX 258: Performed by: NURSE PRACTITIONER

## 2022-06-20 PROCEDURE — 87147 CULTURE TYPE IMMUNOLOGIC: CPT

## 2022-06-20 PROCEDURE — 87186 SC STD MICRODIL/AGAR DIL: CPT

## 2022-06-20 PROCEDURE — 83735 ASSAY OF MAGNESIUM: CPT

## 2022-06-20 PROCEDURE — 73590 X-RAY EXAM OF LOWER LEG: CPT

## 2022-06-20 PROCEDURE — 73630 X-RAY EXAM OF FOOT: CPT

## 2022-06-20 PROCEDURE — 99223 1ST HOSP IP/OBS HIGH 75: CPT | Performed by: STUDENT IN AN ORGANIZED HEALTH CARE EDUCATION/TRAINING PROGRAM

## 2022-06-20 PROCEDURE — 83036 HEMOGLOBIN GLYCOSYLATED A1C: CPT

## 2022-06-20 PROCEDURE — 81001 URINALYSIS AUTO W/SCOPE: CPT

## 2022-06-20 PROCEDURE — 1200000000 HC SEMI PRIVATE

## 2022-06-20 PROCEDURE — 82962 GLUCOSE BLOOD TEST: CPT

## 2022-06-20 RX ORDER — DEXTROSE MONOHYDRATE 50 MG/ML
100 INJECTION, SOLUTION INTRAVENOUS PRN
Status: DISCONTINUED | OUTPATIENT
Start: 2022-06-20 | End: 2022-06-24 | Stop reason: HOSPADM

## 2022-06-20 RX ORDER — ACETAMINOPHEN 650 MG/1
650 SUPPOSITORY RECTAL EVERY 6 HOURS PRN
Status: DISCONTINUED | OUTPATIENT
Start: 2022-06-20 | End: 2022-06-24 | Stop reason: HOSPADM

## 2022-06-20 RX ORDER — ONDANSETRON 4 MG/1
4 TABLET, ORALLY DISINTEGRATING ORAL EVERY 8 HOURS PRN
Status: DISCONTINUED | OUTPATIENT
Start: 2022-06-20 | End: 2022-06-24 | Stop reason: HOSPADM

## 2022-06-20 RX ORDER — INSULIN LISPRO 100 [IU]/ML
0-3 INJECTION, SOLUTION INTRAVENOUS; SUBCUTANEOUS NIGHTLY
Status: DISCONTINUED | OUTPATIENT
Start: 2022-06-20 | End: 2022-06-24 | Stop reason: HOSPADM

## 2022-06-20 RX ORDER — INSULIN LISPRO 100 [IU]/ML
5 INJECTION, SOLUTION INTRAVENOUS; SUBCUTANEOUS
Status: DISCONTINUED | OUTPATIENT
Start: 2022-06-20 | End: 2022-06-24 | Stop reason: HOSPADM

## 2022-06-20 RX ORDER — AMLODIPINE BESYLATE 5 MG/1
5 TABLET ORAL DAILY
Status: DISCONTINUED | OUTPATIENT
Start: 2022-06-20 | End: 2022-06-24 | Stop reason: HOSPADM

## 2022-06-20 RX ORDER — ATORVASTATIN CALCIUM 10 MG/1
5 TABLET, FILM COATED ORAL NIGHTLY
Status: DISCONTINUED | OUTPATIENT
Start: 2022-06-20 | End: 2022-06-24 | Stop reason: HOSPADM

## 2022-06-20 RX ORDER — ONDANSETRON 2 MG/ML
4 INJECTION INTRAMUSCULAR; INTRAVENOUS EVERY 6 HOURS PRN
Status: DISCONTINUED | OUTPATIENT
Start: 2022-06-20 | End: 2022-06-24 | Stop reason: HOSPADM

## 2022-06-20 RX ORDER — SODIUM CHLORIDE 0.9 % (FLUSH) 0.9 %
5-40 SYRINGE (ML) INJECTION PRN
Status: DISCONTINUED | OUTPATIENT
Start: 2022-06-20 | End: 2022-06-23 | Stop reason: SDUPTHER

## 2022-06-20 RX ORDER — MAGNESIUM SULFATE IN WATER 40 MG/ML
2000 INJECTION, SOLUTION INTRAVENOUS PRN
Status: DISCONTINUED | OUTPATIENT
Start: 2022-06-20 | End: 2022-06-24 | Stop reason: HOSPADM

## 2022-06-20 RX ORDER — SODIUM CHLORIDE 9 MG/ML
INJECTION, SOLUTION INTRAVENOUS PRN
Status: DISCONTINUED | OUTPATIENT
Start: 2022-06-20 | End: 2022-06-23 | Stop reason: SDUPTHER

## 2022-06-20 RX ORDER — INSULIN GLARGINE 100 [IU]/ML
15 INJECTION, SOLUTION SUBCUTANEOUS NIGHTLY
Status: DISCONTINUED | OUTPATIENT
Start: 2022-06-20 | End: 2022-06-24 | Stop reason: HOSPADM

## 2022-06-20 RX ORDER — ACETAMINOPHEN 325 MG/1
650 TABLET ORAL EVERY 6 HOURS PRN
Status: DISCONTINUED | OUTPATIENT
Start: 2022-06-20 | End: 2022-06-24 | Stop reason: HOSPADM

## 2022-06-20 RX ORDER — POTASSIUM CHLORIDE 20 MEQ/1
40 TABLET, EXTENDED RELEASE ORAL PRN
Status: DISCONTINUED | OUTPATIENT
Start: 2022-06-20 | End: 2022-06-24 | Stop reason: HOSPADM

## 2022-06-20 RX ORDER — POLYETHYLENE GLYCOL 3350 17 G/17G
17 POWDER, FOR SOLUTION ORAL DAILY PRN
Status: DISCONTINUED | OUTPATIENT
Start: 2022-06-20 | End: 2022-06-24 | Stop reason: HOSPADM

## 2022-06-20 RX ORDER — INSULIN LISPRO 100 [IU]/ML
0-6 INJECTION, SOLUTION INTRAVENOUS; SUBCUTANEOUS
Status: DISCONTINUED | OUTPATIENT
Start: 2022-06-20 | End: 2022-06-24 | Stop reason: HOSPADM

## 2022-06-20 RX ORDER — SODIUM CHLORIDE 0.9 % (FLUSH) 0.9 %
5-40 SYRINGE (ML) INJECTION EVERY 12 HOURS SCHEDULED
Status: DISCONTINUED | OUTPATIENT
Start: 2022-06-20 | End: 2022-06-23 | Stop reason: SDUPTHER

## 2022-06-20 RX ORDER — POTASSIUM CHLORIDE 7.45 MG/ML
10 INJECTION INTRAVENOUS PRN
Status: DISCONTINUED | OUTPATIENT
Start: 2022-06-20 | End: 2022-06-24 | Stop reason: HOSPADM

## 2022-06-20 RX ORDER — ENOXAPARIN SODIUM 100 MG/ML
30 INJECTION SUBCUTANEOUS 2 TIMES DAILY
Status: DISCONTINUED | OUTPATIENT
Start: 2022-06-20 | End: 2022-06-23 | Stop reason: DRUGHIGH

## 2022-06-20 RX ADMIN — SODIUM CHLORIDE, PRESERVATIVE FREE 10 ML: 5 INJECTION INTRAVENOUS at 21:42

## 2022-06-20 RX ADMIN — ATORVASTATIN CALCIUM 5 MG: 10 TABLET, FILM COATED ORAL at 21:40

## 2022-06-20 RX ADMIN — ENOXAPARIN SODIUM 30 MG: 100 INJECTION SUBCUTANEOUS at 21:40

## 2022-06-20 RX ADMIN — INSULIN LISPRO 1 UNITS: 100 INJECTION, SOLUTION INTRAVENOUS; SUBCUTANEOUS at 21:40

## 2022-06-20 RX ADMIN — AMLODIPINE BESYLATE 5 MG: 5 TABLET ORAL at 18:45

## 2022-06-20 RX ADMIN — INSULIN GLARGINE 15 UNITS: 100 INJECTION, SOLUTION SUBCUTANEOUS at 21:40

## 2022-06-20 ASSESSMENT — ENCOUNTER SYMPTOMS
ABDOMINAL PAIN: 0
WHEEZING: 0
COUGH: 0
BACK PAIN: 0
PHOTOPHOBIA: 0
TROUBLE SWALLOWING: 0
VOICE CHANGE: 0
VOMITING: 0
SHORTNESS OF BREATH: 0
DIARRHEA: 0
NAUSEA: 0

## 2022-06-20 NOTE — H&P
[facility]   History and Physical      CHIEF COMPLAINT:  right foot wound    History of Present Illness:  49 y.o. male who presents from home for evaluation right foot wound he states began Friday. States he took shoes off after work and noticed the wound. Has diabetic neuropathy with decreased sensation to BLE. Currently denies pain to right foot, declines prn pain medication. Informant(s) for H&P:patient       REVIEW OF SYSTEMS:  Denies CP, SOB subjective fever/chills, cough, congestion, n/v/d, ha, vision/hearing changes, wt changes, hot/cold flashes, other open skin lesions, constipation, dysuria/hematuria unless noted in HPI. Complete ROS performed with the patient and is otherwise negative. PMH:  Past Medical History:   Diagnosis Date    Charcot foot due to diabetes mellitus ([facility]     Diabetes mellitus ([facility]     Diabetic foot ulcer with osteomyelitis ([facility] 11/11/2020    Hypertension        Surgical History:  Past Surgical History:   Procedure Laterality Date    ANKLE FUSION  02/2020    FOOT AMPUTATION Left 11/14/2020    LEFT FOOT SYMES AMPUTATION performed by Abiola Ruiz DPM at [address] [place] [address] 10/17/2020    LEFT FOOT  INCISION AND DRAINAGE, BONE DEBRIDEMENT AND BIOPSY performed by Wood Avery DPM at [address] [place] [address] 11/11/2020    LEFT FOOT INCISION AND DRAINAGE APPLICATION OF WOUND VAC POSSIBLE INTEGRA GRAFT performed by Abiola Ruiz DPM at [address] [place] [address] 12/2/2020    LEFT FOOT WOUND DEBRIDEMENT, INCISION AND DRAINAGE OF LEFT FOOT HEMATOMA, INTEGRA, WOUND VAC performed by Yaya Castrejon DPM at [facility]  11/18/2020            Medications Prior to Admission:    Prior to Admission medications    Medication Sig Start Date End Date Taking?  Authorizing Provider   amLODIPine (NORVASC) 5 MG tablet Take 1 tablet by mouth daily 12/8/20   Nano Cutler MD   insulin lispro (HUMALOG) 100 UNIT/ML injection vial Inject 5 Units into the skin 3 times daily (with meals) 12/8/20   Antonina Belle MD   enoxaparin (LOVENOX) 40 MG/0.4ML injection Inject 0.4 mLs into the skin daily  Patient not taking: Reported on 6/20/2022 11/20/20   Philippe Singh MD   glucose (GLUTOSE) 40 % GEL Take 37.5 mLs by mouth as needed (Hypoglycemia) 11/19/20   Philippe Singh MD   insulin glargine (LANTUS) 100 UNIT/ML injection vial Inject 15 Units into the skin nightly    Historical Provider, MD   atorvastatin (LIPITOR) 10 MG tablet Take 5 mg by mouth nightly     Historical Provider, MD   metFORMIN (GLUCOPHAGE) 500 MG tablet Take 500 mg by mouth daily (with breakfast)    Historical Provider, MD       Allergies:    Vancomycin and Semaglutide    Social History:    reports that he has never smoked. He has never used smokeless tobacco. He reports previous alcohol use. He reports that he does not use drugs. Family History:   family history includes Alcohol Abuse in his father; Diabetes in his father.      PHYSICAL EXAM:  Vitals:  /76   Pulse 91   Temp 98.9 °F (37.2 °C)   Resp 18   Ht 6' 3\" (1.905 m)   Wt 285 lb (129.3 kg)   SpO2 95%   BMI 35.62 kg/m²     General Appearance: alert and oriented to person, place and time and in no acute distress  Skin: warm and dry  Head: normocephalic and atraumatic  Eyes: pupils equal, round, and reactive to light, extraocular eye movements intact, conjunctivae normal  Neck: neck supple and non tender without mass   Pulmonary/Chest: clear to auscultation bilaterally- no wheezes, rales or rhonchi, normal air movement, no respiratory distress  Cardiovascular: normal rate, normal S1 and S2 and no carotid bruits  Abdomen: soft, non-tender, non-distended, normal bowel sounds, no masses or organomegaly  Extremities: no cyanosis, no clubbing and no edema  Neurologic: no cranial nerve deficit and speech normal    LABS:  Recent Labs     06/20/22  1406   NA 134   K 3.4*   CL 99   CO2 23   BUN 10   CREATININE 1.1   GLUCOSE 180*   CALCIUM 8.5*       Recent Labs     06/20/22  1406   WBC 13.1*   RBC 4.60   HGB 12.1*   HCT 38.3   MCV 83.3   MCH 26.3   MCHC 31.6*   RDW 13.4      MPV 9.1       No results for input(s): POCGLU in the last 72 hours. I reviewed all labs and diagnostic images        Radiology: XR TIBIA FIBULA RIGHT (2 VIEWS)    Result Date: 6/20/2022  EXAMINATION: XRAY VIEWS OF THE RIGHT TIBIA AND FIBULA 6/20/2022 1:39 pm COMPARISON: None. HISTORY: ORDERING SYSTEM PROVIDED HISTORY: swelling, drainage TECHNOLOGIST PROVIDED HISTORY: Reason for exam:->swelling, drainage FINDINGS: Metallic hardware noted in the distal tibia extending into the ankle. Post traumatic deformity of the distal fibula noted. There are no acute fractures or hardware complications. No acute bony abnormalities or hardware complications. XR FOOT RIGHT (MIN 3 VIEWS)    Result Date: 6/20/2022  EXAMINATION: THREE XRAY VIEWS OF THE RIGHT FOOT 6/20/2022 1:39 pm COMPARISON: None. HISTORY: ORDERING SYSTEM PROVIDED HISTORY: swelling, purulent drainage, diabetes, history of previous foot amputationon left  and osteo TECHNOLOGIST PROVIDED HISTORY: Reason for exam:->swelling, purulent drainage, diabetes, history of previous foot amputationon left  and osteo FINDINGS: There is marked deformity with varus angulation and flexion deformity of the toes. 5th ray is nearly absent, likely due to bony erosion or prior surgical resection. There is partially visualized hardware in the distal tibia. Post traumatic deformity of the visualized distal fibula noted. There are no acute fractures or hardware complications. There is marked and diffuse soft tissue swelling. Large calcaneal spur and enthesophyte noted. Extensive deformity and sclerotic changes of the foot, as noted above. Marked soft tissue swelling with no evidence of soft tissue gas.  No evidence of acute bony abnormalities or hardware complications. US DUP LOWER EXTREMITY RIGHT JOCELYN    Result Date: 6/20/2022  EXAMINATION: DUPLEX VENOUS ULTRASOUND OF THE RIGHT LOWER EXTREMITY 6/20/2022 1:13 pm TECHNIQUE: Duplex ultrasound using B-mode/gray scaled imaging and Doppler spectral analysis and color flow was obtained of the deep venous structures of the right lower extremity. COMPARISON: None. HISTORY: ORDERING SYSTEM PROVIDED HISTORY: swelling right leg TECHNOLOGIST PROVIDED HISTORY: Reason for exam:->swelling right leg What reading provider will be dictating this exam?->CRC FINDINGS: The visualized veins of the right lower extremity are patent and free of echogenic thrombus. The veins demonstrate good compressibility with normal color flow study and spectral analysis. There are multiple enlarged right inguinal lymph nodes measuring up to 6.2 x 3.2 x 2.4 cm. No evidence of DVT in the right lower extremity. Right inguinal adenopathy. ASSESSMENT:      Principal Problem:    Cellulitis  Active Problems:    Right foot infection    Type 2 diabetes mellitus with skin complication, with long-term current use of insulin ([facility])    Hypokalemia    Charcot foot due to diabetes mellitus ([facility]    Diabetic neuropathy associated with diabetes mellitus due to underlying condition [facility])    Essential hypertension  Resolved Problems:    * No resolved hospital problems. *      PLAN:    1.  right foot wound/cellulitis (POA)  -seen by podiatry. 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 Foot ulcer grew Pseudomonas and MSSA. -wound cx pending  -consult ID    2. DM2:  -resume home insulin,  Hold home metformin  -add carb control diet, low dose insulin slide scale, hypoglycemia protocol  -update A1c    3. HTN:  -resume home amlodipine    4. Hypokalemia  - Replace and repeat BMP in am    5.  Hx charcot foot  -XR right foot with extensive deformity and sclerotic changes of the foot      Code Status:  Full   DVT prophylaxis: lovenox         Electronically signed by OPAL Mercedes CNP on 6/20/2022 at 5:11 PM

## 2022-06-20 NOTE — ED PROVIDER NOTES
49y.o. year old male presenting to the emergency room with concerns of right foot swelling beginning friday. Patient reports that symptom's onset Friday while patient was in [facility]. Worsen with nothing. Improves with nothing. Severity of moderate, with radiation to knee. Symptoms are constant in timing. Symptoms described as right foot swelling to below the knee. Patient reports  associated symptoms of purulent drainage. Patient in  no acute distress. Patient with previous amputation who follows with Dr. Ben Livingston. Chief Complaint   Patient presents with    Wound Check     wound check, wound on R foot since Fri. hx of DM. Review of Systems   Constitutional: Negative for chills, fatigue and fever. HENT: Negative for trouble swallowing and voice change. Eyes: Negative for photophobia and visual disturbance. Respiratory: Negative for cough, shortness of breath and wheezing. Cardiovascular: Positive for leg swelling. Negative for chest pain. Gastrointestinal: Negative for abdominal pain, diarrhea, nausea and vomiting. Genitourinary: Negative for flank pain, hematuria and urgency. Musculoskeletal: Negative for arthralgias, back pain and neck pain. Skin: Positive for rash and wound. Neurological: Negative for dizziness, syncope, weakness, numbness and headaches. Psychiatric/Behavioral: Negative for behavioral problems and confusion. The patient is not nervous/anxious. Physical Exam  Vitals reviewed. Constitutional:       General: He is not in acute distress. Appearance: Normal appearance. He is obese. HENT:      Head: Normocephalic. Right Ear: External ear normal.      Left Ear: External ear normal.      Nose: Nose normal.      Mouth/Throat:      Mouth: Mucous membranes are moist.   Eyes:      General: No scleral icterus. Right eye: No discharge. Left eye: No discharge.       Conjunctiva/sclera: Conjunctivae normal.   Cardiovascular:      Rate and Rhythm: Normal rate and regular rhythm. Pulses:           Dorsalis pedis pulses are 1+ on the right side. Posterior tibial pulses are 1+ on the right side. Heart sounds: No friction rub. No gallop. Pulmonary:      Effort: Pulmonary effort is normal. No respiratory distress. Breath sounds: No stridor. No wheezing, rhonchi or rales. Abdominal:      General: There is no distension. Palpations: Abdomen is soft. Tenderness: There is no abdominal tenderness. There is no guarding or rebound. Musculoskeletal:         General: Tenderness present. No deformity. Cervical back: Normal range of motion and neck supple. No rigidity or tenderness. Right lower leg: Edema present. Left Lower Extremity: Left leg is amputated above ankle. Feet:      Right foot:      Skin integrity: Ulcer, skin breakdown, erythema and warmth present. Skin:     General: Skin is warm. Capillary Refill: Capillary refill takes less than 2 seconds. Coloration: Skin is not jaundiced. Findings: Erythema and rash present. Neurological:      Mental Status: He is alert and oriented to person, place, and time. Sensory: No sensory deficit. Motor: No weakness. Psychiatric:         Mood and Affect: Mood normal.         Behavior: Behavior normal.          Procedures         MDM  Number of Diagnoses or Management Options  Diagnosis management comments: 49year old male presenting wound check on right foot. Noted increased swelling and drainage beginning Friday. Previous history of osteomyelitis and foot amputation. Pulse present in distal extremity. Patient in no acute distress. Consult placed to Dr. Patricia Elizabeth, resident seen at bedside reported that podiatry plans intervention tomorrow. Spoke to Dr. Vesna Pettit Discussed patient, will plan to admit patient at this time.  Stable at time of admission                  --------------------------------------------- PAST HISTORY ---------------------------------------------  Past Medical History:  has a past medical history of Charcot foot due to diabetes mellitus ([facility] Diabetes mellitus ([facility] Diabetic foot ulcer with osteomyelitis ([facility] and Hypertension. Past Surgical History:  has a past surgical history that includes Ankle Fusion (02/2020); Foot Debridement (Left, 10/17/2020); Foot Debridement (Left, 11/11/2020); Foot Amputation (Left, 11/14/2020); picc line insertion nurse (11/18/2020); Foot Debridement (Left, 12/2/2020); and Foot Debridement (Right, 6/21/2022). Social History:  reports that he has never smoked. He has never used smokeless tobacco. He reports previous alcohol use. He reports that he does not use drugs. Family History: family history includes Alcohol Abuse in his father; Diabetes in his father. The patients home medications have been reviewed. Allergies: Vancomycin and Semaglutide    -------------------------------------------------- RESULTS -------------------------------------------------    LABS:  Results for orders placed or performed during the hospital encounter of 06/20/22   Culture, Blood 2    Specimen: Blood   Result Value Ref Range    Culture, Blood 2 24 Hours no growth    Culture, Blood 1    Specimen: Blood   Result Value Ref Range    Blood Culture, Routine 24 Hours no growth    Culture, Wound    Specimen: Ulcer   Result Value Ref Range    WOUND/ABSCESS       Growth present, evaluating for:  Mixed Gram positive organisms      Gram Stain Result       Gram stain performed from swab, interpret results with  caution. Swab specimens of sterile fluids are inferior to  aspirate specimens for organism recovery.   Polymorphonuclear leukocytes not seen  Epithelial cells not seen  Rare Gram positive cocci in pairs  Rare gram positive rods Diphtheroid like     Culture, Anaerobic    Specimen: Ulcer   Result Value Ref Range    Anaerobic Culture       Swab collections are low-yield and rarely indicated. Generally, the specimen volume when collected by swab is  small, reducing the probability of isolating organisms: many  organisms adhere to the fibers of the swab, which reduces the  opportunity of recovering organisms. Anaerobes not isolated     Culture, Urine    Specimen: Urine, clean catch   Result Value Ref Range    Urine Culture, Routine Growth not present, incubation continues    Gram Stain    Specimen: Bone   Result Value Ref Range    Gram Stain Orderable       Gram stain performed from tissue touch prep  Polymorphonuclear leukocytes not seen  Epithelial cells not seen  No organisms seen     Gram Stain    Specimen: Tissue   Result Value Ref Range    Gram Stain Orderable       Gram stain performed from swab, interpret results with  caution. Swab specimens of sterile fluids are inferior to  aspirate specimens for organism recovery.   Polymorphonuclear leukocytes not seen  Epithelial cells not seen  Few Gram positive cocci in pairs  Rare Gram positive rods     CBC with Auto Differential   Result Value Ref Range    WBC 13.1 (H) 4.5 - 11.5 E9/L    RBC 4.60 3.80 - 5.80 E12/L    Hemoglobin 12.1 (L) 12.5 - 16.5 g/dL    Hematocrit 38.3 37.0 - 54.0 %    MCV 83.3 80.0 - 99.9 fL    MCH 26.3 26.0 - 35.0 pg    MCHC 31.6 (L) 32.0 - 34.5 %    RDW 13.4 11.5 - 15.0 fL    Platelets 361 833 - 568 E9/L    MPV 9.1 7.0 - 12.0 fL    Neutrophils % 88.0 (H) 43.0 - 80.0 %    Immature Granulocytes % 1.0 0.0 - 5.0 %    Lymphocytes % 6.2 (L) 20.0 - 42.0 %    Monocytes % 4.4 2.0 - 12.0 %    Eosinophils % 0.1 0.0 - 6.0 %    Basophils % 0.3 0.0 - 2.0 %    Neutrophils Absolute 11.53 (H) 1.80 - 7.30 E9/L    Immature Granulocytes # 0.13 E9/L    Lymphocytes Absolute 0.81 (L) 1.50 - 4.00 E9/L    Monocytes Absolute 0.58 0.10 - 0.95 E9/L    Eosinophils Absolute 0.01 (L) 0.05 - 0.50 E9/L    Basophils Absolute 0.04 0.00 - 0.20 E9/L   Comprehensive Metabolic Panel w/ Reflex to MG   Result Value Ref Range    Sodium 134 132 - 146 mmol/L Potassium reflex Magnesium 3.4 (L) 3.5 - 5.0 mmol/L    Chloride 99 98 - 107 mmol/L    CO2 23 22 - 29 mmol/L    Anion Gap 12 7 - 16 mmol/L    Glucose 180 (H) 74 - 99 mg/dL    BUN 10 6 - 20 mg/dL    CREATININE 1.1 0.7 - 1.2 mg/dL    GFR Non-African American >60 >=60 mL/min/1.73    GFR African American >60     Calcium 8.5 (L) 8.6 - 10.2 mg/dL    Total Protein 7.8 6.4 - 8.3 g/dL    Albumin 3.7 3.5 - 5.2 g/dL    Total Bilirubin 0.7 0.0 - 1.2 mg/dL    Alkaline Phosphatase 80 40 - 129 U/L    ALT 8 0 - 40 U/L    AST 12 0 - 39 U/L   Protime-INR   Result Value Ref Range    Protime 14.5 (H) 9.3 - 12.4 sec    INR 1.3    Urinalysis with Microscopic   Result Value Ref Range    Color, UA Yellow Straw/Yellow    Clarity, UA Clear Clear    Glucose, Ur Negative Negative mg/dL    Bilirubin Urine Negative Negative    Ketones, Urine Negative Negative mg/dL    Specific Gravity, UA 1.025 1.005 - 1.030    Blood, Urine LARGE (A) Negative    pH, UA 6.0 5.0 - 9.0    Protein,  (A) Negative mg/dL    Urobilinogen, Urine 0.2 <2.0 E.U./dL    Nitrite, Urine Negative Negative    Leukocyte Esterase, Urine MODERATE (A) Negative    WBC, UA >20 (A) 0 - 5 /HPF    RBC, UA 0-1 0 - 2 /HPF    Epithelial Cells, UA RARE /HPF    Bacteria, UA NONE SEEN None Seen /HPF   Lactate, Sepsis   Result Value Ref Range    Lactic Acid, Sepsis 1.4 0.5 - 1.9 mmol/L   Magnesium   Result Value Ref Range    Magnesium 1.9 1.6 - 2.6 mg/dL   Hemoglobin A1C   Result Value Ref Range    Hemoglobin A1C 8.0 (H) 4.0 - 5.6 %   Basic Metabolic Panel w/ Reflex to MG   Result Value Ref Range    Sodium 134 132 - 146 mmol/L    Potassium reflex Magnesium 3.8 3.5 - 5.0 mmol/L    Chloride 99 98 - 107 mmol/L    CO2 23 22 - 29 mmol/L    Anion Gap 12 7 - 16 mmol/L    Glucose 182 (H) 74 - 99 mg/dL    BUN 10 6 - 20 mg/dL    CREATININE 1.0 0.7 - 1.2 mg/dL    GFR Non-African American >60 >=60 mL/min/1.73    GFR African American >60     Calcium 8.3 (L) 8.6 - 10.2 mg/dL   CBC with Auto Differential Result Value Ref Range    WBC 12.8 (H) 4.5 - 11.5 E9/L    RBC 4.46 3.80 - 5.80 E12/L    Hemoglobin 11.8 (L) 12.5 - 16.5 g/dL    Hematocrit 37.0 37.0 - 54.0 %    MCV 83.0 80.0 - 99.9 fL    MCH 26.5 26.0 - 35.0 pg    MCHC 31.9 (L) 32.0 - 34.5 %    RDW 13.5 11.5 - 15.0 fL    Platelets 654 379 - 558 E9/L    MPV 9.4 7.0 - 12.0 fL    Neutrophils % 81.1 (H) 43.0 - 80.0 %    Immature Granulocytes % 1.0 0.0 - 5.0 %    Lymphocytes % 8.7 (L) 20.0 - 42.0 %    Monocytes % 7.9 2.0 - 12.0 %    Eosinophils % 0.9 0.0 - 6.0 %    Basophils % 0.4 0.0 - 2.0 %    Neutrophils Absolute 10.39 (H) 1.80 - 7.30 E9/L    Immature Granulocytes # 0.13 E9/L    Lymphocytes Absolute 1.11 (L) 1.50 - 4.00 E9/L    Monocytes Absolute 1.01 (H) 0.10 - 0.95 E9/L    Eosinophils Absolute 0.11 0.05 - 0.50 E9/L    Basophils Absolute 0.05 0.00 - 0.20 E9/L   C-Reactive Protein   Result Value Ref Range    CRP 23.4 (H) 0.0 - 0.4 mg/dL   Sedimentation Rate   Result Value Ref Range    Sed Rate 55 (H) 0 - 15 mm/Hr   Antistreptolysin O Titer   Result Value Ref Range     (H) 0 - 200 IU/mL   Basic Metabolic Panel w/ Reflex to MG   Result Value Ref Range    Sodium 138 132 - 146 mmol/L    Potassium reflex Magnesium 3.9 3.5 - 5.0 mmol/L    Chloride 102 98 - 107 mmol/L    CO2 24 22 - 29 mmol/L    Anion Gap 12 7 - 16 mmol/L    Glucose 165 (H) 74 - 99 mg/dL    BUN 11 6 - 20 mg/dL    CREATININE 0.9 0.7 - 1.2 mg/dL    GFR Non-African American >60 >=60 mL/min/1.73    GFR African American >60     Calcium 8.4 (L) 8.6 - 10.2 mg/dL   CBC with Auto Differential   Result Value Ref Range    WBC 9.9 4.5 - 11.5 E9/L    RBC 4.43 3.80 - 5.80 E12/L    Hemoglobin 11.8 (L) 12.5 - 16.5 g/dL    Hematocrit 37.2 37.0 - 54.0 %    MCV 84.0 80.0 - 99.9 fL    MCH 26.6 26.0 - 35.0 pg    MCHC 31.7 (L) 32.0 - 34.5 %    RDW 13.5 11.5 - 15.0 fL    Platelets 752 863 - 956 E9/L    MPV 9.5 7.0 - 12.0 fL    Neutrophils % 74.5 43.0 - 80.0 %    Immature Granulocytes % 1.0 0.0 - 5.0 %    Lymphocytes % 12.8 (L) 20.0 - 42.0 %    Monocytes % 8.1 2.0 - 12.0 %    Eosinophils % 3.1 0.0 - 6.0 %    Basophils % 0.5 0.0 - 2.0 %    Neutrophils Absolute 7.37 (H) 1.80 - 7.30 E9/L    Immature Granulocytes # 0.10 E9/L    Lymphocytes Absolute 1.27 (L) 1.50 - 4.00 E9/L    Monocytes Absolute 0.80 0.10 - 0.95 E9/L    Eosinophils Absolute 0.31 0.05 - 0.50 E9/L    Basophils Absolute 0.05 0.00 - 0.20 E9/L   POCT Glucose   Result Value Ref Range    Meter Glucose 127 (H) 74 - 99 mg/dL   POCT Glucose   Result Value Ref Range    Meter Glucose 237 (H) 74 - 99 mg/dL   POCT Glucose   Result Value Ref Range    Meter Glucose 162 (H) 74 - 99 mg/dL   POCT Glucose   Result Value Ref Range    Meter Glucose 140 (H) 74 - 99 mg/dL   POCT Glucose   Result Value Ref Range    Meter Glucose 121 (H) 74 - 99 mg/dL   POCT Glucose   Result Value Ref Range    Meter Glucose 304 (H) 74 - 99 mg/dL   POCT Glucose   Result Value Ref Range    Meter Glucose 138 (H) 74 - 99 mg/dL   POCT Glucose   Result Value Ref Range    Meter Glucose 211 (H) 74 - 99 mg/dL   POCT Glucose   Result Value Ref Range    Meter Glucose 169 (H) 74 - 99 mg/dL   POCT Glucose   Result Value Ref Range    Meter Glucose 145 (H) 74 - 99 mg/dL   EKG 12 Lead   Result Value Ref Range    Ventricular Rate 81 BPM    Atrial Rate 81 BPM    P-R Interval 160 ms    QRS Duration 102 ms    Q-T Interval 406 ms    QTc Calculation (Bazett) 471 ms    P Axis 63 degrees    R Axis 44 degrees    T Axis 26 degrees       RADIOLOGY:  VL LOWER EXTREMITY ARTERIAL SEGMENTAL PRESSURES W PPG BILATERAL   Final Result   No significant peripheral artery disease identified. XR FOOT RIGHT (MIN 3 VIEWS)   Final Result   Extensive deformity and sclerotic changes of the foot, as noted above. Marked soft tissue swelling with no evidence of soft tissue gas. No evidence of acute bony abnormalities or hardware complications.          XR TIBIA FIBULA RIGHT (2 VIEWS)   Final Result   No acute bony abnormalities or hardware available throughout encounter including all critical portions; See Attending Note/Attestation for Final [address], DO  Resident  06/20/22 2101      ATTENDING PROVIDER ATTESTATION:     I have personally performed and/or participated in the history, exam, medical decision making, and procedures and agree with all pertinent clinical information unless otherwise noted. I have also reviewed and agree with the past medical, family and social history unless otherwise noted. I have discussed this patient in detail with the resident and provided the instruction and education regarding the evidence-based evaluation and treatment of Leanna Connor    Patient had cultures sent off.  Podiatry did evaluate patient and he was admitted to medicine to for further evaluation    Electronically signed by Harsha Renteria DO on 6/22/22 at 9:32 PM EDT           Harsha Renteria DO  06/22/22 2276

## 2022-06-20 NOTE — PROGRESS NOTES
Anticipated admission. Admission database completed to best of this RN's ability. Pharmacy tech to review medication list. Care plan and education initiated. Pt from home with wife. Uses a cane with ambulation PRN. Hx L foot amputation and has prosthetic. CPAP HS.  Has [employer], WC, and SC.

## 2022-06-20 NOTE — PROGRESS NOTES
Podiatry Consult H&P  6/20/2022   Lindaann Forward Leschak       REASON FOR CONSULT: Right lateral foot wounds. Right leg edema and cellulitis  REQUESTING PHYSICIAN:    HISTORY OF PRESENT ILLNESS: This is a diabetic 49 y.o. male seen bedside for right lateral foot wounds. 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 Pt states he noticed the wound 4 days ago while visiting his family in [place]. Pt states he feels like he's been walking on the outside of his foot lately. Pt admits to noticing pus drain form the foot in the past 24 hours and feels like his leg is getting worse. Pt denies any current pain but pain is illicited upon palpation. Pt denies any current NVFC. Pt has no other pedal questions or complaints at this time. Pt aware he is scheduled for surgery tomorrow afternoon.          Past Medical History:   Diagnosis Date    Charcot foot due to diabetes mellitus ([address]     Diabetes mellitus ([address]     Diabetic foot ulcer with osteomyelitis ([address] 11/11/2020    Hypertension         Past Surgical History:   Procedure Laterality Date    ANKLE FUSION  02/2020    FOOT AMPUTATION Left 11/14/2020    LEFT FOOT SYMES AMPUTATION performed by Chepe Nelson DPM at [facility] Left 10/17/2020    LEFT FOOT  INCISION AND DRAINAGE, BONE DEBRIDEMENT AND BIOPSY performed by Reg Paulson DPM at [facility] Left 11/11/2020    LEFT FOOT INCISION AND DRAINAGE APPLICATION OF WOUND VAC POSSIBLE INTEGRA GRAFT performed by Chepe Nelson DPM at [facility] Left 12/2/2020    LEFT FOOT WOUND DEBRIDEMENT, INCISION AND DRAINAGE OF LEFT FOOT HEMATOMA, INTEGRA, WOUND VAC performed by Yaya Snider DPM at [facility] PICC LINE INSERTION NURSE  11/18/2020              Family History   Problem Relation Age of Onset    Diabetes Father     Alcohol Abuse Father         Social History     Tobacco Use    Smoking status: Never Smoker    Smokeless tobacco: Never Used   Substance Use Topics    Alcohol use: Not Currently     Comment: 3 times a year maybe. very rare        Prior to Admission medications    Medication Sig Start Date End Date Taking? Authorizing Provider   amLODIPine (NORVASC) 5 MG tablet Take 1 tablet by mouth daily 12/8/20   Armando Britton MD   insulin lispro (HUMALOG) 100 UNIT/ML injection vial Inject 5 Units into the skin 3 times daily (with meals) 12/8/20   Armando Britton MD   enoxaparin (LOVENOX) 40 MG/0.4ML injection Inject 0.4 mLs into the skin daily  Patient not taking: Reported on 6/20/2022 11/20/20   Ingrid Freeman MD   glucose (GLUTOSE) 40 % GEL Take 37.5 mLs by mouth as needed (Hypoglycemia) 11/19/20   Ingrid Freeman MD   insulin glargine (LANTUS) 100 UNIT/ML injection vial Inject 15 Units into the skin nightly    Historical Provider, MD   atorvastatin (LIPITOR) 10 MG tablet Take 5 mg by mouth nightly     Historical Provider, MD   metFORMIN (GLUCOPHAGE) 500 MG tablet Take 500 mg by mouth daily (with breakfast)    Historical Provider, MD        Vancomycin and Semaglutide         OBJECTIVE:        Vitals:    06/20/22 1218   BP: (!) 181/86   Pulse: 99   Resp: 20   Temp: 98.8 °F (37.1 °C)   SpO2: 96%              EXAM:        Pt is AAOx3, NAD  RLE focused exam:  Vascular Exam:  DP and PT pulses are faintly palpable on the right. CFT is < 3 seconds RLE. Edema is present to RLE. Skin temp warm to warm from tibial tuberosity to digits on right side. Neuro Exam:  Light touch and protective sensation diminished. Dermatologic Exam:  RLE: Full thickness ulcer measuring roughly 1x1x0.5 cm present to the lateral ankle. No probe to bone. Wound bed is fibrotic. No drainage or crepitus felt. No malodor. Surrounding erythema. Right plantar lateral foot has a full thickness ulcer measuring roughly 9x9x0.3 cm. No probe to bone.  Wound bed is mixed fibrogranular. No probe to bone. Slight malodor. Slight fluctuance. Hyperkeratotic rim. No purulence noted at time of exam.     LLE: Full thickness ulcer measuring roughly 3x0.3x0.3 cm present to lateral stump. Wound bed healthy granular appearance. No malodor, crepitus, drainage appreciated. Clinical pictures below:    MSK: MMT deferred at this time. LLE symes amputation. RLE TTC fusion with IM nail. No current facility-administered medications for this encounter.      Current Outpatient Medications   Medication Sig Dispense Refill    amLODIPine (NORVASC) 5 MG tablet Take 1 tablet by mouth daily 30 tablet 3    insulin lispro (HUMALOG) 100 UNIT/ML injection vial Inject 5 Units into the skin 3 times daily (with meals) 1 vial 3    enoxaparin (LOVENOX) 40 MG/0.4ML injection Inject 0.4 mLs into the skin daily (Patient not taking: Reported on 6/20/2022)  3    glucose (GLUTOSE) 40 % GEL Take 37.5 mLs by mouth as needed (Hypoglycemia) 45 g 1    insulin glargine (LANTUS) 100 UNIT/ML injection vial Inject 15 Units into the skin nightly      atorvastatin (LIPITOR) 10 MG tablet Take 5 mg by mouth nightly       metFORMIN (GLUCOPHAGE) 500 MG tablet Take 500 mg by mouth daily (with breakfast)          Lab Results   Component Value Date    WBC 13.1 (H) 06/20/2022    HCT 38.3 06/20/2022    HGB 12.1 (L) 06/20/2022     06/20/2022     06/20/2022    K 3.4 (L) 06/20/2022    CL 99 06/20/2022    CO2 23 06/20/2022    BUN 10 06/20/2022    CREATININE 1.1 06/20/2022    GLUCOSE 180 (H) 06/20/2022    CRP 0.8 (H) 12/07/2020         Radiographs:    ASSESSMENT:  Full thickness ulcers to R and L foot and R ankle, POA  IDDM with neuropathy  RLE cellulitis  RLE edema  Possible abscess R foot  Leukocytosis  Charcot Neuropathy  History of previous amputations       PLAN:  -Patient examined and evaluated at bedside  -All pertinent labs, charts, and imaging reviewed prior to encounter  -WBC: 13.1  -R foot and tib fib x rays: Marked soft tissue swelling with no evidence of soft tissue gas. No evidence of acute bony abnormalities or hardware complications. There is marked deformity with varus angulation and flexion deformity of the toes. 5th ray is nearly absent, likely due to bony erosion or prior surgical resection.   -Wound cultures pending  -No evidence of DVT to RLE  -Arterial study pending  -Pt scheduled for Incision and drainage with debridement tomorrow afternoon. Pt to be NPO after breakfast  -Will continue to follow pt  -D/W:   Claude Clifton DPM FACFAS  Fellowship-Trained Foot and Ankle Surgeon  Diplomate, American Board of Foot and Ankle Surgeons  [phone]       Thank you for involving podiatry in this patients care.  Please do not hesitate to call with any questions or concerns       Chicho Goldstein DPM  PGY1 Podiatry Resident   6/20/2022   3:08 PM

## 2022-06-21 ENCOUNTER — APPOINTMENT (OUTPATIENT)
Dept: INTERVENTIONAL RADIOLOGY/VASCULAR | Age: 47
DRG: 623 | End: 2022-06-21
Payer: COMMERCIAL

## 2022-06-21 ENCOUNTER — ANESTHESIA (OUTPATIENT)
Dept: OPERATING ROOM | Age: 47
DRG: 623 | End: 2022-06-21
Payer: COMMERCIAL

## 2022-06-21 LAB
ANION GAP SERPL CALCULATED.3IONS-SCNC: 12 MMOL/L (ref 7–16)
ANTISTREPTOLYSIN-O: 558 IU/ML (ref 0–200)
BASOPHILS ABSOLUTE: 0.05 E9/L (ref 0–0.2)
BASOPHILS RELATIVE PERCENT: 0.4 % (ref 0–2)
BUN BLDV-MCNC: 10 MG/DL (ref 6–20)
C-REACTIVE PROTEIN: 23.4 MG/DL (ref 0–0.4)
CALCIUM SERPL-MCNC: 8.3 MG/DL (ref 8.6–10.2)
CHLORIDE BLD-SCNC: 99 MMOL/L (ref 98–107)
CO2: 23 MMOL/L (ref 22–29)
CREAT SERPL-MCNC: 1 MG/DL (ref 0.7–1.2)
EKG ATRIAL RATE: 81 BPM
EKG P AXIS: 63 DEGREES
EKG P-R INTERVAL: 160 MS
EKG Q-T INTERVAL: 406 MS
EKG QRS DURATION: 102 MS
EKG QTC CALCULATION (BAZETT): 471 MS
EKG R AXIS: 44 DEGREES
EKG T AXIS: 26 DEGREES
EKG VENTRICULAR RATE: 81 BPM
EOSINOPHILS ABSOLUTE: 0.11 E9/L (ref 0.05–0.5)
EOSINOPHILS RELATIVE PERCENT: 0.9 % (ref 0–6)
GFR AFRICAN AMERICAN: >60
GFR NON-AFRICAN AMERICAN: >60 ML/MIN/1.73
GLUCOSE BLD-MCNC: 182 MG/DL (ref 74–99)
HCT VFR BLD CALC: 37 % (ref 37–54)
HEMOGLOBIN: 11.8 G/DL (ref 12.5–16.5)
IMMATURE GRANULOCYTES #: 0.13 E9/L
IMMATURE GRANULOCYTES %: 1 % (ref 0–5)
LYMPHOCYTES ABSOLUTE: 1.11 E9/L (ref 1.5–4)
LYMPHOCYTES RELATIVE PERCENT: 8.7 % (ref 20–42)
MCH RBC QN AUTO: 26.5 PG (ref 26–35)
MCHC RBC AUTO-ENTMCNC: 31.9 % (ref 32–34.5)
MCV RBC AUTO: 83 FL (ref 80–99.9)
METER GLUCOSE: 121 MG/DL (ref 74–99)
METER GLUCOSE: 140 MG/DL (ref 74–99)
METER GLUCOSE: 162 MG/DL (ref 74–99)
METER GLUCOSE: 304 MG/DL (ref 74–99)
MONOCYTES ABSOLUTE: 1.01 E9/L (ref 0.1–0.95)
MONOCYTES RELATIVE PERCENT: 7.9 % (ref 2–12)
NEUTROPHILS ABSOLUTE: 10.39 E9/L (ref 1.8–7.3)
NEUTROPHILS RELATIVE PERCENT: 81.1 % (ref 43–80)
PDW BLD-RTO: 13.5 FL (ref 11.5–15)
PLATELET # BLD: 285 E9/L (ref 130–450)
PMV BLD AUTO: 9.4 FL (ref 7–12)
POTASSIUM REFLEX MAGNESIUM: 3.8 MMOL/L (ref 3.5–5)
RBC # BLD: 4.46 E12/L (ref 3.8–5.8)
SEDIMENTATION RATE, ERYTHROCYTE: 55 MM/HR (ref 0–15)
SODIUM BLD-SCNC: 134 MMOL/L (ref 132–146)
WBC # BLD: 12.8 E9/L (ref 4.5–11.5)

## 2022-06-21 PROCEDURE — 3700000000 HC ANESTHESIA ATTENDED CARE: Performed by: PODIATRIST

## 2022-06-21 PROCEDURE — 2580000003 HC RX 258: Performed by: NURSE PRACTITIONER

## 2022-06-21 PROCEDURE — 6370000000 HC RX 637 (ALT 250 FOR IP): Performed by: SPECIALIST

## 2022-06-21 PROCEDURE — 87206 SMEAR FLUORESCENT/ACID STAI: CPT

## 2022-06-21 PROCEDURE — 0QBN0ZX EXCISION OF RIGHT METATARSAL, OPEN APPROACH, DIAGNOSTIC: ICD-10-PCS | Performed by: PODIATRIST

## 2022-06-21 PROCEDURE — 7100000010 HC PHASE II RECOVERY - FIRST 15 MIN: Performed by: PODIATRIST

## 2022-06-21 PROCEDURE — 6360000002 HC RX W HCPCS: Performed by: NURSE ANESTHETIST, CERTIFIED REGISTERED

## 2022-06-21 PROCEDURE — 80048 BASIC METABOLIC PNL TOTAL CA: CPT

## 2022-06-21 PROCEDURE — 87147 CULTURE TYPE IMMUNOLOGIC: CPT

## 2022-06-21 PROCEDURE — 87205 SMEAR GRAM STAIN: CPT

## 2022-06-21 PROCEDURE — 2709999900 HC NON-CHARGEABLE SUPPLY: Performed by: PODIATRIST

## 2022-06-21 PROCEDURE — APPSS30 APP SPLIT SHARED TIME 16-30 MINUTES: Performed by: NURSE PRACTITIONER

## 2022-06-21 PROCEDURE — 86060 ANTISTREPTOLYSIN O TITER: CPT

## 2022-06-21 PROCEDURE — 86140 C-REACTIVE PROTEIN: CPT

## 2022-06-21 PROCEDURE — 6360000002 HC RX W HCPCS: Performed by: NURSE PRACTITIONER

## 2022-06-21 PROCEDURE — 87186 SC STD MICRODIL/AGAR DIL: CPT

## 2022-06-21 PROCEDURE — 99232 SBSQ HOSP IP/OBS MODERATE 35: CPT | Performed by: STUDENT IN AN ORGANIZED HEALTH CARE EDUCATION/TRAINING PROGRAM

## 2022-06-21 PROCEDURE — 1200000000 HC SEMI PRIVATE

## 2022-06-21 PROCEDURE — 2580000003 HC RX 258: Performed by: NURSE ANESTHETIST, CERTIFIED REGISTERED

## 2022-06-21 PROCEDURE — 88307 TISSUE EXAM BY PATHOLOGIST: CPT

## 2022-06-21 PROCEDURE — 87015 SPECIMEN INFECT AGNT CONCNTJ: CPT

## 2022-06-21 PROCEDURE — 6360000002 HC RX W HCPCS: Performed by: SPECIALIST

## 2022-06-21 PROCEDURE — 87075 CULTR BACTERIA EXCEPT BLOOD: CPT

## 2022-06-21 PROCEDURE — 6370000000 HC RX 637 (ALT 250 FOR IP): Performed by: NURSE PRACTITIONER

## 2022-06-21 PROCEDURE — 2500000003 HC RX 250 WO HCPCS: Performed by: NURSE ANESTHETIST, CERTIFIED REGISTERED

## 2022-06-21 PROCEDURE — 87070 CULTURE OTHR SPECIMN AEROBIC: CPT

## 2022-06-21 PROCEDURE — 7100000011 HC PHASE II RECOVERY - ADDTL 15 MIN: Performed by: PODIATRIST

## 2022-06-21 PROCEDURE — 0J9Q0ZZ DRAINAGE OF RIGHT FOOT SUBCUTANEOUS TISSUE AND FASCIA, OPEN APPROACH: ICD-10-PCS | Performed by: PODIATRIST

## 2022-06-21 PROCEDURE — 93923 UPR/LXTR ART STDY 3+ LVLS: CPT

## 2022-06-21 PROCEDURE — 93005 ELECTROCARDIOGRAM TRACING: CPT | Performed by: NURSE PRACTITIONER

## 2022-06-21 PROCEDURE — 87102 FUNGUS ISOLATION CULTURE: CPT

## 2022-06-21 PROCEDURE — 85651 RBC SED RATE NONAUTOMATED: CPT

## 2022-06-21 PROCEDURE — 3600000012 HC SURGERY LEVEL 2 ADDTL 15MIN: Performed by: PODIATRIST

## 2022-06-21 PROCEDURE — 88311 DECALCIFY TISSUE: CPT

## 2022-06-21 PROCEDURE — 0JBQ0ZZ EXCISION OF RIGHT FOOT SUBCUTANEOUS TISSUE AND FASCIA, OPEN APPROACH: ICD-10-PCS | Performed by: PODIATRIST

## 2022-06-21 PROCEDURE — 36415 COLL VENOUS BLD VENIPUNCTURE: CPT

## 2022-06-21 PROCEDURE — 87116 MYCOBACTERIA CULTURE: CPT

## 2022-06-21 PROCEDURE — 85025 COMPLETE CBC W/AUTO DIFF WBC: CPT

## 2022-06-21 PROCEDURE — 82962 GLUCOSE BLOOD TEST: CPT

## 2022-06-21 PROCEDURE — 3700000001 HC ADD 15 MINUTES (ANESTHESIA): Performed by: PODIATRIST

## 2022-06-21 PROCEDURE — 87077 CULTURE AEROBIC IDENTIFY: CPT

## 2022-06-21 PROCEDURE — 3600000002 HC SURGERY LEVEL 2 BASE: Performed by: PODIATRIST

## 2022-06-21 RX ORDER — PROPOFOL 10 MG/ML
INJECTION, EMULSION INTRAVENOUS PRN
Status: DISCONTINUED | OUTPATIENT
Start: 2022-06-21 | End: 2022-06-21 | Stop reason: SDUPTHER

## 2022-06-21 RX ORDER — LEVOFLOXACIN 500 MG/1
750 TABLET, FILM COATED ORAL DAILY
Status: DISCONTINUED | OUTPATIENT
Start: 2022-06-21 | End: 2022-06-23

## 2022-06-21 RX ORDER — KETAMINE HYDROCHLORIDE 10 MG/ML
INJECTION, SOLUTION INTRAMUSCULAR; INTRAVENOUS PRN
Status: DISCONTINUED | OUTPATIENT
Start: 2022-06-21 | End: 2022-06-21 | Stop reason: SDUPTHER

## 2022-06-21 RX ORDER — SODIUM CHLORIDE 9 MG/ML
INJECTION, SOLUTION INTRAVENOUS PRN
Status: DISCONTINUED | OUTPATIENT
Start: 2022-06-21 | End: 2022-06-24 | Stop reason: HOSPADM

## 2022-06-21 RX ORDER — SODIUM CHLORIDE 9 MG/ML
INJECTION, SOLUTION INTRAVENOUS CONTINUOUS PRN
Status: DISCONTINUED | OUTPATIENT
Start: 2022-06-21 | End: 2022-06-21 | Stop reason: SDUPTHER

## 2022-06-21 RX ORDER — SODIUM CHLORIDE 0.9 % (FLUSH) 0.9 %
5-40 SYRINGE (ML) INJECTION EVERY 12 HOURS SCHEDULED
Status: DISCONTINUED | OUTPATIENT
Start: 2022-06-21 | End: 2022-06-24 | Stop reason: HOSPADM

## 2022-06-21 RX ORDER — LIDOCAINE HYDROCHLORIDE 20 MG/ML
INJECTION, SOLUTION EPIDURAL; INFILTRATION; INTRACAUDAL; PERINEURAL PRN
Status: DISCONTINUED | OUTPATIENT
Start: 2022-06-21 | End: 2022-06-21 | Stop reason: SDUPTHER

## 2022-06-21 RX ORDER — SODIUM CHLORIDE 0.9 % (FLUSH) 0.9 %
5-40 SYRINGE (ML) INJECTION PRN
Status: DISCONTINUED | OUTPATIENT
Start: 2022-06-21 | End: 2022-06-24 | Stop reason: HOSPADM

## 2022-06-21 RX ORDER — MIDAZOLAM HYDROCHLORIDE 1 MG/ML
INJECTION INTRAMUSCULAR; INTRAVENOUS PRN
Status: DISCONTINUED | OUTPATIENT
Start: 2022-06-21 | End: 2022-06-21 | Stop reason: SDUPTHER

## 2022-06-21 RX ORDER — FENTANYL CITRATE 50 UG/ML
INJECTION, SOLUTION INTRAMUSCULAR; INTRAVENOUS PRN
Status: DISCONTINUED | OUTPATIENT
Start: 2022-06-21 | End: 2022-06-21 | Stop reason: SDUPTHER

## 2022-06-21 RX ADMIN — FENTANYL CITRATE 25 MCG: 50 INJECTION, SOLUTION INTRAMUSCULAR; INTRAVENOUS at 18:27

## 2022-06-21 RX ADMIN — INSULIN GLARGINE 15 UNITS: 100 INJECTION, SOLUTION SUBCUTANEOUS at 21:07

## 2022-06-21 RX ADMIN — Medication 2000 MG: at 14:16

## 2022-06-21 RX ADMIN — MIDAZOLAM 2 MG: 1 INJECTION INTRAMUSCULAR; INTRAVENOUS at 18:12

## 2022-06-21 RX ADMIN — Medication 2000 MG: at 21:07

## 2022-06-21 RX ADMIN — AMLODIPINE BESYLATE 5 MG: 5 TABLET ORAL at 08:48

## 2022-06-21 RX ADMIN — SODIUM CHLORIDE, PRESERVATIVE FREE 10 ML: 5 INJECTION INTRAVENOUS at 14:15

## 2022-06-21 RX ADMIN — ENOXAPARIN SODIUM 30 MG: 100 INJECTION SUBCUTANEOUS at 21:03

## 2022-06-21 RX ADMIN — LEVOFLOXACIN 750 MG: 500 TABLET, FILM COATED ORAL at 21:02

## 2022-06-21 RX ADMIN — SODIUM CHLORIDE: 9 INJECTION, SOLUTION INTRAVENOUS at 18:03

## 2022-06-21 RX ADMIN — INSULIN LISPRO 2 UNITS: 100 INJECTION, SOLUTION INTRAVENOUS; SUBCUTANEOUS at 21:07

## 2022-06-21 RX ADMIN — KETAMINE HYDROCHLORIDE 50 MG: 10 INJECTION INTRAMUSCULAR; INTRAVENOUS at 18:30

## 2022-06-21 RX ADMIN — PROPOFOL 300 MG: 10 INJECTION, EMULSION INTRAVENOUS at 18:12

## 2022-06-21 RX ADMIN — SODIUM CHLORIDE, PRESERVATIVE FREE 10 ML: 5 INJECTION INTRAVENOUS at 21:03

## 2022-06-21 RX ADMIN — LIDOCAINE HYDROCHLORIDE 100 MG: 20 INJECTION, SOLUTION EPIDURAL; INFILTRATION; INTRACAUDAL; PERINEURAL at 18:12

## 2022-06-21 RX ADMIN — ATORVASTATIN CALCIUM 5 MG: 10 TABLET, FILM COATED ORAL at 21:02

## 2022-06-21 ASSESSMENT — PAIN SCALES - GENERAL: PAINLEVEL_OUTOF10: 0

## 2022-06-21 ASSESSMENT — LIFESTYLE VARIABLES: SMOKING_STATUS: 0

## 2022-06-21 NOTE — PLAN OF CARE
Problem: Skin/Tissue Integrity  Goal: Absence of new skin breakdown  Description: 1. Monitor for areas of redness and/or skin breakdown  2. Assess vascular access sites hourly  3. Every 4-6 hours minimum:  Change oxygen saturation probe site  4. Every 4-6 hours:  If on nasal continuous positive airway pressure, respiratory therapy assess nares and determine need for appliance change or resting period.   Outcome: Progressing     Problem: Pain  Goal: Verbalizes/displays adequate comfort level or baseline comfort level  Outcome: Progressing     Problem: Safety - Adult  Goal: Free from fall injury  Outcome: Progressing  Flowsheets (Taken 6/20/2022 2181)  Free From Fall Injury: Instruct family/caregiver on patient safety     Problem: Discharge Planning  Goal: Discharge to home or other facility with appropriate resources  Outcome: Progressing

## 2022-06-21 NOTE — CONSULTS
[address] [place] [address] [facility]  Consultation Note     Admit Date: 6/20/2022 12:20 PM    Reason for Consult:   Right foot cellulitis. History Pseudomonas and MSSA    Attending Physician:  Rakesh Lanier MD    HISTORY OF PRESENT ILLNESS:             The history is obtained from extensive review of available past medical records. The patient is a 49 y.o. male who is previously known to the ID service. The patient presents to the ED at [facility] on 6/20/2022 with right foot swelling x3 days. He developed an open wound on the plantar aspect of his right foot. He thinks it may have been doing some more walking than usual.  He denies having any fevers or chills. He was afebrile and vital signs were unremarkable. White count was 13.1. A wound, urine and blood cultures were taken. He was seen by podiatry. He will be undergoing debridement. Past Medical History:        Diagnosis Date    Charcot [facility] due to diabetes mellitus ([facility]     Diabetes mellitus ([facility]     Diabetic foot ulcer with osteomyelitis ([facility] 11/11/2020    Hypertension      November 2020. Admitted to [facility]. Seen by Dr. Gela Gray to continue antibiotics for previous left diabetic foot infection. He was on Levofloxacin and Cefazolin. He underwent excisional wound debridement and application of skin graft on 12/2/2021 flap necrosis. Tissue cultures were negative. Seen in the office in follow-up. Completed course of antibiotics and was placed on Levofloxacin and Doxycycline    October 2020. Admitted to [facility] with a left diabetic foot infection secondary to Pseudomonas and MSSA. He underwent Symes amputation. Seen by Dr. Yolanda Hudson. He was treated with Daptomycin. This was changed over to Zosyn. He was discharged on Cefazolin and Levofloxacin.     Past Surgical History:        Procedure Laterality Date    ANKLE FUSION  02/2020    FOOT AMPUTATION Left 11/14/2020    LEFT FOOT SYMES AMPUTATION performed by Kushal Cline DPM at [facility] Left 10/17/2020    LEFT FOOT  INCISION AND DRAINAGE, BONE DEBRIDEMENT AND BIOPSY performed by Wale Hsu DPM at [facility] Left 11/11/2020    LEFT FOOT INCISION AND DRAINAGE APPLICATION OF WOUND VAC POSSIBLE INTEGRA GRAFT performed by Yaya Wu DPM at [facility] Left 12/2/2020    LEFT FOOT WOUND DEBRIDEMENT, INCISION AND DRAINAGE OF LEFT FOOT HEMATOMA, INTEGRA, WOUND VAC performed by Yaya Wu DPM at [facility] PICC LINE INSERTION NURSE  11/18/2020          Current Medications:   Scheduled Meds:   amLODIPine  5 mg Oral Daily    atorvastatin  5 mg Oral Nightly    insulin glargine  15 Units SubCUTAneous Nightly    insulin lispro  5 Units SubCUTAneous TID WC    insulin lispro  0-6 Units SubCUTAneous TID WC    insulin lispro  0-3 Units SubCUTAneous Nightly    sodium chloride flush  5-40 mL IntraVENous 2 times per day    enoxaparin  30 mg SubCUTAneous BID     Continuous Infusions:   dextrose      sodium chloride       PRN Meds:glucose, dextrose bolus **OR** dextrose bolus, glucagon (rDNA), dextrose, sodium chloride flush, sodium chloride, ondansetron **OR** ondansetron, polyethylene glycol, acetaminophen **OR** acetaminophen, magnesium sulfate, potassium chloride **OR** potassium alternative oral replacement **OR** potassium chloride    Allergies:  Vancomycin and Semaglutide    Social History:   Social History     Socioeconomic History    Marital status:      Spouse name: None    Number of children: None    Years of education: None    Highest education level: None   Occupational History    None   Tobacco Use    Smoking status: Never Smoker    Smokeless tobacco: Never Used   Vaping Use    Vaping Use: Never used   Substance and Sexual Activity    Alcohol use: Not Currently     Comment: 3 times a year maybe.  very rare    Drug use: Never    Sexual activity: Not Currently     Partners: Female   Other Topics Concern    None   Social History Narrative    None     Social Determinants of Health     Financial Resource Strain:     Difficulty of Paying Living Expenses: Not on file   Food Insecurity:     Worried About Running Out of Food in the Last Year: Not on file    Gerard of Food in the Last Year: Not on file   Transportation Needs:     Lack of Transportation (Medical): Not on file    Lack of Transportation (Non-Medical): Not on file   Physical Activity:     Days of Exercise per Week: Not on file    Minutes of Exercise per Session: Not on file   Stress:     Feeling of Stress : Not on file   Social Connections:     Frequency of Communication with Friends and Family: Not on file    Frequency of Social Gatherings with Friends and Family: Not on file    Attends [employer] Services: Not on file    Active Member of [address] [place] [address] [employer] or Organizations: Not on file    Attends Club or Organization Meetings: Not on file    Marital Status: Not on file   Intimate Partner Violence:     Fear of Current or Ex-Partner: Not on file    Emotionally Abused: Not on file    Physically Abused: Not on file    Sexually Abused: Not on file   Housing Stability:     Unable to Pay for Housing in the Last Year: Not on file    Number of [place] in the Last Year: Not on file    Unstable Housing in the Last Year: Not on file      Pets: Cat  Travel: None  The patient lives with his wife. 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 Otherwise non-pertinent to the chief complaint.     REVIEW OF SYSTEMS:    Constitutional: Negative for fevers, chills, diaphoresis  Neurologic: Negative   Psychiatric: Negative  Rheumatologic: Negative   Endocrine: Negative  Hematologic: Negative  Immunologic: Negative  ENT: Negative  Respiratory: Negative   Cardiovascular: Negative  GI: Negative  : Negative  Musculoskeletal: As in the HPI  Skin: No rashes. PHYSICAL EXAM:    Vitals:   BP (!) 151/73   Pulse 79   Temp 98.5 °F (36.9 °C) (Oral)   Resp 16   Ht 6' 3\" (1.905 m)   Wt 287 lb 4 oz (130.3 kg)   SpO2 96%   BMI 35.90 kg/m²   Constitutional: The patient is awake, alert, and oriented. No distress. Skin: Warm and dry. No rashes were noted. HEENT: Eyes show round, and reactive pupils. No jaundice. Moist mucous membranes, no ulcerations, no thrush. Neck: Supple to movements. No lymphadenopathy. Chest: No use of accessory muscles to breathe. Symmetrical expansion. Auscultation reveals no wheezing, crackles, or rhonchi. Cardiovascular: S1 and S2 are rhythmic and regular. No murmurs appreciated. Abdomen: Positive bowel sounds to auscultation. Benign to palpation. No masses felt. No hepatosplenomegaly. Extremities: Left TMA. Small wound on the plantar aspect. No drainage. No erythema. There is a large superficial wound on the plantar aspect of the right foot. It does show some purulence and some surrounding erythema. There is another chronic wound on the lateral aspect of the foot measuring approximately 1.2 cm in diameter. No purulence no erythema. Lines: Peripheral.      CBC+dif:  Recent Labs     06/20/22  1406 06/20/22  1406 06/21/22  0327   WBC 13.1*  --  12.8*   HGB 12.1*   < > 11.8*   HCT 38.3   < > 37.0   MCV 83.3   < > 83.0      < > 285   NEUTROABS 11.53*   < > 10.39*    < > = values in this interval not displayed.      Lab Results   Component Value Date    CRP 0.8 (H) 12/07/2020    CRP 1.3 (H) 11/30/2020    CRP 1.9 (H) 11/23/2020      No results found for: CRPHS  Lab Results   Component Value Date    SEDRATE 54 (H) 11/30/2020    SEDRATE 58 (H) 11/23/2020    SEDRATE 79 (H) 11/10/2020     Lab Results   Component Value Date    ALT 8 06/20/2022    AST 12 06/20/2022    ALKPHOS 80 06/20/2022    BILITOT 0.7 06/20/2022     Lab Results   Component Value Date     06/21/2022    K 3.8 06/21/2022    CL 99 06/21/2022    CO2 23 06/21/2022    BUN 10 06/21/2022    CREATININE 1.0 06/21/2022    GFRAA >60 06/21/2022    LABGLOM >60 06/21/2022    GLUCOSE 182 06/21/2022    PROT 7.8 06/20/2022    LABALBU 3.7 06/20/2022    CALCIUM 8.3 06/21/2022    BILITOT 0.7 06/20/2022    ALKPHOS 80 06/20/2022    AST 12 06/20/2022    ALT 8 06/20/2022       Lab Results   Component Value Date    PROTIME 14.5 06/20/2022    INR 1.3 06/20/2022       No results found for: TSH    Lab Results   Component Value Date    COLORU Yellow 06/20/2022    PHUR 6.0 06/20/2022    WBCUA >20 06/20/2022    RBCUA 0-1 06/20/2022    BACTERIA NONE SEEN 06/20/2022    CLARITYU Clear 06/20/2022    SPECGRAV 1.025 06/20/2022    LEUKOCYTESUR MODERATE 06/20/2022    UROBILINOGEN 0.2 06/20/2022    BILIRUBINUR Negative 06/20/2022    BLOODU LARGE 06/20/2022    GLUCOSEU Negative 06/20/2022       No results found for: HCO3, BE, O2SAT, PH, THGB, PCO2, PO2, TCO2  Radiology:  Noted    Microbiology:  Pending  No results for input(s): BC in the last 72 hours. No results for input(s): ORG in the last 72 hours. No results for input(s): Alba Marquez in the last 72 hours. No results for input(s): STREPNEUMAGU in the last 72 hours. No results for input(s): LP1UAG in the last 72 hours. No results for input(s): ASO in the last 72 hours. No results for input(s): CULTRESP in the last 72 hours. No results for input(s): PROCAL in the last 72 hours. Assessment:  · Diabetic foot ulcer  · Diabetic foot infection right foot  · Leukocytosis associated to the    Plan:    · Start oral Levofloxacin and Cefazolin  · Check cultures, baseline ESR, CRP, ASO titer  · Monitor labs  · Will follow with you    Thank you for having us see this patient in consultation. I will be discussing this case with the treating physicians.     Nicki Aparicio MD  1:20 PM  6/21/2022

## 2022-06-21 NOTE — ANESTHESIA PRE PROCEDURE
Department of Anesthesiology  Preprocedure Note       Name:  Maricarmen Sheehan   Age:  49 y.o.  :  1975                                          MRN:  [id number]         Date:  2022      Surgeon: Deepti Baker):  Yaya Krishnan DPM    Procedure: Procedure(s):  INCISION AND DRAINAGE WITH DEBRIDEMENT WITH POSSIBLE WOUND VAC APPLICATION RIGHT FOOT (DR. BLOUNT TO FOLLOW)    Medications prior to admission:   Prior to Admission medications    Medication Sig Start Date End Date Taking?  Authorizing Provider   amLODIPine (NORVASC) 5 MG tablet Take 1 tablet by mouth daily 20   Armando Britton MD   insulin lispro (HUMALOG) 100 UNIT/ML injection vial Inject 5 Units into the skin 3 times daily (with meals)  Patient not taking: Reported on 2022   Armando Britton MD   enoxaparin (LOVENOX) 40 MG/0.4ML injection Inject 0.4 mLs into the skin daily  Patient not taking: Reported on 20   Huma Savage MD   glucose (GLUTOSE) 40 % GEL Take 37.5 mLs by mouth as needed (Hypoglycemia) 20   Huma Savage MD   insulin glargine (LANTUS) 100 UNIT/ML injection vial Inject 15 Units into the skin nightly    Historical Provider, MD   atorvastatin (LIPITOR) 10 MG tablet Take 5 mg by mouth nightly     Historical Provider, MD   metFORMIN (GLUCOPHAGE) 500 MG tablet Take 500 mg by mouth daily (with breakfast)    Historical Provider, MD       Current medications:    Current Facility-Administered Medications   Medication Dose Route Frequency Provider Last Rate Last Admin    ceFAZolin (ANCEF) 2000 mg in sterile water 20 mL IV syringe  2,000 mg IntraVENous Q8H Marcelino Rodríguez MD   2,000 mg at 22 1416    levoFLOXacin (LEVAQUIN) tablet 750 mg  750 mg Oral Daily Marcelino Rodríguez MD        amLODIPine (NORVASC) tablet 5 mg  5 mg Oral Daily OPAL Arenas CNP   5 mg at 22 0848    atorvastatin (LIPITOR) tablet 5 mg  5 mg Oral Nightly OPAL Arenas - CNP 5 mg at 06/20/22 2140    insulin glargine (LANTUS) injection vial 15 Units  15 Units SubCUTAneous Nightly Paulett Siemens, APRN - CNP   15 Units at 06/20/22 2140    insulin lispro (HUMALOG) injection vial 5 Units  5 Units SubCUTAneous TID WC Paulett Siemens, APRN - CNP        insulin lispro (HUMALOG) injection vial 0-6 Units  0-6 Units SubCUTAneous TID WC Paulett Siemens, APRN - CNP        insulin lispro (HUMALOG) injection vial 0-3 Units  0-3 Units SubCUTAneous Nightly Paulett Siemens, APRN - CNP   1 Units at 06/20/22 2140    glucose chewable tablet 16 g  4 tablet Oral PRN Paulett Siemens, APRN - CNP        dextrose bolus 10% 125 mL  125 mL IntraVENous PRN Paulett Siemens, OPAL - CNP        Or    dextrose bolus 10% 250 mL  250 mL IntraVENous PRN Paulett Siemens, APRN - CNP        glucagon (rDNA) injection 1 mg  1 mg IntraMUSCular PRN Paulett Siemens, APRN - CNP        dextrose 5 % solution  100 mL/hr IntraVENous PRN Paulett Siemens, APRN - CNP        sodium chloride flush 0.9 % injection 5-40 mL  5-40 mL IntraVENous 2 times per day Paulett Siemens, OPAL - CNP   10 mL at 06/21/22 1415    sodium chloride flush 0.9 % injection 5-40 mL  5-40 mL IntraVENous PRN Paulett Siemens, APRN - CNP        0.9 % sodium chloride infusion   IntraVENous PRN Paulett Siemens, APRN - CNP        enoxaparin Sodium (LOVENOX) injection 30 mg  30 mg SubCUTAneous BID Paulett Siemens, OPAL - CNP   30 mg at 06/20/22 2140    ondansetron (ZOFRAN-ODT) disintegrating tablet 4 mg  4 mg Oral Q8H PRN Paulett Siemens, OPAL - CNP        Or    ondansetron [facility]) injection 4 mg  4 mg IntraVENous Q6H PRN Paulett Siemens, APRN - YUDI        polyethylene glycol (GLYCOLAX) packet 17 g  17 g Oral Daily PRN Paulett Siemens, APRN - CNP        acetaminophen (TYLENOL) tablet 650 mg  650 mg Oral Q6H PRN Paulett Siemens, APRN - CNP        Or    acetaminophen (TYLENOL) suppository 650 mg  650 mg Rectal Q6H PRN Paulett Siemens, APRN - CNP        magnesium sulfate 2000 mg in 50 mL IVPB premix  2,000 mg IntraVENous PRN Scot Lavelle, APRN - CNP        potassium chloride (KLOR-CON M) extended release tablet 40 mEq  40 mEq Oral PRN Scot Lavelle, APRN - CNP        Or    potassium bicarb-citric acid (EFFER-K) effervescent tablet 40 mEq  40 mEq Oral PRN Scot Lavelle, APRN - CNP        Or    potassium chloride 10 mEq/100 mL IVPB (Peripheral Line)  10 mEq IntraVENous PRN Scot Lavelle, APRN - CNP           Allergies:     Allergies   Allergen Reactions    Vancomycin Other (See Comments)     Red man syndrome    Semaglutide Itching and Rash     Pink itch rash         Problem List:    Patient Active Problem List   Diagnosis Code    Diabetic foot infection ([facility]) E11.628, L08.9    Charcot foot due to diabetes mellitus ([facility] E11.610    Diabetic neuropathy associated with diabetes mellitus due to underlying condition ([facility] E08.40    Essential hypertension I10    Diabetic foot [facility]omyel[facility] E11.621, E11.69, L97.509, M86.9    Partial nontraumatic amputation of foot, left ([facility] Z89.432    Cellulitis L03.90    Right foot infection L08.9    Type 2 diabetes mellitus with skin complication, with long-term current use of insulin ([facility]) E11.628, Z79.4    Hypokalemia E87.6       Past Medical History:        Diagnosis Date    Charcot foot due to diabetes mellitus ([facility]     Diabetes mellitus ([facility]     Diabetic foot ulcer with osteomyelitis ([facility] 11/11/2020    Hypertension        Past Surgical History:        Procedure Laterality Date    ANKLE FUSION  02/2020    FOOT AMPUTATION Left 11/14/2020    LEFT FOOT SYMES AMPUTATION performed by Ignacio Lyman DPM at [address] Left 10/17/2020    LEFT FOOT  INCISION AND DRAINAGE, BONE DEBRIDEMENT AND BIOPSY performed by Michael Mitchell DPM at [address] Left 11/11/2020    LEFT FOOT INCISION AND DRAINAGE APPLICATION OF WOUND VAC POSSIBLE INTEGRA GRAFT performed by Hermilo Dai DPM at [address] Left 12/2/2020    LEFT FOOT WOUND DEBRIDEMENT, INCISION AND DRAINAGE OF LEFT FOOT HEMATOMA, INTEGRA, WOUND VAC performed by Yaya Dacosta DPM at [facility] PICC LINE INSERTION NURSE  11/18/2020            Social History:    Social History     Tobacco Use    Smoking status: Never Smoker    Smokeless tobacco: Never Used   Substance Use Topics    Alcohol use: Not Currently     Comment: 3 times a year maybe. very rare                                Counseling given: Not Answered      Vital Signs (Current):   Vitals:    06/20/22 2130 06/21/22 0207 06/21/22 0753 06/21/22 1649   BP: 139/76  (!) 151/73 (!) 140/74   Pulse: 91  79 82   Resp: 16  16 15   Temp: 99 °F (37.2 °C)  98.5 °F (36.9 °C)    TempSrc: Oral  Oral    SpO2: 95%  96% 98%   Weight:  287 lb 4 oz (130.3 kg)     Height:                                                  BP Readings from Last 3 Encounters:   06/21/22 (!) 140/74   01/06/21 121/84   12/16/20 (!) 140/87       NPO Status: greater than 8 hours            BMI:   Wt Readings from Last 3 Encounters:   06/21/22 287 lb 4 oz (130.3 kg)   11/19/20 289 lb 11.8 oz (131.4 kg)   11/14/20 280 lb (127 kg)     Body mass index is 35.9 kg/m².     CBC:   Lab Results   Component Value Date    WBC 12.8 06/21/2022    RBC 4.46 06/21/2022    HGB 11.8 06/21/2022    HCT 37.0 06/21/2022    MCV 83.0 06/21/2022    RDW 13.5 06/21/2022     06/21/2022       CMP:   Lab Results   Component Value Date     06/21/2022    K 3.8 06/21/2022    CL 99 06/21/2022    CO2 23 06/21/2022    BUN 10 06/21/2022    CREATININE 1.0 06/21/2022    GFRAA >60 06/21/2022    LABGLOM >60 06/21/2022    GLUCOSE 182 06/21/2022    PROT 7.8 06/20/2022    CALCIUM 8.3 06/21/2022    BILITOT 0.7 06/20/2022    ALKPHOS 80 06/20/2022    AST 12 06/20/2022    ALT 8 06/20/2022       POC Tests: No results for input(s): POCGLU, POCNA, POCK, POCCL, POCBUN, POCHEMO, POCHCT in the last 72 hours.    Coags:   Lab Results   Component Value Date    PROTIME 14.5 06/20/2022    INR 1.3 06/20/2022       HCG (If Applicable): No results found for: PREGTESTUR, PREGSERUM, HCG, HCGQUANT     ABGs: No results found for: PHART, PO2ART, FDD6LTG, IGT6RXS, BEART, V9RWECOV     Type & Screen (If Applicable):  No results found for: LABABO, LABRH    Drug/Infectious Status (If Applicable):  No results found for: HIV, HEPCAB    COVID-19 Screening (If Applicable):   Lab Results   Component Value Date    COVID19 Not Detected 11/17/2020     EKG 11/10/20  Normal sinus rhythm  Normal ECG  When compared with ECG of 16-OCT-2020 07:22,  No significant change was found  Confirmed by Molly Jiménez ([id number]) on 11/10/2020 5:51:21 AM    Chest Xray 11/10/20     FINDINGS:    Heart size is normal. No focal consolidation in the lungs. No pleural    effusion or pneumothorax. Anesthesia Evaluation  Patient summary reviewed and Nursing notes reviewed no history of anesthetic complications:   Airway: Mallampati: II  TM distance: >3 FB   Neck ROM: full  Mouth opening: > = 3 FB   Dental:          Pulmonary: breath sounds clear to auscultation      (-) not a current smoker                           Cardiovascular:  Exercise tolerance: good (>4 METS),   (+) hypertension (Previously reported in medical chart but denies):, hyperlipidemia (Pt on Lipitor but states cholesterol is normal)      ECG reviewed  Rhythm: regular  Rate: normal           Beta Blocker:  Not on Beta Blocker         Neuro/Psych:   (+) neuromuscular disease (Charcot foot due to DM):,             GI/Hepatic/Renal:        (-) no morbid obesity       Endo/Other:    (+) DiabetesType II DM, using insulin, . Abdominal:   (+) obese,           Vascular:   + PVD, aortic or cerebral, . Other Findings:             Anesthesia Plan      MAC     ASA 3       Induction: intravenous.     MIPS: Postoperative opioids intended and Prophylactic antiemetics administered. Anesthetic plan and risks discussed with patient. Use of blood products discussed with patient whom consented to blood products. Plan discussed with CRNA. Delores Bobby DO  Staff Anesthesiologist  5:35 PM     Chart reviewed, patient seen and interviewed. Agree with note above.   OPAL Orozco - CRNA

## 2022-06-21 NOTE — ANESTHESIA POSTPROCEDURE EVALUATION
Department of Anesthesiology  Postprocedure Note    Patient: Maricarmen Sheehan  MRN: [id number]  YOB: 1975  Date of evaluation: 6/21/2022      Procedure Summary     Date: 06/21/22 Room / Location: [address]    Anesthesia Start: 2886 Anesthesia Stop: 1853    Procedure: INCISION AND DRAINAGE WITH DEBRIDEMENT (Right Foot) Diagnosis:       Abscess of right foot      (ABSCESS RIGHT FOOT)    Surgeons: Cristofer Rayo DPM Responsible Provider: Kitty Cardenas DO    Anesthesia Type: MAC ASA Status: 3          Anesthesia Type: No value filed.     Jesenia Phase I:      Jesenia Phase II: Jesenia Score: 10    Last vitals:   Vitals Value Taken Time   /85 06/21/22 1852   Temp 97 °F (36.1 °C) 06/21/22 1852   Pulse 88 06/21/22 1852   Resp 16 06/21/22 1852   SpO2 97 % 06/21/22 1852         Anesthesia Post Evaluation    Patient location during evaluation: PACU  Patient participation: complete - patient participated  Level of consciousness: awake and alert  Airway patency: patent  Nausea & Vomiting: no nausea and no vomiting  Complications: no  Cardiovascular status: hemodynamically stable  Respiratory status: acceptable  Hydration status: euvolemic

## 2022-06-21 NOTE — PROGRESS NOTES
[address] [place], [place] [address]ist   Progress Note    Admitting Date and Time: 6/20/2022 12:20 PM  Admit Dx: Cellulitis [L03.90]  Right foot infection [L08.9]    Subjective:    Patient seen and examined. NPO this am for procedure this afternoon. Denies right foot pain. No overnight events. ROS:   Denies CP, SOB, subjective fever, chills, N/V/D, abdominal pain,  HA. All systems reviewed and negative unless otherwise documented.       amLODIPine  5 mg Oral Daily    atorvastatin  5 mg Oral Nightly    insulin glargine  15 Units SubCUTAneous Nightly    insulin lispro  5 Units SubCUTAneous TID WC    insulin lispro  0-6 Units SubCUTAneous TID WC    insulin lispro  0-3 Units SubCUTAneous Nightly    sodium chloride flush  5-40 mL IntraVENous 2 times per day    enoxaparin  30 mg SubCUTAneous BID     glucose, 4 tablet, PRN  dextrose bolus, 125 mL, PRN   Or  dextrose bolus, 250 mL, PRN  glucagon (rDNA), 1 mg, PRN  dextrose, 100 mL/hr, PRN  sodium chloride flush, 5-40 mL, PRN  sodium chloride, , PRN  ondansetron, 4 mg, Q8H PRN   Or  ondansetron, 4 mg, Q6H PRN  polyethylene glycol, 17 g, Daily PRN  acetaminophen, 650 mg, Q6H PRN   Or  acetaminophen, 650 mg, Q6H PRN  magnesium sulfate, 2,000 mg, PRN  potassium chloride, 40 mEq, PRN   Or  potassium alternative oral replacement, 40 mEq, PRN   Or  potassium chloride, 10 mEq, PRN         Objective:    BP (!) 151/73   Pulse 79   Temp 98.5 °F (36.9 °C) (Oral)   Resp 16   Ht 6' 3\" (1.905 m)   Wt 287 lb 4 oz (130.3 kg)   SpO2 96%   BMI 35.90 kg/m²   General Appearance: alert and oriented to person, place and time and in no acute distress  Skin: warm and dry  Head: normocephalic and atraumatic  Eyes: pupils equal, round, and reactive to light, extraocular eye movements intact, conjunctivae normal  Neck: neck supple and non tender without mass   Pulmonary/Chest: clear to auscultation bilaterally- no wheezes, rales or rhonchi, normal air movement, no respiratory distress  Cardiovascular: normal rate, normal S1 and S2 and no carotid bruits  Abdomen: soft, non-tender, non-distended, normal bowel sounds, no masses or organomegaly  Extremities: no cyanosis, no clubbing and no edema  Neurologic: no cranial nerve deficit and speech normal      Recent Labs     06/20/22  1406 06/21/22  0327    134   K 3.4* 3.8   CL 99 99   CO2 23 23   BUN 10 10   CREATININE 1.1 1.0   GLUCOSE 180* 182*   CALCIUM 8.5* 8.3*       Recent Labs     06/20/22  1406   ALKPHOS 80   PROT 7.8   LABALBU 3.7   BILITOT 0.7   AST 12   ALT 8       Recent Labs     06/20/22  1406 06/21/22  0327   WBC 13.1* 12.8*   RBC 4.60 4.46   HGB 12.1* 11.8*   HCT 38.3 37.0   MCV 83.3 83.0   MCH 26.3 26.5   MCHC 31.6* 31.9*   RDW 13.4 13.5    285   MPV 9.1 9.4         Radiology:   XR FOOT RIGHT (MIN 3 VIEWS)   Final Result   Extensive deformity and sclerotic changes of the foot, as noted above. Marked soft tissue swelling with no evidence of soft tissue gas. No evidence of acute bony abnormalities or hardware complications. XR TIBIA FIBULA RIGHT (2 VIEWS)   Final Result   No acute bony abnormalities or hardware complications. US DUP LOWER EXTREMITY RIGHT JOCELYN   Final Result   No evidence of DVT in the right lower extremity. Right inguinal adenopathy. VL LOWER EXTREMITY ARTERIAL SEGMENTAL PRESSURES W PPG BILATERAL    (Results Pending)     XR TIBIA FIBULA RIGHT (2 VIEWS)    Result Date: 6/20/2022  EXAMINATION: XRAY VIEWS OF THE RIGHT TIBIA AND FIBULA 6/20/2022 1:39 pm COMPARISON: None. HISTORY: ORDERING SYSTEM PROVIDED HISTORY: swelling, drainage TECHNOLOGIST PROVIDED HISTORY: Reason for exam:->swelling, drainage FINDINGS: Metallic hardware noted in the distal tibia extending into the ankle. Post traumatic deformity of the distal fibula noted. There are no acute fractures or hardware complications. No acute bony abnormalities or hardware complications.      XR FOOT RIGHT (MIN 3 VIEWS)    Result Date: 6/20/2022  EXAMINATION: THREE XRAY VIEWS OF THE RIGHT FOOT 6/20/2022 1:39 pm COMPARISON: None. HISTORY: ORDERING SYSTEM PROVIDED HISTORY: swelling, purulent drainage, diabetes, history of previous foot amputationon left  and osteo TECHNOLOGIST PROVIDED HISTORY: Reason for exam:->swelling, purulent drainage, diabetes, history of previous foot amputationon left  and osteo FINDINGS: There is marked deformity with varus angulation and flexion deformity of the toes. 5th ray is nearly absent, likely due to bony erosion or prior surgical resection. There is partially visualized hardware in the distal tibia. Post traumatic deformity of the visualized distal fibula noted. There are no acute fractures or hardware complications. There is marked and diffuse soft tissue swelling. Large calcaneal spur and enthesophyte noted. Extensive deformity and sclerotic changes of the foot, as noted above. Marked soft tissue swelling with no evidence of soft tissue gas. No evidence of acute bony abnormalities or hardware complications. US DUP LOWER EXTREMITY RIGHT JOCELYN    Result Date: 6/20/2022  EXAMINATION: DUPLEX VENOUS ULTRASOUND OF THE RIGHT LOWER EXTREMITY 6/20/2022 1:13 pm TECHNIQUE: Duplex ultrasound using B-mode/gray scaled imaging and Doppler spectral analysis and color flow was obtained of the deep venous structures of the right lower extremity. COMPARISON: None. HISTORY: ORDERING SYSTEM PROVIDED HISTORY: swelling right leg TECHNOLOGIST PROVIDED HISTORY: Reason for exam:->swelling right leg What reading provider will be dictating this exam?->CRC FINDINGS: The visualized veins of the right lower extremity are patent and free of echogenic thrombus. The veins demonstrate good compressibility with normal color flow study and spectral analysis. There are multiple enlarged right inguinal lymph nodes measuring up to 6.2 x 3.2 x 2.4 cm. No evidence of DVT in the right lower extremity.  Right inguinal adenopathy. Assessment:  Principal Problem:    Cellulitis  Active Problems:    Right foot infection    Type 2 diabetes mellitus with skin complication, with long-term current use of insulin (HCC)    Hypokalemia    Charcot foot due to diabetes mellitus ([facility]    Diabetic neuropathy associated with diabetes mellitus due to underlying condition [facility])    Essential hypertension  Resolved Problems:    * No resolved hospital problems. *      Plan:     1.  right foot wound/cellulitis (POA)  -seen by podiatry. For surgical procedure on right foot wound today 06/21/22  -s/p Syme's amputation in November 2020 with subsequent staph aureus bacteremia( MSSA). Foot ulcer grew Pseudomonas and MSSA. -wound cx pending  -consult ID     2. DM2:  -blood sugars stable  -continue home insulin, hold home metformin  -continue  carb control diet, low dose insulin slide scale, hypoglycemia protocol  - A1c 8.0     3. HTN:  -resume home amlodipine     4. Hypokalemia  - currently resolved     5.  Hx charcot foot  -XR right foot with extensive deformity and sclerotic changes of the foot            Electronically signed by OPAL Mercedes - CNP on 6/21/2022 at 8:58 AM

## 2022-06-21 NOTE — BRIEF OP NOTE
Brief Postoperative Note      Patient: Alondra Van  YOB: 1975  MRN: [id number]    Date of Procedure: 6/21/2022    Pre-Op Diagnosis: ABSCESS RIGHT FOOT    Post-Op Diagnosis: Same       Procedure(s):  INCISION AND DRAINAGE WITH DEBRIDEMENT WITH POSSIBLE WOUND VAC APPLICATION RIGHT FOOT (DR. Genna Ford TO FOLLOW)    Surgeon(s):  Yaya Torres DPM    Assistant:  Resident: Angelia Presley DPM    Anesthesia: Monitor Anesthesia Care    Estimated Blood Loss (mL): Minimal    Complications: None    Specimens:   ID Type Source Tests Collected by Time Destination   1 : RIGHT FOOT TISSUE SWAB CULTURE  Tissue Tissue CULTURE, ANAEROBIC, CULTURE, FUNGUS, GRAM STAIN, CULTURE, SURGICAL, CULTURE WITH SMEAR, ACID FAST BACILLIUS Yaya Sanchez DPM 6/21/2022 1833    2 : RIGHT FOOT BONE CULTURE  Bone Bone CULTURE, ANAEROBIC, CULTURE, FUNGUS, GRAM STAIN, CULTURE, SURGICAL, CULTURE WITH SMEAR, ACID FAST BACILLIUS Yaya Sanchez DPM 6/21/2022 1835    A : RIGHT FOOT BONE BIOSPY  Bone Bone SURGICAL PATHOLOGY Yaya Sanchez DPM 6/21/2022 1834        Implants:  * No implants in log *      Drains: * No LDAs found *    Findings: Mild necrosis of tissue. Seropurulent abscess.     Electronically signed by Toyin English DPM on 6/21/2022 at 6:38 PM

## 2022-06-21 NOTE — CARE COORDINATION
Met w/ patient. Explained role of  and plan of care. Lives w/ his wife- 2nd floor apartment- 10 steps to entrance. Has cane, walker x 2, WC, shower chair. Hx [facility] in [place]. Hx [facility] and [facility] acute rehab. PCP is Dr. Sacha Steinberg and pharmacy is [facility]. For OR today I&D w/ possible wound vac application- wound vac forms placed in light chart to be signed by physician if applied. Not currently on abx- ID following. No preference for [address] vs [phone] if needed on discharge.  Will follow Disha Vu, RN case manager

## 2022-06-22 LAB
ANAEROBIC CULTURE: NORMAL
ANION GAP SERPL CALCULATED.3IONS-SCNC: 12 MMOL/L (ref 7–16)
BASOPHILS ABSOLUTE: 0.05 E9/L (ref 0–0.2)
BASOPHILS RELATIVE PERCENT: 0.5 % (ref 0–2)
BUN BLDV-MCNC: 11 MG/DL (ref 6–20)
CALCIUM SERPL-MCNC: 8.4 MG/DL (ref 8.6–10.2)
CHLORIDE BLD-SCNC: 102 MMOL/L (ref 98–107)
CO2: 24 MMOL/L (ref 22–29)
CREAT SERPL-MCNC: 0.9 MG/DL (ref 0.7–1.2)
EOSINOPHILS ABSOLUTE: 0.31 E9/L (ref 0.05–0.5)
EOSINOPHILS RELATIVE PERCENT: 3.1 % (ref 0–6)
GFR AFRICAN AMERICAN: >60
GFR NON-AFRICAN AMERICAN: >60 ML/MIN/1.73
GLUCOSE BLD-MCNC: 165 MG/DL (ref 74–99)
GRAM STAIN ORDERABLE: NORMAL
GRAM STAIN ORDERABLE: NORMAL
HCT VFR BLD CALC: 37.2 % (ref 37–54)
HEMOGLOBIN: 11.8 G/DL (ref 12.5–16.5)
IMMATURE GRANULOCYTES #: 0.1 E9/L
IMMATURE GRANULOCYTES %: 1 % (ref 0–5)
LYMPHOCYTES ABSOLUTE: 1.27 E9/L (ref 1.5–4)
LYMPHOCYTES RELATIVE PERCENT: 12.8 % (ref 20–42)
MCH RBC QN AUTO: 26.6 PG (ref 26–35)
MCHC RBC AUTO-ENTMCNC: 31.7 % (ref 32–34.5)
MCV RBC AUTO: 84 FL (ref 80–99.9)
METER GLUCOSE: 138 MG/DL (ref 74–99)
METER GLUCOSE: 145 MG/DL (ref 74–99)
METER GLUCOSE: 169 MG/DL (ref 74–99)
METER GLUCOSE: 211 MG/DL (ref 74–99)
MONOCYTES ABSOLUTE: 0.8 E9/L (ref 0.1–0.95)
MONOCYTES RELATIVE PERCENT: 8.1 % (ref 2–12)
NEUTROPHILS ABSOLUTE: 7.37 E9/L (ref 1.8–7.3)
NEUTROPHILS RELATIVE PERCENT: 74.5 % (ref 43–80)
PDW BLD-RTO: 13.5 FL (ref 11.5–15)
PLATELET # BLD: 303 E9/L (ref 130–450)
PMV BLD AUTO: 9.5 FL (ref 7–12)
POTASSIUM REFLEX MAGNESIUM: 3.9 MMOL/L (ref 3.5–5)
RBC # BLD: 4.43 E12/L (ref 3.8–5.8)
SODIUM BLD-SCNC: 138 MMOL/L (ref 132–146)
WBC # BLD: 9.9 E9/L (ref 4.5–11.5)

## 2022-06-22 PROCEDURE — 99232 SBSQ HOSP IP/OBS MODERATE 35: CPT | Performed by: STUDENT IN AN ORGANIZED HEALTH CARE EDUCATION/TRAINING PROGRAM

## 2022-06-22 PROCEDURE — 6360000002 HC RX W HCPCS: Performed by: SPECIALIST

## 2022-06-22 PROCEDURE — 36415 COLL VENOUS BLD VENIPUNCTURE: CPT

## 2022-06-22 PROCEDURE — 2580000003 HC RX 258

## 2022-06-22 PROCEDURE — 1200000000 HC SEMI PRIVATE

## 2022-06-22 PROCEDURE — 6370000000 HC RX 637 (ALT 250 FOR IP): Performed by: NURSE PRACTITIONER

## 2022-06-22 PROCEDURE — 82962 GLUCOSE BLOOD TEST: CPT

## 2022-06-22 PROCEDURE — APPSS30 APP SPLIT SHARED TIME 16-30 MINUTES: Performed by: NURSE PRACTITIONER

## 2022-06-22 PROCEDURE — 85025 COMPLETE CBC W/AUTO DIFF WBC: CPT

## 2022-06-22 PROCEDURE — 6370000000 HC RX 637 (ALT 250 FOR IP): Performed by: SPECIALIST

## 2022-06-22 PROCEDURE — 6360000002 HC RX W HCPCS: Performed by: NURSE PRACTITIONER

## 2022-06-22 PROCEDURE — 80048 BASIC METABOLIC PNL TOTAL CA: CPT

## 2022-06-22 RX ADMIN — AMLODIPINE BESYLATE 5 MG: 5 TABLET ORAL at 09:00

## 2022-06-22 RX ADMIN — Medication 2000 MG: at 20:40

## 2022-06-22 RX ADMIN — INSULIN LISPRO 2 UNITS: 100 INJECTION, SOLUTION INTRAVENOUS; SUBCUTANEOUS at 11:39

## 2022-06-22 RX ADMIN — ATORVASTATIN CALCIUM 5 MG: 10 TABLET, FILM COATED ORAL at 20:40

## 2022-06-22 RX ADMIN — ENOXAPARIN SODIUM 30 MG: 100 INJECTION SUBCUTANEOUS at 20:40

## 2022-06-22 RX ADMIN — INSULIN LISPRO 5 UNITS: 100 INJECTION, SOLUTION INTRAVENOUS; SUBCUTANEOUS at 11:39

## 2022-06-22 RX ADMIN — Medication 2000 MG: at 06:03

## 2022-06-22 RX ADMIN — Medication 10 ML: at 20:40

## 2022-06-22 RX ADMIN — Medication 10 ML: at 09:00

## 2022-06-22 RX ADMIN — LEVOFLOXACIN 750 MG: 500 TABLET, FILM COATED ORAL at 14:34

## 2022-06-22 RX ADMIN — INSULIN LISPRO 5 UNITS: 100 INJECTION, SOLUTION INTRAVENOUS; SUBCUTANEOUS at 16:39

## 2022-06-22 RX ADMIN — ENOXAPARIN SODIUM 30 MG: 100 INJECTION SUBCUTANEOUS at 09:00

## 2022-06-22 RX ADMIN — INSULIN GLARGINE 15 UNITS: 100 INJECTION, SOLUTION SUBCUTANEOUS at 20:41

## 2022-06-22 RX ADMIN — Medication 2000 MG: at 14:35

## 2022-06-22 RX ADMIN — INSULIN LISPRO 1 UNITS: 100 INJECTION, SOLUTION INTRAVENOUS; SUBCUTANEOUS at 16:38

## 2022-06-22 RX ADMIN — INSULIN LISPRO 1 UNITS: 100 INJECTION, SOLUTION INTRAVENOUS; SUBCUTANEOUS at 20:41

## 2022-06-22 ASSESSMENT — PAIN SCALES - GENERAL
PAINLEVEL_OUTOF10: 0
PAINLEVEL_OUTOF10: 0

## 2022-06-22 NOTE — PROGRESS NOTES
[address] [place], [place] [address]ist   Progress Note    Admitting Date and Time: 6/20/2022 12:20 PM  Admit Dx: Cellulitis [L03.90]  Right foot infection [L08.9]    Subjective:    Patient seen and examined. Dressing changed to right foot per podiatry resident this am. Patient denies pain. Is interested in home care  to how wife how to change dressing at discharge. ROS:   Denies CP, SOB, subjective fever, chills, N/V/D, abdominal pain,  HA. All systems reviewed and negative unless otherwise documented.       ceFAZolin  2,000 mg IntraVENous Q8H    levoFLOXacin  750 mg Oral Daily    sodium chloride flush  5-40 mL IntraVENous 2 times per day    amLODIPine  5 mg Oral Daily    atorvastatin  5 mg Oral Nightly    insulin glargine  15 Units SubCUTAneous Nightly    insulin lispro  5 Units SubCUTAneous TID WC    insulin lispro  0-6 Units SubCUTAneous TID WC    insulin lispro  0-3 Units SubCUTAneous Nightly    sodium chloride flush  5-40 mL IntraVENous 2 times per day    enoxaparin  30 mg SubCUTAneous BID     sodium chloride flush, 5-40 mL, PRN  sodium chloride, , PRN  glucose, 4 tablet, PRN  dextrose bolus, 125 mL, PRN   Or  dextrose bolus, 250 mL, PRN  glucagon (rDNA), 1 mg, PRN  dextrose, 100 mL/hr, PRN  sodium chloride flush, 5-40 mL, PRN  sodium chloride, , PRN  ondansetron, 4 mg, Q8H PRN   Or  ondansetron, 4 mg, Q6H PRN  polyethylene glycol, 17 g, Daily PRN  acetaminophen, 650 mg, Q6H PRN   Or  acetaminophen, 650 mg, Q6H PRN  magnesium sulfate, 2,000 mg, PRN  potassium chloride, 40 mEq, PRN   Or  potassium alternative oral replacement, 40 mEq, PRN   Or  potassium chloride, 10 mEq, PRN         Objective:    /82   Pulse 82   Temp 98.6 °F (37 °C) (Oral)   Resp 16   Ht 6' 3\" (1.905 m)   Wt (!) 360 lb 12.8 oz (163.7 kg)   SpO2 94%   BMI 45.10 kg/m²   General Appearance: alert and oriented to person, place and time and in no acute distress  Skin: warm and dry  Head: normocephalic and atraumatic  Eyes: pupils equal, round, and reactive to light, extraocular eye movements intact, conjunctivae normal  Neck: neck supple and non tender without mass   Pulmonary/Chest: clear to auscultation bilaterally- no wheezes, rales or rhonchi, normal air movement, no respiratory distress  Cardiovascular: normal rate, normal S1 and S2 and no carotid bruits  Abdomen: soft, non-tender, non-distended, normal bowel sounds, no masses or organomegaly  Extremities: no cyanosis, no clubbing and no edema. Left foot amputation, right foot bulky ace dressing  Neurologic: no cranial nerve deficit and speech normal      Recent Labs     06/20/22  1406 06/21/22  0327 06/22/22  0245    134 138   K 3.4* 3.8 3.9   CL 99 99 102   CO2 23 23 24   BUN 10 10 11   CREATININE 1.1 1.0 0.9   GLUCOSE 180* 182* 165*   CALCIUM 8.5* 8.3* 8.4*       Recent Labs     06/20/22  1406   ALKPHOS 80   PROT 7.8   LABALBU 3.7   BILITOT 0.7   AST 12   ALT 8       Recent Labs     06/20/22  1406 06/21/22  0327 06/22/22  0245   WBC 13.1* 12.8* 9.9   RBC 4.60 4.46 4.43   HGB 12.1* 11.8* 11.8*   HCT 38.3 37.0 37.2   MCV 83.3 83.0 84.0   MCH 26.3 26.5 26.6   MCHC 31.6* 31.9* 31.7*   RDW 13.4 13.5 13.5    285 303   MPV 9.1 9.4 9.5         Radiology:   VL LOWER EXTREMITY ARTERIAL SEGMENTAL PRESSURES W PPG BILATERAL   Final Result   No significant peripheral artery disease identified. XR FOOT RIGHT (MIN 3 VIEWS)   Final Result   Extensive deformity and sclerotic changes of the foot, as noted above. Marked soft tissue swelling with no evidence of soft tissue gas. No evidence of acute bony abnormalities or hardware complications. XR TIBIA FIBULA RIGHT (2 VIEWS)   Final Result   No acute bony abnormalities or hardware complications. US DUP LOWER EXTREMITY RIGHT JOCELYN   Final Result   No evidence of DVT in the right lower extremity. Right inguinal adenopathy.            XR TIBIA FIBULA RIGHT (2 VIEWS)    Result Date: 6/20/2022  EXAMINATION: XRAY VIEWS OF THE RIGHT TIBIA AND FIBULA 6/20/2022 1:39 pm COMPARISON: None. HISTORY: ORDERING SYSTEM PROVIDED HISTORY: swelling, drainage TECHNOLOGIST PROVIDED HISTORY: Reason for exam:->swelling, drainage FINDINGS: Metallic hardware noted in the distal tibia extending into the ankle. Post traumatic deformity of the distal fibula noted. There are no acute fractures or hardware complications. No acute bony abnormalities or hardware complications. XR FOOT RIGHT (MIN 3 VIEWS)    Result Date: 6/20/2022  EXAMINATION: THREE XRAY VIEWS OF THE RIGHT FOOT 6/20/2022 1:39 pm COMPARISON: None. HISTORY: ORDERING SYSTEM PROVIDED HISTORY: swelling, purulent drainage, diabetes, history of previous foot amputationon left  and osteo TECHNOLOGIST PROVIDED HISTORY: Reason for exam:->swelling, purulent drainage, diabetes, history of previous foot amputationon left  and osteo FINDINGS: There is marked deformity with varus angulation and flexion deformity of the toes. 5th ray is nearly absent, likely due to bony erosion or prior surgical resection. There is partially visualized hardware in the distal tibia. Post traumatic deformity of the visualized distal fibula noted. There are no acute fractures or hardware complications. There is marked and diffuse soft tissue swelling. Large calcaneal spur and enthesophyte noted. Extensive deformity and sclerotic changes of the foot, as noted above. Marked soft tissue swelling with no evidence of soft tissue gas. No evidence of acute bony abnormalities or hardware complications. US DUP LOWER EXTREMITY RIGHT JOCELYN    Result Date: 6/20/2022  EXAMINATION: DUPLEX VENOUS ULTRASOUND OF THE RIGHT LOWER EXTREMITY 6/20/2022 1:13 pm TECHNIQUE: Duplex ultrasound using B-mode/gray scaled imaging and Doppler spectral analysis and color flow was obtained of the deep venous structures of the right lower extremity. COMPARISON: None.  HISTORY: ORDERING SYSTEM PROVIDED HISTORY: swelling right leg TECHNOLOGIST PROVIDED HISTORY: Reason for exam:->swelling right leg What reading provider will be dictating this exam?->CRC FINDINGS: The visualized veins of the right lower extremity are patent and free of echogenic thrombus. The veins demonstrate good compressibility with normal color flow study and spectral analysis. There are multiple enlarged right inguinal lymph nodes measuring up to 6.2 x 3.2 x 2.4 cm. No evidence of DVT in the right lower extremity. Right inguinal adenopathy. Assessment:  Principal Problem:    Cellulitis  Active Problems:    Right foot infection    Type 2 diabetes mellitus with skin complication, with long-term current use of insulin (formerly [facility])    Hypokalemia    Charcot foot due to diabetes mellitus ([facility]    Diabetic neuropathy associated with diabetes mellitus due to underlying condition [facility])    Essential hypertension  Resolved Problems:    * No resolved hospital problems. *      Plan:     1.  right foot abcess/cellulitis (POA)  -s/p  I&D right foot with Dr. 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 Foot ulcer grew Pseudomonas and MSSA. -wound cx pending  -now on Levaquin and cefazolin per ID     2. DM2:  -blood sugars stable  -continue home insulin, hold home metformin  -continue  carb control diet, low dose insulin slide scale, hypoglycemia protocol  - A1c 8.0     3. HTN:  - reasonably controlled on home amlodipine     4. Hypokalemia  - currently resolved     5.  Hx charcot foot  -XR right foot with extensive deformity and sclerotic changes of the foot            Electronically signed by OPAL Birmingham - CNP on 6/22/2022 at 8:30 AM

## 2022-06-22 NOTE — PROGRESS NOTES
[address] [place] [address] [facility]  Progress Note    SUBJECTIVE:  Chief Complaint   Patient presents with    Wound Check     wound check, wound on R foot since Fri. hx of DM. Patient is tolerating medications. No reported adverse drug reactions. No nausea, vomiting, diarrhea. Denies pain to the right foot. No fevers or chills. Review of systems:  As stated above in the chief complaint, otherwise negative. Medications:  Scheduled Meds:   ceFAZolin  2,000 mg IntraVENous Q8H    levoFLOXacin  750 mg Oral Daily    sodium chloride flush  5-40 mL IntraVENous 2 times per day    amLODIPine  5 mg Oral Daily    atorvastatin  5 mg Oral Nightly    insulin glargine  15 Units SubCUTAneous Nightly    insulin lispro  5 Units SubCUTAneous TID WC    insulin lispro  0-6 Units SubCUTAneous TID WC    insulin lispro  0-3 Units SubCUTAneous Nightly    sodium chloride flush  5-40 mL IntraVENous 2 times per day    enoxaparin  30 mg SubCUTAneous BID     Continuous Infusions:   sodium chloride      dextrose      sodium chloride       PRN Meds:sodium chloride flush, sodium chloride, glucose, dextrose bolus **OR** dextrose bolus, glucagon (rDNA), dextrose, sodium chloride flush, sodium chloride, ondansetron **OR** ondansetron, polyethylene glycol, acetaminophen **OR** acetaminophen, magnesium sulfate, potassium chloride **OR** potassium alternative oral replacement **OR** potassium chloride    OBJECTIVE:  /82   Pulse 82   Temp 98.6 °F (37 °C) (Oral)   Resp 16   Ht 6' 3\" (1.905 m)   Wt (!) 360 lb 12.8 oz (163.7 kg)   SpO2 94%   BMI 45.10 kg/m²   Temp  Av °F (36.7 °C)  Min: 97 °F (36.1 °C)  Max: 98.6 °F (37 °C)  Constitutional: The patient is awake, alert, and oriented. In no distress. Skin: Warm and dry. No rashes were noted. HEENT: Round and reactive pupils. Moist mucous membranes. No ulcerations or thrush. Neck: Supple to movements.    Chest: No use of accessory muscles to breathe. Symmetrical expansion. No wheezing, crackles or rhonchi. Cardiovascular: S1 and S2 are rhythmic and regular. No murmurs appreciated. Abdomen: Positive bowel sounds to auscultation. Benign to palpation. No masses felt. Extremities: right foot wrapped in a dry dressing post op, exposed toes are warm. Left stump with a dressing.   Lines: peripheral      Laboratory and Tests Review:  Lab Results   Component Value Date    WBC 9.9 06/22/2022    WBC 12.8 (H) 06/21/2022    WBC 13.1 (H) 06/20/2022    HGB 11.8 (L) 06/22/2022    HCT 37.2 06/22/2022    MCV 84.0 06/22/2022     06/22/2022     Lab Results   Component Value Date    NEUTROABS 7.37 (H) 06/22/2022    NEUTROABS 10.39 (H) 06/21/2022    NEUTROABS 11.53 (H) 06/20/2022     No results found for: CRPHS  Lab Results   Component Value Date    ALT 8 06/20/2022    AST 12 06/20/2022    ALKPHOS 80 06/20/2022    BILITOT 0.7 06/20/2022     Lab Results   Component Value Date     06/22/2022    K 3.9 06/22/2022     06/22/2022    CO2 24 06/22/2022    BUN 11 06/22/2022    CREATININE 0.9 06/22/2022    CREATININE 1.0 06/21/2022    CREATININE 1.1 06/20/2022    GFRAA >60 06/22/2022    LABGLOM >60 06/22/2022    GLUCOSE 165 06/22/2022    PROT 7.8 06/20/2022    LABALBU 3.7 06/20/2022    CALCIUM 8.4 06/22/2022    BILITOT 0.7 06/20/2022    ALKPHOS 80 06/20/2022    AST 12 06/20/2022    ALT 8 06/20/2022     Lab Results   Component Value Date    CRP 23.4 (H) 06/21/2022    CRP 0.8 (H) 12/07/2020    CRP 1.3 (H) 11/30/2020     Lab Results   Component Value Date    SEDRATE 55 (H) 06/21/2022    SEDRATE 54 (H) 11/30/2020    SEDRATE 58 (H) 11/23/2020     Radiology:  Reviewed     Microbiology:   Blood cultures 6/20/2022: negative so far  Wound culture: mixed GPO  Surgical cultures: pending g/s showing few GPC in pairs and GPR  Bone cultures: g/s no organisms     ASSESSMENT:  · Diabetic foot ulcer  · Diabetic foot infection right foot  · Leukocytosis associated to the above, improved  · Status post I&D right foot abscess through the subcutaneous tissue & bone biopsy 6/21/2022    PLAN:  · Continue Cefazolin and oral Levofloxacin   · Check final & intraoperative cultures  · Labs reviewed - CRP 23.4, ESR 55,     OPAL Perez - CNP  10:39 AM  6/22/2022     Patient seen and examined. I had a face to face encounter with the patient. Agree with exam.  Assessment and plan as outlined above and directed by me. Addition and corrections were done as deemed appropriate. My exam and plan include: Patient is doing well and tolerating antibiotics. He had a bone biopsy. Still has retained hardware in the ankle. Continue current treatment. Check bone cultures and pathology.     Luba Rojas MD  6/22/2022  1:47 PM

## 2022-06-22 NOTE — OP NOTE
[address] [address]                                OPERATIVE REPORT    PATIENT NAME: Jud Richard                  :        1975  MED REC NO:   [id number]                            ROOM:       [id number]  ACCOUNT NO:   [de-identified]                           ADMIT DATE: 2022  PROVIDER:     Ryder Pettit DPM    DATE OF PROCEDURE:  2022    SURGEON:  Ryder Pettit DPM.    ASSISTANTS:  1.  Mariah Sawant. 2.  Mart Garcia. PREOPERATIVE DIAGNOSES:  1. Cellulitis with abscess, right foot. 2.  Ulceration, right midfoot with fat exposure, infected. 3.  Questionable acute osteomyelitis, right foot. 4.  Insulin-dependent diabetes mellitus with neuropathy. POSTOPERATIVE DIAGNOSES:  1. Cellulitis with abscess, right foot. 2.  Ulceration, right midfoot with fat exposure, infected. 3.  Questionable acute osteomyelitis, right foot. 4.  Insulin-dependent diabetes mellitus with neuropathy. PROCEDURE PERFORMED:  1. Incision and drainage, right foot abscess. 2.  Bone biopsy, right midfoot area. 3.  Excisional wound debridement to and through level of the  subcutaneous tissue. Measurement is 4 cm x 7.5 cm x 0.2 cm in  dimension. EBL: Minimal    ANESTHESIA:  Monitored anesthesia care. INJECTABLES:  20 mL of 0.5% Marcaine plain. SPECIMEN OBTAINED:  Right foot abscess and right midfoot bone for  pathological and microbiological examination. INTRAOPERATIVE FINDINGS:  Mild necrosis of soft tissue. Seropurulent  abscess noted. INDICATIONS:  This pleasant 41-year-old male presented today for right  foot debridement and incision and drainage. I counseled the patient on  the nature of the problem, proposed course of procedure, potential  benefits, risks, complications, and convalescences in detail. All  questions were answered to his apparent satisfaction.   No guarantees  were given as to the outcome of the procedure. DESCRIPTION OF PROCEDURE:  Under mild sedation, the patient was brought  to the operating room and placed on the operating table in the supine  position. The right lower extremity was scrubbed, prepped, and draped  in the usual sterile fashion. At this time, using a #10 blade, I  performed sharp excisional debridement of the wound to and through level  of the subcutaneous tissue. All bleeders were ligated as appropriate. Using a #10-blade, I performed a linear incision on the plantar aspect  of the right foot, approximately 2 cm in length. I opened up the  compartment and the plantar lateral incision were opened up _____ the  plantar compartment and the lateral compartment through that incision. I was able to evacuate approximately 1 mL of the seropurulent abscess  from that area. I could not appreciate any further tracking or  purulence _____ from those areas as well. I irrigated the areas using  copious amounts of normal saline. Post irrigation, using a Jamshidi  needle, I performed the bone biopsy. Bone was sent off for pathological  and microbiological examination. I applied 3-0 nylon suture for  tension. Postoperative bandages were then applied. The patient tolerated the procedure and anesthesia well in apparent  satisfactory condition with vital signs stable and vascular status  intact to the right lower extremity. The patient was then transferred  to the PACU for further monitoring prior to readmission to the nursing  floor.         Macarena Almeida DPM    D: 06/21/2022 18:42:37       T: 06/22/2022 3:22:47     [email or web address]  Job#: [id number]     Doc#: [id number]    CC:

## 2022-06-22 NOTE — PLAN OF CARE
Problem: Skin/Tissue Integrity  Goal: Absence of new skin breakdown  Description: 1. Monitor for areas of redness and/or skin breakdown  2. Assess vascular access sites hourly  3. Every 4-6 hours minimum:  Change oxygen saturation probe site  4. Every 4-6 hours:  If on nasal continuous positive airway pressure, respiratory therapy assess nares and determine need for appliance change or resting period.   Outcome: Progressing     Problem: Pain  Goal: Verbalizes/displays adequate comfort level or baseline comfort level  Outcome: Progressing     Problem: Safety - Adult  Goal: Free from fall injury  Outcome: Progressing

## 2022-06-22 NOTE — PROGRESS NOTES
Podiatry Progress Note  6/22/2022   Chandler Pilgrim Leschak       HISTORY OF PRESENT ILLNESS: Pt is a diabetic 49 y.o. male seen bedside s/p right foot incision and drainage, bone biopsy, and excisional wound debridement DOS 6/21. Pt denies any current NVFC or foot pain. Pt has no other pedal questions or complaints at this time. Past Medical History:   Diagnosis Date    Charcot foot [facility] to [facility] mellitus [facility]     Diabetes mellitus ([facility]     Diabetic foot ulcer with osteomyelitis ([facility] 11/11/2020    Hypertension         Past Surgical History:   Procedure Laterality Date    ANKLE FUSION  02/2020    FOOT AMPUTATION Left 11/14/2020    LEFT FOOT SYMES AMPUTATION performed by Abiola Ruiz DPM at [address] [place] [address] Left 10/17/2020    LEFT FOOT  INCISION AND DRAINAGE, BONE DEBRIDEMENT AND BIOPSY performed by Wood Avery DPM at [address] [place] [address] Left 11/11/2020    LEFT FOOT INCISION AND DRAINAGE APPLICATION OF WOUND VAC POSSIBLE INTEGRA GRAFT performed by Abiola Ruiz DPM at [address] [place] [address] Left 12/2/2020    LEFT FOOT WOUND DEBRIDEMENT, INCISION AND DRAINAGE OF LEFT FOOT HEMATOMA, INTEGRA, WOUND VAC performed by Yaya Castrejon DPM at [address] [place] [address] Right 6/21/2022    INCISION AND DRAINAGE WITH DEBRIDEMENT performed by Abiola Ruiz DPM at [place] [address] PICC LINE INSERTION NURSE  11/18/2020              Family History   Problem Relation Age of Onset    Diabetes Father     Alcohol Abuse Father         Social History     Tobacco Use    Smoking status: Never Smoker    Smokeless tobacco: Never Used   Substance Use Topics    Alcohol use: Not Currently     Comment: 3 times a year maybe. very rare        Prior to Admission medications    Medication Sig Start Date End Date Taking?  Authorizing Provider   amLODIPine (NORVASC) 5 MG tablet Take 1 tablet by mouth daily 12/8/20   Yrn Solano MD   insulin lispro (HUMALOG) 100 UNIT/ML injection vial Inject 5 Units into the skin 3 times daily (with meals)  Patient not taking: Reported on 6/20/2022 12/8/20   Armando Britton MD   enoxaparin (LOVENOX) 40 MG/0.4ML injection Inject 0.4 mLs into the skin daily  Patient not taking: Reported on 6/20/2022 11/20/20   Ingrid [place] MD   glucose (GLUTOSE) 40 % GEL Take 37.5 mLs by mouth as needed (Hypoglycemia) 11/19/20   Ingrid [place] MD   insulin glargine (LANTUS) 100 UNIT/ML injection vial Inject 15 Units into the skin nightly    Historical Provider, MD   atorvastatin (LIPITOR) 10 MG tablet Take 5 mg by mouth nightly     Historical Provider, MD   metFORMIN (GLUCOPHAGE) 500 MG tablet Take 500 mg by mouth daily (with breakfast)    Historical Provider, MD        Vancomycin and Semaglutide         OBJECTIVE:        Vitals:    06/22/22 0803   BP: 135/82   Pulse: 82   Resp: 16   Temp: 98.6 °F (37 °C)   SpO2: 94%              EXAM:        Pt is AAOx3, NAD  RLE focused exam:  Vascular Exam:  DP and PT pulses are faintly palpable on the right. CFT is < 3 seconds RLE. Edema is present to RLE. Skin temp warm to warm from tibial tuberosity to digits on right side. Neuro Exam:  Light touch and protective sensation diminished. Dermatologic Exam:  RLE: Full thickness ulcer measuring roughly 1x1x0.5 cm present to the lateral ankle. No probe to bone. Wound bed is granular. No drainage or crepitus felt. No malodor. Surrounding erythema improving. Right plantar lateral foot has a roughly 2 cm linear incision with sutures intact and skin well coapted. Surrounding area of sloughing skin and hyperkeratotic skin. No probe to bone. Wound bed is mostly granular. No probe to bone. No malodor. Slight fluctuance. Hyperkeratotic rim. No purulence noted. LLE: Full thickness ulcer measuring roughly 3x0.3x0.3 cm present to lateral stump. Wound bed healthy granular appearance. No malodor, crepitus, drainage appreciated.     Clinical pictures below:    MSK: MMT deferred at this time. LLE symes amputation. RLE TTC fusion with IM nail.                        Current Facility-Administered Medications   Medication Dose Route Frequency Provider Last Rate Last Admin    ceFAZolin (ANCEF) 2000 mg in sterile water 20 mL IV syringe  2,000 mg IntraVENous Q8H Lydia Ruiz MD   2,000 mg at 06/22/22 0603    levoFLOXacin (LEVAQUIN) tablet 750 mg  750 mg Oral Daily Lydia Ruiz MD   750 mg at 06/21/22 2102    sodium chloride flush 0.9 % injection 5-40 mL  5-40 mL IntraVENous 2 times per day Jose Sermon, DPM   10 mL at 06/22/22 0900    sodium chloride flush 0.9 % injection 5-40 mL  5-40 mL IntraVENous PRN Jose Sermon, DPM        0.9 % sodium chloride infusion   IntraVENous PRN Jose Sermon, DPM        amLODIPine (NORVASC) tablet 5 mg  5 mg Oral Daily Imtiza Good, APRN - CNP   5 mg at 06/22/22 0900    atorvastatin (LIPITOR) tablet 5 mg  5 mg Oral Nightly Imtiaz Good, APRN - CNP   5 mg at 06/21/22 2102    insulin glargine (LANTUS) injection vial 15 Units  15 Units SubCUTAneous Nightly Imtiaz Good, APRN - CNP   15 Units at 06/21/22 2107    insulin lispro (HUMALOG) injection vial 5 Units  5 Units SubCUTAneous TID WC Imtiaz Good, APRN - CNP        insulin lispro (HUMALOG) injection vial 0-6 Units  0-6 Units SubCUTAneous TID WC Imtiaz Good, APRN - CNP        insulin lispro (HUMALOG) injection vial 0-3 Units  0-3 Units SubCUTAneous Nightly Imtiaz Good, APRN - CNP   2 Units at 06/21/22 2107    glucose chewable tablet 16 g  4 tablet Oral PRN Imtiaz Good, APRN - CNP        dextrose bolus 10% 125 mL  125 mL IntraVENous PRN Imtiaz Good, APRN - CNP        Or    dextrose bolus 10% 250 mL  250 mL IntraVENous PRN Imtiaz Good, APRN - CNP        glucagon (rDNA) injection 1 mg  1 mg IntraMUSCular PRN Imtiaz Good, APRN - CNP        dextrose 5 % solution  100 mL/hr IntraVENous PRN Imtiaz Good, APRN - CNP        sodium chloride flush 0.9 % injection 5-40 mL  5-40 mL IntraVENous 2 times per day OPAL Gonzalez - CNP   10 mL at 06/21/22 2103    sodium chloride flush 0.9 % injection 5-40 mL  5-40 mL IntraVENous PRN OPAL Gonzalez - CNP        0.9 % sodium chloride infusion   IntraVENous PRN OPAL Gonzalez - YUDI        enoxaparin Sodium (LOVENOX) injection 30 mg  30 mg SubCUTAneous BID OPAL Gonzalez - CNP   30 mg at 06/22/22 0900    ondansetron (ZOFRAN-ODT) disintegrating tablet 4 mg  4 mg Oral Q8H PRN OPAL Gonzalez CNP        Or    ondansetron [facility]) injection 4 mg  4 mg IntraVENous Q6H PRN Drew Nugent, OPAL - YUDI        polyethylene glycol (GLYCOLAX) packet 17 g  17 g Oral Daily PRN OPAL Gonzalez - CNP        acetaminophen (TYLENOL) tablet 650 mg  650 mg Oral Q6H PRN OPAL Gonzalez - CNP        Or    acetaminophen (TYLENOL) suppository 650 mg  650 mg Rectal Q6H PRN OPAL Gonzalez - CNP        magnesium sulfate 2000 mg in 50 mL IVPB premix  2,000 mg IntraVENous PRN OPAL Gonzalez - CNP        potassium chloride (KLOR-CON M) extended release tablet 40 mEq  40 mEq Oral PRN OPAL Gonzalez - CNP        Or    potassium bicarb-citric acid (EFFER-K) effervescent tablet 40 mEq  40 mEq Oral PRN OPAL Gonzalez - CNP        Or    potassium chloride 10 mEq/100 mL IVPB (Peripheral Line)  10 mEq IntraVENous PRN Drew Nugent, OPAL - CNP            Lab Results   Component Value Date    WBC 9.9 06/22/2022    HCT 37.2 06/22/2022    HGB 11.8 (L) 06/22/2022     06/22/2022     06/22/2022    K 3.9 06/22/2022     06/22/2022    CO2 24 06/22/2022    BUN 11 06/22/2022    CREATININE 0.9 06/22/2022    GLUCOSE 165 (H) 06/22/2022    CRP 23.4 (H) 06/21/2022         Radiographs:    ASSESSMENT:  s/p right foot incision and drainage, bone biopsy, and excisional wound debridement DOS 6/21  Full thickness ulcers to R and L foot and R ankle, POA  IDDM with neuropathy  RLE cellulitis  RLE edema  Possible abscess R foot  Leukocytosis  Charcot Neuropathy  History of previous amputations       PLAN:  -Patient examined and evaluated at bedside  -All pertinent labs, charts, and imaging reviewed prior to encounter  -WBC: 9.9, trending down  -R foot and tib fib x rays: Marked soft tissue swelling with no evidence of soft tissue gas. No evidence of acute bony abnormalities or hardware complications. There is marked deformity with varus angulation and flexion deformity of the toes. 5th ray is nearly absent, likely due to bony erosion or prior surgical resection.   -Wound cultures prelim: mixed gram positive organisms  -No evidence of DVT to RLE  -Arterial study- No significant peripheral artery disease identified.   -First post op dressing change with xeroform and DSD  -Will continue to follow pt  -D/W:   Rell Johnson DPM FACFAS  Fellowship-Trained Foot and Ankle Surgeon  Diplomate, American Board of Foot and Ankle Surgeons  [phone]       Thank you for involving podiatry in this patients care.  Please do not hesitate to call with any questions or concerns       Mingo Harris DPM  PGY1 Podiatry Resident   6/22/2022   9:23 AM

## 2022-06-22 NOTE — CARE COORDINATION
Met with patient at bedside. He acknowledges need for post discharge dressing changes and potential IV atb. Discussed HHC and choices offered. Patient selected [facility] and referral called to agency, spoke with [facility]. Will await her review and response regarding availability/acceptance. Will follow for final orders form podiatry and ID as well  Julee Segundo.  Ozzy, MSN, RN  [facility] Case Management  [phone]

## 2022-06-23 LAB
ANION GAP SERPL CALCULATED.3IONS-SCNC: 11 MMOL/L (ref 7–16)
BASOPHILS ABSOLUTE: 0.06 E9/L (ref 0–0.2)
BASOPHILS RELATIVE PERCENT: 0.7 % (ref 0–2)
BUN BLDV-MCNC: 14 MG/DL (ref 6–20)
CALCIUM SERPL-MCNC: 8.8 MG/DL (ref 8.6–10.2)
CHLORIDE BLD-SCNC: 101 MMOL/L (ref 98–107)
CO2: 25 MMOL/L (ref 22–29)
CREAT SERPL-MCNC: 1 MG/DL (ref 0.7–1.2)
EOSINOPHILS ABSOLUTE: 0.52 E9/L (ref 0.05–0.5)
EOSINOPHILS RELATIVE PERCENT: 6.3 % (ref 0–6)
GFR AFRICAN AMERICAN: >60
GFR NON-AFRICAN AMERICAN: >60 ML/MIN/1.73
GLUCOSE BLD-MCNC: 178 MG/DL (ref 74–99)
GRAM STAIN RESULT: ABNORMAL
HCT VFR BLD CALC: 39 % (ref 37–54)
HEMOGLOBIN: 11.9 G/DL (ref 12.5–16.5)
IMMATURE GRANULOCYTES #: 0.1 E9/L
IMMATURE GRANULOCYTES %: 1.2 % (ref 0–5)
LYMPHOCYTES ABSOLUTE: 1.77 E9/L (ref 1.5–4)
LYMPHOCYTES RELATIVE PERCENT: 21.3 % (ref 20–42)
MCH RBC QN AUTO: 25.9 PG (ref 26–35)
MCHC RBC AUTO-ENTMCNC: 30.5 % (ref 32–34.5)
MCV RBC AUTO: 85 FL (ref 80–99.9)
METER GLUCOSE: 146 MG/DL (ref 74–99)
METER GLUCOSE: 175 MG/DL (ref 74–99)
METER GLUCOSE: 175 MG/DL (ref 74–99)
METER GLUCOSE: 233 MG/DL (ref 74–99)
MONOCYTES ABSOLUTE: 0.62 E9/L (ref 0.1–0.95)
MONOCYTES RELATIVE PERCENT: 7.5 % (ref 2–12)
NEUTROPHILS ABSOLUTE: 5.25 E9/L (ref 1.8–7.3)
NEUTROPHILS RELATIVE PERCENT: 63 % (ref 43–80)
ORGANISM: ABNORMAL
PDW BLD-RTO: 13.6 FL (ref 11.5–15)
PLATELET # BLD: 366 E9/L (ref 130–450)
PMV BLD AUTO: 9.6 FL (ref 7–12)
POTASSIUM REFLEX MAGNESIUM: 4.1 MMOL/L (ref 3.5–5)
RBC # BLD: 4.59 E12/L (ref 3.8–5.8)
SODIUM BLD-SCNC: 137 MMOL/L (ref 132–146)
URINE CULTURE, ROUTINE: NORMAL
WBC # BLD: 8.3 E9/L (ref 4.5–11.5)
WOUND/ABSCESS: ABNORMAL
WOUND/ABSCESS: ABNORMAL

## 2022-06-23 PROCEDURE — 6370000000 HC RX 637 (ALT 250 FOR IP): Performed by: NURSE PRACTITIONER

## 2022-06-23 PROCEDURE — 85025 COMPLETE CBC W/AUTO DIFF WBC: CPT

## 2022-06-23 PROCEDURE — 1200000000 HC SEMI PRIVATE

## 2022-06-23 PROCEDURE — 36415 COLL VENOUS BLD VENIPUNCTURE: CPT

## 2022-06-23 PROCEDURE — 6360000002 HC RX W HCPCS: Performed by: SPECIALIST

## 2022-06-23 PROCEDURE — 6360000002 HC RX W HCPCS: Performed by: NURSE PRACTITIONER

## 2022-06-23 PROCEDURE — 80048 BASIC METABOLIC PNL TOTAL CA: CPT

## 2022-06-23 PROCEDURE — 82962 GLUCOSE BLOOD TEST: CPT

## 2022-06-23 PROCEDURE — 2580000003 HC RX 258

## 2022-06-23 PROCEDURE — 99232 SBSQ HOSP IP/OBS MODERATE 35: CPT | Performed by: STUDENT IN AN ORGANIZED HEALTH CARE EDUCATION/TRAINING PROGRAM

## 2022-06-23 RX ORDER — CEPHALEXIN 500 MG/1
500 CAPSULE ORAL EVERY 6 HOURS SCHEDULED
Status: DISCONTINUED | OUTPATIENT
Start: 2022-06-23 | End: 2022-06-24

## 2022-06-23 RX ORDER — ENOXAPARIN SODIUM 100 MG/ML
40 INJECTION SUBCUTANEOUS 2 TIMES DAILY
Status: DISCONTINUED | OUTPATIENT
Start: 2022-06-23 | End: 2022-06-24 | Stop reason: HOSPADM

## 2022-06-23 RX ORDER — CEPHALEXIN 500 MG/1
500 CAPSULE ORAL 4 TIMES DAILY
Qty: 56 CAPSULE | Refills: 0 | Status: SHIPPED | OUTPATIENT
Start: 2022-06-23 | End: 2022-06-24 | Stop reason: HOSPADM

## 2022-06-23 RX ADMIN — CEPHALEXIN 500 MG: 500 CAPSULE ORAL at 13:34

## 2022-06-23 RX ADMIN — ENOXAPARIN SODIUM 40 MG: 100 INJECTION SUBCUTANEOUS at 08:36

## 2022-06-23 RX ADMIN — INSULIN LISPRO 5 UNITS: 100 INJECTION, SOLUTION INTRAVENOUS; SUBCUTANEOUS at 16:29

## 2022-06-23 RX ADMIN — Medication 2000 MG: at 06:10

## 2022-06-23 RX ADMIN — Medication 10 ML: at 08:36

## 2022-06-23 RX ADMIN — INSULIN LISPRO 1 UNITS: 100 INJECTION, SOLUTION INTRAVENOUS; SUBCUTANEOUS at 06:10

## 2022-06-23 RX ADMIN — ENOXAPARIN SODIUM 40 MG: 100 INJECTION SUBCUTANEOUS at 21:11

## 2022-06-23 RX ADMIN — INSULIN LISPRO 5 UNITS: 100 INJECTION, SOLUTION INTRAVENOUS; SUBCUTANEOUS at 11:14

## 2022-06-23 RX ADMIN — CEPHALEXIN 500 MG: 500 CAPSULE ORAL at 17:59

## 2022-06-23 RX ADMIN — INSULIN LISPRO 1 UNITS: 100 INJECTION, SOLUTION INTRAVENOUS; SUBCUTANEOUS at 16:30

## 2022-06-23 RX ADMIN — INSULIN GLARGINE 15 UNITS: 100 INJECTION, SOLUTION SUBCUTANEOUS at 21:12

## 2022-06-23 RX ADMIN — Medication 10 ML: at 21:12

## 2022-06-23 RX ADMIN — INSULIN LISPRO 1 UNITS: 100 INJECTION, SOLUTION INTRAVENOUS; SUBCUTANEOUS at 21:11

## 2022-06-23 RX ADMIN — AMLODIPINE BESYLATE 5 MG: 5 TABLET ORAL at 08:36

## 2022-06-23 RX ADMIN — ATORVASTATIN CALCIUM 5 MG: 10 TABLET, FILM COATED ORAL at 21:09

## 2022-06-23 RX ADMIN — INSULIN LISPRO 2 UNITS: 100 INJECTION, SOLUTION INTRAVENOUS; SUBCUTANEOUS at 11:14

## 2022-06-23 RX ADMIN — INSULIN LISPRO 5 UNITS: 100 INJECTION, SOLUTION INTRAVENOUS; SUBCUTANEOUS at 06:14

## 2022-06-23 NOTE — CARE COORDINATION
Chart reviewed, ID notes acknowledged, HHC orders noted. Spoke with Desire at [facility] - patient is on schedule for initiation of [address] 6/25/22 for QOD dressing changes, med compliance, pt eval.  Ar Perez.  Ozzy, MSN, RN  [facility] Case Management  [phone]

## 2022-06-23 NOTE — PROGRESS NOTES
[facility] Progress Note    Admitting Date and Time: 6/20/2022 12:20 PM  Admit Dx: Cellulitis [L03.90]  Right foot infection [L08.9]    Subjective:  Patient is being followed for Cellulitis [L03.90]  Right foot infection [L08.9]   Pt tolerating medications, wants to go home, reluctant to go home on IV abxs, prefers po. Per RN: No major concerns. ROS: denies fever, chills, cp, sob, n/v, HA unless stated above.       enoxaparin  40 mg SubCUTAneous BID    cephALEXin  500 mg Oral 4 times per day    sodium chloride flush  5-40 mL IntraVENous 2 times per day    amLODIPine  5 mg Oral Daily    atorvastatin  5 mg Oral Nightly    insulin glargine  15 Units SubCUTAneous Nightly    insulin lispro  5 Units SubCUTAneous TID WC    insulin lispro  0-6 Units SubCUTAneous TID WC    insulin lispro  0-3 Units SubCUTAneous Nightly     sodium chloride flush, 5-40 mL, PRN  sodium chloride, , PRN  glucose, 4 tablet, PRN  dextrose bolus, 125 mL, PRN   Or  dextrose bolus, 250 mL, PRN  glucagon (rDNA), 1 mg, PRN  dextrose, 100 mL/hr, PRN  ondansetron, 4 mg, Q8H PRN   Or  ondansetron, 4 mg, Q6H PRN  polyethylene glycol, 17 g, Daily PRN  acetaminophen, 650 mg, Q6H PRN   Or  acetaminophen, 650 mg, Q6H PRN  magnesium sulfate, 2,000 mg, PRN  potassium chloride, 40 mEq, PRN   Or  potassium alternative oral replacement, 40 mEq, PRN   Or  potassium chloride, 10 mEq, PRN         Objective:    /74   Pulse 74   Temp 98.6 °F (37 °C) (Oral)   Resp 18   Ht 6' 3\" (1.905 m)   Wt (!) 356 lb 11.2 oz (161.8 kg)   SpO2 98%   BMI 44.58 kg/m²     General Appearance: alert and oriented to person, place and time and in no acute distress, Obese  Skin: warm and dry  Head: normocephalic and atraumatic  Eyes: pupils equal, round, and reactive to light, extraocular eye movements intact, conjunctivae normal  Neck: neck supple and non tender without mass   Pulmonary/Chest: clear to auscultation bilaterally- no wheezes, rales or rhonchi, normal air movement, no respiratory distress  Cardiovascular: normal rate, normal S1 and S2 and no carotid bruits  Abdomen: soft, non-tender, non-distended, normal bowel sounds, no masses or organomegaly  Extremities: no cyanosis, no clubbing and no edema, rt LL erythematous, warm to touch, edematous, pulses palpable, left LL s/p amputation foot. Neurologic: no cranial nerve deficit and speech normal        Recent Labs     06/21/22 0327 06/22/22 0245 06/23/22  0403    138 137   K 3.8 3.9 4.1   CL 99 102 101   CO2 23 24 25   BUN 10 11 14   CREATININE 1.0 0.9 1.0   GLUCOSE 182* 165* 178*   CALCIUM 8.3* 8.4* 8.8       Recent Labs     06/21/22 0327 06/22/22 0245 06/23/22  0403   WBC 12.8* 9.9 8.3   RBC 4.46 4.43 4.59   HGB 11.8* 11.8* 11.9*   HCT 37.0 37.2 39.0   MCV 83.0 84.0 85.0   MCH 26.5 26.6 25.9*   MCHC 31.9* 31.7* 30.5*   RDW 13.5 13.5 13.6    303 366   MPV 9.4 9.5 9.6       Micro:  No components found for: [facility])    Radiology:   No results found. Assessment:    Principal Problem:    Cellulitis  Active Problems:    Right foot infection    Type 2 diabetes mellitus with skin complication, with long-term current use of insulin ([facility])    Hypokalemia    Charcot foot due to diabetes mellitus ([facility] [address]    Diabetic neuropathy associated with diabetes mellitus due to underlying condition [facility])    Essential hypertension  Resolved Problems:    * No resolved hospital problems. *      Plan:  1. RT foot Cellulitis - in the settings of charcot foot, s/p amputation left foot, HX of MSSA, ? Questionable allergic to vancomycin, poor controlled diabetic - podiatry and ID on board, f/u cxs. S/P  I&d 6/22 per podiatry. Possible dc soon on po vs IV abxs per ID pending cxs/ path result. 2. HTN   3. Dm2 - ISS with hypoglycemia protocol.     DVT prophylaxis - Lovenox      NOTE: This report was transcribed using voice recognition software.  Every effort was made to ensure accuracy; however, inadvertent computerized transcription errors may be present.   Electronically signed by Nanci Abdalla MD on 6/23/2022 at 5:29 PM

## 2022-06-23 NOTE — PROGRESS NOTES
Spoke with pt about discharge plans, he discussed with Dr. Xavi Corbin about awaiting pathology results to determine need for IV vs oral antibiotics. Pt agreeable to stay tonight and plan at this time.

## 2022-06-23 NOTE — PROGRESS NOTES
This patient is on medication that requires renal, weight, and/or indication dose adjustment. Date Body Weight IBW  Adjusted BW SCr  CrCl Dialysis status   6/23/2022 (!) 356 lb 11.2 oz (161.8 kg)   Ideal body weight: 84.5 kg (186 lb 4.6 oz)  Adjusted ideal body weight: 115.4 kg (254 lb 7.3 oz) Serum creatinine: 1 mg/dL 06/23/22 0403  Estimated creatinine clearance: 147 mL/min N/a       Pharmacy has dose-adjusted the following medication(s):    Date Previous Order Adjusted Order   6/23/2022 Enoxaparin 30 mg twice daily Enoxaparin 40 mg twice daily       These changes were made per protocol according to the [address] for Pharmacists. *Please note this dose may need readjusted if patient's condition changes. Please contact pharmacy with any questions regarding these changes.     alice zacarias, [facility]  6/23/2022  6:38 AM

## 2022-06-23 NOTE — PROGRESS NOTES
[address] [place] [address] [facility]  Progress Note    SUBJECTIVE:  Chief Complaint   Patient presents with    Wound Check     wound check, wound on R foot since Fri. hx of DM. Patient is tolerating medications. No reported adverse drug reactions. No nausea, vomiting, diarrhea. Denies pain to the right foot. No fevers or chills. Review of systems:  As stated above in the chief complaint, otherwise negative. Medications:  Scheduled Meds:   enoxaparin  40 mg SubCUTAneous BID    ceFAZolin  2,000 mg IntraVENous Q8H    levoFLOXacin  750 mg Oral Daily    sodium chloride flush  5-40 mL IntraVENous 2 times per day    amLODIPine  5 mg Oral Daily    atorvastatin  5 mg Oral Nightly    insulin glargine  15 Units SubCUTAneous Nightly    insulin lispro  5 Units SubCUTAneous TID WC    insulin lispro  0-6 Units SubCUTAneous TID WC    insulin lispro  0-3 Units SubCUTAneous Nightly    sodium chloride flush  5-40 mL IntraVENous 2 times per day     Continuous Infusions:   sodium chloride      dextrose      sodium chloride       PRN Meds:sodium chloride flush, sodium chloride, glucose, dextrose bolus **OR** dextrose bolus, glucagon (rDNA), dextrose, sodium chloride flush, sodium chloride, ondansetron **OR** ondansetron, polyethylene glycol, acetaminophen **OR** acetaminophen, magnesium sulfate, potassium chloride **OR** potassium alternative oral replacement **OR** potassium chloride    OBJECTIVE:  /74   Pulse 74   Temp 98.6 °F (37 °C) (Oral)   Resp 18   Ht 6' 3\" (1.905 m)   Wt (!) 356 lb 11.2 oz (161.8 kg)   SpO2 98%   BMI 44.58 kg/m²   Temp  Av.3 °F (36.8 °C)  Min: 98 °F (36.7 °C)  Max: 98.6 °F (37 °C)  Constitutional: The patient is awake, alert, and oriented. In no distress. Skin: Warm and dry. No rashes were noted. HEENT: Round and reactive pupils. Moist mucous membranes. No ulcerations or thrush. Neck: Supple to movements.    Chest: No use of accessory muscles ulcer  · Diabetic foot infection right foot  · Leukocytosis associated to the above, improved  · Status post I&D right foot abscess through the subcutaneous tissue & bone biopsy 6/21/2022    PLAN:  · stop Cefazolin and oral Levofloxacin. Start cephalexin for discharge   · Check final & intraoperative cultures  · Labs reviewed -  · baseline CRP 23.4, ESR 55,     OPAL Malloy - CNP  11:19 AM  6/23/2022     Patient seen and examined. I had a face to face encounter with the patient. Agree with exam.  Assessment and plan as outlined above and directed by me. Addition and corrections were done as deemed appropriate. My exam and plan include: The patient is tolerating oral Levofloxacin and Cephalexin. Bone cultures, however, are positive for MSSA and Streptococcus. The pathology comes back positive he will need IV antibiotics for no less than 6 weeks.     Deven Wade MD  6/23/2022  2:38 PM

## 2022-06-23 NOTE — PROGRESS NOTES
Podiatry Progress Note  6/23/2022   Corwin Ask Leschak       HISTORY OF PRESENT ILLNESS: Pt is a diabetic 49 y.o. male seen bedside s/p right foot incision and drainage, bone biopsy, and excisional wound debridement DOS 6/21. Pt denies any current NVFC or foot pain. Pt has no other pedal questions or complaints at this time. Pt inquiring when he can go home. Past Medical History:   Diagnosis Date    Charcot foot due to diabetes mellitus ([facility]     Diabetes mellitus ([facility]     Diabetic foot ulcer with osteomyelitis ([facility] 11/11/2020    Hypertension         Past Surgical History:   Procedure Laterality Date    ANKLE FUSION  02/2020    FOOT AMPUTATION Left 11/14/2020    LEFT FOOT SYMES AMPUTATION performed by Eleni Herzog DPM at [address] [place] [address] Left 10/17/2020    LEFT FOOT  INCISION AND DRAINAGE, BONE DEBRIDEMENT AND BIOPSY performed by Cristian Staley DPM at [address] [place] [address] Left 11/11/2020    LEFT FOOT INCISION AND DRAINAGE APPLICATION OF WOUND VAC POSSIBLE INTEGRA GRAFT performed by Eleni Herzog DPM at [address] [place] [address] Left 12/2/2020    LEFT FOOT WOUND DEBRIDEMENT, INCISION AND DRAINAGE OF LEFT FOOT HEMATOMA, INTEGRA, WOUND VAC performed by Yaya Heredia DPM at [address] [place] [address] Right 6/21/2022    INCISION AND DRAINAGE WITH DEBRIDEMENT performed by lEeni Herzog DPM at [place] [address] PICC LINE INSERTION NURSE  11/18/2020              Family History   Problem Relation Age of Onset    Diabetes Father     Alcohol Abuse Father         Social History     Tobacco Use    Smoking status: Never Smoker    Smokeless tobacco: Never Used   Substance Use Topics    Alcohol use: Not Currently     Comment: 3 times a year maybe. very rare        Prior to Admission medications    Medication Sig Start Date End Date Taking?  Authorizing Provider   amLODIPine (NORVASC) 5 MG tablet Take 1 tablet by mouth daily 12/8/20   Kaelyn Wells MD   insulin lispro (HUMALOG) 100 UNIT/ML injection vial Inject 5 Units into the skin 3 times daily (with meals)  Patient not taking: Reported on 6/20/2022 12/8/20   Rock Shelton MD   enoxaparin (LOVENOX) 40 MG/0.4ML injection Inject 0.4 mLs into the skin daily  Patient not taking: Reported on 6/20/2022 11/20/20   Dexter Thomas MD   glucose (GLUTOSE) 40 % GEL Take 37.5 mLs by mouth as needed (Hypoglycemia) 11/19/20   Dexter Thomas MD   insulin glargine (LANTUS) 100 UNIT/ML injection vial Inject 15 Units into the skin nightly    Historical Provider, MD   atorvastatin (LIPITOR) 10 MG tablet Take 5 mg by mouth nightly     Historical Provider, MD   metFORMIN (GLUCOPHAGE) 500 MG tablet Take 500 mg by mouth daily (with breakfast)    Historical Provider, MD        Vancomycin and Semaglutide         OBJECTIVE:        Vitals:    06/23/22 0830   BP: 130/74   Pulse: 74   Resp: 18   Temp: 98.6 °F (37 °C)   SpO2: 98%              EXAM:        Pt is AAOx3, NAD  Dressings remain CDI  Previous exam findings:   RLE focused exam:  Vascular Exam:  DP and PT pulses are faintly palpable on the right. CFT is < 3 seconds RLE. Edema is present to RLE. Skin temp warm to warm from tibial tuberosity to digits on right side. Neuro Exam:  Light touch and protective sensation diminished. Dermatologic Exam:  RLE: Full thickness ulcer measuring roughly 1x1x0.5 cm present to the lateral ankle. No probe to bone. Wound bed is granular. No drainage or crepitus felt. No malodor. Surrounding erythema improving. Right plantar lateral foot has a roughly 2 cm linear incision with sutures intact and skin well coapted. Surrounding area of sloughing skin and hyperkeratotic skin. No probe to bone. Wound bed is mostly granular. No probe to bone. No malodor. Slight fluctuance. Hyperkeratotic rim. No purulence noted. LLE: Full thickness ulcer measuring roughly 3x0.3x0.3 cm present to lateral stump. Wound bed healthy granular appearance.  No malodor, crepitus, drainage appreciated. Clinical pictures below:    MSK: MMT deferred at this time. LLE symes amputation. RLE TTC fusion with IM nail.                        Current Facility-Administered Medications   Medication Dose Route Frequency Provider Last Rate Last Admin    enoxaparin (LOVENOX) injection 40 mg  40 mg SubCUTAneous BID Drew Nugent, APRN - CNP   40 mg at 06/23/22 0836    ceFAZolin (ANCEF) 2000 mg in sterile water 20 mL IV syringe  2,000 mg IntraVENous Q8H Deven Wade MD   2,000 mg at 06/23/22 0610    levoFLOXacin (LEVAQUIN) tablet 750 mg  750 mg Oral Daily Deven Wade MD   750 mg at 06/22/22 1434    sodium chloride flush 0.9 % injection 5-40 mL  5-40 mL IntraVENous 2 times per day Sueellen Forts, DPM   10 mL at 06/23/22 0836    sodium chloride flush 0.9 % injection 5-40 mL  5-40 mL IntraVENous PRN Sueellen Forts, DPM        0.9 % sodium chloride infusion   IntraVENous PRN Sueellen Forts, DPM        amLODIPine (NORVASC) tablet 5 mg  5 mg Oral Daily Drew Nugent, APRN - CNP   5 mg at 06/23/22 0836    atorvastatin (LIPITOR) tablet 5 mg  5 mg Oral Nightly Drew Nugent, APRN - CNP   5 mg at 06/22/22 2040    insulin glargine (LANTUS) injection vial 15 Units  15 Units SubCUTAneous Nightly Drew Nugent, APRN - CNP   15 Units at 06/22/22 2041    insulin lispro (HUMALOG) injection vial 5 Units  5 Units SubCUTAneous TID  Drew Nugent, APRN - CNP   5 Units at 06/23/22 1114    insulin lispro (HUMALOG) injection vial 0-6 Units  0-6 Units SubCUTAneous TID  Drew Nugent, APRN - CNP   2 Units at 06/23/22 1114    insulin lispro (HUMALOG) injection vial 0-3 Units  0-3 Units SubCUTAneous Nightly Drew Nugent, APRN - CNP   1 Units at 06/22/22 2041    glucose chewable tablet 16 g  4 tablet Oral PRN Drew Nugent, APRN - CNP        dextrose bolus 10% 125 mL  125 mL IntraVENous PRN OPAL Gonzalez CNP        Or    dextrose bolus 10% 250 mL  250 mL IntraVENous PRN Penne Vila, APRN - CNP        glucagon (rDNA) injection 1 mg  1 mg IntraMUSCular PRN Karmen Reison, APRN - CNP        dextrose 5 % solution  100 mL/hr IntraVENous PRN Penfrank Reison, APRN - CNP        sodium chloride flush 0.9 % injection 5-40 mL  5-40 mL IntraVENous 2 times per day Karmen Vila, APRN - CNP   10 mL at 06/21/22 2103    sodium chloride flush 0.9 % injection 5-40 mL  5-40 mL IntraVENous PRN Karmen Reison, APRN - CNP        0.9 % sodium chloride infusion   IntraVENous PRN Karmen Vila, APRN - CNP        ondansetron (ZOFRAN-ODT) disintegrating tablet 4 mg  4 mg Oral Q8H PRN Karmen Vila, APRN - CNP        Or    ondansetron [facility]) injection 4 mg  4 mg IntraVENous Q6H PRN Penfrank Reison, APRN - CNP        polyethylene glycol (GLYCOLAX) packet 17 g  17 g Oral Daily PRN Karmen Vila, APRN - CNP        acetaminophen (TYLENOL) tablet 650 mg  650 mg Oral Q6H PRN Karmen Reison, APRN - CNP        Or    acetaminophen (TYLENOL) suppository 650 mg  650 mg Rectal Q6H PRN Karmen Vila, APRN - CNP        magnesium sulfate 2000 mg in 50 mL IVPB premix  2,000 mg IntraVENous PRN Penne Vila, APRN - CNP        potassium chloride (KLOR-CON M) extended release tablet 40 mEq  40 mEq Oral PRN Penne Vila, APRN - CNP        Or    potassium bicarb-citric acid (EFFER-K) effervescent tablet 40 mEq  40 mEq Oral PRN Penne Vila, APRN - CNP        Or    potassium chloride 10 mEq/100 mL IVPB (Peripheral Line)  10 mEq IntraVENous PRN Penne Vila, APRN - CNP            Lab Results   Component Value Date    WBC 8.3 06/23/2022    HCT 39.0 06/23/2022    HGB 11.9 (L) 06/23/2022     06/23/2022     06/23/2022    K 4.1 06/23/2022     06/23/2022    CO2 25 06/23/2022    BUN 14 06/23/2022    CREATININE 1.0 06/23/2022    GLUCOSE 178 (H) 06/23/2022    CRP 23.4 (H) 06/21/2022         Radiographs:    ASSESSMENT:  s/p right foot incision and drainage, bone biopsy, and excisional wound debridement DOS 6/21  Full thickness ulcers to R and L foot and R ankle, POA  IDDM with neuropathy  RLE cellulitis  RLE edema  Possible abscess R foot  Leukocytosis  Charcot Neuropathy  History of previous amputations       PLAN:  -Patient examined and evaluated at bedside  -All pertinent labs, charts, and imaging reviewed prior to encounter  -WBC: 8.3, trending down  -R foot and tib fib x rays: Marked soft tissue swelling with no evidence of soft tissue gas. No evidence of acute bony abnormalities or hardware complications. There is marked deformity with varus angulation and flexion deformity of the toes. 5th ray is nearly absent, likely due to bony erosion or prior surgical resection.   -Wound cultures : Staph aureus,  mixed constance  -Surgical cultures pending  -No evidence of DVT to RLE  -Arterial study- No significant peripheral artery disease identified. -QOD dressing changes w/ xeroform and DSD  -pt is clear for DC from podiatry perspective pending clearance from all other providers  -Will continue to follow pt  -D/W:   Reid Sicard, DPM FACFAS  Fellowship-Trained Foot and Ankle Surgeon  Diplomate, [employer] Board of Foot and Ankle Surgeons  [phone]       Thank you for involving podiatry in this patients care.  Please do not hesitate to call with any questions or concerns       Dennys Vieyra DPM  PGY1 Podiatry Resident   6/23/2022   12:05 PM

## 2022-06-24 VITALS
RESPIRATION RATE: 18 BRPM | OXYGEN SATURATION: 96 % | HEIGHT: 75 IN | SYSTOLIC BLOOD PRESSURE: 134 MMHG | BODY MASS INDEX: 39.17 KG/M2 | TEMPERATURE: 97.7 F | HEART RATE: 71 BPM | DIASTOLIC BLOOD PRESSURE: 64 MMHG | WEIGHT: 315 LBS

## 2022-06-24 LAB
ANAEROBIC CULTURE: NORMAL
ANAEROBIC CULTURE: NORMAL
CULTURE SURGICAL: ABNORMAL
METER GLUCOSE: 133 MG/DL (ref 74–99)
METER GLUCOSE: 179 MG/DL (ref 74–99)
ORGANISM: ABNORMAL

## 2022-06-24 PROCEDURE — 6360000002 HC RX W HCPCS: Performed by: NURSE PRACTITIONER

## 2022-06-24 PROCEDURE — 6370000000 HC RX 637 (ALT 250 FOR IP): Performed by: NURSE PRACTITIONER

## 2022-06-24 PROCEDURE — 99239 HOSP IP/OBS DSCHRG MGMT >30: CPT | Performed by: INTERNAL MEDICINE

## 2022-06-24 PROCEDURE — 6370000000 HC RX 637 (ALT 250 FOR IP): Performed by: REGISTERED NURSE

## 2022-06-24 PROCEDURE — 82962 GLUCOSE BLOOD TEST: CPT

## 2022-06-24 PROCEDURE — 2580000003 HC RX 258

## 2022-06-24 RX ORDER — AMOXICILLIN AND CLAVULANATE POTASSIUM 600; 42.9 MG/5ML; MG/5ML
1800 POWDER, FOR SUSPENSION ORAL 2 TIMES DAILY
Status: DISCONTINUED | OUTPATIENT
Start: 2022-06-24 | End: 2022-06-24 | Stop reason: HOSPADM

## 2022-06-24 RX ORDER — AMOXICILLIN AND CLAVULANATE POTASSIUM 600; 42.9 MG/5ML; MG/5ML
1800 POWDER, FOR SUSPENSION ORAL EVERY 12 HOURS SCHEDULED
Qty: 840 ML | Refills: 0 | Status: SHIPPED | OUTPATIENT
Start: 2022-06-24 | End: 2022-07-22

## 2022-06-24 RX ADMIN — AMLODIPINE BESYLATE 5 MG: 5 TABLET ORAL at 08:30

## 2022-06-24 RX ADMIN — ENOXAPARIN SODIUM 40 MG: 100 INJECTION SUBCUTANEOUS at 08:29

## 2022-06-24 RX ADMIN — CEPHALEXIN 500 MG: 500 CAPSULE ORAL at 06:44

## 2022-06-24 RX ADMIN — INSULIN LISPRO 1 UNITS: 100 INJECTION, SOLUTION INTRAVENOUS; SUBCUTANEOUS at 11:17

## 2022-06-24 RX ADMIN — CEPHALEXIN 500 MG: 500 CAPSULE ORAL at 00:28

## 2022-06-24 RX ADMIN — INSULIN LISPRO 5 UNITS: 100 INJECTION, SOLUTION INTRAVENOUS; SUBCUTANEOUS at 07:13

## 2022-06-24 RX ADMIN — CEPHALEXIN 500 MG: 500 CAPSULE ORAL at 11:16

## 2022-06-24 RX ADMIN — INSULIN LISPRO 5 UNITS: 100 INJECTION, SOLUTION INTRAVENOUS; SUBCUTANEOUS at 11:16

## 2022-06-24 RX ADMIN — Medication 10 ML: at 08:30

## 2022-06-24 RX ADMIN — AMOXICILLIN AND CLAVULANATE POTASSIUM 1800 MG: 600; 42.9 POWDER, FOR SUSPENSION ORAL at 12:57

## 2022-06-24 NOTE — PROGRESS NOTES
[address] [place] [address] [facility]  JOHNNA  Progress Note    SUBJECTIVE:  Chief Complaint   Patient presents with    Wound Check     wound check, wound on R foot since Fri. hx of DM. Patient is tolerating medications. No reported adverse drug reactions. No nausea, vomiting, diarrhea. Feels that pain in the foot and leg is much improved  Hoping to be discharged     Review of systems:  As stated above in the chief complaint, otherwise negative. Medications:  Scheduled Meds:   enoxaparin  40 mg SubCUTAneous BID    cephALEXin  500 mg Oral 4 times per day    sodium chloride flush  5-40 mL IntraVENous 2 times per day    amLODIPine  5 mg Oral Daily    atorvastatin  5 mg Oral Nightly    insulin glargine  15 Units SubCUTAneous Nightly    insulin lispro  5 Units SubCUTAneous TID WC    insulin lispro  0-6 Units SubCUTAneous TID WC    insulin lispro  0-3 Units SubCUTAneous Nightly     Continuous Infusions:   sodium chloride      dextrose       PRN Meds:sodium chloride flush, sodium chloride, glucose, dextrose bolus **OR** dextrose bolus, glucagon (rDNA), dextrose, ondansetron **OR** ondansetron, polyethylene glycol, acetaminophen **OR** acetaminophen, magnesium sulfate, potassium chloride **OR** potassium alternative oral replacement **OR** potassium chloride    OBJECTIVE:  /64   Pulse 71   Temp 97.7 °F (36.5 °C) (Oral)   Resp 18   Ht 6' 3\" (1.905 m)   Wt (!) 356 lb (161.5 kg)   SpO2 96%   BMI 44.50 kg/m²   Temp  Av.9 °F (36.6 °C)  Min: 97.7 °F (36.5 °C)  Max: 98 °F (36.7 °C)  Constitutional: The patient is awake, alert, and oriented. In no distress. Skin: Warm and dry. No rashes were noted. HEENT: Round and reactive pupils. Moist mucous membranes. No ulcerations or thrush. Neck: Supple to movements. Chest: No use of accessory muscles to breathe. Symmetrical expansion. No wheezing, crackles or rhonchi. Cardiovascular: S1 and S2 are rhythmic and regular.  No murmurs appreciated. Abdomen: Positive bowel sounds to auscultation. Benign to palpation. No masses felt. Extremities: right foot wrapped in a dry dressing post op, exposed toes are warm. Left stump with a dressing. Edema is improving   Lines: peripheral      Laboratory and Tests Review:  Lab Results   Component Value Date    WBC 8.3 06/23/2022    WBC 9.9 06/22/2022    WBC 12.8 (H) 06/21/2022    HGB 11.9 (L) 06/23/2022    HCT 39.0 06/23/2022    MCV 85.0 06/23/2022     06/23/2022     Lab Results   Component Value Date    NEUTROABS 5.25 06/23/2022    NEUTROABS 7.37 (H) 06/22/2022    NEUTROABS 10.39 (H) 06/21/2022     No results found for: [facility]  Lab Results   Component Value Date    ALT 8 06/20/2022    AST 12 06/20/2022    ALKPHOS 80 06/20/2022    BILITOT 0.7 06/20/2022     Lab Results   Component Value Date     06/23/2022    K 4.1 06/23/2022     06/23/2022    CO2 25 06/23/2022    BUN 14 06/23/2022    CREATININE 1.0 06/23/2022    CREATININE 0.9 06/22/2022    CREATININE 1.0 06/21/2022    GFRAA >60 06/23/2022    LABGLOM >60 06/23/2022    GLUCOSE 178 06/23/2022    PROT 7.8 06/20/2022    LABALBU 3.7 06/20/2022    CALCIUM 8.8 06/23/2022    BILITOT 0.7 06/20/2022    ALKPHOS 80 06/20/2022    AST 12 06/20/2022    ALT 8 06/20/2022     Lab Results   Component Value Date    CRP 23.4 (H) 06/21/2022    CRP 0.8 (H) 12/07/2020    CRP 1.3 (H) 11/30/2020     Lab Results   Component Value Date    SEDRATE 55 (H) 06/21/2022    SEDRATE 54 (H) 11/30/2020    SEDRATE 58 (H) 11/23/2020     Radiology:  Reviewed     Microbiology:   Blood cultures 6/20/2022: negative so far  Wound culture: alpha heme strep, group c strep, corynebacterium, MSSA  Surgical tissue cultures: Mixed GPO, Proteus species  Bone cultures: g/s no organisms.   Culture: Staphylococcus aureus, group C Streptococcus  Surgical pathology bone -- negative for osteomyelitis     ASSESSMENT:  · Diabetic foot ulcer  · Diabetic foot infection right foot  · Leukocytosis associated to the above, improved  · Status post I&D right foot abscess through the subcutaneous tissue & bone biopsy 6/21/2022    PLAN:  · Change Cephalexin to Augmentin ES  · Check final & intraoperative cultures -- pathology is negative for osteomyelitis   · Labs reviewed  · Ok to discharge from ID standpoint on oral antibiotics     OPAL Frey - CNP  11:09 AM  6/24/2022     Patient seen and examined. I had a face to face encounter with the patient. Agree with exam.  Assessment and plan as outlined above and directed by me. Addition and corrections were done as deemed appropriate. My exam and plan include: Bone pathology was negative for osteomyelitis. We will treat him for a minimum of 4 weeks of Augmentin ES 1800 mg p.o. twice daily. Instructed to call the office and make a follow-up appointment.     Shyam Rivas MD  6/24/2022  12:03 PM

## 2022-06-24 NOTE — DISCHARGE SUMMARY
[address] [facility] [place] Discharge Summary       [address]  [address] [id number] [phone]          Activity level: As tolerated    Diet: ADULT DIET; Regular; 4 carb choices (60 gm/meal)    Labs:    Dispo: Home with home health care    Condition at discharge: Stable    Continue supplemental oxygen via nasal canula @ 2 LPM round-the-clock. Continue CPAP / BiPAP during sleep as prior to admission. Patient ID:  Leslie Mckay  [id number]  49 y.o.  1975    Admit date: 6/20/2022    Discharge date and time:  6/24/2022  11:04 AM    Admission Diagnoses: Principal Problem:    Cellulitis  Active Problems:    Right foot infection    Type 2 diabetes mellitus with skin complication, with long-term current use of insulin ([facility])    Hypokalemia    Charcot foot due to diabetes mellitus ([facility]    Diabetic neuropathy associated with diabetes mellitus due to underlying condition [facility])    Essential hypertension  Resolved Problems:    * No resolved hospital problems. *      Discharge Diagnoses: Principal Problem:    Cellulitis  Active Problems:    Right foot infection    Type 2 diabetes mellitus with skin complication, with long-term current use of insulin ([facility])    Hypokalemia    Charcot foot due to diabetes mellitus ([facility]    Diabetic neuropathy associated with diabetes mellitus due to underlying condition [facility])    Essential hypertension  Resolved Problems:    * No resolved hospital problems. *      Consults:  IP CONSULT TO PODIATRY  IP CONSULT TO INFECTIOUS DISEASES  IP CONSULT TO HOME CARE NEEDS    Procedures: Incision and drainage as well as debridement right foot    Hospital Course: Patient was admitted with Cellulitis [L03.90]  Right foot infection [L08.9].  Patient  Patient came to ED with right foot swelling, per admitting physician impression of cellulitis, patient with Charcot foot and is s/p amputation of left foot, poorly controlled diabetes, ID evaluated on 21st, patient with prior Pseudomonas infection as well as MSSA. Patient remained on oral levofloxacin as well as cefazolin. Podiatry did incision and drainage with debridement right foot. Antibiotics were changed to cephalexin on 23rd for possible discharge. Patient okay for DC as of 4/23 from podiatry also. Per case management home health care is scheduled starting on 25th for every other day dressing changes. Surgical cultures from 21st showing evidence of staph aureus, beta strep, Corynebacterium, staphylococci as well as strep both pansensitive. As of now patient is awake, alert, resting, comfortable, does communicate well, proper control of diabetes was stressed which he understands and does plan to follow-up with endocrine, also some level of exercise was also stressed if he cannot do his lower extremity off upper extremities, patient is okay with DC plans we will continue DC with home health care.     Discharge Exam:  Vitals:    06/23/22 0830 06/23/22 2100 06/24/22 0504 06/24/22 0815   BP: 130/74 124/74  134/64   Pulse: 74 74  71   Resp: 18 18  18   Temp: 98.6 °F (37 °C) 98 °F (36.7 °C)  97.7 °F (36.5 °C)   TempSrc: Oral Oral  Oral   SpO2: 98% 95%  96%   Weight:   (!) 356 lb (161.5 kg)    Height:           General Appearance: alert and oriented to person, place and time, well-developed and well-nourished, in no acute distress  Skin: warm and dry, no rash or erythema  Head: normocephalic and atraumatic  Eyes: pupils equal, round, and reactive to light, extraocular eye movements intact, conjunctivae normal  ENT: tympanic membrane, external ear and ear canal normal bilaterally, oropharynx clear and moist with normal mucous membranes  Neck: neck supple and non tender without mass, no thyromegaly or thyroid nodules, no cervical lymphadenopathy   Pulmonary/Chest: clear to auscultation bilaterally- no wheezes, rales or rhonchi, normal air movement, no respiratory distress  Cardiovascular: normal rate, normal S1 and S2, no gallops, intact distal pulses and no carotid bruits  Abdomen: soft, non-tender, non-distended, normal bowel sounds, no masses or organomegaly  I/O last 3 completed shifts:  In: -   Out: 2320 [Urine:2320]  I/O this shift:  In: -   Out: 500 [Urine:500]      LABS:  Recent Labs     06/22/22  0245 06/23/22  0403    137   K 3.9 4.1    101   CO2 24 25   BUN 11 14   CREATININE 0.9 1.0   GLUCOSE 165* 178*   CALCIUM 8.4* 8.8       Recent Labs     06/22/22  0245 06/23/22  0403   WBC 9.9 8.3   RBC 4.43 4.59   HGB 11.8* 11.9*   HCT 37.2 39.0   MCV 84.0 85.0   MCH 26.6 25.9*   MCHC 31.7* 30.5*   RDW 13.5 13.6    366   MPV 9.5 9.6         Imaging:   VL LOWER EXTREMITY ARTERIAL SEGMENTAL PRESSURES W PPG BILATERAL   Final Result   No significant peripheral artery disease identified. XR FOOT RIGHT (MIN 3 VIEWS)   Final Result   Extensive deformity and sclerotic changes of the foot, as noted above. Marked soft tissue swelling with no evidence of soft tissue gas. No evidence of acute bony abnormalities or hardware complications. XR TIBIA FIBULA RIGHT (2 VIEWS)   Final Result   No acute bony abnormalities or hardware complications. US DUP LOWER EXTREMITY RIGHT JOCEYLN   Final Result   No evidence of DVT in the right lower extremity. Right inguinal adenopathy.              Patient Instructions:      Medication List      START taking these medications    cephALEXin 500 MG capsule  Commonly known as: KEFLEX  Take 1 capsule by mouth 4 times daily for 14 days        CONTINUE taking these medications    amLODIPine 5 MG tablet  Commonly known as: NORVASC  Take 1 tablet by mouth daily     atorvastatin 10 MG tablet  Commonly known as: LIPITOR     enoxaparin 40 MG/0.4ML injection  Commonly known as: LOVENOX  Inject 0.4 mLs into the skin daily     glucose 40 % Gel  Commonly known as: GLUTOSE  Take 37.5 mLs by mouth as needed (Hypoglycemia)     insulin glargine 100 UNIT/ML injection vial  Commonly known as: LANTUS     insulin lispro 100 UNIT/ML injection vial  Commonly known as: HUMALOG  Inject 5 Units into the skin 3 times daily (with meals)     metFORMIN 500 MG tablet  Commonly known as: GLUCOPHAGE           Where to Get Your Medications      You can get these medications from any pharmacy    Bring a paper prescription for each of these medications  · cephALEXin 500 MG capsule           Note that more than 30 minutes was spent in preparing discharge papers, discussing discharge with patient, medication review, etc.    Signed:  Electronically signed by Dexter Thomas MD on 6/24/2022 at 11:04 AM    NOTE: This report was transcribed using voice recognition software. Every effort was made to ensure accuracy; however, inadvertent computerized transcription errors may be present.

## 2022-06-24 NOTE — PLAN OF CARE
Problem: Skin/Tissue Integrity  Goal: Absence of new skin breakdown  Description: 1. Monitor for areas of redness and/or skin breakdown  2. Assess vascular access sites hourly  3. Every 4-6 hours minimum:  Change oxygen saturation probe site  4. Every 4-6 hours:  If on nasal continuous positive airway pressure, respiratory therapy assess nares and determine need for appliance change or resting period.   Outcome: Progressing     Problem: Pain  Goal: Verbalizes/displays adequate comfort level or baseline comfort level  Outcome: Progressing     Problem: Safety - Adult  Goal: Free from fall injury  Outcome: Progressing     Problem: Discharge Planning  Goal: Discharge to home or other facility with appropriate resources  Outcome: Progressing     Problem: Chronic Conditions and Co-morbidities  Goal: Patient's chronic conditions and co-morbidity symptoms are monitored and maintained or improved  Outcome: Progressing     Problem: ABCDS Injury Assessment  Goal: Absence of physical injury  Outcome: Progressing

## 2022-06-24 NOTE — PROGRESS NOTES
CLINICAL PHARMACY NOTE: MEDS TO BEDS    Total # of Prescriptions Filled: 1   The following medications were delivered to the patient:  · AMOX- -42.9 SUSP    Additional Documentation:

## 2022-06-25 LAB
BLOOD CULTURE, ROUTINE: NORMAL
CULTURE, BLOOD 2: NORMAL

## 2022-06-26 LAB
CULTURE SURGICAL: ABNORMAL
ORGANISM: ABNORMAL

## 2022-08-01 LAB
FUNGUS (MYCOLOGY) CULTURE: NORMAL
FUNGUS STAIN: NORMAL

## 2022-08-16 LAB
AFB CULTURE (MYCOBACTERIA): NORMAL
AFB SMEAR: NORMAL

## 2023-05-15 ENCOUNTER — APPOINTMENT (OUTPATIENT)
Dept: GENERAL RADIOLOGY | Age: 48
DRG: 710 | End: 2023-05-15
Payer: MEDICAID

## 2023-05-15 ENCOUNTER — HOSPITAL ENCOUNTER (INPATIENT)
Age: 48
LOS: 7 days | Discharge: NURSING, REHAB OR LONG-TERM CARE | DRG: 710 | End: 2023-05-23
Attending: EMERGENCY MEDICINE | Admitting: FAMILY MEDICINE
Payer: MEDICAID

## 2023-05-15 DIAGNOSIS — E11.621 DIABETIC FOOT ULCER WITH OSTEOMYELITIS (HCC): ICD-10-CM

## 2023-05-15 DIAGNOSIS — L08.9 RIGHT FOOT INFECTION: ICD-10-CM

## 2023-05-15 DIAGNOSIS — L03.115 CELLULITIS OF RIGHT LOWER EXTREMITY: ICD-10-CM

## 2023-05-15 DIAGNOSIS — L08.9 DIABETIC INFECTION OF RIGHT FOOT (HCC): Primary | ICD-10-CM

## 2023-05-15 DIAGNOSIS — M86.9 DIABETIC FOOT ULCER WITH OSTEOMYELITIS (HCC): ICD-10-CM

## 2023-05-15 DIAGNOSIS — I96 GANGRENE (HCC): ICD-10-CM

## 2023-05-15 DIAGNOSIS — L97.509 DIABETIC FOOT ULCER WITH OSTEOMYELITIS (HCC): ICD-10-CM

## 2023-05-15 DIAGNOSIS — E11.628 DIABETIC INFECTION OF RIGHT FOOT (HCC): Primary | ICD-10-CM

## 2023-05-15 DIAGNOSIS — E11.69 DIABETIC FOOT ULCER WITH OSTEOMYELITIS (HCC): ICD-10-CM

## 2023-05-15 LAB
ABO + RH BLD: NORMAL
ANION GAP SERPL CALCULATED.3IONS-SCNC: 12 MMOL/L (ref 7–16)
APTT BLD: 32.2 SEC (ref 24.5–35.1)
BASOPHILS # BLD: 0.15 E9/L (ref 0–0.2)
BASOPHILS NFR BLD: 0.9 % (ref 0–2)
BLD GP AB SCN SERPL QL: NORMAL
BUN SERPL-MCNC: 10 MG/DL (ref 6–20)
CALCIUM SERPL-MCNC: 8.9 MG/DL (ref 8.6–10.2)
CHLORIDE SERPL-SCNC: 98 MMOL/L (ref 98–107)
CO2 SERPL-SCNC: 26 MMOL/L (ref 22–29)
CREAT SERPL-MCNC: 1.1 MG/DL (ref 0.7–1.2)
DR. NOTIFY: NORMAL
EOSINOPHIL # BLD: 0.86 E9/L (ref 0.05–0.5)
EOSINOPHIL NFR BLD: 5.2 % (ref 0–6)
ERYTHROCYTE [DISTWIDTH] IN BLOOD BY AUTOMATED COUNT: 13.4 FL (ref 11.5–15)
GLUCOSE SERPL-MCNC: 232 MG/DL (ref 74–99)
HCT VFR BLD AUTO: 39.6 % (ref 37–54)
HGB BLD-MCNC: 12.9 G/DL (ref 12.5–16.5)
INR BLD: 1.4
LACTATE BLDV-SCNC: 1.5 MMOL/L (ref 0.5–1.9)
LYMPHOCYTES # BLD: 1.66 E9/L (ref 1.5–4)
LYMPHOCYTES NFR BLD: 8.8 % (ref 20–42)
MCH RBC QN AUTO: 26.3 PG (ref 26–35)
MCHC RBC AUTO-ENTMCNC: 32.6 % (ref 32–34.5)
MCV RBC AUTO: 80.8 FL (ref 80–99.9)
MONOCYTES # BLD: 0.66 E9/L (ref 0.1–0.95)
MONOCYTES NFR BLD: 4.4 % (ref 2–12)
NEUTROPHILS # BLD: 13.28 E9/L (ref 1.8–7.3)
NEUTS SEG NFR BLD: 79.8 % (ref 43–80)
NRBC BLD-RTO: 0 /100 WBC
PLATELET # BLD AUTO: 427 E9/L (ref 130–450)
PMV BLD AUTO: 9.7 FL (ref 7–12)
POTASSIUM SERPL-SCNC: 3.6 MMOL/L (ref 3.5–5)
PROTHROMBIN TIME: 15.3 SEC (ref 9.3–12.4)
RBC # BLD AUTO: 4.9 E12/L (ref 3.8–5.8)
RBC MORPH BLD: NORMAL
SODIUM SERPL-SCNC: 136 MMOL/L (ref 132–146)
VARIANT LYMPHS NFR BLD: 0.9 % (ref 0–4)
WBC # BLD: 16.6 E9/L (ref 4.5–11.5)

## 2023-05-15 PROCEDURE — 87070 CULTURE OTHR SPECIMN AEROBIC: CPT

## 2023-05-15 PROCEDURE — 87077 CULTURE AEROBIC IDENTIFY: CPT

## 2023-05-15 PROCEDURE — 86850 RBC ANTIBODY SCREEN: CPT

## 2023-05-15 PROCEDURE — 84145 PROCALCITONIN (PCT): CPT

## 2023-05-15 PROCEDURE — 86900 BLOOD TYPING SEROLOGIC ABO: CPT

## 2023-05-15 PROCEDURE — 83605 ASSAY OF LACTIC ACID: CPT

## 2023-05-15 PROCEDURE — 85730 THROMBOPLASTIN TIME PARTIAL: CPT

## 2023-05-15 PROCEDURE — 80048 BASIC METABOLIC PNL TOTAL CA: CPT

## 2023-05-15 PROCEDURE — 73630 X-RAY EXAM OF FOOT: CPT

## 2023-05-15 PROCEDURE — 99285 EMERGENCY DEPT VISIT HI MDM: CPT

## 2023-05-15 PROCEDURE — 86901 BLOOD TYPING SEROLOGIC RH(D): CPT

## 2023-05-15 PROCEDURE — 86922 COMPATIBILITY TEST ANTIGLOB: CPT

## 2023-05-15 PROCEDURE — 87147 CULTURE TYPE IMMUNOLOGIC: CPT

## 2023-05-15 PROCEDURE — 85651 RBC SED RATE NONAUTOMATED: CPT

## 2023-05-15 PROCEDURE — 87040 BLOOD CULTURE FOR BACTERIA: CPT

## 2023-05-15 PROCEDURE — 87186 SC STD MICRODIL/AGAR DIL: CPT

## 2023-05-15 PROCEDURE — 2580000003 HC RX 258: Performed by: STUDENT IN AN ORGANIZED HEALTH CARE EDUCATION/TRAINING PROGRAM

## 2023-05-15 PROCEDURE — 86870 RBC ANTIBODY IDENTIFICATION: CPT

## 2023-05-15 PROCEDURE — 80076 HEPATIC FUNCTION PANEL: CPT

## 2023-05-15 PROCEDURE — 6360000002 HC RX W HCPCS: Performed by: STUDENT IN AN ORGANIZED HEALTH CARE EDUCATION/TRAINING PROGRAM

## 2023-05-15 PROCEDURE — 85025 COMPLETE CBC W/AUTO DIFF WBC: CPT

## 2023-05-15 PROCEDURE — 36415 COLL VENOUS BLD VENIPUNCTURE: CPT

## 2023-05-15 PROCEDURE — 86880 COOMBS TEST DIRECT: CPT

## 2023-05-15 PROCEDURE — 85610 PROTHROMBIN TIME: CPT

## 2023-05-15 PROCEDURE — 86140 C-REACTIVE PROTEIN: CPT

## 2023-05-15 PROCEDURE — 96365 THER/PROPH/DIAG IV INF INIT: CPT

## 2023-05-15 PROCEDURE — 87075 CULTR BACTERIA EXCEPT BLOOD: CPT

## 2023-05-15 RX ORDER — SODIUM CHLORIDE 9 MG/ML
INJECTION, SOLUTION INTRAVENOUS PRN
Status: DISCONTINUED | OUTPATIENT
Start: 2023-05-15 | End: 2023-05-23 | Stop reason: HOSPADM

## 2023-05-15 RX ORDER — SODIUM CHLORIDE 0.9 % (FLUSH) 0.9 %
5-40 SYRINGE (ML) INJECTION PRN
Status: DISCONTINUED | OUTPATIENT
Start: 2023-05-15 | End: 2023-05-23 | Stop reason: HOSPADM

## 2023-05-15 RX ORDER — SODIUM CHLORIDE 0.9 % (FLUSH) 0.9 %
5-40 SYRINGE (ML) INJECTION EVERY 12 HOURS SCHEDULED
Status: DISCONTINUED | OUTPATIENT
Start: 2023-05-15 | End: 2023-05-23 | Stop reason: HOSPADM

## 2023-05-15 RX ORDER — LINEZOLID 2 MG/ML
600 INJECTION, SOLUTION INTRAVENOUS ONCE
Status: COMPLETED | OUTPATIENT
Start: 2023-05-15 | End: 2023-05-16

## 2023-05-15 RX ADMIN — MEROPENEM 1000 MG: 1 INJECTION, POWDER, FOR SOLUTION INTRAVENOUS at 22:52

## 2023-05-15 RX ADMIN — LINEZOLID 600 MG: 600 INJECTION, SOLUTION INTRAVENOUS at 23:46

## 2023-05-15 ASSESSMENT — LIFESTYLE VARIABLES
HOW OFTEN DO YOU HAVE A DRINK CONTAINING ALCOHOL: NEVER
HOW MANY STANDARD DRINKS CONTAINING ALCOHOL DO YOU HAVE ON A TYPICAL DAY: PATIENT DOES NOT DRINK

## 2023-05-15 ASSESSMENT — PAIN SCALES - GENERAL: PAINLEVEL_OUTOF10: 4

## 2023-05-15 ASSESSMENT — PAIN - FUNCTIONAL ASSESSMENT: PAIN_FUNCTIONAL_ASSESSMENT: 0-10

## 2023-05-16 ENCOUNTER — APPOINTMENT (OUTPATIENT)
Dept: CT IMAGING | Age: 48
DRG: 710 | End: 2023-05-16
Payer: MEDICAID

## 2023-05-16 ENCOUNTER — ANESTHESIA (OUTPATIENT)
Dept: OPERATING ROOM | Age: 48
End: 2023-05-16
Payer: MEDICAID

## 2023-05-16 ENCOUNTER — ANESTHESIA EVENT (OUTPATIENT)
Dept: OPERATING ROOM | Age: 48
End: 2023-05-16
Payer: MEDICAID

## 2023-05-16 ENCOUNTER — APPOINTMENT (OUTPATIENT)
Dept: ULTRASOUND IMAGING | Age: 48
DRG: 710 | End: 2023-05-16
Payer: MEDICAID

## 2023-05-16 LAB
ALBUMIN SERPL-MCNC: 3.3 G/DL (ref 3.5–5.2)
ALP SERPL-CCNC: 85 U/L (ref 40–129)
ALT SERPL-CCNC: 9 U/L (ref 0–40)
ANION GAP SERPL CALCULATED.3IONS-SCNC: 10 MMOL/L (ref 7–16)
AST SERPL-CCNC: 18 U/L (ref 0–39)
BASOPHILS # BLD: 0 E9/L (ref 0–0.2)
BASOPHILS NFR BLD: 0 % (ref 0–2)
BILIRUB DIRECT SERPL-MCNC: 0.3 MG/DL (ref 0–0.3)
BILIRUB INDIRECT SERPL-MCNC: 0.4 MG/DL (ref 0–1)
BILIRUB SERPL-MCNC: 0.7 MG/DL (ref 0–1.2)
BLOOD GROUP ANTIBODIES SERPL: NORMAL
BUN SERPL-MCNC: 11 MG/DL (ref 6–20)
CALCIUM SERPL-MCNC: 8 MG/DL (ref 8.6–10.2)
CHLORIDE SERPL-SCNC: 104 MMOL/L (ref 98–107)
CO2 SERPL-SCNC: 23 MMOL/L (ref 22–29)
CREAT SERPL-MCNC: 1 MG/DL (ref 0.7–1.2)
CRP SERPL HS-MCNC: 24.1 MG/DL (ref 0–0.4)
DAT POLY-SP REAG RBC QL: NORMAL
EOSINOPHIL # BLD: 0.62 E9/L (ref 0.05–0.5)
EOSINOPHIL NFR BLD: 4.4 % (ref 0–6)
ERYTHROCYTE [DISTWIDTH] IN BLOOD BY AUTOMATED COUNT: 13.3 FL (ref 11.5–15)
ERYTHROCYTE [SEDIMENTATION RATE] IN BLOOD BY WESTERGREN METHOD: 76 MM/HR (ref 0–15)
GLUCOSE SERPL-MCNC: 244 MG/DL (ref 74–99)
HBA1C MFR BLD: 9.2 % (ref 4–5.6)
HCT VFR BLD AUTO: 35.8 % (ref 37–54)
HGB BLD-MCNC: 11.6 G/DL (ref 12.5–16.5)
LYMPHOCYTES # BLD: 1.55 E9/L (ref 1.5–4)
LYMPHOCYTES NFR BLD: 10.5 % (ref 20–42)
MAGNESIUM SERPL-MCNC: 1.9 MG/DL (ref 1.6–2.6)
MCH RBC QN AUTO: 26.2 PG (ref 26–35)
MCHC RBC AUTO-ENTMCNC: 32.4 % (ref 32–34.5)
MCV RBC AUTO: 80.8 FL (ref 80–99.9)
METER GLUCOSE: 135 MG/DL (ref 74–99)
METER GLUCOSE: 182 MG/DL (ref 74–99)
METER GLUCOSE: 219 MG/DL (ref 74–99)
METER GLUCOSE: 226 MG/DL (ref 74–99)
METER GLUCOSE: 251 MG/DL (ref 74–99)
MONOCYTES # BLD: 0.56 E9/L (ref 0.1–0.95)
MONOCYTES NFR BLD: 4.4 % (ref 2–12)
NEUTROPHILS # BLD: 11.42 E9/L (ref 1.8–7.3)
NEUTS SEG NFR BLD: 80.7 % (ref 43–80)
NRBC BLD-RTO: 0 /100 WBC
PLATELET # BLD AUTO: 393 E9/L (ref 130–450)
PMV BLD AUTO: 9.5 FL (ref 7–12)
POLYCHROMASIA: ABNORMAL
POTASSIUM SERPL-SCNC: 3.1 MMOL/L (ref 3.5–5)
PROCALCITONIN: 0.37 NG/ML (ref 0–0.08)
PROT SERPL-MCNC: 8.4 G/DL (ref 6.4–8.3)
RBC # BLD AUTO: 4.43 E12/L (ref 3.8–5.8)
SODIUM SERPL-SCNC: 137 MMOL/L (ref 132–146)
WBC # BLD: 14.1 E9/L (ref 4.5–11.5)

## 2023-05-16 PROCEDURE — 87205 SMEAR GRAM STAIN: CPT

## 2023-05-16 PROCEDURE — 87147 CULTURE TYPE IMMUNOLOGIC: CPT

## 2023-05-16 PROCEDURE — 36415 COLL VENOUS BLD VENIPUNCTURE: CPT

## 2023-05-16 PROCEDURE — 2500000003 HC RX 250 WO HCPCS: Performed by: PODIATRIST

## 2023-05-16 PROCEDURE — 6360000002 HC RX W HCPCS: Performed by: NURSE PRACTITIONER

## 2023-05-16 PROCEDURE — 2709999900 HC NON-CHARGEABLE SUPPLY: Performed by: PODIATRIST

## 2023-05-16 PROCEDURE — 3600000012 HC SURGERY LEVEL 2 ADDTL 15MIN: Performed by: PODIATRIST

## 2023-05-16 PROCEDURE — 93970 EXTREMITY STUDY: CPT

## 2023-05-16 PROCEDURE — 88305 TISSUE EXAM BY PATHOLOGIST: CPT

## 2023-05-16 PROCEDURE — 2580000003 HC RX 258

## 2023-05-16 PROCEDURE — 2580000003 HC RX 258: Performed by: STUDENT IN AN ORGANIZED HEALTH CARE EDUCATION/TRAINING PROGRAM

## 2023-05-16 PROCEDURE — 2580000003 HC RX 258: Performed by: NURSE ANESTHETIST, CERTIFIED REGISTERED

## 2023-05-16 PROCEDURE — 2580000003 HC RX 258: Performed by: NURSE PRACTITIONER

## 2023-05-16 PROCEDURE — 73700 CT LOWER EXTREMITY W/O DYE: CPT

## 2023-05-16 PROCEDURE — 7100000010 HC PHASE II RECOVERY - FIRST 15 MIN: Performed by: PODIATRIST

## 2023-05-16 PROCEDURE — 3700000000 HC ANESTHESIA ATTENDED CARE: Performed by: PODIATRIST

## 2023-05-16 PROCEDURE — 3600000002 HC SURGERY LEVEL 2 BASE: Performed by: PODIATRIST

## 2023-05-16 PROCEDURE — 87077 CULTURE AEROBIC IDENTIFY: CPT

## 2023-05-16 PROCEDURE — 85025 COMPLETE CBC W/AUTO DIFF WBC: CPT

## 2023-05-16 PROCEDURE — 6370000000 HC RX 637 (ALT 250 FOR IP)

## 2023-05-16 PROCEDURE — 83735 ASSAY OF MAGNESIUM: CPT

## 2023-05-16 PROCEDURE — 6360000002 HC RX W HCPCS

## 2023-05-16 PROCEDURE — 3700000001 HC ADD 15 MINUTES (ANESTHESIA): Performed by: PODIATRIST

## 2023-05-16 PROCEDURE — 6360000002 HC RX W HCPCS: Performed by: NURSE ANESTHETIST, CERTIFIED REGISTERED

## 2023-05-16 PROCEDURE — 2060000000 HC ICU INTERMEDIATE R&B

## 2023-05-16 PROCEDURE — 6370000000 HC RX 637 (ALT 250 FOR IP): Performed by: NURSE PRACTITIONER

## 2023-05-16 PROCEDURE — 80048 BASIC METABOLIC PNL TOTAL CA: CPT

## 2023-05-16 PROCEDURE — 87186 SC STD MICRODIL/AGAR DIL: CPT

## 2023-05-16 PROCEDURE — 87070 CULTURE OTHR SPECIMN AEROBIC: CPT

## 2023-05-16 PROCEDURE — 93923 UPR/LXTR ART STDY 3+ LVLS: CPT

## 2023-05-16 PROCEDURE — 6370000000 HC RX 637 (ALT 250 FOR IP): Performed by: INTERNAL MEDICINE

## 2023-05-16 PROCEDURE — 83036 HEMOGLOBIN GLYCOSYLATED A1C: CPT

## 2023-05-16 PROCEDURE — 88311 DECALCIFY TISSUE: CPT

## 2023-05-16 PROCEDURE — 87075 CULTR BACTERIA EXCEPT BLOOD: CPT

## 2023-05-16 PROCEDURE — 82962 GLUCOSE BLOOD TEST: CPT

## 2023-05-16 PROCEDURE — 7100000011 HC PHASE II RECOVERY - ADDTL 15 MIN: Performed by: PODIATRIST

## 2023-05-16 RX ORDER — ATORVASTATIN CALCIUM 10 MG/1
5 TABLET, FILM COATED ORAL NIGHTLY
Status: DISCONTINUED | OUTPATIENT
Start: 2023-05-16 | End: 2023-05-23 | Stop reason: HOSPADM

## 2023-05-16 RX ORDER — ACETAMINOPHEN 325 MG/1
650 TABLET ORAL EVERY 6 HOURS PRN
Status: DISCONTINUED | OUTPATIENT
Start: 2023-05-16 | End: 2023-05-23 | Stop reason: HOSPADM

## 2023-05-16 RX ORDER — BUPIVACAINE HYDROCHLORIDE 5 MG/ML
INJECTION, SOLUTION EPIDURAL; INTRACAUDAL PRN
Status: DISCONTINUED | OUTPATIENT
Start: 2023-05-16 | End: 2023-05-16 | Stop reason: ALTCHOICE

## 2023-05-16 RX ORDER — POLYETHYLENE GLYCOL 3350 17 G/17G
17 POWDER, FOR SOLUTION ORAL DAILY PRN
Status: DISCONTINUED | OUTPATIENT
Start: 2023-05-16 | End: 2023-05-23 | Stop reason: HOSPADM

## 2023-05-16 RX ORDER — AMLODIPINE BESYLATE 5 MG/1
5 TABLET ORAL NIGHTLY
Status: DISCONTINUED | OUTPATIENT
Start: 2023-05-16 | End: 2023-05-23 | Stop reason: HOSPADM

## 2023-05-16 RX ORDER — SODIUM CHLORIDE 9 MG/ML
INJECTION, SOLUTION INTRAVENOUS PRN
Status: DISCONTINUED | OUTPATIENT
Start: 2023-05-16 | End: 2023-05-23 | Stop reason: HOSPADM

## 2023-05-16 RX ORDER — POTASSIUM CHLORIDE 20 MEQ/1
40 TABLET, EXTENDED RELEASE ORAL PRN
Status: DISCONTINUED | OUTPATIENT
Start: 2023-05-16 | End: 2023-05-23 | Stop reason: HOSPADM

## 2023-05-16 RX ORDER — SODIUM CHLORIDE 0.9 % (FLUSH) 0.9 %
5-40 SYRINGE (ML) INJECTION EVERY 12 HOURS SCHEDULED
Status: DISCONTINUED | OUTPATIENT
Start: 2023-05-16 | End: 2023-05-22

## 2023-05-16 RX ORDER — DEXTROSE MONOHYDRATE 100 MG/ML
INJECTION, SOLUTION INTRAVENOUS CONTINUOUS PRN
Status: DISCONTINUED | OUTPATIENT
Start: 2023-05-16 | End: 2023-05-23 | Stop reason: HOSPADM

## 2023-05-16 RX ORDER — INSULIN LISPRO 100 [IU]/ML
0-8 INJECTION, SOLUTION INTRAVENOUS; SUBCUTANEOUS
Status: DISCONTINUED | OUTPATIENT
Start: 2023-05-16 | End: 2023-05-23 | Stop reason: HOSPADM

## 2023-05-16 RX ORDER — INSULIN LISPRO 100 [IU]/ML
0-4 INJECTION, SOLUTION INTRAVENOUS; SUBCUTANEOUS NIGHTLY
Status: DISCONTINUED | OUTPATIENT
Start: 2023-05-16 | End: 2023-05-23 | Stop reason: HOSPADM

## 2023-05-16 RX ORDER — SODIUM CHLORIDE 0.9 % (FLUSH) 0.9 %
5-40 SYRINGE (ML) INJECTION PRN
Status: DISCONTINUED | OUTPATIENT
Start: 2023-05-16 | End: 2023-05-23 | Stop reason: HOSPADM

## 2023-05-16 RX ORDER — AMLODIPINE BESYLATE 5 MG/1
5 TABLET ORAL NIGHTLY
COMMUNITY

## 2023-05-16 RX ORDER — AMLODIPINE BESYLATE 5 MG/1
5 TABLET ORAL DAILY
Status: DISCONTINUED | OUTPATIENT
Start: 2023-05-16 | End: 2023-05-16

## 2023-05-16 RX ORDER — INSULIN GLARGINE 100 [IU]/ML
15 INJECTION, SOLUTION SUBCUTANEOUS NIGHTLY
Status: DISCONTINUED | OUTPATIENT
Start: 2023-05-16 | End: 2023-05-23 | Stop reason: HOSPADM

## 2023-05-16 RX ORDER — SODIUM CHLORIDE 9 MG/ML
INJECTION, SOLUTION INTRAVENOUS CONTINUOUS
Status: DISCONTINUED | OUTPATIENT
Start: 2023-05-16 | End: 2023-05-17 | Stop reason: ALTCHOICE

## 2023-05-16 RX ORDER — POTASSIUM CHLORIDE 7.45 MG/ML
10 INJECTION INTRAVENOUS PRN
Status: DISCONTINUED | OUTPATIENT
Start: 2023-05-16 | End: 2023-05-23 | Stop reason: HOSPADM

## 2023-05-16 RX ORDER — PROPOFOL 10 MG/ML
INJECTION, EMULSION INTRAVENOUS CONTINUOUS PRN
Status: DISCONTINUED | OUTPATIENT
Start: 2023-05-16 | End: 2023-05-16 | Stop reason: SDUPTHER

## 2023-05-16 RX ORDER — ONDANSETRON 2 MG/ML
INJECTION INTRAMUSCULAR; INTRAVENOUS PRN
Status: DISCONTINUED | OUTPATIENT
Start: 2023-05-16 | End: 2023-05-16 | Stop reason: SDUPTHER

## 2023-05-16 RX ORDER — FENTANYL CITRATE 50 UG/ML
INJECTION, SOLUTION INTRAMUSCULAR; INTRAVENOUS PRN
Status: DISCONTINUED | OUTPATIENT
Start: 2023-05-16 | End: 2023-05-16 | Stop reason: SDUPTHER

## 2023-05-16 RX ORDER — MIDAZOLAM HYDROCHLORIDE 1 MG/ML
INJECTION INTRAMUSCULAR; INTRAVENOUS PRN
Status: DISCONTINUED | OUTPATIENT
Start: 2023-05-16 | End: 2023-05-16 | Stop reason: SDUPTHER

## 2023-05-16 RX ORDER — SODIUM CHLORIDE 9 MG/ML
INJECTION, SOLUTION INTRAVENOUS CONTINUOUS PRN
Status: DISCONTINUED | OUTPATIENT
Start: 2023-05-16 | End: 2023-05-16 | Stop reason: SDUPTHER

## 2023-05-16 RX ORDER — ENOXAPARIN SODIUM 100 MG/ML
30 INJECTION SUBCUTANEOUS 2 TIMES DAILY
Status: DISCONTINUED | OUTPATIENT
Start: 2023-05-16 | End: 2023-05-23 | Stop reason: DRUGHIGH

## 2023-05-16 RX ORDER — ACETAMINOPHEN 650 MG/1
650 SUPPOSITORY RECTAL EVERY 6 HOURS PRN
Status: DISCONTINUED | OUTPATIENT
Start: 2023-05-16 | End: 2023-05-23 | Stop reason: HOSPADM

## 2023-05-16 RX ORDER — LINEZOLID 2 MG/ML
600 INJECTION, SOLUTION INTRAVENOUS EVERY 12 HOURS
Status: DISCONTINUED | OUTPATIENT
Start: 2023-05-16 | End: 2023-05-16

## 2023-05-16 RX ADMIN — MIDAZOLAM 2 MG: 1 INJECTION INTRAMUSCULAR; INTRAVENOUS at 18:20

## 2023-05-16 RX ADMIN — INSULIN GLARGINE 15 UNITS: 100 INJECTION, SOLUTION SUBCUTANEOUS at 20:34

## 2023-05-16 RX ADMIN — ATORVASTATIN CALCIUM 5 MG: 10 TABLET, FILM COATED ORAL at 02:26

## 2023-05-16 RX ADMIN — INSULIN GLARGINE 15 UNITS: 100 INJECTION, SOLUTION SUBCUTANEOUS at 01:34

## 2023-05-16 RX ADMIN — AMLODIPINE BESYLATE 5 MG: 5 TABLET ORAL at 02:26

## 2023-05-16 RX ADMIN — SODIUM CHLORIDE: 9 INJECTION, SOLUTION INTRAVENOUS at 01:39

## 2023-05-16 RX ADMIN — FENTANYL CITRATE 100 MCG: 50 INJECTION, SOLUTION INTRAMUSCULAR; INTRAVENOUS at 18:20

## 2023-05-16 RX ADMIN — MEROPENEM 1000 MG: 1 INJECTION, POWDER, FOR SOLUTION INTRAVENOUS at 14:00

## 2023-05-16 RX ADMIN — PROPOFOL 110 MCG/KG/MIN: 10 INJECTION, EMULSION INTRAVENOUS at 18:25

## 2023-05-16 RX ADMIN — ATORVASTATIN CALCIUM 5 MG: 10 TABLET, FILM COATED ORAL at 20:26

## 2023-05-16 RX ADMIN — SODIUM CHLORIDE: 9 INJECTION, SOLUTION INTRAVENOUS at 18:20

## 2023-05-16 RX ADMIN — MEROPENEM 1000 MG: 1 INJECTION, POWDER, FOR SOLUTION INTRAVENOUS at 22:36

## 2023-05-16 RX ADMIN — Medication 10 ML: at 08:36

## 2023-05-16 RX ADMIN — POTASSIUM CHLORIDE 40 MEQ: 1500 TABLET, EXTENDED RELEASE ORAL at 10:11

## 2023-05-16 RX ADMIN — MEROPENEM 1000 MG: 1 INJECTION, POWDER, FOR SOLUTION INTRAVENOUS at 06:14

## 2023-05-16 RX ADMIN — Medication 10 ML: at 01:35

## 2023-05-16 RX ADMIN — LINEZOLID 600 MG: 600 INJECTION, SOLUTION INTRAVENOUS at 11:42

## 2023-05-16 RX ADMIN — SODIUM CHLORIDE: 9 INJECTION, SOLUTION INTRAVENOUS at 18:42

## 2023-05-16 RX ADMIN — PROPOFOL 70 MG: 10 INJECTION, EMULSION INTRAVENOUS at 18:24

## 2023-05-16 RX ADMIN — SODIUM CHLORIDE: 9 INJECTION, SOLUTION INTRAVENOUS at 20:26

## 2023-05-16 RX ADMIN — ONDANSETRON 4 MG: 2 INJECTION INTRAMUSCULAR; INTRAVENOUS at 18:34

## 2023-05-16 ASSESSMENT — PAIN SCALES - GENERAL
PAINLEVEL_OUTOF10: 0
PAINLEVEL_OUTOF10: 2

## 2023-05-16 ASSESSMENT — LIFESTYLE VARIABLES: SMOKING_STATUS: 0

## 2023-05-16 ASSESSMENT — PAIN DESCRIPTION - LOCATION: LOCATION: FOOT

## 2023-05-16 ASSESSMENT — PAIN DESCRIPTION - DESCRIPTORS: DESCRIPTORS: ACHING

## 2023-05-16 ASSESSMENT — PAIN DESCRIPTION - ORIENTATION: ORIENTATION: RIGHT

## 2023-05-16 NOTE — ANESTHESIA PRE PROCEDURE
Department of Anesthesiology  Preprocedure Note       Name:  Hipolito Gonzalez   Age:  51 y.o.  :  1975                                          MRN:  [id number]         Date:  2023      Surgeon: Maxi Wilkinson):  Yaya Acharya DPM    Procedure: Procedure(s):  RIGHT PARTIAL FOOT AMPUTATION   (REQ. 5PM)    Medications prior to admission:   Prior to Admission medications    Medication Sig Start Date End Date Taking?  Authorizing Provider   amLODIPine (NORVASC) 5 MG tablet Take 1 tablet by mouth at bedtime   Yes Historical Provider, MD   insulin lispro (HUMALOG) 100 UNIT/ML injection vial Inject 5 Units into the skin 3 times daily (with meals)  Patient not taking: Reported on 2022   Esequiel Farah MD   enoxaparin (LOVENOX) 40 MG/0.4ML injection Inject 0.4 mLs into the skin daily  Patient not taking: Reported on 20   Al Jose MD   glucose (GLUTOSE) 40 % GEL Take 37.5 mLs by mouth as needed (Hypoglycemia) 20   Al Jose MD   insulin glargine (LANTUS) 100 UNIT/ML injection vial Inject 15 Units into the skin nightly    Historical Provider, MD   atorvastatin (LIPITOR) 10 MG tablet Take 0.5 tablets by mouth nightly    Historical Provider, MD   metFORMIN (GLUCOPHAGE) 500 MG tablet Take 1 tablet by mouth 2 times daily (with meals)    Historical Provider, MD       Current medications:    Current Facility-Administered Medications   Medication Dose Route Frequency Provider Last Rate Last Admin    sodium chloride flush 0.9 % injection 5-40 mL  5-40 mL IntraVENous 2 times per day April OPAL Sewell - CNP        sodium chloride flush 0.9 % injection 5-40 mL  5-40 mL IntraVENous PRN April OPAL Sewell - CNP        0.9 % sodium chloride infusion   IntraVENous PRN April OPAL Sewell CNP        enoxaparin Sodium (LOVENOX) injection 30 mg  30 mg SubCUTAneous BID April OPAL Sewell - YUDI        polyethylene glycol

## 2023-05-17 LAB
ALBUMIN SERPL-MCNC: 2.8 G/DL (ref 3.5–5.2)
ALP SERPL-CCNC: 70 U/L (ref 40–129)
ALT SERPL-CCNC: 12 U/L (ref 0–40)
ANION GAP SERPL CALCULATED.3IONS-SCNC: 7 MMOL/L (ref 7–16)
AST SERPL-CCNC: 20 U/L (ref 0–39)
BASOPHILS # BLD: 0.08 E9/L (ref 0–0.2)
BASOPHILS NFR BLD: 0.9 % (ref 0–2)
BILIRUB SERPL-MCNC: 0.4 MG/DL (ref 0–1.2)
BUN SERPL-MCNC: 8 MG/DL (ref 6–20)
CALCIUM SERPL-MCNC: 8.3 MG/DL (ref 8.6–10.2)
CHLORIDE SERPL-SCNC: 104 MMOL/L (ref 98–107)
CO2 SERPL-SCNC: 27 MMOL/L (ref 22–29)
CREAT SERPL-MCNC: 1 MG/DL (ref 0.7–1.2)
EOSINOPHIL # BLD: 0.51 E9/L (ref 0.05–0.5)
EOSINOPHIL NFR BLD: 5.8 % (ref 0–6)
ERYTHROCYTE [DISTWIDTH] IN BLOOD BY AUTOMATED COUNT: 13.6 FL (ref 11.5–15)
GLUCOSE SERPL-MCNC: 227 MG/DL (ref 74–99)
HCT VFR BLD AUTO: 37.7 % (ref 37–54)
HGB BLD-MCNC: 11.7 G/DL (ref 12.5–16.5)
IMM GRANULOCYTES # BLD: 0.23 E9/L
IMM GRANULOCYTES NFR BLD: 2.6 % (ref 0–5)
LYMPHOCYTES # BLD: 2.2 E9/L (ref 1.5–4)
LYMPHOCYTES NFR BLD: 24.9 % (ref 20–42)
MCH RBC QN AUTO: 26.2 PG (ref 26–35)
MCHC RBC AUTO-ENTMCNC: 31 % (ref 32–34.5)
MCV RBC AUTO: 84.3 FL (ref 80–99.9)
METER GLUCOSE: 180 MG/DL (ref 74–99)
METER GLUCOSE: 187 MG/DL (ref 74–99)
METER GLUCOSE: 192 MG/DL (ref 74–99)
METER GLUCOSE: 194 MG/DL (ref 74–99)
MONOCYTES # BLD: 0.49 E9/L (ref 0.1–0.95)
MONOCYTES NFR BLD: 5.6 % (ref 2–12)
NEUTROPHILS # BLD: 5.31 E9/L (ref 1.8–7.3)
NEUTS SEG NFR BLD: 60.2 % (ref 43–80)
PLATELET # BLD AUTO: 458 E9/L (ref 130–450)
PMV BLD AUTO: 9.4 FL (ref 7–12)
POTASSIUM SERPL-SCNC: 4.2 MMOL/L (ref 3.5–5)
PROT SERPL-MCNC: 7.4 G/DL (ref 6.4–8.3)
RBC # BLD AUTO: 4.47 E12/L (ref 3.8–5.8)
SODIUM SERPL-SCNC: 138 MMOL/L (ref 132–146)
WBC # BLD: 8.8 E9/L (ref 4.5–11.5)

## 2023-05-17 PROCEDURE — 36415 COLL VENOUS BLD VENIPUNCTURE: CPT

## 2023-05-17 PROCEDURE — 0KNV0ZZ RELEASE RIGHT FOOT MUSCLE, OPEN APPROACH: ICD-10-PCS | Performed by: PODIATRIST

## 2023-05-17 PROCEDURE — 2580000003 HC RX 258

## 2023-05-17 PROCEDURE — 82962 GLUCOSE BLOOD TEST: CPT

## 2023-05-17 PROCEDURE — 0Y6M0Z0 DETACHMENT AT RIGHT FOOT, COMPLETE, OPEN APPROACH: ICD-10-PCS | Performed by: PODIATRIST

## 2023-05-17 PROCEDURE — 2060000000 HC ICU INTERMEDIATE R&B

## 2023-05-17 PROCEDURE — 6360000002 HC RX W HCPCS

## 2023-05-17 PROCEDURE — 97165 OT EVAL LOW COMPLEX 30 MIN: CPT

## 2023-05-17 PROCEDURE — 97161 PT EVAL LOW COMPLEX 20 MIN: CPT

## 2023-05-17 PROCEDURE — 6370000000 HC RX 637 (ALT 250 FOR IP)

## 2023-05-17 PROCEDURE — 1200000000 HC SEMI PRIVATE

## 2023-05-17 PROCEDURE — 80053 COMPREHEN METABOLIC PANEL: CPT

## 2023-05-17 PROCEDURE — 85025 COMPLETE CBC W/AUTO DIFF WBC: CPT

## 2023-05-17 RX ORDER — OXYCODONE HYDROCHLORIDE 5 MG/1
5 TABLET ORAL EVERY 4 HOURS PRN
Status: DISCONTINUED | OUTPATIENT
Start: 2023-05-17 | End: 2023-05-23 | Stop reason: HOSPADM

## 2023-05-17 RX ADMIN — ATORVASTATIN CALCIUM 5 MG: 10 TABLET, FILM COATED ORAL at 19:48

## 2023-05-17 RX ADMIN — Medication 10 ML: at 01:39

## 2023-05-17 RX ADMIN — ENOXAPARIN SODIUM 30 MG: 100 INJECTION SUBCUTANEOUS at 17:22

## 2023-05-17 RX ADMIN — MEROPENEM 1000 MG: 1 INJECTION, POWDER, FOR SOLUTION INTRAVENOUS at 13:28

## 2023-05-17 RX ADMIN — Medication 10 ML: at 19:48

## 2023-05-17 RX ADMIN — MEROPENEM 1000 MG: 1 INJECTION, POWDER, FOR SOLUTION INTRAVENOUS at 22:20

## 2023-05-17 RX ADMIN — AMLODIPINE BESYLATE 5 MG: 5 TABLET ORAL at 19:48

## 2023-05-17 RX ADMIN — ENOXAPARIN SODIUM 30 MG: 100 INJECTION SUBCUTANEOUS at 08:26

## 2023-05-17 RX ADMIN — INSULIN GLARGINE 15 UNITS: 100 INJECTION, SOLUTION SUBCUTANEOUS at 19:49

## 2023-05-17 RX ADMIN — Medication 10 ML: at 01:38

## 2023-05-17 RX ADMIN — MEROPENEM 1000 MG: 1 INJECTION, POWDER, FOR SOLUTION INTRAVENOUS at 06:08

## 2023-05-17 ASSESSMENT — PAIN SCALES - GENERAL
PAINLEVEL_OUTOF10: 0
PAINLEVEL_OUTOF10: 0

## 2023-05-18 LAB
ALBUMIN SERPL-MCNC: 2.7 G/DL (ref 3.5–5.2)
ALP SERPL-CCNC: 64 U/L (ref 40–129)
ALT SERPL-CCNC: 12 U/L (ref 0–40)
ANION GAP SERPL CALCULATED.3IONS-SCNC: 10 MMOL/L (ref 7–16)
AST SERPL-CCNC: 14 U/L (ref 0–39)
BACTERIA SPEC ANAEROBE CULT: NORMAL
BASOPHILS # BLD: 0.09 E9/L (ref 0–0.2)
BASOPHILS NFR BLD: 1 % (ref 0–2)
BILIRUB SERPL-MCNC: 0.3 MG/DL (ref 0–1.2)
BUN SERPL-MCNC: 9 MG/DL (ref 6–20)
CALCIUM SERPL-MCNC: 8.4 MG/DL (ref 8.6–10.2)
CHLORIDE SERPL-SCNC: 99 MMOL/L (ref 98–107)
CO2 SERPL-SCNC: 25 MMOL/L (ref 22–29)
CREAT SERPL-MCNC: 1 MG/DL (ref 0.7–1.2)
EOSINOPHIL # BLD: 0.5 E9/L (ref 0.05–0.5)
EOSINOPHIL NFR BLD: 5.8 % (ref 0–6)
ERYTHROCYTE [DISTWIDTH] IN BLOOD BY AUTOMATED COUNT: 13.7 FL (ref 11.5–15)
GLUCOSE SERPL-MCNC: 229 MG/DL (ref 74–99)
HCT VFR BLD AUTO: 37.8 % (ref 37–54)
HGB BLD-MCNC: 11.9 G/DL (ref 12.5–16.5)
IMM GRANULOCYTES # BLD: 0.29 E9/L
IMM GRANULOCYTES NFR BLD: 3.4 % (ref 0–5)
LYMPHOCYTES # BLD: 2.49 E9/L (ref 1.5–4)
LYMPHOCYTES NFR BLD: 28.9 % (ref 20–42)
MCH RBC QN AUTO: 26.4 PG (ref 26–35)
MCHC RBC AUTO-ENTMCNC: 31.5 % (ref 32–34.5)
MCV RBC AUTO: 83.8 FL (ref 80–99.9)
METER GLUCOSE: 186 MG/DL (ref 74–99)
METER GLUCOSE: 186 MG/DL (ref 74–99)
METER GLUCOSE: 203 MG/DL (ref 74–99)
METER GLUCOSE: 209 MG/DL (ref 74–99)
MONOCYTES # BLD: 0.54 E9/L (ref 0.1–0.95)
MONOCYTES NFR BLD: 6.3 % (ref 2–12)
NEUTROPHILS # BLD: 4.72 E9/L (ref 1.8–7.3)
NEUTS SEG NFR BLD: 54.6 % (ref 43–80)
PLATELET # BLD AUTO: 479 E9/L (ref 130–450)
PMV BLD AUTO: 9.4 FL (ref 7–12)
POTASSIUM SERPL-SCNC: 4.2 MMOL/L (ref 3.5–5)
PROT SERPL-MCNC: 7.6 G/DL (ref 6.4–8.3)
RBC # BLD AUTO: 4.51 E12/L (ref 3.8–5.8)
SODIUM SERPL-SCNC: 134 MMOL/L (ref 132–146)
WBC # BLD: 8.6 E9/L (ref 4.5–11.5)

## 2023-05-18 PROCEDURE — 6360000002 HC RX W HCPCS

## 2023-05-18 PROCEDURE — 6370000000 HC RX 637 (ALT 250 FOR IP)

## 2023-05-18 PROCEDURE — 82962 GLUCOSE BLOOD TEST: CPT

## 2023-05-18 PROCEDURE — 1200000000 HC SEMI PRIVATE

## 2023-05-18 PROCEDURE — 6370000000 HC RX 637 (ALT 250 FOR IP): Performed by: FAMILY MEDICINE

## 2023-05-18 PROCEDURE — 85025 COMPLETE CBC W/AUTO DIFF WBC: CPT

## 2023-05-18 PROCEDURE — 80053 COMPREHEN METABOLIC PANEL: CPT

## 2023-05-18 PROCEDURE — 2580000003 HC RX 258

## 2023-05-18 PROCEDURE — 2060000000 HC ICU INTERMEDIATE R&B

## 2023-05-18 PROCEDURE — 36415 COLL VENOUS BLD VENIPUNCTURE: CPT

## 2023-05-18 RX ORDER — TIRZEPATIDE 2.5 MG/.5ML
2.5 INJECTION, SOLUTION SUBCUTANEOUS WEEKLY
Qty: 2 ML | Refills: 0 | Status: SHIPPED | OUTPATIENT
Start: 2023-05-18

## 2023-05-18 RX ADMIN — Medication 10 ML: at 07:55

## 2023-05-18 RX ADMIN — MEROPENEM 1000 MG: 1 INJECTION, POWDER, FOR SOLUTION INTRAVENOUS at 22:30

## 2023-05-18 RX ADMIN — MEROPENEM 1000 MG: 1 INJECTION, POWDER, FOR SOLUTION INTRAVENOUS at 14:05

## 2023-05-18 RX ADMIN — OXYCODONE 5 MG: 5 TABLET ORAL at 07:13

## 2023-05-18 RX ADMIN — INSULIN GLARGINE 15 UNITS: 100 INJECTION, SOLUTION SUBCUTANEOUS at 20:13

## 2023-05-18 RX ADMIN — AMLODIPINE BESYLATE 5 MG: 5 TABLET ORAL at 20:10

## 2023-05-18 RX ADMIN — ATORVASTATIN CALCIUM 5 MG: 10 TABLET, FILM COATED ORAL at 20:10

## 2023-05-18 RX ADMIN — MEROPENEM 1000 MG: 1 INJECTION, POWDER, FOR SOLUTION INTRAVENOUS at 06:36

## 2023-05-18 RX ADMIN — Medication 10 ML: at 20:11

## 2023-05-18 RX ADMIN — ENOXAPARIN SODIUM 30 MG: 100 INJECTION SUBCUTANEOUS at 17:03

## 2023-05-18 RX ADMIN — INSULIN LISPRO 2 UNITS: 100 INJECTION, SOLUTION INTRAVENOUS; SUBCUTANEOUS at 15:24

## 2023-05-18 RX ADMIN — ENOXAPARIN SODIUM 30 MG: 100 INJECTION SUBCUTANEOUS at 07:55

## 2023-05-18 ASSESSMENT — PAIN SCALES - GENERAL: PAINLEVEL_OUTOF10: 0

## 2023-05-19 ENCOUNTER — ANESTHESIA EVENT (OUTPATIENT)
Dept: OPERATING ROOM | Age: 48
End: 2023-05-19
Payer: MEDICAID

## 2023-05-19 LAB
ALBUMIN SERPL-MCNC: 3.1 G/DL (ref 3.5–5.2)
ALP SERPL-CCNC: 66 U/L (ref 40–129)
ALT SERPL-CCNC: 13 U/L (ref 0–40)
ANION GAP SERPL CALCULATED.3IONS-SCNC: 7 MMOL/L (ref 7–16)
AST SERPL-CCNC: 22 U/L (ref 0–39)
BACTERIA SPEC ANAEROBE CULT: NORMAL
BACTERIA WND AEROBE CULT: ABNORMAL
BACTERIA WND AEROBE CULT: ABNORMAL
BASOPHILS # BLD: 0.09 E9/L (ref 0–0.2)
BASOPHILS NFR BLD: 1 % (ref 0–2)
BILIRUB SERPL-MCNC: 0.4 MG/DL (ref 0–1.2)
BLOOD BANK DISPENSE STATUS: NORMAL
BLOOD BANK PRODUCT CODE: NORMAL
BPU ID: NORMAL
BUN SERPL-MCNC: 8 MG/DL (ref 6–20)
CALCIUM SERPL-MCNC: 8.7 MG/DL (ref 8.6–10.2)
CHLORIDE SERPL-SCNC: 104 MMOL/L (ref 98–107)
CO2 SERPL-SCNC: 28 MMOL/L (ref 22–29)
CREAT SERPL-MCNC: 0.9 MG/DL (ref 0.7–1.2)
DESCRIPTION BLOOD BANK: NORMAL
EOSINOPHIL # BLD: 0.38 E9/L (ref 0.05–0.5)
EOSINOPHIL NFR BLD: 4.2 % (ref 0–6)
ERYTHROCYTE [DISTWIDTH] IN BLOOD BY AUTOMATED COUNT: 13.6 FL (ref 11.5–15)
GLUCOSE SERPL-MCNC: 184 MG/DL (ref 74–99)
HCT VFR BLD AUTO: 39.6 % (ref 37–54)
HGB BLD-MCNC: 12.2 G/DL (ref 12.5–16.5)
IMM GRANULOCYTES # BLD: 0.35 E9/L
IMM GRANULOCYTES NFR BLD: 3.8 % (ref 0–5)
LYMPHOCYTES # BLD: 2.32 E9/L (ref 1.5–4)
LYMPHOCYTES NFR BLD: 25.4 % (ref 20–42)
MCH RBC QN AUTO: 26 PG (ref 26–35)
MCHC RBC AUTO-ENTMCNC: 30.8 % (ref 32–34.5)
MCV RBC AUTO: 84.4 FL (ref 80–99.9)
METER GLUCOSE: 129 MG/DL (ref 74–99)
METER GLUCOSE: 151 MG/DL (ref 74–99)
METER GLUCOSE: 169 MG/DL (ref 74–99)
METER GLUCOSE: 183 MG/DL (ref 74–99)
MONOCYTES # BLD: 0.53 E9/L (ref 0.1–0.95)
MONOCYTES NFR BLD: 5.8 % (ref 2–12)
NEUTROPHILS # BLD: 5.48 E9/L (ref 1.8–7.3)
NEUTS SEG NFR BLD: 59.8 % (ref 43–80)
ORGANISM: ABNORMAL
PLATELET # BLD AUTO: 434 E9/L (ref 130–450)
PMV BLD AUTO: 10.1 FL (ref 7–12)
POTASSIUM SERPL-SCNC: 4.4 MMOL/L (ref 3.5–5)
PROT SERPL-MCNC: 8.1 G/DL (ref 6.4–8.3)
RBC # BLD AUTO: 4.69 E12/L (ref 3.8–5.8)
SODIUM SERPL-SCNC: 139 MMOL/L (ref 132–146)
WBC # BLD: 9.2 E9/L (ref 4.5–11.5)

## 2023-05-19 PROCEDURE — 6370000000 HC RX 637 (ALT 250 FOR IP)

## 2023-05-19 PROCEDURE — 2580000003 HC RX 258

## 2023-05-19 PROCEDURE — 6370000000 HC RX 637 (ALT 250 FOR IP): Performed by: FAMILY MEDICINE

## 2023-05-19 PROCEDURE — 85025 COMPLETE CBC W/AUTO DIFF WBC: CPT

## 2023-05-19 PROCEDURE — 80053 COMPREHEN METABOLIC PANEL: CPT

## 2023-05-19 PROCEDURE — 82962 GLUCOSE BLOOD TEST: CPT

## 2023-05-19 PROCEDURE — 97530 THERAPEUTIC ACTIVITIES: CPT

## 2023-05-19 PROCEDURE — 36415 COLL VENOUS BLD VENIPUNCTURE: CPT

## 2023-05-19 PROCEDURE — 1200000000 HC SEMI PRIVATE

## 2023-05-19 PROCEDURE — 6360000002 HC RX W HCPCS

## 2023-05-19 PROCEDURE — 97535 SELF CARE MNGMENT TRAINING: CPT

## 2023-05-19 RX ORDER — HEPARIN SODIUM (PORCINE) LOCK FLUSH IV SOLN 100 UNIT/ML 100 UNIT/ML
3 SOLUTION INTRAVENOUS PRN
Status: DISCONTINUED | OUTPATIENT
Start: 2023-05-19 | End: 2023-05-23 | Stop reason: HOSPADM

## 2023-05-19 RX ORDER — LIDOCAINE HYDROCHLORIDE 10 MG/ML
5 INJECTION, SOLUTION EPIDURAL; INFILTRATION; INTRACAUDAL; PERINEURAL ONCE
Status: COMPLETED | OUTPATIENT
Start: 2023-05-19 | End: 2023-05-21

## 2023-05-19 RX ORDER — HEPARIN SODIUM (PORCINE) LOCK FLUSH IV SOLN 100 UNIT/ML 100 UNIT/ML
3 SOLUTION INTRAVENOUS EVERY 12 HOURS SCHEDULED
Status: DISCONTINUED | OUTPATIENT
Start: 2023-05-19 | End: 2023-05-23 | Stop reason: HOSPADM

## 2023-05-19 RX ORDER — SODIUM CHLORIDE 9 MG/ML
INJECTION, SOLUTION INTRAVENOUS PRN
Status: DISCONTINUED | OUTPATIENT
Start: 2023-05-19 | End: 2023-05-23 | Stop reason: HOSPADM

## 2023-05-19 RX ORDER — SODIUM CHLORIDE 0.9 % (FLUSH) 0.9 %
5-40 SYRINGE (ML) INJECTION PRN
Status: DISCONTINUED | OUTPATIENT
Start: 2023-05-19 | End: 2023-05-23 | Stop reason: HOSPADM

## 2023-05-19 RX ORDER — SODIUM CHLORIDE 0.9 % (FLUSH) 0.9 %
5-40 SYRINGE (ML) INJECTION EVERY 12 HOURS SCHEDULED
Status: DISCONTINUED | OUTPATIENT
Start: 2023-05-19 | End: 2023-05-22

## 2023-05-19 RX ADMIN — MEROPENEM 1000 MG: 1 INJECTION, POWDER, FOR SOLUTION INTRAVENOUS at 22:32

## 2023-05-19 RX ADMIN — ENOXAPARIN SODIUM 30 MG: 100 INJECTION SUBCUTANEOUS at 07:52

## 2023-05-19 RX ADMIN — OXYCODONE 5 MG: 5 TABLET ORAL at 07:50

## 2023-05-19 RX ADMIN — MEROPENEM 1000 MG: 1 INJECTION, POWDER, FOR SOLUTION INTRAVENOUS at 06:17

## 2023-05-19 RX ADMIN — AMLODIPINE BESYLATE 5 MG: 5 TABLET ORAL at 20:33

## 2023-05-19 RX ADMIN — ATORVASTATIN CALCIUM 5 MG: 10 TABLET, FILM COATED ORAL at 20:33

## 2023-05-19 RX ADMIN — Medication 10 ML: at 20:31

## 2023-05-19 RX ADMIN — MEROPENEM 1000 MG: 1 INJECTION, POWDER, FOR SOLUTION INTRAVENOUS at 15:28

## 2023-05-19 ASSESSMENT — PAIN DESCRIPTION - DESCRIPTORS: DESCRIPTORS: ACHING;THROBBING

## 2023-05-19 ASSESSMENT — PAIN DESCRIPTION - ORIENTATION: ORIENTATION: RIGHT

## 2023-05-19 ASSESSMENT — PAIN SCALES - GENERAL
PAINLEVEL_OUTOF10: 0
PAINLEVEL_OUTOF10: 9

## 2023-05-19 ASSESSMENT — PAIN DESCRIPTION - PAIN TYPE: TYPE: SURGICAL PAIN

## 2023-05-19 ASSESSMENT — PAIN DESCRIPTION - LOCATION: LOCATION: FOOT

## 2023-05-19 NOTE — ANESTHESIA POSTPROCEDURE EVALUATION
Department of Anesthesiology  Postprocedure Note    Patient: Jovanni Major  MRN: [id number]  YOB: 1975  Date of evaluation: 5/19/2023      Procedure Summary     Date: 05/16/23 Room / Location: [place] [address] / [address] [place] [address]    Anesthesia Start: 5839 Anesthesia Stop: 1853    Procedure: RIGHT PARTIAL FOOT AMPUTATION (Right: Foot) Diagnosis:       Gangrene ([address]      (Gangrene ([address] Jay Guise)    Surgeons: Analy Gray DPM Responsible Provider: Azra Waite MD    Anesthesia Type: MAC ASA Status: 3          Anesthesia Type: MAC    Jesenia Phase I:      Jesenia Phase II: Jesenia Score: 10      Anesthesia Post Evaluation    Patient location during evaluation: PACU  Patient participation: complete - patient participated  Level of consciousness: awake  Airway patency: patent  Nausea & Vomiting: no nausea and no vomiting  Complications: no  Cardiovascular status: hemodynamically stable  Respiratory status: acceptable  Hydration status: stable

## 2023-05-20 ENCOUNTER — ANESTHESIA (OUTPATIENT)
Dept: OPERATING ROOM | Age: 48
End: 2023-05-20
Payer: MEDICAID

## 2023-05-20 LAB
ALBUMIN SERPL-MCNC: 2.9 G/DL (ref 3.5–5.2)
ALP SERPL-CCNC: 64 U/L (ref 40–129)
ALT SERPL-CCNC: 14 U/L (ref 0–40)
ANION GAP SERPL CALCULATED.3IONS-SCNC: 7 MMOL/L (ref 7–16)
AST SERPL-CCNC: 26 U/L (ref 0–39)
BACTERIA SPEC AEROBE CULT: ABNORMAL
BACTERIA SPEC AEROBE CULT: ABNORMAL
BASOPHILS # BLD: 0.1 E9/L (ref 0–0.2)
BASOPHILS NFR BLD: 0.9 % (ref 0–2)
BILIRUB SERPL-MCNC: 0.5 MG/DL (ref 0–1.2)
BUN SERPL-MCNC: 9 MG/DL (ref 6–20)
CALCIUM SERPL-MCNC: 8.9 MG/DL (ref 8.6–10.2)
CHLORIDE SERPL-SCNC: 99 MMOL/L (ref 98–107)
CO2 SERPL-SCNC: 27 MMOL/L (ref 22–29)
CREAT SERPL-MCNC: 0.8 MG/DL (ref 0.7–1.2)
EOSINOPHIL # BLD: 0.47 E9/L (ref 0.05–0.5)
EOSINOPHIL NFR BLD: 4.4 % (ref 0–6)
ERYTHROCYTE [DISTWIDTH] IN BLOOD BY AUTOMATED COUNT: 13.5 FL (ref 11.5–15)
GLUCOSE SERPL-MCNC: 145 MG/DL (ref 74–99)
GRAM STAIN ORDERABLE: NORMAL
HCT VFR BLD AUTO: 40.1 % (ref 37–54)
HGB BLD-MCNC: 12.3 G/DL (ref 12.5–16.5)
IMM GRANULOCYTES # BLD: 0.38 E9/L
IMM GRANULOCYTES NFR BLD: 3.6 % (ref 0–5)
LYMPHOCYTES # BLD: 2.67 E9/L (ref 1.5–4)
LYMPHOCYTES NFR BLD: 25 % (ref 20–42)
MCH RBC QN AUTO: 25.6 PG (ref 26–35)
MCHC RBC AUTO-ENTMCNC: 30.7 % (ref 32–34.5)
MCV RBC AUTO: 83.5 FL (ref 80–99.9)
METER GLUCOSE: 119 MG/DL (ref 74–99)
METER GLUCOSE: 146 MG/DL (ref 74–99)
METER GLUCOSE: 150 MG/DL (ref 74–99)
METER GLUCOSE: 163 MG/DL (ref 74–99)
MONOCYTES # BLD: 0.66 E9/L (ref 0.1–0.95)
MONOCYTES NFR BLD: 6.2 % (ref 2–12)
NEUTROPHILS # BLD: 6.42 E9/L (ref 1.8–7.3)
NEUTS SEG NFR BLD: 59.9 % (ref 43–80)
ORGANISM: ABNORMAL
ORGANISM: ABNORMAL
PLATELET # BLD AUTO: 525 E9/L (ref 130–450)
PMV BLD AUTO: 8.8 FL (ref 7–12)
POTASSIUM SERPL-SCNC: 4.4 MMOL/L (ref 3.5–5)
PROT SERPL-MCNC: 8.2 G/DL (ref 6.4–8.3)
RBC # BLD AUTO: 4.8 E12/L (ref 3.8–5.8)
SODIUM SERPL-SCNC: 133 MMOL/L (ref 132–146)
WBC # BLD: 10.7 E9/L (ref 4.5–11.5)

## 2023-05-20 PROCEDURE — 2580000003 HC RX 258: Performed by: NURSE ANESTHETIST, CERTIFIED REGISTERED

## 2023-05-20 PROCEDURE — 1200000000 HC SEMI PRIVATE

## 2023-05-20 PROCEDURE — 82962 GLUCOSE BLOOD TEST: CPT

## 2023-05-20 PROCEDURE — 2500000003 HC RX 250 WO HCPCS: Performed by: PODIATRIST

## 2023-05-20 PROCEDURE — 3600000002 HC SURGERY LEVEL 2 BASE: Performed by: PODIATRIST

## 2023-05-20 PROCEDURE — 3600000012 HC SURGERY LEVEL 2 ADDTL 15MIN: Performed by: PODIATRIST

## 2023-05-20 PROCEDURE — 36415 COLL VENOUS BLD VENIPUNCTURE: CPT

## 2023-05-20 PROCEDURE — 3700000000 HC ANESTHESIA ATTENDED CARE: Performed by: PODIATRIST

## 2023-05-20 PROCEDURE — 6370000000 HC RX 637 (ALT 250 FOR IP)

## 2023-05-20 PROCEDURE — 7100000011 HC PHASE II RECOVERY - ADDTL 15 MIN: Performed by: PODIATRIST

## 2023-05-20 PROCEDURE — 2709999900 HC NON-CHARGEABLE SUPPLY: Performed by: PODIATRIST

## 2023-05-20 PROCEDURE — 0HRMXK4 REPLACEMENT OF RIGHT FOOT SKIN WITH NONAUTOLOGOUS TISSUE SUBSTITUTE, PARTIAL THICKNESS, EXTERNAL APPROACH: ICD-10-PCS | Performed by: PODIATRIST

## 2023-05-20 PROCEDURE — 2500000003 HC RX 250 WO HCPCS: Performed by: NURSE ANESTHETIST, CERTIFIED REGISTERED

## 2023-05-20 PROCEDURE — 0KBV0ZZ EXCISION OF RIGHT FOOT MUSCLE, OPEN APPROACH: ICD-10-PCS | Performed by: PODIATRIST

## 2023-05-20 PROCEDURE — 7100000010 HC PHASE II RECOVERY - FIRST 15 MIN: Performed by: PODIATRIST

## 2023-05-20 PROCEDURE — 6360000002 HC RX W HCPCS: Performed by: ANESTHESIOLOGY

## 2023-05-20 PROCEDURE — 80053 COMPREHEN METABOLIC PANEL: CPT

## 2023-05-20 PROCEDURE — 6360000002 HC RX W HCPCS

## 2023-05-20 PROCEDURE — 85025 COMPLETE CBC W/AUTO DIFF WBC: CPT

## 2023-05-20 PROCEDURE — 2580000003 HC RX 258

## 2023-05-20 PROCEDURE — 3700000001 HC ADD 15 MINUTES (ANESTHESIA): Performed by: PODIATRIST

## 2023-05-20 PROCEDURE — 6360000002 HC RX W HCPCS: Performed by: NURSE ANESTHETIST, CERTIFIED REGISTERED

## 2023-05-20 DEVICE — INTEGRA® BILAYER MATRIX WOUND DRESSING 4 IN*5 IN (10 CM*12.5 CM)
Type: IMPLANTABLE DEVICE | Site: FOOT | Status: FUNCTIONAL
Brand: INTEGRA®

## 2023-05-20 RX ORDER — PROPOFOL 10 MG/ML
INJECTION, EMULSION INTRAVENOUS PRN
Status: DISCONTINUED | OUTPATIENT
Start: 2023-05-20 | End: 2023-05-20 | Stop reason: SDUPTHER

## 2023-05-20 RX ORDER — SODIUM CHLORIDE 0.9 % (FLUSH) 0.9 %
5-40 SYRINGE (ML) INJECTION EVERY 12 HOURS SCHEDULED
Status: DISCONTINUED | OUTPATIENT
Start: 2023-05-20 | End: 2023-05-20 | Stop reason: HOSPADM

## 2023-05-20 RX ORDER — SODIUM CHLORIDE 0.9 % (FLUSH) 0.9 %
5-40 SYRINGE (ML) INJECTION PRN
Status: DISCONTINUED | OUTPATIENT
Start: 2023-05-20 | End: 2023-05-20 | Stop reason: HOSPADM

## 2023-05-20 RX ORDER — PROPOFOL 10 MG/ML
INJECTION, EMULSION INTRAVENOUS CONTINUOUS PRN
Status: DISCONTINUED | OUTPATIENT
Start: 2023-05-20 | End: 2023-05-20 | Stop reason: SDUPTHER

## 2023-05-20 RX ORDER — LIDOCAINE HYDROCHLORIDE 20 MG/ML
INJECTION, SOLUTION EPIDURAL; INFILTRATION; INTRACAUDAL; PERINEURAL PRN
Status: DISCONTINUED | OUTPATIENT
Start: 2023-05-20 | End: 2023-05-20 | Stop reason: SDUPTHER

## 2023-05-20 RX ORDER — SODIUM CHLORIDE 0.9 % (FLUSH) 0.9 %
5-40 SYRINGE (ML) INJECTION PRN
Status: DISCONTINUED | OUTPATIENT
Start: 2023-05-20 | End: 2023-05-23 | Stop reason: HOSPADM

## 2023-05-20 RX ORDER — SODIUM CHLORIDE 9 MG/ML
INJECTION, SOLUTION INTRAVENOUS PRN
Status: DISCONTINUED | OUTPATIENT
Start: 2023-05-20 | End: 2023-05-20 | Stop reason: HOSPADM

## 2023-05-20 RX ORDER — SODIUM CHLORIDE 9 MG/ML
INJECTION, SOLUTION INTRAVENOUS PRN
Status: DISCONTINUED | OUTPATIENT
Start: 2023-05-20 | End: 2023-05-23 | Stop reason: HOSPADM

## 2023-05-20 RX ORDER — SODIUM CHLORIDE 0.9 % (FLUSH) 0.9 %
5-40 SYRINGE (ML) INJECTION EVERY 12 HOURS SCHEDULED
Status: DISCONTINUED | OUTPATIENT
Start: 2023-05-20 | End: 2023-05-22

## 2023-05-20 RX ORDER — SODIUM CHLORIDE 9 MG/ML
INJECTION, SOLUTION INTRAVENOUS CONTINUOUS PRN
Status: DISCONTINUED | OUTPATIENT
Start: 2023-05-20 | End: 2023-05-20 | Stop reason: SDUPTHER

## 2023-05-20 RX ORDER — PROCHLORPERAZINE EDISYLATE 5 MG/ML
5 INJECTION INTRAMUSCULAR; INTRAVENOUS
Status: DISCONTINUED | OUTPATIENT
Start: 2023-05-20 | End: 2023-05-20 | Stop reason: HOSPADM

## 2023-05-20 RX ORDER — LIDOCAINE HYDROCHLORIDE 10 MG/ML
INJECTION, SOLUTION INFILTRATION; PERINEURAL PRN
Status: DISCONTINUED | OUTPATIENT
Start: 2023-05-20 | End: 2023-05-20 | Stop reason: ALTCHOICE

## 2023-05-20 RX ORDER — BUPIVACAINE HYDROCHLORIDE AND EPINEPHRINE 2.5; 5 MG/ML; UG/ML
INJECTION, SOLUTION EPIDURAL; INFILTRATION; INTRACAUDAL; PERINEURAL PRN
Status: DISCONTINUED | OUTPATIENT
Start: 2023-05-20 | End: 2023-05-20 | Stop reason: ALTCHOICE

## 2023-05-20 RX ADMIN — MEROPENEM 1000 MG: 1 INJECTION, POWDER, FOR SOLUTION INTRAVENOUS at 22:16

## 2023-05-20 RX ADMIN — LIDOCAINE HYDROCHLORIDE 40 MG: 20 INJECTION, SOLUTION EPIDURAL; INFILTRATION; INTRACAUDAL; PERINEURAL at 13:41

## 2023-05-20 RX ADMIN — Medication 10 ML: at 08:50

## 2023-05-20 RX ADMIN — MEROPENEM 1000 MG: 1 INJECTION, POWDER, FOR SOLUTION INTRAVENOUS at 06:29

## 2023-05-20 RX ADMIN — INSULIN GLARGINE 15 UNITS: 100 INJECTION, SOLUTION SUBCUTANEOUS at 22:10

## 2023-05-20 RX ADMIN — AMLODIPINE BESYLATE 5 MG: 5 TABLET ORAL at 22:11

## 2023-05-20 RX ADMIN — PROPOFOL 150 MCG/KG/MIN: 10 INJECTION, EMULSION INTRAVENOUS at 13:41

## 2023-05-20 RX ADMIN — HYDROMORPHONE HYDROCHLORIDE 0.5 MG: 1 INJECTION, SOLUTION INTRAMUSCULAR; INTRAVENOUS; SUBCUTANEOUS at 14:27

## 2023-05-20 RX ADMIN — HYDROMORPHONE HYDROCHLORIDE 0.5 MG: 1 INJECTION, SOLUTION INTRAMUSCULAR; INTRAVENOUS; SUBCUTANEOUS at 14:19

## 2023-05-20 RX ADMIN — ATORVASTATIN CALCIUM 5 MG: 10 TABLET, FILM COATED ORAL at 22:11

## 2023-05-20 RX ADMIN — ENOXAPARIN SODIUM 30 MG: 100 INJECTION SUBCUTANEOUS at 17:44

## 2023-05-20 RX ADMIN — SODIUM CHLORIDE: 9 INJECTION, SOLUTION INTRAVENOUS at 13:31

## 2023-05-20 RX ADMIN — PROPOFOL 70 MG: 10 INJECTION, EMULSION INTRAVENOUS at 13:41

## 2023-05-20 RX ADMIN — MEROPENEM 1000 MG: 1 INJECTION, POWDER, FOR SOLUTION INTRAVENOUS at 15:51

## 2023-05-20 ASSESSMENT — PAIN DESCRIPTION - ONSET
ONSET: ON-GOING
ONSET: ON-GOING

## 2023-05-20 ASSESSMENT — PAIN DESCRIPTION - PAIN TYPE
TYPE: SURGICAL PAIN
TYPE: SURGICAL PAIN

## 2023-05-20 ASSESSMENT — PAIN SCALES - GENERAL
PAINLEVEL_OUTOF10: 0
PAINLEVEL_OUTOF10: 6
PAINLEVEL_OUTOF10: 0
PAINLEVEL_OUTOF10: 1
PAINLEVEL_OUTOF10: 1
PAINLEVEL_OUTOF10: 7
PAINLEVEL_OUTOF10: 0
PAINLEVEL_OUTOF10: 0
PAINLEVEL_OUTOF10: 1
PAINLEVEL_OUTOF10: 7
PAINLEVEL_OUTOF10: 0
PAINLEVEL_OUTOF10: 3

## 2023-05-20 ASSESSMENT — PAIN - FUNCTIONAL ASSESSMENT
PAIN_FUNCTIONAL_ASSESSMENT: ACTIVITIES ARE NOT PREVENTED
PAIN_FUNCTIONAL_ASSESSMENT: ACTIVITIES ARE NOT PREVENTED

## 2023-05-20 ASSESSMENT — PAIN DESCRIPTION - ORIENTATION
ORIENTATION: RIGHT

## 2023-05-20 ASSESSMENT — PAIN DESCRIPTION - DESCRIPTORS
DESCRIPTORS: DISCOMFORT;BURNING
DESCRIPTORS: DISCOMFORT;BURNING
DESCRIPTORS: BURNING;DISCOMFORT
DESCRIPTORS: BURNING;DISCOMFORT

## 2023-05-20 ASSESSMENT — PAIN DESCRIPTION - LOCATION
LOCATION: FOOT

## 2023-05-20 ASSESSMENT — PAIN DESCRIPTION - FREQUENCY
FREQUENCY: CONTINUOUS
FREQUENCY: CONTINUOUS

## 2023-05-20 NOTE — ANESTHESIA PRE PROCEDURE
% sodium chloride infusion   IntraVENous PRN Jere Padilla MD        heparin flush 100 UNIT/ML injection 300 Units  3 mL IntraCATHeter 2 times per day Jere Padilla MD        heparin flush 100 UNIT/ML injection 300 Units  3 mL Intercatheter PRN Jere Padilla MD        oxyCODONE (ROXICODONE) immediate release tablet 5 mg  5 mg Oral Q4H PRN Argelia Vee DO   5 mg at 05/19/23 0750    sodium chloride flush 0.9 % injection 5-40 mL  5-40 mL IntraVENous 2 times per day Abbey Haile, DPM   10 mL at 05/18/23 0755    sodium chloride flush 0.9 % injection 5-40 mL  5-40 mL IntraVENous PRN Abbey Haile DPM        0.9 % sodium chloride infusion   IntraVENous PRN Abbey Haile, DPM        enoxaparin Sodium (LOVENOX) injection 30 mg  30 mg SubCUTAneous BID Abbey Haile DPM   30 mg at 05/19/23 0752    polyethylene glycol (GLYCOLAX) packet 17 g  17 g Oral Daily PRN Abbey Haile, DPM        acetaminophen (TYLENOL) tablet 650 mg  650 mg Oral Q6H PRN Abbey Haile, DPM        Or    acetaminophen (TYLENOL) suppository 650 mg  650 mg Rectal Q6H PRN Abbey Haile, DPM        glucose chewable tablet 16 g  4 tablet Oral PRN Abbey Haile, DPM        dextrose bolus 10% 125 mL  125 mL IntraVENous PRN Abbey Haile, DPM        Or    dextrose bolus 10% 250 mL  250 mL IntraVENous PRN Abbey Haile, DPM        glucagon (rDNA) injection 1 mg  1 mg SubCUTAneous PRN Abbey Haile, DPM        dextrose 10 % infusion   IntraVENous Continuous PRN Abbey Haile, DPM        atorvastatin (LIPITOR) tablet 5 mg  5 mg Oral Nightly Abbey Haile, DPM   5 mg at 05/19/23 2033    insulin glargine (LANTUS) injection vial 15 Units  15 Units SubCUTAneous Nightly Abbey Haile DPM   15 Units at 05/18/23 2013    insulin lispro (HUMALOG) injection vial 0-8 Units  0-8 Units SubCUTAneous TID WC Abbey Haile DPM   2 Units at 05/18/23 1524    insulin lispro (HUMALOG) injection vial 0-4 Units  0-4 Units SubCUTAneous Nightly

## 2023-05-21 LAB
ALBUMIN SERPL-MCNC: 3.1 G/DL (ref 3.5–5.2)
ALP SERPL-CCNC: 68 U/L (ref 40–129)
ALT SERPL-CCNC: 15 U/L (ref 0–40)
ANION GAP SERPL CALCULATED.3IONS-SCNC: 8 MMOL/L (ref 7–16)
AST SERPL-CCNC: 24 U/L (ref 0–39)
BACTERIA BLD CULT ORG #2: NORMAL
BACTERIA BLD CULT: NORMAL
BASOPHILS # BLD: 0.1 E9/L (ref 0–0.2)
BASOPHILS NFR BLD: 1.1 % (ref 0–2)
BILIRUB SERPL-MCNC: 0.6 MG/DL (ref 0–1.2)
BUN SERPL-MCNC: 12 MG/DL (ref 6–20)
CALCIUM SERPL-MCNC: 9 MG/DL (ref 8.6–10.2)
CHLORIDE SERPL-SCNC: 101 MMOL/L (ref 98–107)
CO2 SERPL-SCNC: 26 MMOL/L (ref 22–29)
CREAT SERPL-MCNC: 0.9 MG/DL (ref 0.7–1.2)
EOSINOPHIL # BLD: 0.38 E9/L (ref 0.05–0.5)
EOSINOPHIL NFR BLD: 4.1 % (ref 0–6)
ERYTHROCYTE [DISTWIDTH] IN BLOOD BY AUTOMATED COUNT: 13.6 FL (ref 11.5–15)
GLUCOSE SERPL-MCNC: 196 MG/DL (ref 74–99)
HCT VFR BLD AUTO: 42.4 % (ref 37–54)
HGB BLD-MCNC: 13.1 G/DL (ref 12.5–16.5)
IMM GRANULOCYTES # BLD: 0.36 E9/L
IMM GRANULOCYTES NFR BLD: 3.9 % (ref 0–5)
LYMPHOCYTES # BLD: 2.12 E9/L (ref 1.5–4)
LYMPHOCYTES NFR BLD: 22.9 % (ref 20–42)
MCH RBC QN AUTO: 26.2 PG (ref 26–35)
MCHC RBC AUTO-ENTMCNC: 30.9 % (ref 32–34.5)
MCV RBC AUTO: 84.8 FL (ref 80–99.9)
METER GLUCOSE: 149 MG/DL (ref 74–99)
METER GLUCOSE: 161 MG/DL (ref 74–99)
METER GLUCOSE: 173 MG/DL (ref 74–99)
METER GLUCOSE: 234 MG/DL (ref 74–99)
MONOCYTES # BLD: 0.64 E9/L (ref 0.1–0.95)
MONOCYTES NFR BLD: 6.9 % (ref 2–12)
NEUTROPHILS # BLD: 5.66 E9/L (ref 1.8–7.3)
NEUTS SEG NFR BLD: 61.1 % (ref 43–80)
PLATELET # BLD AUTO: 579 E9/L (ref 130–450)
PMV BLD AUTO: 8.8 FL (ref 7–12)
POTASSIUM SERPL-SCNC: 4.9 MMOL/L (ref 3.5–5)
PROT SERPL-MCNC: 8.5 G/DL (ref 6.4–8.3)
RBC # BLD AUTO: 5 E12/L (ref 3.8–5.8)
SODIUM SERPL-SCNC: 135 MMOL/L (ref 132–146)
WBC # BLD: 9.3 E9/L (ref 4.5–11.5)

## 2023-05-21 PROCEDURE — 6370000000 HC RX 637 (ALT 250 FOR IP)

## 2023-05-21 PROCEDURE — 6360000002 HC RX W HCPCS

## 2023-05-21 PROCEDURE — 1200000000 HC SEMI PRIVATE

## 2023-05-21 PROCEDURE — 2500000003 HC RX 250 WO HCPCS

## 2023-05-21 PROCEDURE — 82962 GLUCOSE BLOOD TEST: CPT

## 2023-05-21 PROCEDURE — 36569 INSJ PICC 5 YR+ W/O IMAGING: CPT

## 2023-05-21 PROCEDURE — 02HV33Z INSERTION OF INFUSION DEVICE INTO SUPERIOR VENA CAVA, PERCUTANEOUS APPROACH: ICD-10-PCS | Performed by: FAMILY MEDICINE

## 2023-05-21 PROCEDURE — 36415 COLL VENOUS BLD VENIPUNCTURE: CPT

## 2023-05-21 PROCEDURE — 80053 COMPREHEN METABOLIC PANEL: CPT

## 2023-05-21 PROCEDURE — 2580000003 HC RX 258

## 2023-05-21 PROCEDURE — 85025 COMPLETE CBC W/AUTO DIFF WBC: CPT

## 2023-05-21 PROCEDURE — C1751 CATH, INF, PER/CENT/MIDLINE: HCPCS

## 2023-05-21 PROCEDURE — 76937 US GUIDE VASCULAR ACCESS: CPT

## 2023-05-21 RX ADMIN — MEROPENEM 1000 MG: 1 INJECTION, POWDER, FOR SOLUTION INTRAVENOUS at 06:55

## 2023-05-21 RX ADMIN — LIDOCAINE HYDROCHLORIDE 1 ML: 10 SOLUTION INTRAVENOUS at 16:05

## 2023-05-21 RX ADMIN — Medication 10 ML: at 08:00

## 2023-05-21 RX ADMIN — ENOXAPARIN SODIUM 30 MG: 100 INJECTION SUBCUTANEOUS at 08:00

## 2023-05-21 RX ADMIN — Medication 10 ML: at 22:10

## 2023-05-21 RX ADMIN — SODIUM CHLORIDE, PRESERVATIVE FREE 300 UNITS: 5 INJECTION INTRAVENOUS at 22:10

## 2023-05-21 RX ADMIN — ENOXAPARIN SODIUM 30 MG: 100 INJECTION SUBCUTANEOUS at 18:44

## 2023-05-21 RX ADMIN — INSULIN LISPRO 2 UNITS: 100 INJECTION, SOLUTION INTRAVENOUS; SUBCUTANEOUS at 10:55

## 2023-05-21 RX ADMIN — MEROPENEM 1000 MG: 1 INJECTION, POWDER, FOR SOLUTION INTRAVENOUS at 22:13

## 2023-05-21 RX ADMIN — MEROPENEM 1000 MG: 1 INJECTION, POWDER, FOR SOLUTION INTRAVENOUS at 14:37

## 2023-05-21 RX ADMIN — OXYCODONE 5 MG: 5 TABLET ORAL at 06:57

## 2023-05-21 RX ADMIN — ATORVASTATIN CALCIUM 5 MG: 10 TABLET, FILM COATED ORAL at 22:10

## 2023-05-21 RX ADMIN — INSULIN GLARGINE 15 UNITS: 100 INJECTION, SOLUTION SUBCUTANEOUS at 22:18

## 2023-05-21 RX ADMIN — AMLODIPINE BESYLATE 5 MG: 5 TABLET ORAL at 22:10

## 2023-05-21 ASSESSMENT — PAIN SCALES - GENERAL
PAINLEVEL_OUTOF10: 0
PAINLEVEL_OUTOF10: 0

## 2023-05-22 PROBLEM — L08.9 DIABETIC INFECTION OF RIGHT FOOT (HCC): Status: ACTIVE | Noted: 2022-06-20

## 2023-05-22 LAB
ALBUMIN SERPL-MCNC: 3.5 G/DL (ref 3.5–5.2)
ALP SERPL-CCNC: 64 U/L (ref 40–129)
ALT SERPL-CCNC: 17 U/L (ref 0–40)
ANION GAP SERPL CALCULATED.3IONS-SCNC: 8 MMOL/L (ref 7–16)
AST SERPL-CCNC: 27 U/L (ref 0–39)
BASOPHILS # BLD: 0.07 E9/L (ref 0–0.2)
BASOPHILS NFR BLD: 0.8 % (ref 0–2)
BILIRUB SERPL-MCNC: 0.7 MG/DL (ref 0–1.2)
BUN SERPL-MCNC: 16 MG/DL (ref 6–20)
CALCIUM SERPL-MCNC: 9.5 MG/DL (ref 8.6–10.2)
CHLORIDE SERPL-SCNC: 101 MMOL/L (ref 98–107)
CO2 SERPL-SCNC: 27 MMOL/L (ref 22–29)
CREAT SERPL-MCNC: 0.8 MG/DL (ref 0.7–1.2)
EOSINOPHIL # BLD: 0.36 E9/L (ref 0.05–0.5)
EOSINOPHIL NFR BLD: 4 % (ref 0–6)
ERYTHROCYTE [DISTWIDTH] IN BLOOD BY AUTOMATED COUNT: 13.5 FL (ref 11.5–15)
GLUCOSE SERPL-MCNC: 212 MG/DL (ref 74–99)
HCT VFR BLD AUTO: 43.4 % (ref 37–54)
HGB BLD-MCNC: 13.6 G/DL (ref 12.5–16.5)
IMM GRANULOCYTES # BLD: 0.24 E9/L
IMM GRANULOCYTES NFR BLD: 2.7 % (ref 0–5)
LYMPHOCYTES # BLD: 2.11 E9/L (ref 1.5–4)
LYMPHOCYTES NFR BLD: 23.5 % (ref 20–42)
MCH RBC QN AUTO: 26.2 PG (ref 26–35)
MCHC RBC AUTO-ENTMCNC: 31.3 % (ref 32–34.5)
MCV RBC AUTO: 83.5 FL (ref 80–99.9)
METER GLUCOSE: 156 MG/DL (ref 74–99)
METER GLUCOSE: 156 MG/DL (ref 74–99)
METER GLUCOSE: 158 MG/DL (ref 74–99)
METER GLUCOSE: 210 MG/DL (ref 74–99)
MONOCYTES # BLD: 0.59 E9/L (ref 0.1–0.95)
MONOCYTES NFR BLD: 6.6 % (ref 2–12)
NEUTROPHILS # BLD: 5.62 E9/L (ref 1.8–7.3)
NEUTS SEG NFR BLD: 62.4 % (ref 43–80)
PLATELET # BLD AUTO: 570 E9/L (ref 130–450)
PMV BLD AUTO: 9.1 FL (ref 7–12)
POTASSIUM SERPL-SCNC: 4.7 MMOL/L (ref 3.5–5)
PROT SERPL-MCNC: 8.8 G/DL (ref 6.4–8.3)
RBC # BLD AUTO: 5.2 E12/L (ref 3.8–5.8)
SODIUM SERPL-SCNC: 136 MMOL/L (ref 132–146)
WBC # BLD: 9 E9/L (ref 4.5–11.5)

## 2023-05-22 PROCEDURE — 97535 SELF CARE MNGMENT TRAINING: CPT

## 2023-05-22 PROCEDURE — 6370000000 HC RX 637 (ALT 250 FOR IP)

## 2023-05-22 PROCEDURE — 6360000002 HC RX W HCPCS

## 2023-05-22 PROCEDURE — 85025 COMPLETE CBC W/AUTO DIFF WBC: CPT

## 2023-05-22 PROCEDURE — 80053 COMPREHEN METABOLIC PANEL: CPT

## 2023-05-22 PROCEDURE — 2580000003 HC RX 258

## 2023-05-22 PROCEDURE — 36415 COLL VENOUS BLD VENIPUNCTURE: CPT

## 2023-05-22 PROCEDURE — 82962 GLUCOSE BLOOD TEST: CPT

## 2023-05-22 PROCEDURE — 1200000000 HC SEMI PRIVATE

## 2023-05-22 PROCEDURE — 97530 THERAPEUTIC ACTIVITIES: CPT

## 2023-05-22 RX ORDER — POLYETHYLENE GLYCOL 3350 17 G/17G
17 POWDER, FOR SOLUTION ORAL DAILY PRN
Qty: 527 G | Refills: 1 | DISCHARGE
Start: 2023-05-22 | End: 2023-06-21

## 2023-05-22 RX ORDER — OXYCODONE HYDROCHLORIDE 5 MG/1
5 TABLET ORAL EVERY 4 HOURS PRN
Qty: 42 TABLET | Refills: 0 | Status: SHIPPED | OUTPATIENT
Start: 2023-05-22 | End: 2023-05-29

## 2023-05-22 RX ORDER — INSULIN LISPRO 100 [IU]/ML
0-4 INJECTION, SOLUTION INTRAVENOUS; SUBCUTANEOUS NIGHTLY
DISCHARGE
Start: 2023-05-22

## 2023-05-22 RX ORDER — ENOXAPARIN SODIUM 100 MG/ML
30 INJECTION SUBCUTANEOUS 2 TIMES DAILY
DISCHARGE
Start: 2023-05-22

## 2023-05-22 RX ORDER — INSULIN LISPRO 100 [IU]/ML
0-8 INJECTION, SOLUTION INTRAVENOUS; SUBCUTANEOUS
DISCHARGE
Start: 2023-05-22

## 2023-05-22 RX ADMIN — MEROPENEM 1000 MG: 1 INJECTION, POWDER, FOR SOLUTION INTRAVENOUS at 06:44

## 2023-05-22 RX ADMIN — Medication 10 ML: at 20:46

## 2023-05-22 RX ADMIN — SODIUM CHLORIDE, PRESERVATIVE FREE 300 UNITS: 5 INJECTION INTRAVENOUS at 20:46

## 2023-05-22 RX ADMIN — SODIUM CHLORIDE, PRESERVATIVE FREE 10 ML: 5 INJECTION INTRAVENOUS at 08:27

## 2023-05-22 RX ADMIN — ATORVASTATIN CALCIUM 5 MG: 10 TABLET, FILM COATED ORAL at 20:43

## 2023-05-22 RX ADMIN — Medication 10 ML: at 08:27

## 2023-05-22 RX ADMIN — OXYCODONE 5 MG: 5 TABLET ORAL at 06:45

## 2023-05-22 RX ADMIN — AMLODIPINE BESYLATE 5 MG: 5 TABLET ORAL at 20:48

## 2023-05-22 RX ADMIN — INSULIN GLARGINE 15 UNITS: 100 INJECTION, SOLUTION SUBCUTANEOUS at 20:44

## 2023-05-22 RX ADMIN — SODIUM CHLORIDE, PRESERVATIVE FREE 300 UNITS: 5 INJECTION INTRAVENOUS at 08:26

## 2023-05-22 RX ADMIN — INSULIN LISPRO 2 UNITS: 100 INJECTION, SOLUTION INTRAVENOUS; SUBCUTANEOUS at 11:20

## 2023-05-22 RX ADMIN — MEROPENEM 1000 MG: 1 INJECTION, POWDER, FOR SOLUTION INTRAVENOUS at 16:25

## 2023-05-22 RX ADMIN — ENOXAPARIN SODIUM 30 MG: 100 INJECTION SUBCUTANEOUS at 08:25

## 2023-05-22 RX ADMIN — ENOXAPARIN SODIUM 30 MG: 100 INJECTION SUBCUTANEOUS at 19:07

## 2023-05-22 ASSESSMENT — PAIN SCALES - GENERAL: PAINLEVEL_OUTOF10: 7

## 2023-05-22 ASSESSMENT — PAIN DESCRIPTION - DESCRIPTORS: DESCRIPTORS: THROBBING

## 2023-05-22 ASSESSMENT — PAIN DESCRIPTION - LOCATION: LOCATION: FOOT

## 2023-05-22 ASSESSMENT — PAIN DESCRIPTION - ORIENTATION: ORIENTATION: RIGHT

## 2023-05-22 ASSESSMENT — PAIN - FUNCTIONAL ASSESSMENT: PAIN_FUNCTIONAL_ASSESSMENT: PREVENTS OR INTERFERES SOME ACTIVE ACTIVITIES AND ADLS

## 2023-05-22 NOTE — PATIENT CARE CONFERENCE
P Quality Flow/Interdisciplinary Rounds Progress Note        Quality Flow Rounds held on May 22, 2023    Disciplines Attending:  Bedside Nurse, , , and Nursing Unit Leadership    Joleen Romano was admitted on 5/15/2023  9:37 PM    Anticipated Discharge Date:  Expected Discharge Date: 05/18/23    Disposition:    Eamon Score:  Eamon Scale Score: 19    Readmission Risk              Risk of Unplanned Readmission:  14           Discussed patient goal for the day, patient clinical progression, and barriers to discharge.   The following Goal(s) of the Day/Commitment(s) have been identified:  Discharge - Obtain Order      Josefina Augustin RN  May 22, 2023

## 2023-05-22 NOTE — PLAN OF CARE
5/21/2023 0943)  Absence of infection at discharge:   Assess and monitor for signs and symptoms of infection   Monitor lab/diagnostic results   Monitor all insertion sites i.e., indwelling lines, tubes and drains   Monitor endotracheal (as able) and nasal secretions for changes in amount and color   Nenana appropriate cooling/warming therapies per order   Administer medications as ordered   Instruct and encourage patient and family to use good hand hygiene technique   Identify and instruct in appropriate isolation precautions for identified infection/condition  Goal: Absence of infection during hospitalization  5/21/2023 2250 by Quinn Amezquita  Outcome: Progressing  5/21/2023 1854 by Lisa Zimmerman RN  Outcome: Progressing  Goal: Absence of fever/infection during anticipated neutropenic period  5/21/2023 2250 by Quinn Amezquita  Outcome: Progressing  5/21/2023 1854 by Lisa Zimmerman RN  Outcome: Progressing

## 2023-05-22 NOTE — DISCHARGE INSTRUCTIONS
the infusion and blood work to accommodate the patients home care conditions. When PICC or VAD is to be removed, documentation of length of inserted PICC. PICC or VAD length is to correlate with inserted length and sent to physician at the time of removal.  Give the patient a list of antibiotics being administered with:  Drug name  Route  Frequency  Start/Stop date      ROUTINE LABS TO BE DRAWN/ORDERED:  Twice weekly (preferably every Monday & Thursday):  CMP  Complete Blood Count with differential (CBC with dif)  Once weekly:  C-Reactive Protein (not high sensitivity CRP)  Erythrocyte/Westergren Sedimentation Rate (WSR or ESR)  Total CPK for patients on Daptomycin (Cubicin®). Obtain CPK more often if the patient is experiencing muscle weakness or myalgias. Clinical Pharmacist is to adjust Vancomycin and Aminoglycosides. Clinical pharmacist is  encouraged to follow: \"Therapeutic Monitoring of Vancomycin for Serious Methicillin-resistant Staphylococcus aureus Infections: A Revised Consensus Guideline and Review by the American Society of Health-system Pharmacists, the Infectious Diseases Society of [facility] Prieto Biswas, the Pediatric Infectious Diseases Society, and the Society of Infectious Diseases Pharmacists\" (https://doi.org/10.1093/antionette/bhbw231). If a clinical calculator is not available, clinical pharmacist is to follow the orders below:  Draw Vancomycin trough 30 minutes before the third dose  After starting drug, or   After the dosing or interval is changed. If the trough level is between 5 and 20 continue dose as ordered. Draw troughs twice weekly thereafter until troughs are stable (i.e. until trough is between 10 and 20 mcg/ml for two consecutive laboratory values). Once stable check troughs once weekly or every third dose. Please do not call physician unless the trough is < 5 or >20. If the trough is <20 continue dosing as ordered. If the trough is >20 call the office for further orders.   Do not hold the

## 2023-05-22 NOTE — CARE COORDINATION
Social Work discharge planning    Notified PT OT that [facility] needs updates this am for precert per Stephen Montana. Electronically signed by Sofia Jordan on 5/22/2023 at 8:30 AM     Addendum-    JEWEL met with pt and updated him that precert remains pending, and that his insurance requested updated pt ot today. Jewel notified Taylor August with [facility] that PT OT notes are in chart.   Electronically signed by Sofia Jordan on 5/22/2023 at 12:50 PM

## 2023-05-22 NOTE — DISCHARGE INSTR - COC
Continuity of Care Form    Patient Name: Leanna Dodson   :  1975  MRN:  [id number]    Admit date:  5/15/2023  Discharge date:  23    Code Status Order: Full Code   Advance Directives:     Admitting Physician:  Yesenia Hampton DO  PCP: Glenn Mena MD    Discharging Nurse: Vannesa Domingo RN  [address] Unit/Room#: 7262/0337-X  Discharging Unit Phone Number: [phone]    Emergency Contact:   Extended Emergency Contact Information  Primary Emergency Contact: Darion Gonzáles  Address: [facility] [address]           [address] Phone: [phone]  Mobile Phone: [phone]  Relation: Spouse  Preferred language: English   needed?  No    Past Surgical History:  Past Surgical History:   Procedure Laterality Date    ANKLE FUSION  2020    FOOT AMPUTATION Left 2020    LEFT FOOT SYMES AMPUTATION performed by Fuad Olivas DPM at [address] Left 10/17/2020    LEFT FOOT  INCISION AND DRAINAGE, BONE DEBRIDEMENT AND BIOPSY performed by Ruth Ann Clemente DPM at [address] Left 2020    LEFT FOOT INCISION AND DRAINAGE APPLICATION OF WOUND VAC POSSIBLE INTEGRA GRAFT performed by Fuad Olivas DPM at [address] Left 2020    LEFT FOOT WOUND DEBRIDEMENT, INCISION AND DRAINAGE OF LEFT FOOT HEMATOMA, INTEGRA, WOUND VAC performed by Yaya Hatch DPM at [address] Right 2022    INCISION AND DRAINAGE WITH DEBRIDEMENT performed by Fuad Olivas DPM at [address] Right 2023    FOOT DEBRIDEMENT INCISION AND DRAINAGE WITH APPLICATION OF INTEGRA GRAFT AND WOUND VAC performed by Fuad Olivas DPM at [place] [address] Right 2023    RIGHT PARTIAL FOOT AMPUTATION performed by Fuad Olivas DPM at [address] [place] [address]  2020         PICC LINE INSERTION NURSE  2023       Immunization History:

## 2023-05-23 VITALS
OXYGEN SATURATION: 96 % | SYSTOLIC BLOOD PRESSURE: 131 MMHG | TEMPERATURE: 98.5 F | DIASTOLIC BLOOD PRESSURE: 80 MMHG | BODY MASS INDEX: 38.36 KG/M2 | WEIGHT: 315 LBS | HEART RATE: 85 BPM | RESPIRATION RATE: 18 BRPM | HEIGHT: 76 IN

## 2023-05-23 LAB
ALBUMIN SERPL-MCNC: 3.4 G/DL (ref 3.5–5.2)
ALP SERPL-CCNC: 73 U/L (ref 40–129)
ALT SERPL-CCNC: 14 U/L (ref 0–40)
ANION GAP SERPL CALCULATED.3IONS-SCNC: 10 MMOL/L (ref 7–16)
AST SERPL-CCNC: 20 U/L (ref 0–39)
BASOPHILS # BLD: 0.07 E9/L (ref 0–0.2)
BASOPHILS NFR BLD: 0.6 % (ref 0–2)
BILIRUB SERPL-MCNC: 0.7 MG/DL (ref 0–1.2)
BUN SERPL-MCNC: 20 MG/DL (ref 6–20)
CALCIUM SERPL-MCNC: 9.3 MG/DL (ref 8.6–10.2)
CHLORIDE SERPL-SCNC: 99 MMOL/L (ref 98–107)
CO2 SERPL-SCNC: 24 MMOL/L (ref 22–29)
CREAT SERPL-MCNC: 0.8 MG/DL (ref 0.7–1.2)
EOSINOPHIL # BLD: 0.38 E9/L (ref 0.05–0.5)
EOSINOPHIL NFR BLD: 3.3 % (ref 0–6)
ERYTHROCYTE [DISTWIDTH] IN BLOOD BY AUTOMATED COUNT: 13.4 FL (ref 11.5–15)
GLUCOSE SERPL-MCNC: 186 MG/DL (ref 74–99)
HCT VFR BLD AUTO: 42.9 % (ref 37–54)
HGB BLD-MCNC: 13.5 G/DL (ref 12.5–16.5)
IMM GRANULOCYTES # BLD: 0.2 E9/L
IMM GRANULOCYTES NFR BLD: 1.8 % (ref 0–5)
LYMPHOCYTES # BLD: 2.22 E9/L (ref 1.5–4)
LYMPHOCYTES NFR BLD: 19.5 % (ref 20–42)
MCH RBC QN AUTO: 26 PG (ref 26–35)
MCHC RBC AUTO-ENTMCNC: 31.5 % (ref 32–34.5)
MCV RBC AUTO: 82.7 FL (ref 80–99.9)
METER GLUCOSE: 203 MG/DL (ref 74–99)
METER GLUCOSE: 215 MG/DL (ref 74–99)
MONOCYTES # BLD: 0.72 E9/L (ref 0.1–0.95)
MONOCYTES NFR BLD: 6.3 % (ref 2–12)
NEUTROPHILS # BLD: 7.81 E9/L (ref 1.8–7.3)
NEUTS SEG NFR BLD: 68.5 % (ref 43–80)
PLATELET # BLD AUTO: 605 E9/L (ref 130–450)
PMV BLD AUTO: 8.9 FL (ref 7–12)
POTASSIUM SERPL-SCNC: 4.7 MMOL/L (ref 3.5–5)
PROT SERPL-MCNC: 8.6 G/DL (ref 6.4–8.3)
RBC # BLD AUTO: 5.19 E12/L (ref 3.8–5.8)
SODIUM SERPL-SCNC: 133 MMOL/L (ref 132–146)
WBC # BLD: 11.4 E9/L (ref 4.5–11.5)

## 2023-05-23 PROCEDURE — 80053 COMPREHEN METABOLIC PANEL: CPT

## 2023-05-23 PROCEDURE — 36415 COLL VENOUS BLD VENIPUNCTURE: CPT

## 2023-05-23 PROCEDURE — 6360000002 HC RX W HCPCS: Performed by: NURSE PRACTITIONER

## 2023-05-23 PROCEDURE — 2580000003 HC RX 258

## 2023-05-23 PROCEDURE — 6360000002 HC RX W HCPCS

## 2023-05-23 PROCEDURE — 82962 GLUCOSE BLOOD TEST: CPT

## 2023-05-23 PROCEDURE — 6370000000 HC RX 637 (ALT 250 FOR IP)

## 2023-05-23 PROCEDURE — 85025 COMPLETE CBC W/AUTO DIFF WBC: CPT

## 2023-05-23 RX ORDER — ENOXAPARIN SODIUM 100 MG/ML
40 INJECTION SUBCUTANEOUS 2 TIMES DAILY
Status: DISCONTINUED | OUTPATIENT
Start: 2023-05-23 | End: 2023-05-23 | Stop reason: HOSPADM

## 2023-05-23 RX ADMIN — INSULIN LISPRO 2 UNITS: 100 INJECTION, SOLUTION INTRAVENOUS; SUBCUTANEOUS at 11:31

## 2023-05-23 RX ADMIN — SODIUM CHLORIDE, PRESERVATIVE FREE 10 ML: 5 INJECTION INTRAVENOUS at 09:13

## 2023-05-23 RX ADMIN — ENOXAPARIN SODIUM 40 MG: 100 INJECTION SUBCUTANEOUS at 09:13

## 2023-05-23 RX ADMIN — INSULIN LISPRO 2 UNITS: 100 INJECTION, SOLUTION INTRAVENOUS; SUBCUTANEOUS at 07:00

## 2023-05-23 RX ADMIN — SODIUM CHLORIDE, PRESERVATIVE FREE 300 UNITS: 5 INJECTION INTRAVENOUS at 09:13

## 2023-05-23 NOTE — DISCHARGE SUMMARY
None. HISTORY ORDERING SYSTEM PROVIDED HISTORY: Gas in soft tissue on x ray TECHNOLOGIST PROVIDED HISTORY: Reason for exam:->Gas in soft tissue on x ray FINDINGS: Diffuse soft tissue swelling. Chronic posttraumatic or postsurgical deformity of the distal fibula. Chronic heterotopic ossification between the distal tibia and fibula. Previous hindfoot fusion without apparent hardware complication. Calcaneal spurs. Advanced degenerative/Charcot arthropathy of the midfoot with flatfoot deformity. Chronic postsurgical or posttraumatic deformity of the 5th metatarsal.  Soft tissue ulceration of the lateral distal forefoot with foci of soft tissue gas in the vicinity of the 4th toe and distal 4th metatarsal.  Focal lucency in the distal 4th metatarsal head suspicious for osteomyelitis. Degenerative 1st MTP joint. Findings suspicious for osteomyelitis involving the 4th metatarsal head. Soft tissue ulceration of the lateral forefoot with foci of soft tissue gas in the vicinity. Soft tissue swelling. Additional chronic degenerative and postsurgical findings. VL LOWER EXTREMITY ARTERIAL SEGMENTAL PRESSURES W PPG BILATERAL    Result Date: 5/16/2023  EXAMINATION: LOWER ARTERIAL DUPLEX BILATERAL WITH PVR AND SEGMENTAL PRESSURE 5/16/2023 3:12 pm TECHNIQUE: Arterial duplex MARK ultrasound. Segmental pressures and PVR performed with Doppler. COMPARISON: None. HISTORY: ORDERING SYSTEM PROVIDED HISTORY: chronic wounds TECHNOLOGIST PROVIDED HISTORY: Reason for exam:->chronic wounds What reading provider will be dictating this exam?->CRC FINDINGS: Previous left foot amputation. Planning for amputation of the right 4th and 5th digits. The MARK on the right is 1.3. The MARK on the left is 1.1. Right lower extremity: Arterial Doppler evaluation from the common femoral artery through to the dorsalis pedis artery demonstrates multiphasic waveforms throughout.  PVR evaluation from the right thigh to the right metatarsal demonstrates

## 2023-05-23 NOTE — CARE COORDINATION
Social Work discharge planning    Per Nicolle Shannon with [facility], they have precert approval and pt can go there today. She is checking on their [facility] availability to transport pt. Electronically signed by Nora Wright on 5/23/2023 at 8:34 AM     Addendum-    Per Nicolle Shannon with [facility], their [facility] can transport pt to [facility] skilled today at 1130am.  Charge RN Don Pretty aware. Electronically signed by Nora Wright on 5/23/2023 at 8:53 AM     Addendum- [facility] 7000 done and sent with pt to snf. Pt told Sw he already notified his wife.   Electronically signed by Nora Wright on 5/23/2023 at 10:39 AM

## 2023-05-23 NOTE — PROGRESS NOTES
[address] [place] [address] [facility]  DONNAIDA  Progress Note    SUBJECTIVE:  Chief Complaint   Patient presents with    Wound Check     Right foot     Patient is sitting up in the chair, in no distress  Tolerating antibiotics  Denies any complaints  No fevers    Review of systems:  As stated above in the chief complaint, otherwise negative. Medications:  Scheduled Meds:   heparin flush  3 mL IntraCATHeter 2 times per day    enoxaparin  30 mg SubCUTAneous BID    atorvastatin  5 mg Oral Nightly    insulin glargine  15 Units SubCUTAneous Nightly    insulin lispro  0-8 Units SubCUTAneous TID WC    insulin lispro  0-4 Units SubCUTAneous Nightly    meropenem  1,000 mg IntraVENous Q8H    amLODIPine  5 mg Oral Nightly    sodium chloride flush  5-40 mL IntraVENous 2 times per day     Continuous Infusions:   sodium chloride      sodium chloride      sodium chloride      dextrose      sodium chloride      sodium chloride       PRN Meds:sodium chloride flush, sodium chloride, sodium chloride flush, sodium chloride, heparin flush, oxyCODONE, sodium chloride flush, sodium chloride, polyethylene glycol, acetaminophen **OR** acetaminophen, glucose, dextrose bolus **OR** dextrose bolus, glucagon (rDNA), dextrose, potassium chloride **OR** potassium alternative oral replacement **OR** potassium chloride, sodium chloride flush, sodium chloride, sodium chloride flush, sodium chloride    OBJECTIVE:  /85   Pulse 74   Temp 98.1 °F (36.7 °C) (Oral)   Resp 18   Ht 6' 4\" (1.93 m)   Wt (!) 354 lb (160.6 kg)   SpO2 93%   BMI 43.09 kg/m²   Temp  Av °F (36.7 °C)  Min: 97.8 °F (36.6 °C)  Max: 98.2 °F (36.8 °C)  Constitutional: The patient is awake, alert, and oriented. Sitting up in the chair, in no distress. Playing a game on his phone  Skin: Warm and dry. No rashes were noted. HEENT: Round and reactive pupils. Moist mucous membranes. No ulcerations or thrush. Neck: Supple to movements.    Chest: No respiratory
[facility]   Progress note      Subjective: The patient is awake and alert. Moving bowels. PT in room during rounds. Sitting up in chair at bedside, wound vac applied. Eating lunch. No acute events overnight. Pain well controlled. Objective:    /85   Pulse 74   Temp 98.1 °F (36.7 °C) (Oral)   Resp 18   Ht 6' 4\" (1.93 m)   Wt (!) 354 lb (160.6 kg)   SpO2 93%   BMI 43.09 kg/m²     In: 440 [P.O.:440]  Out: 1650   In: 440   Out: 3464 [Urine:1650]    Constitutional: The patient is awake, alert, and oriented. Skin: Warm and dry. No rashes were noted. HEENT: Round and reactive pupils. Moist mucous membranes. Neck: Supple to movements. Chest: No use of accessory muscles to breathe. Symmetrical expansion. No wheezing, crackles or rhonchi. Cardiovascular: S1 and S2 are rhythmic and regular. No murmurs appreciated. Abdomen: Positive bowel sounds to auscultation. Benign to palpation. Extremities: Left TMA. Right foot wrapped with wound vac applied    Recent Labs     05/20/23  0323 05/21/23  0522 05/22/23  0640   WBC 10.7 9.3 9.0   HGB 12.3* 13.1 13.6   HCT 40.1 42.4 43.4   * 579* 570*       Recent Labs     05/20/23  0323 05/21/23  0522 05/22/23  0640    135 136   K 4.4 4.9 4.7   CL 99 101 101   CO2 27 26 27   BUN 9 12 16   CREATININE 0.8 0.9 0.8   CALCIUM 8.9 9.0 9.5       Assessment:    Principal Problem:    Diabetic foot infection (HCC)  Active Problems:    Diabetic infection of right foot ([facility])    Diabetic foot ulcer with osteomyelitis ([facility]  Resolved Problems:    * No resolved hospital problems.  *      Plan:    Patient is a 21-year-old male admitted to [facility] for  Diabetic foot infection  -Monitor labs  -WBC 16.6  -A1c 9.2, needs tighter glucose control  -Sed rate 76, CRP 24.1, Pro-Josue 0.37  -Continue IV Merrem, Zyvox  -ID and podiatry following  -Local wound care per podiatry  -Continue home Lantus 15 units nightly with medium dose ISS  -Cultures
Nurse to nurse phoned to [facility] @ [facility]. Wound vac removed and right foot incision covered with adaptic, moist dsg/saline, 4X4's and abd pads. Wrapped with ace wrap as ordered. Skin graft and staples intact. Pt tolerated well. [facility] transport to arrive @ 2880.
Occupational Therapy  OT BEDSIDE TREATMENT NOTE      Date:2023  Patient Name: Ming Mirza  MRN: [id number]  : 1975  Room: [address] [place] [address]     Evaluating OT: SHLOMO Mehta/L - [id number]     Referring Provider: Samra Albright DPM  Specific Provider Orders/Date: \"OT eval and treat\" - 2023     Diagnosis: Diabetic foot infection ([facility] [J78.470, L08.9]  Diabetic infection of right foot ([facility] [B93.736, L08.9]    Surgery: Patient underwent R foot open transmetatarsal amputation and open fasciotomy on 2023. Pertinent Medical History: L foot syme's amputation (L prosthetic foot), charcot foot, DM      Precautions: fall risk, NWB RLE for transfers ([facility] w/ walker), L prosthetic foot([place] brace)      Assessment of Current Deficits:    [x] Functional mobility             [x]ADLs           [x] Strength                  []Cognition   [x] Functional transfers           [x] IADLs         [x] Safety Awareness   [x]Endurance   [] Fine Coordination              [x] Balance      [] Vision/perception   []Sensation     []Gross Motor Coordination  [] ROM           [] Delirium                   [] Motor Control      OT PLAN OF CARE   OT POC is based on physician orders, patient diagnosis, and results of clinical assessment.   Frequency/Duration 2-5 days/week for 2 weeks PRN   Specific OT Treatment Interventions to Include:   * Instruction/training on adapted ADL techniques and AE recommendations to increase functional independence within precautions       * Training on energy conservation strategies, correct breathing pattern and techniques to improve independence/tolerance for self-care routine  * Functional transfer/mobility training/DME recommendations for increased independence, safety, and fall prevention  * Patient/Family education to increase follow through with safety techniques and functional independence  * Recommendation of environmental modifications for increased safety with functional
Physical Therapy  Facility/Department: [facility]  Physical Therapy Treatment Note    Name: Dianne Hopkins  : 1975  MRN: [id number]  Date of Service: 2023      Patient Diagnosis(es): The primary encounter diagnosis was Diabetic infection of right foot ([facility] Diagnoses of Gangrene ([facility] [facility] Right foot infection, Cellulitis of right lower extremity, and Diabetic foot ulcer with osteomyelitis ([facility] were also pertinent to this visit. Past Medical History:  has a past medical history of Charcot foot due to diabetes mellitus ([facility] Diabetes mellitus ([facility] Diabetic foot ulcer [facility]elitis [facility] and Hypertension. Past Surgical History:  has a past surgical history that includes Ankle Fusion (2020); Foot Debridement (Left, 10/17/2020); Foot Debridement (Left, 2020); Foot Amputation (Left, 2020); picc line insertion nurse (2020); Foot Debridement (Left, 2020); Foot Debridement (Right, 2022); Foot surgery (Right, 2023); picc line insertion nurse (2023); and Foot Debridement (Right, 2023). Evaluating Therapist: Karly Rosas PT    Room #:  4023/4860-A  Diagnosis:  Diabetic foot infection ([facility] [E11.628, L08.9]  Diabetic infection of right foot ([facility] [U91.316, L08.9]  PMHx/PSHx:  L syme's amputation 2 years ag, HTN, DM  Procedure/Surgery:  Open transmetatarsal amputation, right foot. Open fasciotomy, right foot, plantar compartment specifically.   Precautions:  NWB R LE for transfers, R LE wound vac      Social:  Pt lives with wife in a second floor apartment with 7 steps to enter. Independent without device. States he has been NWB several times over past few years. Has walkers, wheelchair and knee scooter but states he needs a new knee scooter     Initial Evaluation  Date: 23 Treatment  2023 Short Term/ Long Term   Goals   Was pt agreeable to Eval/treatment? yes yes    Does pt have pain?   No complaints    Bed Mobility  Rolling:
Spoke with Dr Nicolasa king for pt to dc to [facility], will be doing m-w-f wound vac changes there
This patient is on medication that requires renal, weight, and/or indication dose adjustment. Date Body Weight IBW  Adjusted BW SCr  CrCl Dialysis status   5/23/2023 (!) 353 lb (160.1 kg)   Ideal body weight: 86.8 kg (191 lb 5.7 oz)  Adjusted ideal body weight: 116.1 kg (256 lb 0.3 oz) Serum creatinine: 0.8 mg/dL 05/23/23 0504  Estimated creatinine clearance: 183 mL/min N/a       Pharmacy has dose-adjusted the following medication(s):    Date Previous Order Adjusted Order   5/23/2023 Lovenox 30 mg bid Lovenox 40 mg bid       These changes were made per protocol according to the [facility] Clinical Guidance for Pharmacists. *Please note this dose may need readjusted if patient's condition changes. Please contact pharmacy with any questions regarding these changes.     LUIS Read [facility]  5/23/2023  9:10 AM
Not Currently     Comment: 3 times a year maybe. very rare        Prior to Admission medications    Medication Sig Start Date End Date Taking? Authorizing Provider   Tirzepatide [facility]) 2.5 MG/0.5ML SOPN SC injection Inject 0.5 mLs into the skin once a week 5/18/23  Yes Nichol Singh DO   amLODIPine (NORVASC) 5 MG tablet Take 1 tablet by mouth at bedtime   Yes Historical Provider, MD   insulin lispro (HUMALOG) 100 UNIT/ML injection vial Inject 5 Units into the skin 3 times daily (with meals)  Patient not taking: Reported on 6/20/2022 12/8/20   Patsi Gaucher, MD   enoxaparin (LOVENOX) 40 MG/0.4ML injection Inject 0.4 mLs into the skin daily  Patient not taking: Reported on 6/20/2022 11/20/20   Navid Méndez MD   glucose (GLUTOSE) 40 % GEL Take 37.5 mLs by mouth as needed (Hypoglycemia) 11/19/20   Navid Méndez MD   insulin glargine (LANTUS) 100 UNIT/ML injection vial Inject 15 Units into the skin nightly    Historical Provider, MD   atorvastatin (LIPITOR) 10 MG tablet Take 0.5 tablets by mouth nightly    Historical Provider, MD   metFORMIN (GLUCOPHAGE) 500 MG tablet Take 1 tablet by mouth 2 times daily (with meals)    Historical Provider, MD        Vancomycin and Semaglutide         OBJECTIVE:        Vitals:    05/22/23 0639   BP: 133/85   Pulse: 74   Resp: 18   Temp: 98.1 °F (36.7 °C)   SpO2: 93%              EXAM:        Pt is AAOx3, NAD    Wound vac on and running at 125 mmHg continuous. Previous exam findings:   Vascular Exam:  DP and PT pulses are faintly palpable, likely due to edema. CFT is ~3 seconds bilateral lower extremity. Edema is present to RLE. Increased warmth present to R foot. Neuro Exam:  Light touch and protective sensation diminished. Dermatologic Exam:  Graft applied over distal lateral right foot stump. Silicone border overlying. Staples intact Sutures intact to the medial forefoot. MSK: MMT deferred at this time. No POP to R foot.

## 2024-04-29 ENCOUNTER — OFFICE VISIT (OUTPATIENT)
Dept: ENDOCRINOLOGY | Age: 49
End: 2024-04-29
Payer: MEDICAID

## 2024-04-29 VITALS
OXYGEN SATURATION: 96 % | HEART RATE: 100 BPM | BODY MASS INDEX: 36.57 KG/M2 | WEIGHT: 285 LBS | HEIGHT: 74 IN | DIASTOLIC BLOOD PRESSURE: 96 MMHG | SYSTOLIC BLOOD PRESSURE: 148 MMHG

## 2024-04-29 DIAGNOSIS — E11.9 TYPE 2 DIABETES MELLITUS WITHOUT COMPLICATION, UNSPECIFIED WHETHER LONG TERM INSULIN USE (HCC): Primary | ICD-10-CM

## 2024-04-29 DIAGNOSIS — E11.628 DIABETIC INFECTION OF RIGHT FOOT (HCC): ICD-10-CM

## 2024-04-29 DIAGNOSIS — L08.9 DIABETIC INFECTION OF RIGHT FOOT (HCC): ICD-10-CM

## 2024-04-29 LAB — HBA1C MFR BLD: 7.1 %

## 2024-04-29 PROCEDURE — 1036F TOBACCO NON-USER: CPT | Performed by: INTERNAL MEDICINE

## 2024-04-29 PROCEDURE — 99204 OFFICE O/P NEW MOD 45 MIN: CPT | Performed by: INTERNAL MEDICINE

## 2024-04-29 PROCEDURE — G8417 CALC BMI ABV UP PARAM F/U: HCPCS | Performed by: INTERNAL MEDICINE

## 2024-04-29 PROCEDURE — 3051F HG A1C>EQUAL 7.0%<8.0%: CPT | Performed by: INTERNAL MEDICINE

## 2024-04-29 PROCEDURE — 83036 HEMOGLOBIN GLYCOSYLATED A1C: CPT | Performed by: INTERNAL MEDICINE

## 2024-04-29 PROCEDURE — 2022F DILAT RTA XM EVC RTNOPTHY: CPT | Performed by: INTERNAL MEDICINE

## 2024-04-29 PROCEDURE — 3077F SYST BP >= 140 MM HG: CPT | Performed by: INTERNAL MEDICINE

## 2024-04-29 PROCEDURE — 3080F DIAST BP >= 90 MM HG: CPT | Performed by: INTERNAL MEDICINE

## 2024-04-29 PROCEDURE — G8428 CUR MEDS NOT DOCUMENT: HCPCS | Performed by: INTERNAL MEDICINE

## 2024-04-29 PROCEDURE — 3017F COLORECTAL CA SCREEN DOC REV: CPT | Performed by: INTERNAL MEDICINE

## 2024-04-29 RX ORDER — TIRZEPATIDE 2.5 MG/.5ML
2.5 INJECTION, SOLUTION SUBCUTANEOUS WEEKLY
Qty: 2 ML | Refills: 3 | Status: SHIPPED | OUTPATIENT
Start: 2024-04-29

## 2024-04-29 NOTE — PROGRESS NOTES
[facility] [place] [facility] END[facility]  [address], [address]  [facility] [address]  Dept: [phone]  Loc: [phone]   Date of Service: 4/29/2024  Primary Care Physician: Jean Marie Edwards MD  Referring physician: Jun Gomez DO  Provider: Tommy Gomez MD    Reason for the visit:  Diabetes type 2    History of Present Illness:  The history is provided by the patient. No  was used. Accuracy of the patient data is excellent.  Michael J Leschak is a very pleasant 49 y.o. male seen today for diabetes management     Michael J Leschak was diagnosed with diabetes at age 40   and currently on Metformin 500 mg BID , Lantus 20 units nightly   H/o rash with Ozempic   The patient has been checking blood sugar twice daily   Most recent A1c results summarized below  Lab Results   Component Value Date/Time    LABA1C 7.1 04/29/2024 08:53 AM    LABA1C 9.0 05/24/2023 05:20 AM    LABA1C 9.2 05/16/2023 02:30 AM     Patient has had no hypoglycemic episodes   The patient has been mindful of what has been eating and following diabetic diet as encouraged  I reviewed current medications and the patient has no issues with diabetes medications  Michael J Leschak is up to date with eye exam and denied any history of diabetic retinopathy   Microvascular complications:  No Retinopathy, Nephropathy +  Neuropathy , s/p Lt foot amputation, Rt transmetatarsal   Follow with podiatry every weeks   Macrovascular complications: no CAD, or Stroke  The patient receives Flushot every year     PAST MEDICAL HISTORY   Past Medical History:   Diagnosis Date    Charcot foot due to diabetes mellitus (HCC)     Diabetes mellitus (HCC)     Diabetic foot ulcer with osteomyelitis (HCC) 11/11/2020    Hypertension        PAST SURGICAL HISTORY   Past Surgical History:   Procedure Laterality Date    ANKLE FUSION  02/2020    FOOT AMPUTATION Left 11/14/2020    LEFT FOOT SYMES AMPUTATION performed by

## 2024-05-30 ENCOUNTER — TELEPHONE (OUTPATIENT)
Dept: ENDOCRINOLOGY | Age: 49
End: 2024-05-30

## 2024-05-30 NOTE — TELEPHONE ENCOUNTER
Patient called [facility] is not cover they want his to try something something else, he is  allergic to OZEMPIC

## 2024-05-31 RX ORDER — DULAGLUTIDE 0.75 MG/.5ML
0.75 INJECTION, SOLUTION SUBCUTANEOUS WEEKLY
COMMUNITY
End: 2024-05-31 | Stop reason: SDUPTHER

## 2024-05-31 RX ORDER — DULAGLUTIDE 0.75 MG/.5ML
0.75 INJECTION, SOLUTION SUBCUTANEOUS WEEKLY
Qty: 12 ADJUSTABLE DOSE PRE-FILLED PEN SYRINGE | Refills: 3 | Status: SHIPPED | OUTPATIENT
Start: 2024-05-31

## 2024-08-26 LAB
ALBUMIN SERPL-MCNC: 4.4 G/DL (ref 3.5–5.2)
ALBUMIN: 4.4 G/DL
ALP BLD-CCNC: 85 U/L
ALP SERPL-CCNC: 85 U/L (ref 40–129)
ALT SERPL-CCNC: 18 U/L
ALT SERPL-CCNC: 18 U/L (ref 0–40)
ANION GAP SERPL CALCULATED.3IONS-SCNC: 16 MMOL/L
ANION GAP SERPL CALCULATED.3IONS-SCNC: 16 MMOL/L (ref 7–16)
AST SERPL-CCNC: 17 U/L
AST SERPL-CCNC: 17 U/L (ref 0–39)
BILIRUB SERPL-MCNC: 0.7 MG/DL (ref 0.1–1.4)
BILIRUB SERPL-MCNC: 0.7 MG/DL (ref 0–1.2)
BUN BLDV-MCNC: 18 MG/DL
BUN SERPL-MCNC: 18 MG/DL (ref 6–20)
CALCIUM SERPL-MCNC: 10.4 MG/DL
CALCIUM SERPL-MCNC: 10.4 MG/DL (ref 8.6–10.2)
CHLORIDE BLD-SCNC: 101 MMOL/L
CHLORIDE SERPL-SCNC: 101 MMOL/L (ref 98–107)
CHOLEST SERPL-MCNC: 134 MG/DL
CHOLESTEROL, TOTAL: 134 MG/DL
CHOLESTEROL/HDL RATIO: NORMAL
CO2 SERPL-SCNC: 26 MMOL/L (ref 22–29)
CO2: 26 MMOL/L
CREAT SERPL-MCNC: 0.9 MG/DL
CREAT SERPL-MCNC: 0.9 MG/DL (ref 0.7–1.2)
CREAT UR-MCNC: 56.8 MG/DL (ref 40–278)
CREATININE URINE: 56.8 MG/DL
ESTIMATED AVERAGE GLUCOSE: NORMAL
GFR, ESTIMATED: >90
GFR, ESTIMATED: >90 ML/MIN/1.73M2
GLUCOSE BLD-MCNC: 125 MG/DL
GLUCOSE SERPL-MCNC: 125 MG/DL (ref 74–99)
HBA1C MFR BLD: 7.1 %
HBA1C MFR BLD: 7.1 % (ref 4–5.6)
HDLC SERPL-MCNC: 37 MG/DL
HDLC SERPL-MCNC: 37 MG/DL (ref 35–70)
LDL CHOLESTEROL: 43
LDLC SERPL CALC-MCNC: 43 MG/DL
MICROALBUMIN UR-MCNC: 202 MG/L (ref 0–19)
MICROALBUMIN/CREAT 24H UR: 202 MG/DL
MICROALBUMIN/CREAT UR-RTO: 355 MCG/MG CREAT (ref 0–30)
MICROALBUMIN/CREAT UR-RTO: 355 MG/G
NONHDLC SERPL-MCNC: NORMAL MG/DL
POTASSIUM SERPL-SCNC: 5 MMOL/L
POTASSIUM SERPL-SCNC: 5 MMOL/L (ref 3.5–5)
PROT SERPL-MCNC: 8.2 G/DL (ref 6.4–8.3)
SODIUM BLD-SCNC: 143 MMOL/L
SODIUM SERPL-SCNC: 143 MMOL/L (ref 132–146)
TOTAL PROTEIN: 8.2 G/DL (ref 6.4–8.2)
TRIGL SERPL-MCNC: 271 MG/DL
TRIGL SERPL-MCNC: 271 MG/DL
VLDLC SERPL CALC-MCNC: 54 MG/DL
VLDLC SERPL CALC-MCNC: 54 MG/DL

## 2024-08-29 ENCOUNTER — OFFICE VISIT (OUTPATIENT)
Dept: ENDOCRINOLOGY | Age: 49
End: 2024-08-29
Payer: MEDICAID

## 2024-08-29 VITALS
SYSTOLIC BLOOD PRESSURE: 124 MMHG | HEIGHT: 74 IN | BODY MASS INDEX: 36.57 KG/M2 | WEIGHT: 285 LBS | DIASTOLIC BLOOD PRESSURE: 80 MMHG

## 2024-08-29 DIAGNOSIS — E66.01 CLASS 2 SEVERE OBESITY DUE TO EXCESS CALORIES WITH SERIOUS COMORBIDITY AND BODY MASS INDEX (BMI) OF 36.0 TO 36.9 IN ADULT (HCC): ICD-10-CM

## 2024-08-29 DIAGNOSIS — E11.9 TYPE 2 DIABETES MELLITUS WITHOUT COMPLICATION, UNSPECIFIED WHETHER LONG TERM INSULIN USE (HCC): ICD-10-CM

## 2024-08-29 DIAGNOSIS — E11.9 TYPE 2 DIABETES MELLITUS WITHOUT COMPLICATION, UNSPECIFIED WHETHER LONG TERM INSULIN USE (HCC): Primary | ICD-10-CM

## 2024-08-29 DIAGNOSIS — L97.519 ULCER OF RIGHT FOOT, UNSPECIFIED ULCER STAGE (HCC): ICD-10-CM

## 2024-08-29 PROCEDURE — G8417 CALC BMI ABV UP PARAM F/U: HCPCS | Performed by: NURSE PRACTITIONER

## 2024-08-29 PROCEDURE — G8427 DOCREV CUR MEDS BY ELIG CLIN: HCPCS | Performed by: NURSE PRACTITIONER

## 2024-08-29 PROCEDURE — 2022F DILAT RTA XM EVC RTNOPTHY: CPT | Performed by: NURSE PRACTITIONER

## 2024-08-29 PROCEDURE — 3074F SYST BP LT 130 MM HG: CPT | Performed by: NURSE PRACTITIONER

## 2024-08-29 PROCEDURE — 3079F DIAST BP 80-89 MM HG: CPT | Performed by: NURSE PRACTITIONER

## 2024-08-29 PROCEDURE — 99214 OFFICE O/P EST MOD 30 MIN: CPT | Performed by: NURSE PRACTITIONER

## 2024-08-29 PROCEDURE — 3051F HG A1C>EQUAL 7.0%<8.0%: CPT | Performed by: NURSE PRACTITIONER

## 2024-08-29 PROCEDURE — 3017F COLORECTAL CA SCREEN DOC REV: CPT | Performed by: NURSE PRACTITIONER

## 2024-08-29 PROCEDURE — 1036F TOBACCO NON-USER: CPT | Performed by: NURSE PRACTITIONER

## 2024-08-29 RX ORDER — KETOROLAC TROMETHAMINE 30 MG/ML
INJECTION, SOLUTION INTRAMUSCULAR; INTRAVENOUS
Qty: 1 EACH | Refills: 0 | Status: SHIPPED | OUTPATIENT
Start: 2024-08-29

## 2024-08-29 RX ORDER — DULAGLUTIDE 1.5 MG/.5ML
INJECTION, SOLUTION SUBCUTANEOUS
Qty: 4 ADJUSTABLE DOSE PRE-FILLED PEN SYRINGE | Refills: 1 | Status: SHIPPED | OUTPATIENT
Start: 2024-08-29

## 2024-08-29 RX ORDER — BLOOD-GLUCOSE SENSOR
EACH MISCELLANEOUS
Qty: 6 EACH | Refills: 3 | Status: SHIPPED | OUTPATIENT
Start: 2024-08-29

## 2024-08-29 NOTE — PROGRESS NOTES
[facility] ENDO  [address]  [facility] [address]  Dept: [phone]  Loc: [phone]   Date of Service: 8/29/2024  Primary Care Physician: Pro Stewart MD  Referring physician: No ref. provider found  Provider: OPAL Mullen NP    Reason for the visit:    Diabetes type 2    History of Present Illness:  The history is provided by the patient. No  was used. Accuracy of the patient data is excellent.  Michael J Leschak is a very pleasant 49 y.o. male seen today for diabetes management     Michael J Leschak was diagnosed with diabetes at age 40   and currently on Metformin 500 mg BID , Lantus 20 units nightly, Trulicity  0.75 mg weekly  ( had been without for 2 months)    H/o rash with Ozempic     The patient has been checking blood sugar twice daily   Per recall FBS  105-130  AT HS  pre recall  130-160    Most recent A1c results summarized below  Lab Results   Component Value Date/Time    LABA1C 7.1 08/26/2024 02:00 PM    LABA1C 7.1 04/29/2024 08:53 AM    LABA1C 9.0 05/24/2023 05:20 AM     Patient has had no hypoglycemic episodes   The patient has been mindful of what has been eating and following diabetic diet as encouraged  I reviewed current medications and the patient has no issues with diabetes medications  Michael J Leschak is up to date with eye exam and denied any history of diabetic retinopathy   Microvascular complications:  No Retinopathy, Nephropathy +  Neuropathy , s/p Lt foot amputation, Rt transmetatarsal   Follow with podiatry every weeks   Macrovascular complications: no CAD, or Stroke  The patient receives Flushot every year     PAST MEDICAL HISTORY   Past Medical History:   Diagnosis Date    Charcot foot due to diabetes mellitus (HCC)     Diabetes mellitus (HCC)     Diabetic foot ulcer with osteomyelitis (HCC) 11/11/2020    Hypertension        PAST SURGICAL HISTORY   Past Surgical History:

## 2024-09-27 ENCOUNTER — TELEPHONE (OUTPATIENT)
Dept: ENDOCRINOLOGY | Age: 49
End: 2024-09-27

## 2024-09-27 NOTE — TELEPHONE ENCOUNTER
Patient called to have the increased dose of trulicity sent to pharmacy. Currently taking 1.5mg and states it needs increased to 3mg

## 2024-09-30 DIAGNOSIS — E11.9 TYPE 2 DIABETES MELLITUS WITHOUT COMPLICATION, UNSPECIFIED WHETHER LONG TERM INSULIN USE (HCC): Primary | ICD-10-CM

## 2024-09-30 RX ORDER — DULAGLUTIDE 3 MG/.5ML
3 INJECTION, SOLUTION SUBCUTANEOUS WEEKLY
Qty: 4 ADJUSTABLE DOSE PRE-FILLED PEN SYRINGE | Refills: 3 | Status: SHIPPED | OUTPATIENT
Start: 2024-09-30

## 2024-09-30 NOTE — TELEPHONE ENCOUNTER
I INCREASED TRULICITY TO 3MG BUT I NEED TO DECREASE HIS LONG ACTING. WHAT IS HIS CURRENT DOSE OF INUSLIN

## 2025-02-06 ENCOUNTER — OFFICE VISIT (OUTPATIENT)
Dept: ENDOCRINOLOGY | Age: 50
End: 2025-02-06
Payer: MEDICARE

## 2025-02-06 VITALS
TEMPERATURE: 97.3 F | BODY MASS INDEX: 36.59 KG/M2 | DIASTOLIC BLOOD PRESSURE: 70 MMHG | SYSTOLIC BLOOD PRESSURE: 124 MMHG | HEIGHT: 74 IN

## 2025-02-06 DIAGNOSIS — E11.9 TYPE 2 DIABETES MELLITUS WITHOUT COMPLICATION, UNSPECIFIED WHETHER LONG TERM INSULIN USE (HCC): Primary | ICD-10-CM

## 2025-02-06 DIAGNOSIS — R13.10 SWALLOWING PROBLEM: ICD-10-CM

## 2025-02-06 LAB — HBA1C MFR BLD: 6.7 %

## 2025-02-06 PROCEDURE — 3078F DIAST BP <80 MM HG: CPT | Performed by: NURSE PRACTITIONER

## 2025-02-06 PROCEDURE — G8417 CALC BMI ABV UP PARAM F/U: HCPCS | Performed by: NURSE PRACTITIONER

## 2025-02-06 PROCEDURE — 3044F HG A1C LEVEL LT 7.0%: CPT | Performed by: NURSE PRACTITIONER

## 2025-02-06 PROCEDURE — 83036 HEMOGLOBIN GLYCOSYLATED A1C: CPT | Performed by: NURSE PRACTITIONER

## 2025-02-06 PROCEDURE — 3074F SYST BP LT 130 MM HG: CPT | Performed by: NURSE PRACTITIONER

## 2025-02-06 PROCEDURE — 2022F DILAT RTA XM EVC RTNOPTHY: CPT | Performed by: NURSE PRACTITIONER

## 2025-02-06 PROCEDURE — 3017F COLORECTAL CA SCREEN DOC REV: CPT | Performed by: NURSE PRACTITIONER

## 2025-02-06 PROCEDURE — 99214 OFFICE O/P EST MOD 30 MIN: CPT | Performed by: NURSE PRACTITIONER

## 2025-02-06 PROCEDURE — G8428 CUR MEDS NOT DOCUMENT: HCPCS | Performed by: NURSE PRACTITIONER

## 2025-02-06 PROCEDURE — 1036F TOBACCO NON-USER: CPT | Performed by: NURSE PRACTITIONER

## 2025-02-06 RX ORDER — GLUCOSAMINE HCL/CHONDROITIN SU 500-400 MG
CAPSULE ORAL
Qty: 300 STRIP | Refills: 4 | Status: SHIPPED | OUTPATIENT
Start: 2025-02-06

## 2025-02-06 RX ORDER — LANCETS 30 GAUGE
1 EACH MISCELLANEOUS 4 TIMES DAILY
Qty: 600 EACH | Refills: 1 | Status: SHIPPED | OUTPATIENT
Start: 2025-02-06

## 2025-02-06 RX ORDER — BLOOD-GLUCOSE METER
KIT MISCELLANEOUS
Qty: 1 KIT | Refills: 0 | Status: SHIPPED | OUTPATIENT
Start: 2025-02-06

## 2025-02-06 NOTE — PROGRESS NOTES
[facility] BD ENDO  [address]  [facility] [address]  Dept: [phone]  Loc: [phone]   Date of Service: 2/6/2025  Primary Care Physician: Pro Stewart MD  Referring physician: No ref. provider found  Provider: OPAL Mullen NP    Reason for the visit:    Diabetes type 2    History of Present Illness:  The history is provided by the patient. No  was used. Accuracy of the patient data is excellent.  Michael J Leschak is a very pleasant 50 y.o. male seen today for diabetes management     Last OV  8/2024    Michael J Leschak was diagnosed with diabetes at age 40   and currently on Metformin 500 mg BID, , Lantus 14  units nightly, Trulicity  3  mg weekly    H/o rash with Ozempic    Arturo not adhering      The patient has been checking blood sugar twice daily   Per recall -120  AT HS  pre recall  130-160    Most recent A1c results summarized below  Lab Results   Component Value Date/Time    LABA1C 6.7 02/06/2025 11:55 AM    LABA1C 7.1 08/26/2024 02:00 PM    LABA1C 7.1 08/26/2024 12:00 AM     Patient has had no hypoglycemic episodes   The patient has been mindful of what has been eating and following diabetic diet as encouraged  I reviewed current medications and the patient has no issues with diabetes medications  Michael J Leschak is up to date with eye exam and denied any history of diabetic retinopathy   Microvascular complications:  No Retinopathy, Nephropathy +  Neuropathy , s/p Lt foot amputation, Rt transmetatarsal   Follow with podiatry every weeks due to right foot wound  Macrovascular complications: no CAD, or Stroke  The patient receives Flushot every year     PAST MEDICAL HISTORY   Past Medical History:   Diagnosis Date    Charcot foot due to diabetes mellitus (HCC)     Diabetes mellitus (HCC)     Diabetic foot ulcer with osteomyelitis (HCC) 11/11/2020    Hypertension        PAST SURGICAL HISTORY

## 2025-02-07 LAB
T4 FREE SERPL-MCNC: 1.2 NG/DL (ref 0.9–1.7)
TSH SERPL DL<=0.05 MIU/L-ACNC: 3.42 UIU/ML (ref 0.27–4.2)

## 2025-02-10 NOTE — PROGRESS NOTES
Consult . In OR today per Dr. Kenn Campos  Will defer to podiatry  Mely Francis.  Rissa Harris, CNS, Wound Care Provider E-Visit time total (minutes): 2

## 2025-06-10 ENCOUNTER — OFFICE VISIT (OUTPATIENT)
Dept: ENDOCRINOLOGY | Age: 50
End: 2025-06-10
Payer: MEDICARE

## 2025-06-10 VITALS
BODY MASS INDEX: 36.57 KG/M2 | HEIGHT: 74 IN | HEART RATE: 92 BPM | OXYGEN SATURATION: 99 % | SYSTOLIC BLOOD PRESSURE: 130 MMHG | RESPIRATION RATE: 18 BRPM | DIASTOLIC BLOOD PRESSURE: 76 MMHG | WEIGHT: 285 LBS | TEMPERATURE: 97.7 F

## 2025-06-10 DIAGNOSIS — L97.519 ULCER OF RIGHT FOOT, UNSPECIFIED ULCER STAGE (HCC): ICD-10-CM

## 2025-06-10 DIAGNOSIS — E11.9 TYPE 2 DIABETES MELLITUS WITHOUT COMPLICATION, UNSPECIFIED WHETHER LONG TERM INSULIN USE (HCC): Primary | ICD-10-CM

## 2025-06-10 DIAGNOSIS — E66.812 CLASS 2 SEVERE OBESITY DUE TO EXCESS CALORIES WITH SERIOUS COMORBIDITY AND BODY MASS INDEX (BMI) OF 36.0 TO 36.9 IN ADULT (HCC): ICD-10-CM

## 2025-06-10 DIAGNOSIS — E66.01 CLASS 2 SEVERE OBESITY DUE TO EXCESS CALORIES WITH SERIOUS COMORBIDITY AND BODY MASS INDEX (BMI) OF 36.0 TO 36.9 IN ADULT (HCC): ICD-10-CM

## 2025-06-10 LAB — HBA1C MFR BLD: 6.7 %

## 2025-06-10 PROCEDURE — 3075F SYST BP GE 130 - 139MM HG: CPT | Performed by: NURSE PRACTITIONER

## 2025-06-10 PROCEDURE — 3078F DIAST BP <80 MM HG: CPT | Performed by: NURSE PRACTITIONER

## 2025-06-10 PROCEDURE — 1036F TOBACCO NON-USER: CPT | Performed by: NURSE PRACTITIONER

## 2025-06-10 PROCEDURE — 83036 HEMOGLOBIN GLYCOSYLATED A1C: CPT | Performed by: NURSE PRACTITIONER

## 2025-06-10 PROCEDURE — G8427 DOCREV CUR MEDS BY ELIG CLIN: HCPCS | Performed by: NURSE PRACTITIONER

## 2025-06-10 PROCEDURE — 2022F DILAT RTA XM EVC RTNOPTHY: CPT | Performed by: NURSE PRACTITIONER

## 2025-06-10 PROCEDURE — 3017F COLORECTAL CA SCREEN DOC REV: CPT | Performed by: NURSE PRACTITIONER

## 2025-06-10 PROCEDURE — G8417 CALC BMI ABV UP PARAM F/U: HCPCS | Performed by: NURSE PRACTITIONER

## 2025-06-10 PROCEDURE — 3044F HG A1C LEVEL LT 7.0%: CPT | Performed by: NURSE PRACTITIONER

## 2025-06-10 PROCEDURE — 99214 OFFICE O/P EST MOD 30 MIN: CPT | Performed by: NURSE PRACTITIONER

## 2025-06-10 RX ORDER — INSULIN GLARGINE 100 [IU]/ML
INJECTION, SOLUTION SUBCUTANEOUS
Qty: 3 ADJUSTABLE DOSE PRE-FILLED PEN SYRINGE | Refills: 3 | Status: SHIPPED
Start: 2025-06-10

## 2025-06-10 RX ORDER — TIRZEPATIDE 7.5 MG/.5ML
7.5 INJECTION, SOLUTION SUBCUTANEOUS WEEKLY
Qty: 2 ML | Refills: 4 | Status: SHIPPED | OUTPATIENT
Start: 2025-06-10

## 2025-06-10 NOTE — PROGRESS NOTES
[facility] END[facility]  [address]  [facility] [address]  Dept: [phone]  Loc: [phone]   Date of Service: 6/10/2025  Primary Care Physician: Pro Stewart MD  Referring physician: No ref. provider found  Provider: OPAL Mullen NP    Reason for the visit:    Diabetes type 2    History of Present Illness:  The history is provided by the patient. No  was used. Accuracy of the patient data is excellent.  Michael J Leschak is a very pleasant 50 y.o. male seen today for diabetes management     Last OV  8/2024    Michael J Leschak was diagnosed with diabetes at age 40   and currently on Metformin 500 mg BID, Lantus 10 units nightly, Trulicity 4.5 mg weekly    H/o rash with Ozempic    Arturo not adhering      The patient has been checking blood sugar twice daily   Per recall -125  AT HS  pre recall 115-140    Most recent A1c results summarized below  Lab Results   Component Value Date/Time    LABA1C 6.7 06/10/2025 11:31 AM    LABA1C 6.7 02/06/2025 11:55 AM    LABA1C 7.1 08/26/2024 02:00 PM     Patient has had no hypoglycemic episodes   The patient has been mindful of what has been eating and following diabetic diet as encouraged  I reviewed current medications and the patient has no issues with diabetes medications  Michael J Leschak is up to date with eye exam and denied any history of diabetic retinopathy   Microvascular complications:  No Retinopathy, Nephropathy +  Neuropathy , s/p Lt foot amputation, Rt transmetatarsal   Follow with podiatry every weeks due to right foot wound  Macrovascular complications: no CAD, or Stroke  The patient receives Flushot every year     PAST MEDICAL HISTORY   Past Medical History:   Diagnosis Date    Charcot foot due to diabetes mellitus (HCC)     Diabetes mellitus (HCC)     Diabetic foot ulcer with osteomyelitis (HCC) 11/11/2020    Hypertension        PAST SURGICAL HISTORY   Past

## (undated) DEVICE — TOWEL,OR,DSP,ST,BLUE,STD,6/PK,12PK/CS: Brand: MEDLINE

## (undated) DEVICE — PACK PROCEDURE SURG GEN CUST

## (undated) DEVICE — SOLUTION IV 1000ML 0.9% SOD CHL PH 5 INJ USP VIAFLX PLAS

## (undated) DEVICE — DRESSING NEG PRSS SM 10X7.5X3.3CM POLYUR FOR WND THER VAC

## (undated) DEVICE — SWAB SPEC COLL SHFT L5.25IN POLYUR FOAM TIP SFT DBL MEDIA

## (undated) DEVICE — GAUZE,SPONGE,4"X4",8PLY,STRL,LF,10/TRAY: Brand: MEDLINE

## (undated) DEVICE — BANDAGE,GAUZE,BULKEE II,4.5"X4.1YD,STRL: Brand: MEDLINE

## (undated) DEVICE — STERILE PVP: Brand: MEDLINE INDUSTRIES, INC.

## (undated) DEVICE — READY WET SKIN SCRUB TRAY-LF: Brand: MEDLINE INDUSTRIES, INC.

## (undated) DEVICE — TUBING, SUCTION, 9/32" X 10', STRAIGHT: Brand: MEDLINE

## (undated) DEVICE — DOUBLE BASIN SET: Brand: MEDLINE INDUSTRIES, INC.

## (undated) DEVICE — ELECTRODE PT RET AD L9FT HI MOIST COND ADH HYDRGEL CORDED

## (undated) DEVICE — SPONGE,LAP,4"X18",XR,ST,5/PK,40PK/CS: Brand: MEDLINE INDUSTRIES, INC.

## (undated) DEVICE — CONTAINER VACUTAINER ANAER CULTURE SWAB

## (undated) DEVICE — SYRINGE MED 30ML STD CLR PLAS LUERLOCK TIP N CTRL DISP

## (undated) DEVICE — BAG: SPONGE CT 10.25X32 2.0ML BLU 250/CS: Brand: MEDICAL ACTION INDUSTRIES

## (undated) DEVICE — GOWN,SIRUS,FABRNF,XL,20/CS: Brand: MEDLINE

## (undated) DEVICE — GAUZE,PACKING STRIP,PLAIN,1/2"X5YD,STRL: Brand: CURAD

## (undated) DEVICE — STAPLER SKIN L39MM DIA0.53MM CRWN 5.7MM S STL FIX HD PROX

## (undated) DEVICE — SOLUTION IRRIG 1000ML 0.9% SOD CHL USP POUR PLAS BTL

## (undated) DEVICE — CHLORAPREP 26ML ORANGE

## (undated) DEVICE — NEEDLE JAMSH BNE MAR 13G X 2

## (undated) DEVICE — NEEDLE SYR 18GA L1.5IN RED PLAS HUB S STL BLNT FILL W/O

## (undated) DEVICE — COVER HNDL LT DISP

## (undated) DEVICE — SET ORTHO STD STORTSTD1

## (undated) DEVICE — NEEDLE HYPO 25GA L1.5IN BLU POLYPR HUB S STL REG BVL STR

## (undated) DEVICE — BANDAGE,GAUZE,4.5"X4.1YD,STERILE,LF: Brand: MEDLINE

## (undated) DEVICE — INTEGRA® MESHED BILAYER WOUND MATRIX 4 IN*5 IN (10 CM*12.5 CM): Brand: INTEGRA®

## (undated) DEVICE — Device

## (undated) DEVICE — BNDG,ELSTC,MATRIX,STRL,3"X5YD,LF,HOOK&LP: Brand: MEDLINE

## (undated) DEVICE — Z DISCONTINUED GLOVE SURG SZ 7 L12IN FNGR THK13MIL WHT ISOLEX POLYISOPRENE

## (undated) DEVICE — 4-PORT MANIFOLD: Brand: NEPTUNE 2

## (undated) DEVICE — BNDG,ELSTC,MATRIX,STRL,4"X5YD,LF,HOOK&LP: Brand: MEDLINE

## (undated) DEVICE — SET ORTHO STD STORTSTD2

## (undated) DEVICE — Z INACTIVE USE 2660664 SOLUTION IRRIG 3000ML 0.9% SOD CHL USP UROMATIC PLAS CONT

## (undated) DEVICE — INTENDED FOR TISSUE SEPARATION, AND OTHER PROCEDURES THAT REQUIRE A SHARP SURGICAL BLADE TO PUNCTURE OR CUT.: Brand: BARD-PARKER ® STAINLESS STEEL BLADES

## (undated) DEVICE — GLOVE ORANGE PI 7 1/2   MSG9075

## (undated) DEVICE — GLOVE ORANGE PI 7   MSG9070

## (undated) DEVICE — DRESSING PETRO W3XL8IN OIL EMUL N ADH GZ KNIT IMPREG CELOS

## (undated) DEVICE — RETRACTOR WEITLANER SM

## (undated) DEVICE — BANDAGE,GAUZE,CONFORMING,3"X75",STRL,LF: Brand: MEDLINE

## (undated) DEVICE — CANISTER NEG PRSS 1000ML W/ GEL INFOVAC

## (undated) DEVICE — NEEDLE,22GX1.5",REG,BEVEL: Brand: MEDLINE

## (undated) DEVICE — SET PSI

## (undated) DEVICE — BANDAGE,ELASTIC,ESMARK,STERILE,4"X9',LF: Brand: MEDLINE

## (undated) DEVICE — 6 X 9  1.75MIL 4-WALL LABGUARD: Brand: MINIGRIP COMMERCIAL LLC

## (undated) DEVICE — TUBE IRRIG HNDPC HI FLO TP INTRPULS W/SUCTION TUBE

## (undated) DEVICE — GLOVE ORTHO 7   MSG9470

## (undated) DEVICE — TOTAL KNEE PK

## (undated) DEVICE — SYRINGE MED 10ML POLYPR LUERSLIP TIP FLAT TOP W/O SFTY DISP

## (undated) DEVICE — SOLUTION IRRIGATION SODIUM CHL 0.9% TITAN XL CONTAINER 4/CA

## (undated) DEVICE — YANKAUER,OPEN TIP,W/O VENT,STERILE: Brand: MEDLINE INDUSTRIES, INC.

## (undated) DEVICE — DRESSING WND VAC XLG GRANUFOAM SENSATRAC

## (undated) DEVICE — CONTROL SYRINGE LUER-LOCK TIP: Brand: MONOJECT

## (undated) DEVICE — GOWN,SIRUS,FABRNF,L,20/CS: Brand: MEDLINE

## (undated) DEVICE — SOLUTION IV IRRIG POUR BRL 0.9% SODIUM CHL 2F7124

## (undated) DEVICE — SYSTEM TPS ORTHO

## (undated) DEVICE — PAD,ABDOMINAL,5"X9",ST,LF,25/BX: Brand: MEDLINE INDUSTRIES, INC.

## (undated) DEVICE — SPONGE LAP W18XL18IN WHT COT 4 PLY FLD STRUNG RADPQ DISP ST

## (undated) DEVICE — CONTAINER SPEC ANAERB VACTNR

## (undated) DEVICE — GOWN,SIRUS,FABRNF,2XL,18/CS: Brand: MEDLINE

## (undated) DEVICE — TRAP SPEC MUCUS FOR SUCT

## (undated) DEVICE — DRAPE,EXTREMITY,89X128,STERILE: Brand: MEDLINE

## (undated) DEVICE — GLOVE ORANGE PI 8   MSG9080

## (undated) DEVICE — SURGICAL PROCEDURE PACK BASIC

## (undated) DEVICE — ELECTROSURGICAL PENCIL BUTTON SWITCH E-Z CLEAN COATED BLADE ELECTRODE 10 FT (3 M) CORD HOLSTER: Brand: MEGADYNE

## (undated) DEVICE — ZIMMER® STERILE DISPOSABLE TOURNIQUET CUFF WITH PROTECTIVE SLEEVE AND PLC, DUAL PORT, SINGLE BLADDER, 34 IN. (86 CM)

## (undated) DEVICE — GAUZE,SPONGE,AVANT,4"X4",4PLY,STRL,10/TR: Brand: MEDLINE

## (undated) DEVICE — DRESSING GZ W1XL8IN COT XRFRM N ADH OVERWRAP CURAD

## (undated) DEVICE — TUBING SUCT 12FR MAL ALUM SHFT FN CAP VENT UNIV CONN W/ OBT

## (undated) DEVICE — INSTRUMENT CURRETTES REUSABLE

## (undated) DEVICE — STRYKER PERFORMANCE SERIES SAGITTAL BLADE: Brand: STRYKER PERFORMANCE SERIES

## (undated) DEVICE — DRAIN SURG 15FR SIL RND CHN W/ TRCR FULL FLUT DBL WRP TRAD

## (undated) DEVICE — TRAP,MUCUS SPECIMEN,40CC: Brand: MEDLINE

## (undated) DEVICE — SYRINGE MED 10ML LUERLOCK TIP W/O SFTY DISP

## (undated) DEVICE — SUTURE VCRL SZ 2-0 L27IN ABSRB UD L26MM SH 1/2 CIR J417H

## (undated) DEVICE — DRESSING WND VAC MED GRANUFOAM SENSATRAC

## (undated) DEVICE — GLOVE ORTHO 7 1/2   MSG9475

## (undated) DEVICE — BLADE,STAINLESS-STEEL,10,STRL,DISPOSABLE: Brand: MEDLINE

## (undated) DEVICE — DRESSING,GAUZE,XEROFORM,CURAD,1"X8",ST: Brand: CURAD

## (undated) DEVICE — HANDPIECE SET WITH COAXIAL HIGH FLOW TIP AND SUCTION TUBE: Brand: INTERPULSE